# Patient Record
Sex: FEMALE | Race: WHITE | Employment: PART TIME | URBAN - NONMETROPOLITAN AREA
[De-identification: names, ages, dates, MRNs, and addresses within clinical notes are randomized per-mention and may not be internally consistent; named-entity substitution may affect disease eponyms.]

---

## 2018-07-31 ENCOUNTER — OFFICE VISIT (OUTPATIENT)
Dept: FAMILY MEDICINE CLINIC | Age: 38
End: 2018-07-31
Payer: MEDICAID

## 2018-07-31 VITALS
RESPIRATION RATE: 18 BRPM | SYSTOLIC BLOOD PRESSURE: 120 MMHG | WEIGHT: 238 LBS | BODY MASS INDEX: 38.25 KG/M2 | HEIGHT: 66 IN | DIASTOLIC BLOOD PRESSURE: 70 MMHG | HEART RATE: 72 BPM

## 2018-07-31 DIAGNOSIS — B35.1 ONYCHOMYCOSIS: ICD-10-CM

## 2018-07-31 DIAGNOSIS — M54.50 CHRONIC LOW BACK PAIN WITHOUT SCIATICA, UNSPECIFIED BACK PAIN LATERALITY: Primary | ICD-10-CM

## 2018-07-31 DIAGNOSIS — G89.29 CHRONIC NECK PAIN: ICD-10-CM

## 2018-07-31 DIAGNOSIS — M54.2 CHRONIC NECK PAIN: ICD-10-CM

## 2018-07-31 DIAGNOSIS — G89.29 CHRONIC LOW BACK PAIN WITHOUT SCIATICA, UNSPECIFIED BACK PAIN LATERALITY: Primary | ICD-10-CM

## 2018-07-31 PROCEDURE — 99203 OFFICE O/P NEW LOW 30 MIN: CPT | Performed by: INTERNAL MEDICINE

## 2018-07-31 PROCEDURE — G8427 DOCREV CUR MEDS BY ELIG CLIN: HCPCS | Performed by: INTERNAL MEDICINE

## 2018-07-31 PROCEDURE — G8417 CALC BMI ABV UP PARAM F/U: HCPCS | Performed by: INTERNAL MEDICINE

## 2018-07-31 PROCEDURE — 1036F TOBACCO NON-USER: CPT | Performed by: INTERNAL MEDICINE

## 2018-07-31 RX ORDER — MELOXICAM 15 MG/1
15 TABLET ORAL DAILY
Qty: 30 TABLET | Refills: 0 | Status: SHIPPED | OUTPATIENT
Start: 2018-07-31 | End: 2018-08-28 | Stop reason: SDUPTHER

## 2018-07-31 RX ORDER — OMEPRAZOLE 20 MG/1
20 CAPSULE, DELAYED RELEASE ORAL DAILY
COMMUNITY
End: 2018-09-19 | Stop reason: SDUPTHER

## 2018-07-31 RX ORDER — IBUPROFEN 800 MG/1
800 TABLET ORAL EVERY 6 HOURS PRN
COMMUNITY
End: 2018-09-19 | Stop reason: SDUPTHER

## 2018-07-31 ASSESSMENT — ENCOUNTER SYMPTOMS
ABDOMINAL PAIN: 0
BOWEL INCONTINENCE: 0
COUGH: 0
SORE THROAT: 0
BLURRED VISION: 0
SHORTNESS OF BREATH: 0
HEARTBURN: 0
BACK PAIN: 1
NAUSEA: 0

## 2018-07-31 ASSESSMENT — PATIENT HEALTH QUESTIONNAIRE - PHQ9
1. LITTLE INTEREST OR PLEASURE IN DOING THINGS: 1
2. FEELING DOWN, DEPRESSED OR HOPELESS: 1
SUM OF ALL RESPONSES TO PHQ9 QUESTIONS 1 & 2: 2
SUM OF ALL RESPONSES TO PHQ QUESTIONS 1-9: 2

## 2018-07-31 NOTE — PROGRESS NOTES
Musculoskeletal: Positive for back pain and neck pain. Skin: Negative for rash. Neurological: Negative for dizziness and headaches. Psychiatric/Behavioral: Negative for depression. The patient is not nervous/anxious and does not have insomnia. Physical Exam:     Vitals:  /70 (Site: Left Arm, Position: Sitting, Cuff Size: Medium Adult)   Pulse 72   Resp 18   Ht 5' 6\" (1.676 m)   Wt 238 lb (108 kg)   LMP 07/14/2018   BMI 38.41 kg/m²       Physical Exam   Constitutional: She is oriented to person, place, and time. She appears well-developed and well-nourished. No distress. HENT:   Head: Normocephalic and atraumatic. Right Ear: External ear normal.   Left Ear: External ear normal.   Mouth/Throat: Oropharynx is clear and moist. No oropharyngeal exudate. Neck: No thyromegaly present. Cardiovascular: Normal rate, regular rhythm and normal heart sounds. No murmur heard. Pulmonary/Chest: Effort normal and breath sounds normal. She has no wheezes. She has no rales. Abdominal: Soft. Bowel sounds are normal. She exhibits no distension and no mass. There is no tenderness. Musculoskeletal: Normal range of motion. She exhibits tenderness (over the neck area and lumbar spine, range of motion normal). She exhibits no edema or deformity. Lymphadenopathy:     She has no cervical adenopathy. Neurological: She is alert and oriented to person, place, and time. No cranial nerve deficit. Skin: Skin is warm and dry. No rash noted. Her toenails are painted with red nail polish and I am not able to see the extent of fungal infection. The nails look very thick   Psychiatric: She has a normal mood and affect. Her behavior is normal. Judgment normal.   Vitals reviewed.             Data:     No results found for: NA, K, CL, CO2, BUN, CREATININE, GLUCOSE, PROT, LABALBU, BILITOT, ALKPHOS, AST, ALT  No results found for: WBC, RBC, HGB, HCT, MCV, MCH, MCHC, RDW, PLT, MPV  No results found for:

## 2018-08-05 ENCOUNTER — HOSPITAL ENCOUNTER (EMERGENCY)
Age: 38
Discharge: HOME | End: 2018-08-05
Attending: FAMILY MEDICINE
Payer: MEDICAID

## 2018-08-05 VITALS
OXYGEN SATURATION: 96 % | TEMPERATURE: 98.8 F | DIASTOLIC BLOOD PRESSURE: 78 MMHG | SYSTOLIC BLOOD PRESSURE: 126 MMHG | RESPIRATION RATE: 16 BRPM | HEIGHT: 67 IN | WEIGHT: 237 LBS | BODY MASS INDEX: 37.2 KG/M2 | HEART RATE: 68 BPM

## 2018-08-05 DIAGNOSIS — K59.00 CONSTIPATION, UNSPECIFIED CONSTIPATION TYPE: ICD-10-CM

## 2018-08-05 DIAGNOSIS — M62.830 SPASM OF BACK MUSCLES: Primary | ICD-10-CM

## 2018-08-05 PROCEDURE — 6360000002 HC RX W HCPCS: Performed by: FAMILY MEDICINE

## 2018-08-05 PROCEDURE — 96372 THER/PROPH/DIAG INJ SC/IM: CPT

## 2018-08-05 PROCEDURE — 99283 EMERGENCY DEPT VISIT LOW MDM: CPT

## 2018-08-05 RX ORDER — ACETAMINOPHEN 500 MG
1000 TABLET ORAL EVERY 6 HOURS PRN
COMMUNITY
End: 2021-02-15 | Stop reason: ALTCHOICE

## 2018-08-05 RX ORDER — IBUPROFEN 800 MG/1
800 TABLET ORAL EVERY 8 HOURS PRN
Qty: 30 TABLET | Refills: 0 | Status: SHIPPED | OUTPATIENT
Start: 2018-08-05 | End: 2018-09-19 | Stop reason: SDUPTHER

## 2018-08-05 RX ORDER — KETOROLAC TROMETHAMINE 30 MG/ML
60 INJECTION, SOLUTION INTRAMUSCULAR; INTRAVENOUS ONCE
Status: COMPLETED | OUTPATIENT
Start: 2018-08-05 | End: 2018-08-05

## 2018-08-05 RX ORDER — METHOCARBAMOL 500 MG/1
500 TABLET, FILM COATED ORAL 4 TIMES DAILY
Qty: 40 TABLET | Refills: 0 | Status: SHIPPED | OUTPATIENT
Start: 2018-08-05 | End: 2019-05-22 | Stop reason: SDUPTHER

## 2018-08-05 RX ORDER — ORPHENADRINE CITRATE 30 MG/ML
60 INJECTION INTRAMUSCULAR; INTRAVENOUS ONCE
Status: COMPLETED | OUTPATIENT
Start: 2018-08-05 | End: 2018-08-05

## 2018-08-05 RX ADMIN — ORPHENADRINE CITRATE 60 MG: 30 INJECTION INTRAMUSCULAR; INTRAVENOUS at 18:35

## 2018-08-05 RX ADMIN — KETOROLAC TROMETHAMINE 60 MG: 60 INJECTION, SOLUTION INTRAMUSCULAR at 18:34

## 2018-08-05 ASSESSMENT — PAIN SCALES - GENERAL
PAINLEVEL_OUTOF10: 9
PAINLEVEL_OUTOF10: 9

## 2018-08-05 ASSESSMENT — PAIN DESCRIPTION - LOCATION: LOCATION: BACK;SHOULDER

## 2018-08-05 ASSESSMENT — PAIN DESCRIPTION - ORIENTATION: ORIENTATION: LOWER;RIGHT;LEFT

## 2018-08-05 ASSESSMENT — PAIN DESCRIPTION - PAIN TYPE: TYPE: ACUTE PAIN;CHRONIC PAIN

## 2018-08-05 NOTE — ED NOTES
Pt having increased pain in upper back and shoulder from fall yesterday. Pt states, \"I am in a drug rehab center and can't have any narcotics but I have had Toradol before for pain and it helps. Also while I'm here I have been having trouble going to the bathroom and haven't had a bowel movement for 3 days. \"      Sanjiv Panchal RN  08/05/18 9273

## 2018-08-06 NOTE — ED PROVIDER NOTES
[facility]  eMERGENCY dEPARTMENT eNCOUnter          [address] [facility]       Chief Complaint   Patient presents with    Back Pain     Pt has pain in between shoulder blades and was playing wiffle ball yesterday and fell landing on both hands. Pt having increased pain in upper back and shoulders. Nurses Notes reviewed and I agree except as noted in the HPI. HISTORY OF PRESENT ILLNESS    Corrie Ramires is a 41 y.o. female who presents To emergency room complaint of back pain indicating pain between her shoulder blades, as well as a lower portion of her neck. Patient states  6 weeks prior she thinks she may have pulled a back muscle, had seen her PCP and was placed on low back at that time. Patient states day prior she was playing wiffleball when she fell and both hands, exacerbating pain indicating an area between her shoulder blades and lower portion of her neck posteriorly. Patient rates pain 9 out of 10, aching and cramping like. Patient denies any head strike denies any loss of consciousness from the fall. Patient does acknowledges she is in a drug rehab center he cannot have any narcotic medications, but does states she's had Toradol in the past that helps. Patient incidentally was saying that she was having some difficulty with bowel movements, had not had a bowel movement in a few days. REVIEW OF SYSTEMS     Review of Systems   All other systems reviewed and are negative. PAST MEDICAL HISTORY    has a past medical history of Anxiety; Bulging of lumbar intervertebral disc without myelopathy; Chronic back pain; Depression; GERD (gastroesophageal reflux disease); and Substance abuse. SURGICAL HISTORY      has a past surgical history that includes Pilonidal cyst excision (03/2018).     CURRENT MEDICATIONS       Discharge Medication List as of 8/5/2018  6:29 PM      CONTINUE these medications which have NOT CHANGED    Details   acetaminophen (TYLENOL) 500 MG otherwise noted,Negative acute trauma or deformity,  apparent full range of motion and normal strength all extremities appropriate to age. Neurological: Patient is alert and oriented to person, place, and time. patient displays no tremor. Patient displays no seizure activity. .     Skin: Skin is warm and dry. Patient is not diaphoretic. Psychiatric: Patient has a normal mood and affect. Patient speech is normal and behavior is normal. Cognition and memory are normal.      DIFFERENTIAL DIAGNOSIS:    muscle spasm trapezius versus rhomboid versus capit suture, constipation    DIAGNOSTIC RESULTS         RADIOLOGY: non-plain film images(s) such as CT, Ultrasound and MRI are read by the radiologist.  No orders to display       LABS:   Labs Reviewed - No data to display    EMERGENCY DEPARTMENT COURSE:   Vitals:    Vitals:    08/05/18 1803   BP: 126/78   Pulse: 68   Resp: 16   Temp: 98.8 °F (37.1 °C)   TempSrc: Oral   SpO2: 96%   Weight: 237 lb (107.5 kg)   Height: 5' 7\" (1.702 m)      patient history and physical exam taken at bedside, discussed patient's symptoms and exam finds, discussed the clinical diagnosis of spasms of the upper back, confirmed allergies will give IM ketorolac and orphenadrine, in light of patient being in a drug rehab center, she can have Motrin 800 and she is use Robaxin the past which is allowed, will prescribe a same. Discussed patient's constipation, discussed importance of hydration, slowly increasing her fiber over time, in the interim can use MiraLAX and/or Colace for cyst, as well as other methods such as milk of magnesia, magnesium citrate, etc.  Patient to follow with PCP as needed, acknowledges    FINAL IMPRESSION      1. Spasm of back muscles    2.  Constipation, unspecified constipation type          DISPOSITION/PLAN   discharge    [facility]:  [facility] [id number] [facility]  [phone]    Call   As needed      DISCHARGE MEDICATIONS:  Discharge Medication List as of 8/5/2018  6:29 PM      START taking these medications    Details   !! ibuprofen (ADVIL;MOTRIN) 800 MG tablet Take 1 tablet by mouth every 8 hours as needed for Pain, Disp-30 tablet, R-0Print      methocarbamol (ROBAXIN) 500 MG tablet Take 1 tablet by mouth 4 times daily for 10 days, Disp-40 tablet, R-0Print       !! - Potential duplicate medications found. Please discuss with provider. Summation      Patient Course:  discharge    ED Medications administered this visit:    Medications   orphenadrine (NORFLEX) injection 60 mg (60 mg Intramuscular Given 8/5/18 1835)   ketorolac (TORADOL) injection 60 mg (60 mg Intramuscular Given 8/5/18 1834)       New Prescriptions from this visit:    Discharge Medication List as of 8/5/2018  6:29 PM      START taking these medications    Details   !! ibuprofen (ADVIL;MOTRIN) 800 MG tablet Take 1 tablet by mouth every 8 hours as needed for Pain, Disp-30 tablet, R-0Print      methocarbamol (ROBAXIN) 500 MG tablet Take 1 tablet by mouth 4 times daily for 10 days, Disp-40 tablet, R-0Print       !! - Potential duplicate medications found. Please discuss with provider. Follow-up:  Doris Wooten MD  [address]  [phone]    Call   As needed        Final Impression:   1. Spasm of back muscles    2.  Constipation, unspecified constipation type               (Please note that portions of this note were completed with a voice recognition program.  Efforts were made to edit the dictations but occasionally words are mis-transcribed.)    MD Brian Sanchez MD  08/06/18 4510

## 2018-08-28 DIAGNOSIS — G89.29 CHRONIC LOW BACK PAIN WITHOUT SCIATICA, UNSPECIFIED BACK PAIN LATERALITY: ICD-10-CM

## 2018-08-28 DIAGNOSIS — M54.50 CHRONIC LOW BACK PAIN WITHOUT SCIATICA, UNSPECIFIED BACK PAIN LATERALITY: ICD-10-CM

## 2018-08-28 RX ORDER — MELOXICAM 15 MG/1
15 TABLET ORAL DAILY
Qty: 30 TABLET | Refills: 0 | Status: SHIPPED | OUTPATIENT
Start: 2018-08-28 | End: 2018-10-02 | Stop reason: SDUPTHER

## 2018-09-19 ENCOUNTER — HOSPITAL ENCOUNTER (OUTPATIENT)
Age: 38
Discharge: HOME | End: 2018-09-19
Payer: MEDICAID

## 2018-09-19 ENCOUNTER — OFFICE VISIT (OUTPATIENT)
Dept: FAMILY MEDICINE CLINIC | Age: 38
End: 2018-09-19
Payer: MEDICAID

## 2018-09-19 VITALS
BODY MASS INDEX: 37.51 KG/M2 | DIASTOLIC BLOOD PRESSURE: 70 MMHG | WEIGHT: 239 LBS | HEIGHT: 67 IN | SYSTOLIC BLOOD PRESSURE: 128 MMHG

## 2018-09-19 DIAGNOSIS — R20.2 NUMBNESS AND TINGLING IN LEFT HAND: Primary | ICD-10-CM

## 2018-09-19 DIAGNOSIS — B35.1 ONYCHOMYCOSIS: ICD-10-CM

## 2018-09-19 DIAGNOSIS — G89.29 CHRONIC BILATERAL LOW BACK PAIN WITHOUT SCIATICA: ICD-10-CM

## 2018-09-19 DIAGNOSIS — M62.838 MUSCLE SPASM: ICD-10-CM

## 2018-09-19 DIAGNOSIS — R20.2 NUMBNESS AND TINGLING IN LEFT HAND: ICD-10-CM

## 2018-09-19 DIAGNOSIS — R20.0 NUMBNESS AND TINGLING IN LEFT HAND: ICD-10-CM

## 2018-09-19 DIAGNOSIS — Z87.898 HISTORY OF HEAVY ALCOHOL CONSUMPTION: ICD-10-CM

## 2018-09-19 DIAGNOSIS — M54.50 CHRONIC BILATERAL LOW BACK PAIN WITHOUT SCIATICA: ICD-10-CM

## 2018-09-19 DIAGNOSIS — L40.9 PSORIASIS: ICD-10-CM

## 2018-09-19 DIAGNOSIS — R20.0 NUMBNESS AND TINGLING IN LEFT HAND: Primary | ICD-10-CM

## 2018-09-19 LAB
ABSOLUTE EOS #: 0.1 K/UL (ref 0–0.4)
ABSOLUTE IMMATURE GRANULOCYTE: NORMAL K/UL (ref 0–0.3)
ABSOLUTE LYMPH #: 2.5 K/UL (ref 1–4.8)
ABSOLUTE MONO #: 0.8 K/UL (ref 0–1)
ALBUMIN SERPL-MCNC: 4.5 G/DL (ref 3.5–5.2)
ALBUMIN/GLOBULIN RATIO: ABNORMAL (ref 1–2.5)
ALP BLD-CCNC: 69 U/L (ref 35–104)
ALT SERPL-CCNC: 21 U/L (ref 5–33)
ANION GAP SERPL CALCULATED.3IONS-SCNC: 11 MMOL/L (ref 9–17)
AST SERPL-CCNC: 14 U/L
BASOPHILS # BLD: 0 % (ref 0–2)
BASOPHILS ABSOLUTE: 0 K/UL (ref 0–0.2)
BILIRUB SERPL-MCNC: 0.21 MG/DL (ref 0.3–1.2)
BUN BLDV-MCNC: 10 MG/DL (ref 6–20)
BUN/CREAT BLD: 12 (ref 9–20)
CALCIUM SERPL-MCNC: 9.5 MG/DL (ref 8.6–10.4)
CHLORIDE BLD-SCNC: 102 MMOL/L (ref 98–107)
CO2: 27 MMOL/L (ref 20–31)
CREAT SERPL-MCNC: 0.81 MG/DL (ref 0.5–0.9)
DIFFERENTIAL TYPE: YES
EOSINOPHILS RELATIVE PERCENT: 1 % (ref 0–5)
ESTIMATED AVERAGE GLUCOSE: 105 MG/DL
FOLATE: 10.9 NG/ML
GFR AFRICAN AMERICAN: >60 ML/MIN
GFR NON-AFRICAN AMERICAN: >60 ML/MIN
GFR SERPL CREATININE-BSD FRML MDRD: ABNORMAL ML/MIN/{1.73_M2}
GFR SERPL CREATININE-BSD FRML MDRD: ABNORMAL ML/MIN/{1.73_M2}
GLUCOSE BLD-MCNC: 83 MG/DL (ref 70–99)
HBA1C MFR BLD: 5.3 % (ref 4.8–5.9)
HCT VFR BLD CALC: 38.2 % (ref 36–46)
HEMOGLOBIN: 13.1 G/DL (ref 12–16)
IMMATURE GRANULOCYTES: NORMAL %
LYMPHOCYTES # BLD: 26 % (ref 15–40)
MAGNESIUM: 2.5 MG/DL (ref 1.6–2.6)
MCH RBC QN AUTO: 29 PG (ref 26–34)
MCHC RBC AUTO-ENTMCNC: 34.4 G/DL (ref 31–37)
MCV RBC AUTO: 84.2 FL (ref 80–100)
MONOCYTES # BLD: 8 % (ref 4–8)
NRBC AUTOMATED: NORMAL PER 100 WBC
PDW BLD-RTO: 14.1 % (ref 12.1–15.2)
PLATELET # BLD: 347 K/UL (ref 140–450)
PLATELET ESTIMATE: NORMAL
PMV BLD AUTO: NORMAL FL (ref 6–12)
POTASSIUM SERPL-SCNC: 3.7 MMOL/L (ref 3.7–5.3)
RBC # BLD: 4.53 M/UL (ref 4–5.2)
RBC # BLD: NORMAL 10*6/UL
SEG NEUTROPHILS: 65 % (ref 47–75)
SEGMENTED NEUTROPHILS ABSOLUTE COUNT: 6 K/UL (ref 2.5–7)
SODIUM BLD-SCNC: 140 MMOL/L (ref 135–144)
TOTAL PROTEIN: 7.3 G/DL (ref 6.4–8.3)
TSH SERPL DL<=0.05 MIU/L-ACNC: 1.81 MIU/L (ref 0.3–5)
VITAMIN B-12: 355 PG/ML (ref 232–1245)
WBC # BLD: 9.5 K/UL (ref 3.5–11)
WBC # BLD: NORMAL 10*3/UL

## 2018-09-19 PROCEDURE — 36415 COLL VENOUS BLD VENIPUNCTURE: CPT

## 2018-09-19 PROCEDURE — G8417 CALC BMI ABV UP PARAM F/U: HCPCS | Performed by: FAMILY MEDICINE

## 2018-09-19 PROCEDURE — 1036F TOBACCO NON-USER: CPT | Performed by: FAMILY MEDICINE

## 2018-09-19 PROCEDURE — 82746 ASSAY OF FOLIC ACID SERUM: CPT

## 2018-09-19 PROCEDURE — 83735 ASSAY OF MAGNESIUM: CPT

## 2018-09-19 PROCEDURE — 83036 HEMOGLOBIN GLYCOSYLATED A1C: CPT

## 2018-09-19 PROCEDURE — 84443 ASSAY THYROID STIM HORMONE: CPT

## 2018-09-19 PROCEDURE — 82607 VITAMIN B-12: CPT

## 2018-09-19 PROCEDURE — G8427 DOCREV CUR MEDS BY ELIG CLIN: HCPCS | Performed by: FAMILY MEDICINE

## 2018-09-19 PROCEDURE — 85025 COMPLETE CBC W/AUTO DIFF WBC: CPT

## 2018-09-19 PROCEDURE — 80053 COMPREHEN METABOLIC PANEL: CPT

## 2018-09-19 PROCEDURE — 99204 OFFICE O/P NEW MOD 45 MIN: CPT | Performed by: FAMILY MEDICINE

## 2018-09-19 RX ORDER — IBUPROFEN 800 MG/1
800 TABLET ORAL EVERY 8 HOURS PRN
Qty: 90 TABLET | Refills: 1 | Status: SHIPPED | OUTPATIENT
Start: 2018-09-19 | End: 2019-07-15 | Stop reason: SDUPTHER

## 2018-09-19 RX ORDER — OMEPRAZOLE 20 MG/1
20 CAPSULE, DELAYED RELEASE ORAL DAILY
Qty: 30 CAPSULE | Refills: 5 | Status: SHIPPED | OUTPATIENT
Start: 2018-09-19 | End: 2019-03-25 | Stop reason: SDUPTHER

## 2018-09-19 NOTE — PROGRESS NOTES
toenail thickened a few mm, crumbly under the surface. 4th and 5th toenails thickened to a lesser degree. Mild erythema and peeling of R sole also. L foot skin WNL. Psychiatric: She has a normal mood and affect. Her behavior is normal.   Nursing note and vitals reviewed. Assessment & Plan:        Diagnosis Orders   1. Numbness and tingling in left hand  Comprehensive Metabolic Panel    CBC Auto Differential    Hemoglobin A1C    TSH with Reflex    Magnesium    Vitamin B12 & Folate    EMG   2. Muscle spasm  Comprehensive Metabolic Panel    CBC Auto Differential    Hemoglobin A1C    TSH with Reflex    Magnesium    Vitamin B12 & Folate   3. Psoriasis     4. History of heavy alcohol consumption     5. Onychomycosis     6. Chronic bilateral low back pain without sciatica     N/T in L hand ulnar nerve distribution. On exam has signs of compression at the neck, elbow, and wrist. EMG to differentiate further; labs. Interesting symptoms of widespread twitching and pain which started few wks after pt had ceased use of alcohol, street drugs, and psych meds and has persisted, so they're not r/t withdrawal from any substance. Not convincing for seizures either. May be variant of RLS. Again labs and EMG may reveal cause. Consider neuro referral  Mild psoriasis of scalp, elbows. Cont good moisturizing and use kenalog ointment prn.  H/o heavy alcohol, sober x 75 days and in intensive residential treatment program. No known liver disease. penlac for onychomycosis and advised otc athlete's foot tx for R tinea pedis  Low back pain ok on nsaid's, exercise. Cont same. Ok to use occasional ibuprofen along with the mobic.          Requested Prescriptions     Signed Prescriptions Disp Refills    omeprazole (PRILOSEC) 20 MG delayed release capsule 30 capsule 5     Sig: Take 1 capsule by mouth daily    ibuprofen (ADVIL;MOTRIN) 800 MG tablet 90 tablet 1     Sig: Take 1 tablet by mouth every 8 hours as needed for Pain    ciclopirox

## 2018-09-20 ASSESSMENT — ENCOUNTER SYMPTOMS
EYES NEGATIVE: 1
ALLERGIC/IMMUNOLOGIC NEGATIVE: 1
GASTROINTESTINAL NEGATIVE: 1
RESPIRATORY NEGATIVE: 1

## 2018-10-02 DIAGNOSIS — M54.50 CHRONIC LOW BACK PAIN WITHOUT SCIATICA, UNSPECIFIED BACK PAIN LATERALITY: ICD-10-CM

## 2018-10-02 DIAGNOSIS — G89.29 CHRONIC LOW BACK PAIN WITHOUT SCIATICA, UNSPECIFIED BACK PAIN LATERALITY: ICD-10-CM

## 2018-10-02 RX ORDER — MELOXICAM 15 MG/1
15 TABLET ORAL DAILY
Qty: 30 TABLET | Refills: 5 | Status: SHIPPED | OUTPATIENT
Start: 2018-10-02 | End: 2019-03-25 | Stop reason: SDUPTHER

## 2018-10-02 NOTE — TELEPHONE ENCOUNTER
Requesting a refill on Meloxicam 15mg    Drug [address] Maintenance   Topic Date Due    HIV screen  03/26/1997    DTaP/Tdap/Td vaccine (1 - Tdap) 03/26/2001    Cervical cancer screen  03/26/2003    Flu vaccine (1) 09/01/2018             (applicable per patient's age: Cancer Screenings, Depression Screening, Fall Risk Screening, Immunizations)    Hemoglobin A1C (%)   Date Value   09/19/2018 5.3     AST (U/L)   Date Value   09/19/2018 14     ALT (U/L)   Date Value   09/19/2018 21     BUN (mg/dL)   Date Value   09/19/2018 10      (goal A1C is < 7)   (goal LDL is <100) need 30-50% reduction from baseline     BP Readings from Last 3 Encounters:   09/19/18 128/70   08/05/18 126/78   07/31/18 120/70    (goal /80)      All Future Testing planned in [facility]:  Lab Frequency Next Occurrence   EMG Once 10/03/2018       Next Visit Date:  No future appointments.          Patient Active Problem List:     Chronic neck pain     Onychomycosis     Chronic low back pain without sciatica

## 2018-10-11 ENCOUNTER — TELEPHONE (OUTPATIENT)
Dept: FAMILY MEDICINE CLINIC | Age: 38
End: 2018-10-11

## 2019-03-25 DIAGNOSIS — M54.50 CHRONIC LOW BACK PAIN WITHOUT SCIATICA, UNSPECIFIED BACK PAIN LATERALITY: ICD-10-CM

## 2019-03-25 DIAGNOSIS — G89.29 CHRONIC LOW BACK PAIN WITHOUT SCIATICA, UNSPECIFIED BACK PAIN LATERALITY: ICD-10-CM

## 2019-03-25 RX ORDER — MELOXICAM 15 MG/1
15 TABLET ORAL DAILY
Qty: 30 TABLET | Refills: 1 | Status: SHIPPED | OUTPATIENT
Start: 2019-03-25 | End: 2019-05-22

## 2019-03-25 RX ORDER — OMEPRAZOLE 20 MG/1
20 CAPSULE, DELAYED RELEASE ORAL DAILY
Qty: 30 CAPSULE | Refills: 1 | Status: SHIPPED | OUTPATIENT
Start: 2019-03-25 | End: 2019-05-22 | Stop reason: SDUPTHER

## 2019-03-26 ENCOUNTER — HOSPITAL ENCOUNTER (OUTPATIENT)
Age: 39
Setting detail: SPECIMEN
Discharge: HOME | End: 2019-03-26
Payer: MEDICAID

## 2019-03-26 ENCOUNTER — OFFICE VISIT (OUTPATIENT)
Dept: OBGYN CLINIC | Age: 39
End: 2019-03-26
Payer: MEDICAID

## 2019-03-26 VITALS
SYSTOLIC BLOOD PRESSURE: 118 MMHG | WEIGHT: 224 LBS | BODY MASS INDEX: 33.95 KG/M2 | DIASTOLIC BLOOD PRESSURE: 70 MMHG | RESPIRATION RATE: 16 BRPM | HEIGHT: 68 IN

## 2019-03-26 DIAGNOSIS — N92.6 IRREGULAR MENSTRUAL CYCLE: ICD-10-CM

## 2019-03-26 DIAGNOSIS — B35.6 TINEA CRURIS: ICD-10-CM

## 2019-03-26 DIAGNOSIS — Z23 NEED FOR INFLUENZA VACCINATION: Primary | ICD-10-CM

## 2019-03-26 DIAGNOSIS — Z12.4 ENCOUNTER FOR SCREENING FOR CERVICAL CANCER: ICD-10-CM

## 2019-03-26 PROCEDURE — 90471 IMMUNIZATION ADMIN: CPT | Performed by: OBSTETRICS & GYNECOLOGY

## 2019-03-26 PROCEDURE — G0145 SCR C/V CYTO,THINLAYER,RESCR: HCPCS

## 2019-03-26 PROCEDURE — 90686 IIV4 VACC NO PRSV 0.5 ML IM: CPT | Performed by: OBSTETRICS & GYNECOLOGY

## 2019-03-26 PROCEDURE — G8484 FLU IMMUNIZE NO ADMIN: HCPCS | Performed by: OBSTETRICS & GYNECOLOGY

## 2019-03-26 PROCEDURE — 99385 PREV VISIT NEW AGE 18-39: CPT | Performed by: OBSTETRICS & GYNECOLOGY

## 2019-03-26 RX ORDER — DROSPIRENONE AND ETHINYL ESTRADIOL 0.03MG-3MG
1 KIT ORAL DAILY
Qty: 1 PACKET | Refills: 12 | Status: SHIPPED | OUTPATIENT
Start: 2019-03-26 | End: 2019-05-22 | Stop reason: ALTCHOICE

## 2019-03-26 RX ORDER — NYSTATIN 100000 [USP'U]/G
POWDER TOPICAL
Qty: 1 BOTTLE | Refills: 1 | Status: SHIPPED | OUTPATIENT
Start: 2019-03-26 | End: 2019-07-15 | Stop reason: ALTCHOICE

## 2019-03-27 LAB — COMMENT: NORMAL

## 2019-04-09 LAB — CYTOLOGY REPORT: NORMAL

## 2019-05-22 ENCOUNTER — OFFICE VISIT (OUTPATIENT)
Dept: FAMILY MEDICINE CLINIC | Age: 39
End: 2019-05-22
Payer: MEDICAID

## 2019-05-22 ENCOUNTER — HOSPITAL ENCOUNTER (OUTPATIENT)
Age: 39
Discharge: HOME | End: 2019-05-22
Payer: MEDICAID

## 2019-05-22 ENCOUNTER — TELEPHONE (OUTPATIENT)
Dept: FAMILY MEDICINE CLINIC | Age: 39
End: 2019-05-22

## 2019-05-22 VITALS
WEIGHT: 218 LBS | HEIGHT: 68 IN | SYSTOLIC BLOOD PRESSURE: 120 MMHG | DIASTOLIC BLOOD PRESSURE: 82 MMHG | BODY MASS INDEX: 33.04 KG/M2

## 2019-05-22 DIAGNOSIS — Z79.899 HIGH RISK MEDICATION USE: ICD-10-CM

## 2019-05-22 DIAGNOSIS — G89.29 CHRONIC NECK PAIN: ICD-10-CM

## 2019-05-22 DIAGNOSIS — F41.9 ANXIETY: ICD-10-CM

## 2019-05-22 DIAGNOSIS — Z79.899 HIGH RISK MEDICATION USE: Primary | ICD-10-CM

## 2019-05-22 DIAGNOSIS — M54.2 CHRONIC NECK PAIN: ICD-10-CM

## 2019-05-22 DIAGNOSIS — K21.9 GASTROESOPHAGEAL REFLUX DISEASE WITHOUT ESOPHAGITIS: ICD-10-CM

## 2019-05-22 DIAGNOSIS — G89.29 CHRONIC BILATERAL LOW BACK PAIN WITHOUT SCIATICA: Primary | ICD-10-CM

## 2019-05-22 DIAGNOSIS — F19.11 HISTORY OF SUBSTANCE ABUSE (HCC): ICD-10-CM

## 2019-05-22 DIAGNOSIS — B35.1 ONYCHOMYCOSIS OF TOENAIL: ICD-10-CM

## 2019-05-22 DIAGNOSIS — B35.1 ONYCHOMYCOSIS OF RIGHT GREAT TOE: ICD-10-CM

## 2019-05-22 DIAGNOSIS — M54.50 CHRONIC BILATERAL LOW BACK PAIN WITHOUT SCIATICA: Primary | ICD-10-CM

## 2019-05-22 LAB
ALBUMIN SERPL-MCNC: 4.8 G/DL (ref 3.5–5.2)
ALBUMIN/GLOBULIN RATIO: ABNORMAL (ref 1–2.5)
ALP BLD-CCNC: 62 U/L (ref 35–104)
ALT SERPL-CCNC: 20 U/L (ref 5–33)
AST SERPL-CCNC: 16 U/L
BILIRUB SERPL-MCNC: 0.2 MG/DL (ref 0.3–1.2)
BILIRUBIN DIRECT: <0.08 MG/DL
BILIRUBIN, INDIRECT: ABNORMAL MG/DL (ref 0–1)
GLOBULIN: ABNORMAL G/DL (ref 1.5–3.8)
TOTAL PROTEIN: 7.4 G/DL (ref 6.4–8.3)

## 2019-05-22 PROCEDURE — 36415 COLL VENOUS BLD VENIPUNCTURE: CPT

## 2019-05-22 PROCEDURE — 1036F TOBACCO NON-USER: CPT | Performed by: FAMILY MEDICINE

## 2019-05-22 PROCEDURE — G8417 CALC BMI ABV UP PARAM F/U: HCPCS | Performed by: FAMILY MEDICINE

## 2019-05-22 PROCEDURE — 99214 OFFICE O/P EST MOD 30 MIN: CPT | Performed by: FAMILY MEDICINE

## 2019-05-22 PROCEDURE — 80076 HEPATIC FUNCTION PANEL: CPT

## 2019-05-22 PROCEDURE — G8427 DOCREV CUR MEDS BY ELIG CLIN: HCPCS | Performed by: FAMILY MEDICINE

## 2019-05-22 RX ORDER — TERBINAFINE HYDROCHLORIDE 250 MG/1
250 TABLET ORAL DAILY
Qty: 84 TABLET | Refills: 0 | Status: SHIPPED | OUTPATIENT
Start: 2019-05-22 | End: 2019-06-20 | Stop reason: SDUPTHER

## 2019-05-22 RX ORDER — OMEPRAZOLE 40 MG/1
40 CAPSULE, DELAYED RELEASE ORAL DAILY
Qty: 30 CAPSULE | Refills: 2 | Status: SHIPPED | OUTPATIENT
Start: 2019-05-22 | End: 2019-07-15 | Stop reason: SDUPTHER

## 2019-05-22 RX ORDER — METHOCARBAMOL 500 MG/1
500 TABLET, FILM COATED ORAL 3 TIMES DAILY PRN
Qty: 60 TABLET | Refills: 0 | Status: SHIPPED | OUTPATIENT
Start: 2019-05-22 | End: 2019-06-20 | Stop reason: SDUPTHER

## 2019-05-22 RX ORDER — IBUPROFEN 800 MG/1
800 TABLET ORAL EVERY 8 HOURS PRN
Qty: 60 TABLET | Refills: 1 | Status: SHIPPED | OUTPATIENT
Start: 2019-05-22 | End: 2019-07-15 | Stop reason: SDUPTHER

## 2019-05-22 ASSESSMENT — PATIENT HEALTH QUESTIONNAIRE - PHQ9
1. LITTLE INTEREST OR PLEASURE IN DOING THINGS: 0
SUM OF ALL RESPONSES TO PHQ QUESTIONS 1-9: 0
SUM OF ALL RESPONSES TO PHQ9 QUESTIONS 1 & 2: 0
2. FEELING DOWN, DEPRESSED OR HOPELESS: 0
SUM OF ALL RESPONSES TO PHQ QUESTIONS 1-9: 0

## 2019-05-22 NOTE — TELEPHONE ENCOUNTER
----- Message from Waqas Castillo DO sent at 5/22/2019 10:45 AM EDT -----  Liver function ok, prescribing terbinafine for toenail fungus. Needs to repeat hepatic function panel in 3 mos.

## 2019-05-22 NOTE — PROGRESS NOTES
Name: Gustavo Burciaga  : 1980         Chief Complaint:     Chief Complaint   Patient presents with    Gastroesophageal Reflux    Insomnia     Out on her own after completing [employer]. out for 3 weeks , living with her sister    Back Pain    Rash     groin       History of Present Illness:      Gustavo Burciaga is a 42 y.o.  female who presents with Gastroesophageal Reflux; Insomnia (Out on her own after completing [employer]. out for 3 weeks , living with her sister); Back Pain; and Rash (groin)      HPI     Patient presents for follow-up of anxiety. She has a history of substance abuse including alcohol, pain meds, benzos. Remote history of cocaine use. She participated in and completed the teen challenge substance abuse discipleship program, finished 3 weeks ago and is now living with her sister who also did the program a couple of years ago. Pt has been able to abstain from substances. Some cravings for alcohol at times but prays through it. Sometimes eats sweets at night to help with that. Anxiety bothering her. Feels at [facility] some perfectionism was instilled in her and she started having constipation, L eye twitching, tension in upper shoulders and neck, \"deep sense of calamity\" in chest. Anxiety started after she started working. For 2 wks between [facility] and working she had good BM's and the eye stopped twitching. Trouble sleeping, goes to bed around midnight but then wakes up 3:30-4 and is up for a while. Slept a lot better while in [employer].    Chronic neck pain, sees chiro q2 wks. Knows she holds stress in her neck and shoulders. A lot of back pain also, working as . Was using robaxin prn while at [facility] and it did help. Does some stretching and it helps some days more than others. Toenail fungus, gavin on R great toenail, didn't resolve despite use of penlac for 7 straight mos. Bothersome GERD, thinks r/t drinking more coffee and also drinking some pop every day. Trying to cut back. Heartburn starts first thing in the morning. Using pepto, nauzene chews. 3 days ago started taking 2 omeprazole 20 mg caps daily and it hasn't helped yet. Past Medical History:     Past Medical History:   Diagnosis Date    Anxiety     Bulging of lumbar intervertebral disc without myelopathy     L1    Chronic back pain     Depression     GERD (gastroesophageal reflux disease)     Substance abuse ([address]         Past Surgical History:     Past Surgical History:   Procedure Laterality Date    PILONIDAL CYST EXCISION  03/2018        Medications:       Prior to Admission medications    Medication Sig Start Date End Date Taking? Authorizing Provider   omeprazole (PRILOSEC) 40 MG delayed release capsule Take 1 capsule by mouth daily 5/22/19  Yes Luciano Oats, DO   methocarbamol (ROBAXIN) 500 MG tablet Take 1 tablet by mouth 3 times daily as needed (neck pain) 5/22/19  Yes Luciano Oats, DO   ibuprofen (ADVIL;MOTRIN) 800 MG tablet Take 1 tablet by mouth every 8 hours as needed for Pain 5/22/19  Yes Luciano Oats, DO   terbinafine (LAMISIL) 250 MG tablet Take 1 tablet by mouth daily 5/22/19 8/14/19 Yes Luciano Oats, DO   nystatin (MYCOSTATIN) 200051 UNIT/GM powder Apply 2 times daily as needed until rash clears 3/26/19  Yes Valerio Ron MD   ibuprofen (ADVIL;MOTRIN) 800 MG tablet Take 1 tablet by mouth every 8 hours as needed for Pain  Patient taking differently: Take 1 tablet by mouth every 8 hours as needed for Pain 9/19/18   Luciano Jordans, DO   ciclopirox (PENLAC) 8 % solution Apply topically nightly. 9/19/18   Luciano Oats, DO   acetaminophen (TYLENOL) 500 MG tablet Take 1,000 mg by mouth every 6 hours as needed for Pain     Historical Provider, MD        Allergies:       Patient has no known allergies. Social History:     Tobacco:    reports that she quit smoking about 10 months ago. Her smoking use included cigarettes. She has a 4.00 pack-year smoking history.  She has never used smokeless tobacco.  Alcohol:      reports that she drinks alcohol. Drug Use:  reports that she has current or past drug history. Drugs: Opiates  and Methamphetamines. Family History:     Family History   Problem Relation Age of Onset    Diabetes Mother        Review of Systems:     Positive and Negative as described in HPI    Review of Systems   Constitutional: Negative. Respiratory: Negative. Genitourinary: Negative. Physical Exam:     Vitals:  /82   Ht 5' 8\" (1.727 m)   Wt 218 lb (98.9 kg)   BMI 33.15 kg/m²   Physical Exam   Constitutional: She is oriented to person, place, and time. She appears well-developed and well-nourished. HENT:   Head: Normocephalic and atraumatic. Right Ear: Hearing and tympanic membrane normal.   Left Ear: Hearing and tympanic membrane normal.   Nose: No mucosal edema or rhinorrhea. Mouth/Throat: Oropharynx is clear and moist.   Eyes: Pupils are equal, round, and reactive to light. Conjunctivae and EOM are normal.   Neck: Neck supple. No thyroid mass and no thyromegaly present. Cardiovascular: Normal rate, regular rhythm, S1 normal and S2 normal.   No murmur heard. No peripheral edema. Pulmonary/Chest: Effort normal and breath sounds normal.   Abdominal: Soft. Bowel sounds are normal. There is no hepatosplenomegaly. There is no tenderness. Musculoskeletal:   Hypertonicity cuca traps. Mild ttp and hypertonicity cuca lumbar paraspinals. No obvious somatic dysfn. Neg SLR bilaterally. Lymphadenopathy:     She has no cervical adenopathy. Neurological: She is alert and oriented to person, place, and time. She has normal strength. Skin: Skin is warm and dry. No rash noted. R great toenail thick, dark, opaque, crumbly undersurface   Psychiatric: She has a normal mood and affect. Her behavior is normal.   Nursing note and vitals reviewed.       Data:     Lab Results   Component Value Date     09/19/2018    K 3.7 09/19/2018    CL 102 09/19/2018    CO2 27 09/19/2018    BUN 10 09/19/2018    CREATININE 0.81 09/19/2018    GLUCOSE 83 09/19/2018    PROT 7.4 05/22/2019    LABALBU 4.8 05/22/2019    BILITOT 0.20 05/22/2019    ALKPHOS 62 05/22/2019    AST 16 05/22/2019    ALT 20 05/22/2019     Lab Results   Component Value Date    WBC 9.5 09/19/2018    RBC 4.53 09/19/2018    HGB 13.1 09/19/2018    HCT 38.2 09/19/2018    MCV 84.2 09/19/2018    MCH 29.0 09/19/2018    MCHC 34.4 09/19/2018    RDW 14.1 09/19/2018     09/19/2018    MPV NOT REPORTED 09/19/2018     Lab Results   Component Value Date    TSH 1.81 09/19/2018     Lab Results   Component Value Date    LABA1C 5.3 09/19/2018         Assessment & Plan:        Diagnosis Orders   1. Chronic bilateral low back pain without sciatica  [facility] [place]   2. Chronic neck pain  [facility]   3. Onychomycosis of right great toe     4. High risk medication use  Hepatic Function Panel   5. Anxiety     6. History of substance abuse     7. Gastroesophageal reflux disease without esophagitis     low back and neck pain chronically with apparent DDD. Starting PT. Robaxin prn. Discussed that occasionally this is a med people do abuse. She has never felt any kind of high from the med and is confident it won't trigger cravings for her but will monitor closely. May also use ibuprofen prn - sparingly with GERD. R toenail onychomycosis which has failed topical tx. Advised of risks of liver damage and of need for monitoring but pt was desiring systemic tx. If liver enzymes wnl will rx terbinafine. Bothersome anxiety but not desiring med tx. Advised deep breathing periodically to help tension in neck and shoulders. Discussed h/o multi substance abuse, congratulated on sobriety. Continue working on same. GERD - cont omeprazole 40 mg daily and try to reduce/eliminate trigger food and drink. Return in about 3 months (around 8/22/2019) for gerd.           Requested Prescriptions     Signed Prescriptions Disp Refills    omeprazole (PRILOSEC) 40 MG delayed release capsule 30 capsule 2     Sig: Take 1 capsule by mouth daily    methocarbamol (ROBAXIN) 500 MG tablet 60 tablet 0     Sig: Take 1 tablet by mouth 3 times daily as needed (neck pain)    ibuprofen (ADVIL;MOTRIN) 800 MG tablet 60 tablet 1     Sig: Take 1 tablet by mouth every 8 hours as needed for Pain    terbinafine (LAMISIL) 250 MG tablet 84 tablet 0     Sig: Take 1 tablet by mouth daily       There are no Patient Instructions on file for this visit. Agustinaene Clarkine received counseling on the following healthy behaviors: medication adherence  Reviewed prior labs and health maintenance. Continue current medications, diet and exercise. Discussed use, benefit, and side effects of prescribed medications. Barriers to medication compliance addressed. Patient given educational materials - see patient instructions. All patient questions answered. Patient voiced understanding.      Electronically signed by Rima Allen DO on 5/26/2019 at 4:37 PM   [facility] [id number]  Dept: [phone]

## 2019-05-26 ASSESSMENT — ENCOUNTER SYMPTOMS: RESPIRATORY NEGATIVE: 1

## 2019-05-29 ENCOUNTER — HOSPITAL ENCOUNTER (OUTPATIENT)
Dept: PHYSICAL THERAPY | Age: 39
Setting detail: THERAPIES SERIES
Discharge: HOME | End: 2019-05-29
Payer: MEDICAID

## 2019-05-29 PROCEDURE — 97162 PT EVAL MOD COMPLEX 30 MIN: CPT

## 2019-05-29 ASSESSMENT — PAIN SCALES - GENERAL: PAINLEVEL_OUTOF10: 4

## 2019-05-29 ASSESSMENT — PAIN DESCRIPTION - ORIENTATION: ORIENTATION: RIGHT;LEFT

## 2019-05-29 ASSESSMENT — PAIN DESCRIPTION - LOCATION: LOCATION: BACK;NECK

## 2019-05-29 NOTE — PLAN OF CARE
[facility] TYSON       Phone: [phone]   Date: 2019                      Outpatient Physical Therapy  Fax: [phone]    ACCT #: [de-identified]                     Plan of Care  [facility]#: [id number]  Patient: Sara Chavez  : 1980    Referring Practitioner: Dr Garret Kasper    Referral Date : 19    Diagnosis: Chronic low back pain without sciatica, chronic neck pain  Onset Date: 19(Referral)  Treatment Diagnosis: back pain, neck pain    Assessment  Body structures, Functions, Activity limitations: Decreased functional mobility , Decreased strength  Assessment: Chronic back pain and new onset neck pain over past year. Decrease posture. Educated patient on posture and issued educational handout.  educated on and issued HEP handout. Plan for manual therapy, back education, therex with focus on core exercises. Prognosis: Good    Treatment Plan   Days: 2 times per week Weeks: 5 weeks Total # of Visits Approved: 10    Therapeutic Exercise, Patient Education/HEP, Back Education, Manual Therapy: Myofacial Release and Manual Therapy: Mobilization/Manipulation     Goals  Short term goals  Time Frame for Short term goals: 6  Short term goal 1: Patient to be independent with HEP for core strengthening  Short term goal 2: Patient to demonstrate proper posture and lifting following education  Long term goals  Time Frame for Long term goals : 10  Long term goal 1: Decrease neck pain 2/10 x 3days  Long term goal 2: Decrease low back pain 4/10 at worst x 3 days  Long term goal 3: Improve functional activities with score >8/50 on Neck Disability Index     Brea Robles, PT   Date: 2019    ______________________________________ Date: 2019  Physician Signature  By signing above or cosigning electronically, I have reviewed this Plan of Care and certify a need for medically necessary rehabilitation services.
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
## 2019-05-31 ENCOUNTER — HOSPITAL ENCOUNTER (OUTPATIENT)
Dept: PHYSICAL THERAPY | Age: 39
Setting detail: THERAPIES SERIES
Discharge: HOME | End: 2019-05-31
Payer: MEDICAID

## 2019-05-31 PROCEDURE — 97110 THERAPEUTIC EXERCISES: CPT

## 2019-05-31 PROCEDURE — 97140 MANUAL THERAPY 1/> REGIONS: CPT

## 2019-05-31 ASSESSMENT — PAIN SCALES - GENERAL: PAINLEVEL_OUTOF10: 4

## 2019-05-31 NOTE — PROGRESS NOTES
Phone: [address]      Fax: [phone]                            Outpatient Physical Therapy                                                                            Daily Note    Date: 2019  Patient Name: Hallie Hardy        MRN: [id number]   ACCT#:  [de-identified]  : 1980  (42 y.o.)    Referring Practitioner: Dr Dayanara Angela    Referral Date : 19    Diagnosis: Chronic low back pain without sciatica, chronic neck pain  Treatment Diagnosis: back pain, neck pain    Onset Date: 19(Referral)  PT Insurance Information: Parth Bowens  Total # of Visits Approved: 10 Per Physician Order  Total # of Visits to Date: 2  No Show: 0  Canceled Appointment: 0    Pre-Treatment Pain:  410     Assessment  Assessment: Initiated ex and manual as outlined. She reports good compliance with HEP. Cues to not shift pelvis during \"bird dog ex\",  Pt noted feeling much better after session. Will cont.    Chart Reviewed: Yes    Plan  Plan: Continue with current plan    Exercises/Modalities/Manual:  See DocFlow Sheet    Education:   Patient Education: Heat, hydration       Goals  (Total # of Visits to Date: 2)   Short Term Goals - Time Frame for Short term goals: 6  Short term goal 1: Patient to be independent with HEP for core strengthening  Short term goal 2: Patient to demonstrate proper posture and lifting following education       Long Term Goals - Time Frame for Long term goals : 10  Long term goal 1: Decrease neck pain 2/10 x 3days  Long term goal 2: Decrease low back pain 4/10 at worst x 3 days  Long term goal 3: Improve functional activities with score >8/50 on Neck Disability Index       Post Treatment Pain:  2/10    Time In: 0858    Time Out : 0935     Timed Code Treatment Minutes: 37 Minutes  Total Treatment Time: 37 Minutes    Leopold Doll Chaffins PTA  Date: 2019

## 2019-06-04 ENCOUNTER — HOSPITAL ENCOUNTER (OUTPATIENT)
Dept: PHYSICAL THERAPY | Age: 39
Setting detail: THERAPIES SERIES
Discharge: HOME | End: 2019-06-04
Payer: MEDICAID

## 2019-06-04 PROCEDURE — 97110 THERAPEUTIC EXERCISES: CPT

## 2019-06-04 PROCEDURE — 97140 MANUAL THERAPY 1/> REGIONS: CPT

## 2019-06-04 ASSESSMENT — PAIN DESCRIPTION - LOCATION: LOCATION: BACK;NECK

## 2019-06-04 ASSESSMENT — PAIN SCALES - GENERAL: PAINLEVEL_OUTOF10: 4

## 2019-06-04 ASSESSMENT — PAIN DESCRIPTION - ORIENTATION: ORIENTATION: RIGHT;LEFT

## 2019-06-04 NOTE — PROGRESS NOTES
Phone: [address]      Fax: [phone]                            Outpatient Physical Therapy                                                                            Daily Note    Date: 2019  Patient Name: Tom Abarca        MRN: [id number]   ACCT#:  [de-identified]  : 1980  (42 y.o.)    Referring Practitioner: Dr Shon Rivera    Referral Date : 19    Diagnosis: Chronic low back pain without sciatica, chronic neck pain  Treatment Diagnosis: back pain, neck pain    Onset Date: 19(Referral)  PT Insurance Information: Kamini Batch  Total # of Visits Approved: 10 Per Physician Order  Total # of Visits to Date: 3    Pre-Treatment Pain:  4/10     Assessment  Assessment: Pain low back and neck 4/10. Reviewed HEP and progressed with strrengthening exercises with good tolerance. Educated patient on posture and proper lifting. Chart Reviewed: Yes    Plan  Plan: Continue with current plan    Exercises/Modalities/Manual:  See DocFlow Sheet    Education:    On proper lifting        Goals  (Total # of Visits to Date: 3)   Short Term Goals - Time Frame for Short term goals: 6  Short term goal 1: Patient to be independent with HEP for core strengthening  Short term goal 2: Patient to demonstrate proper posture and lifting following education             Long Term Goals - Time Frame for Long term goals : 10  Long term goal 1: Decrease neck pain 2/10 x 3days  Long term goal 2: Decrease low back pain 4/10 at worst x 3 days  Long term goal 3: Improve functional activities with score >8/50 on Neck Disability Index          Post Treatment Pain:  2/10    Time In: 10:35    Time Out : 11:20        Timed Code Treatment Minutes: 45 Minutes  Total Treatment Time: 39 Minutes    Hadley Barraza PT     Date: 2019

## 2019-06-07 ENCOUNTER — HOSPITAL ENCOUNTER (OUTPATIENT)
Dept: PHYSICAL THERAPY | Age: 39
Setting detail: THERAPIES SERIES
Discharge: HOME | End: 2019-06-07
Payer: MEDICAID

## 2019-06-07 PROCEDURE — 97140 MANUAL THERAPY 1/> REGIONS: CPT

## 2019-06-07 PROCEDURE — 97110 THERAPEUTIC EXERCISES: CPT

## 2019-06-11 ENCOUNTER — HOSPITAL ENCOUNTER (OUTPATIENT)
Dept: PHYSICAL THERAPY | Age: 39
Setting detail: THERAPIES SERIES
Discharge: HOME | End: 2019-06-11
Payer: MEDICAID

## 2019-06-11 PROCEDURE — 97140 MANUAL THERAPY 1/> REGIONS: CPT

## 2019-06-11 PROCEDURE — 97110 THERAPEUTIC EXERCISES: CPT

## 2019-06-11 ASSESSMENT — PAIN SCALES - GENERAL: PAINLEVEL_OUTOF10: 4

## 2019-06-11 NOTE — PROGRESS NOTES
Phone: [address]      Fax: [phone]                            Outpatient Physical Therapy                                                                            Daily Note    Date: 2019  Patient Name: Robert Ballesteros        MRN: [id number]   ACCT#:  [de-identified]  : 1980  (42 y.o.)    Referring Practitioner: Dr Ana Phillips    Referral Date : 19    Diagnosis: Chronic low back pain without sciatica, chronic neck pain  Treatment Diagnosis: back pain, neck pain    Onset Date: 19(Referral)  PT Insurance Information: [place]  Total # of Visits Approved: 10 Per Physician Order  Total # of Visits to Date: 5  No Show: 0  Canceled Appointment: 0    Pre-Treatment Pain:  4/10     Assessment  Assessment: Patient rates pain today as 4/10. Following work last night pain was 8/10. Reviewed proper lifting technique and patient is able to demonstate without difficulty. Also reports she now stops to think about how she is lifing things. Following exercises and manual today patient rates pain in neck as 3/10 and back remains at 4/10.   Chart Reviewed: Yes    Plan  Plan: Continue with current plan    Exercises/Modalities/Manual:  See DocFlow Sheet    Education:        Goals  (Total # of Visits to Date: 5)   Short Term Goals - Time Frame for Short term goals: 6  Short term goal 1: Patient to be independent with HEP for core strengthening  Short term goal 2: Patient to demonstrate proper posture and lifting following education-MET             Long Term Goals - Time Frame for Long term goals : 10  Long term goal 1: Decrease neck pain 2/10 x 3days  Long term goal 2: Decrease low back pain 4/10 at worst x 3 days  Long term goal 3: Improve functional activities with score >8/50 on Neck Disability Index          Post Treatment Pain:  3/10 in neck ; 4/10 in back    Time In:   0944  Time Out :1030         Timed Code Treatment Minutes: 46 Minutes  Total Treatment Time: 46

## 2019-06-13 ENCOUNTER — HOSPITAL ENCOUNTER (OUTPATIENT)
Dept: PHYSICAL THERAPY | Age: 39
Setting detail: THERAPIES SERIES
Discharge: HOME | End: 2019-06-13
Payer: MEDICAID

## 2019-06-13 PROCEDURE — 97140 MANUAL THERAPY 1/> REGIONS: CPT

## 2019-06-13 PROCEDURE — 97110 THERAPEUTIC EXERCISES: CPT

## 2019-06-13 ASSESSMENT — PAIN DESCRIPTION - LOCATION: LOCATION: BACK;NECK

## 2019-06-13 ASSESSMENT — PAIN SCALES - GENERAL: PAINLEVEL_OUTOF10: 4

## 2019-06-13 NOTE — PROGRESS NOTES
Phone: [address] [facility]      Fax: [phone]                            Outpatient Physical Therapy                                                                            Daily Note    Date: 2019  Patient Name: Julianne Yeung        MRN: [id number]   ACCT#:  [de-identified]  : 1980  (42 y.o.)    Referring Practitioner: Dr Ady Olsen    Referral Date : 19    Diagnosis: Chronic low back pain without sciatica, chronic neck pain  Treatment Diagnosis: back pain, neck pain    Onset Date: 19(Referral)  PT Insurance Information: Rolly Robles  Total # of Visits Approved: 10 Per Physician Order  Total # of Visits to Date: 6    Pre-Treatment Pain:  4/10     Assessment  Assessment: Reviewed HEP, patient demonstrates independence. Per patient less neck pain 2-3/10, but low back pain persist 4-810. C/o headach today. Cervical ROM WFL all planes. Added temoralis mob for headaches. Patient requesting to decrease PT to 1xwk and she will continue HEP daily.   Chart Reviewed: Yes    Plan  Plan: Continue with current plan    Exercises/Modalities/Manual:  See DocFlow Sheet    Education:         Goals  (Total # of Visits to Date: 6)   Short Term Goals - Time Frame for Short term goals: 6  Short term goal 1: Patient to be independent with HEP for core strengthening-met  Short term goal 2: Patient to demonstrate proper posture and lifting following education-MET             Long Term Goals - Time Frame for Long term goals : 10  Long term goal 1: Decrease neck pain 2/10 x 3days  Long term goal 2: Decrease low back pain 4/10 at worst x 3 days  Long term goal 3: Improve functional activities with score >8/50 on Neck Disability Index          Post Treatment Pain:  3/10    Time In: 11:15    Time Out : 12:00        Timed Code Treatment Minutes: 45 Minutes  Total Treatment Time: 1125 Carol Espinal PT     Date: 2019

## 2019-06-18 ENCOUNTER — APPOINTMENT (OUTPATIENT)
Dept: PHYSICAL THERAPY | Age: 39
End: 2019-06-18
Payer: MEDICAID

## 2019-06-20 ENCOUNTER — APPOINTMENT (OUTPATIENT)
Dept: PHYSICAL THERAPY | Age: 39
End: 2019-06-20
Payer: MEDICAID

## 2019-06-25 ENCOUNTER — HOSPITAL ENCOUNTER (OUTPATIENT)
Dept: PHYSICAL THERAPY | Age: 39
Setting detail: THERAPIES SERIES
Discharge: HOME | End: 2019-06-25
Payer: MEDICAID

## 2019-06-25 PROCEDURE — 97110 THERAPEUTIC EXERCISES: CPT

## 2019-06-25 PROCEDURE — 97140 MANUAL THERAPY 1/> REGIONS: CPT

## 2019-06-25 ASSESSMENT — PAIN DESCRIPTION - LOCATION: LOCATION: BACK;NECK

## 2019-06-25 ASSESSMENT — PAIN SCALES - GENERAL: PAINLEVEL_OUTOF10: 5

## 2019-06-25 NOTE — PROGRESS NOTES
Phone: [address]      Fax: [phone]                            Outpatient Physical Therapy                                                                            Daily Note    Date: 2019  Patient Name: Danisha Gerardo        MRN: [id number]   ACCT#:  [de-identified]  : 1980  (42 y.o.)    Referring Practitioner: Dr Nick Grewal    Referral Date : 19    Diagnosis: Chronic low back pain without sciatica, chronic neck pain  Treatment Diagnosis: back pain, neck pain    Onset Date: 19(Referral)  PT Insurance Information: Theo Bernal  Total # of Visits Approved: 10 Per Physician Order  Total # of Visits to Date: 7    Pre-Treatment Pain:  10     Assessment  Assessment: Pain neck and back 5/10. Patient reports she was moving to appartment over weekend, some increase stress and not sleeping as well. Educated patient on and issued blue t-band to upgraded HEP.   Chart Reviewed: Yes    Plan  Plan: Continue with current plan    Exercises/Modalities/Manual:  See DocFlow Sheet    Education:           Goals  (Total # of Visits to Date: 7)   Short Term Goals - Time Frame for Short term goals: 6  Short term goal 1: Patient to be independent with HEP for core strengthening-met  Short term goal 2: Patient to demonstrate proper posture and lifting following education-MET             Long Term Goals - Time Frame for Long term goals : 10  Long term goal 1: Decrease neck pain 2/10 x 3days  Long term goal 2: Decrease low back pain 4/10 at worst x 3 days  Long term goal 3: Improve functional activities with score >8/50 on Neck Disability Index          Post Treatment Pain:  4/10    Time In: 11:20    Time Out : 12:05        Timed Code Treatment Minutes: 45 Minutes  Total Treatment Time: 2 Amelie Cerda, PT     Date: 2019

## 2019-07-02 ENCOUNTER — APPOINTMENT (OUTPATIENT)
Dept: PHYSICAL THERAPY | Age: 39
End: 2019-07-02
Payer: MEDICAID

## 2019-07-09 ENCOUNTER — OFFICE VISIT (OUTPATIENT)
Dept: OBGYN CLINIC | Age: 39
End: 2019-07-09
Payer: MEDICAID

## 2019-07-09 ENCOUNTER — HOSPITAL ENCOUNTER (OUTPATIENT)
Age: 39
Setting detail: SPECIMEN
Discharge: HOME | End: 2019-07-09
Payer: MEDICAID

## 2019-07-09 ENCOUNTER — HOSPITAL ENCOUNTER (OUTPATIENT)
Dept: PHYSICAL THERAPY | Age: 39
Setting detail: THERAPIES SERIES
Discharge: HOME | End: 2019-07-09
Payer: MEDICAID

## 2019-07-09 VITALS
HEART RATE: 66 BPM | WEIGHT: 214 LBS | DIASTOLIC BLOOD PRESSURE: 79 MMHG | SYSTOLIC BLOOD PRESSURE: 119 MMHG | RESPIRATION RATE: 18 BRPM | HEIGHT: 68 IN | BODY MASS INDEX: 32.43 KG/M2

## 2019-07-09 DIAGNOSIS — Z11.3 SCREENING FOR STD (SEXUALLY TRANSMITTED DISEASE): Primary | ICD-10-CM

## 2019-07-09 DIAGNOSIS — Z11.3 SCREENING FOR STD (SEXUALLY TRANSMITTED DISEASE): ICD-10-CM

## 2019-07-09 PROCEDURE — 97110 THERAPEUTIC EXERCISES: CPT

## 2019-07-09 PROCEDURE — 80074 ACUTE HEPATITIS PANEL: CPT

## 2019-07-09 PROCEDURE — 87491 CHLMYD TRACH DNA AMP PROBE: CPT

## 2019-07-09 PROCEDURE — 97140 MANUAL THERAPY 1/> REGIONS: CPT

## 2019-07-09 PROCEDURE — 87529 HSV DNA AMP PROBE: CPT

## 2019-07-09 PROCEDURE — 1036F TOBACCO NON-USER: CPT | Performed by: OBSTETRICS & GYNECOLOGY

## 2019-07-09 PROCEDURE — 86696 HERPES SIMPLEX TYPE 2 TEST: CPT

## 2019-07-09 PROCEDURE — 87070 CULTURE OTHR SPECIMN AEROBIC: CPT

## 2019-07-09 PROCEDURE — G8427 DOCREV CUR MEDS BY ELIG CLIN: HCPCS | Performed by: OBSTETRICS & GYNECOLOGY

## 2019-07-09 PROCEDURE — 87389 HIV-1 AG W/HIV-1&-2 AB AG IA: CPT

## 2019-07-09 PROCEDURE — 86694 HERPES SIMPLEX NES ANTBDY: CPT

## 2019-07-09 PROCEDURE — G8417 CALC BMI ABV UP PARAM F/U: HCPCS | Performed by: OBSTETRICS & GYNECOLOGY

## 2019-07-09 PROCEDURE — 36415 COLL VENOUS BLD VENIPUNCTURE: CPT | Performed by: OBSTETRICS & GYNECOLOGY

## 2019-07-09 PROCEDURE — 99213 OFFICE O/P EST LOW 20 MIN: CPT | Performed by: OBSTETRICS & GYNECOLOGY

## 2019-07-09 PROCEDURE — 87591 N.GONORRHOEAE DNA AMP PROB: CPT

## 2019-07-09 PROCEDURE — 86695 HERPES SIMPLEX TYPE 1 TEST: CPT

## 2019-07-09 ASSESSMENT — PAIN SCALES - GENERAL: PAINLEVEL_OUTOF10: 2

## 2019-07-11 LAB
C TRACH DNA GENITAL QL NAA+PROBE: NEGATIVE
CULTURE: NORMAL
HAV IGM SER IA-ACNC: NONREACTIVE
HEPATITIS B CORE IGM ANTIBODY: NONREACTIVE
HEPATITIS B SURFACE ANTIGEN: NONREACTIVE
HEPATITIS C ANTIBODY: NONREACTIVE
HERPES SIMPLEX VIRUS 1 IGG: 6.77
HERPES SIMPLEX VIRUS 2 IGG: 0.72
HERPES TYPE 1/2 IGM COMBINED: 1.89
HIV AG/AB: NONREACTIVE
Lab: NORMAL
N. GONORRHOEAE DNA: NEGATIVE
SPECIMEN DESCRIPTION: NORMAL
SPECIMEN DESCRIPTION: NORMAL

## 2019-07-12 DIAGNOSIS — B37.9 YEAST INFECTION: Primary | ICD-10-CM

## 2019-07-12 RX ORDER — FLUCONAZOLE 150 MG/1
150 TABLET ORAL ONCE
Qty: 1 TABLET | Refills: 0 | Status: SHIPPED | OUTPATIENT
Start: 2019-07-12 | End: 2019-07-12

## 2019-07-13 LAB
CULTURE: ABNORMAL
Lab: ABNORMAL
SPECIMEN DESCRIPTION: ABNORMAL

## 2019-07-15 ENCOUNTER — OFFICE VISIT (OUTPATIENT)
Dept: FAMILY MEDICINE CLINIC | Age: 39
End: 2019-07-15
Payer: MEDICAID

## 2019-07-15 VITALS
HEIGHT: 68 IN | DIASTOLIC BLOOD PRESSURE: 70 MMHG | SYSTOLIC BLOOD PRESSURE: 110 MMHG | BODY MASS INDEX: 32.28 KG/M2 | WEIGHT: 213 LBS

## 2019-07-15 DIAGNOSIS — Z79.899 HIGH RISK MEDICATION USE: ICD-10-CM

## 2019-07-15 DIAGNOSIS — J30.9 CHRONIC ALLERGIC RHINITIS: ICD-10-CM

## 2019-07-15 DIAGNOSIS — G47.09 SECONDARY INSOMNIA: Primary | ICD-10-CM

## 2019-07-15 DIAGNOSIS — B35.1 ONYCHOMYCOSIS OF TOENAIL: ICD-10-CM

## 2019-07-15 DIAGNOSIS — K21.9 GASTROESOPHAGEAL REFLUX DISEASE WITHOUT ESOPHAGITIS: ICD-10-CM

## 2019-07-15 PROCEDURE — 99214 OFFICE O/P EST MOD 30 MIN: CPT | Performed by: FAMILY MEDICINE

## 2019-07-15 PROCEDURE — 1036F TOBACCO NON-USER: CPT | Performed by: FAMILY MEDICINE

## 2019-07-15 PROCEDURE — G8427 DOCREV CUR MEDS BY ELIG CLIN: HCPCS | Performed by: FAMILY MEDICINE

## 2019-07-15 PROCEDURE — G8417 CALC BMI ABV UP PARAM F/U: HCPCS | Performed by: FAMILY MEDICINE

## 2019-07-15 RX ORDER — CETIRIZINE HYDROCHLORIDE 10 MG/1
10 TABLET ORAL DAILY
Qty: 30 TABLET | Refills: 2 | Status: SHIPPED | OUTPATIENT
Start: 2019-07-15 | End: 2019-10-14 | Stop reason: SDUPTHER

## 2019-07-15 RX ORDER — METHOCARBAMOL 500 MG/1
500 TABLET, FILM COATED ORAL 3 TIMES DAILY PRN
Qty: 60 TABLET | Refills: 0 | Status: SHIPPED | OUTPATIENT
Start: 2019-07-15 | End: 2019-08-12 | Stop reason: SDUPTHER

## 2019-07-15 RX ORDER — FLUTICASONE PROPIONATE 50 MCG
2 SPRAY, SUSPENSION (ML) NASAL DAILY
Qty: 1 BOTTLE | Refills: 2 | Status: SHIPPED | OUTPATIENT
Start: 2019-07-15 | End: 2019-10-22 | Stop reason: SDUPTHER

## 2019-07-15 RX ORDER — GABAPENTIN 400 MG/1
1 CAPSULE ORAL 3 TIMES DAILY
Refills: 0 | COMMUNITY
Start: 2019-06-25 | End: 2021-10-29 | Stop reason: DRUGHIGH

## 2019-07-15 RX ORDER — TRAZODONE HYDROCHLORIDE 50 MG/1
50-100 TABLET ORAL NIGHTLY PRN
Qty: 60 TABLET | Refills: 2 | Status: SHIPPED | OUTPATIENT
Start: 2019-07-15 | End: 2019-10-21 | Stop reason: SDUPTHER

## 2019-07-15 RX ORDER — OMEPRAZOLE 40 MG/1
40 CAPSULE, DELAYED RELEASE ORAL DAILY
Qty: 30 CAPSULE | Refills: 5 | Status: SHIPPED | OUTPATIENT
Start: 2019-07-15 | End: 2020-01-08

## 2019-07-15 RX ORDER — IBUPROFEN 800 MG/1
800 TABLET ORAL EVERY 8 HOURS PRN
Qty: 60 TABLET | Refills: 2 | Status: SHIPPED | OUTPATIENT
Start: 2019-07-15 | End: 2019-10-23 | Stop reason: SDUPTHER

## 2019-07-15 RX ORDER — DROSPIRENONE AND ETHINYL ESTRADIOL 0.03MG-3MG
1 KIT ORAL DAILY
Qty: 1 PACKET | Refills: 12
Start: 2019-07-15 | End: 2019-10-07 | Stop reason: ALTCHOICE

## 2019-07-15 RX ORDER — TERBINAFINE HYDROCHLORIDE 250 MG/1
250 TABLET ORAL DAILY
Qty: 30 TABLET | Refills: 0 | Status: SHIPPED | OUTPATIENT
Start: 2019-07-15 | End: 2019-08-12 | Stop reason: SDUPTHER

## 2019-07-15 ASSESSMENT — ENCOUNTER SYMPTOMS
GASTROINTESTINAL NEGATIVE: 1
RESPIRATORY NEGATIVE: 1

## 2019-07-15 NOTE — PROGRESS NOTES
and side effects of prescribed medications. Barriers to medication compliance addressed. Patient given educational materials - see patient instructions. All patient questions answered. Patient voiced understanding.      Electronically signed by Maranda Reyna DO on 7/15/2019 at 10:46 PM   [address]  Dept: [phone]

## 2019-07-15 NOTE — PATIENT INSTRUCTIONS
SURVEY:    You may be receiving a survey from [employer] regarding your visit today. Please complete the survey to enable us to provide the highest quality of care to you and your family. If you cannot score us as very good on any question, please call the office to discuss how we could have made your experience exceptional.     Thank you.

## 2019-08-06 ENCOUNTER — OFFICE VISIT (OUTPATIENT)
Dept: PRIMARY CARE CLINIC | Age: 39
End: 2019-08-06
Payer: MEDICAID

## 2019-08-06 VITALS
SYSTOLIC BLOOD PRESSURE: 112 MMHG | DIASTOLIC BLOOD PRESSURE: 75 MMHG | WEIGHT: 217 LBS | OXYGEN SATURATION: 95 % | BODY MASS INDEX: 32.99 KG/M2 | HEART RATE: 96 BPM | TEMPERATURE: 98 F

## 2019-08-06 DIAGNOSIS — H10.9 BACTERIAL CONJUNCTIVITIS OF LEFT EYE: Primary | ICD-10-CM

## 2019-08-06 PROCEDURE — 1036F TOBACCO NON-USER: CPT | Performed by: NURSE PRACTITIONER

## 2019-08-06 PROCEDURE — G8417 CALC BMI ABV UP PARAM F/U: HCPCS | Performed by: NURSE PRACTITIONER

## 2019-08-06 PROCEDURE — 99213 OFFICE O/P EST LOW 20 MIN: CPT | Performed by: NURSE PRACTITIONER

## 2019-08-06 PROCEDURE — G8427 DOCREV CUR MEDS BY ELIG CLIN: HCPCS | Performed by: NURSE PRACTITIONER

## 2019-08-06 RX ORDER — TOBRAMYCIN 3 MG/ML
1 SOLUTION/ DROPS OPHTHALMIC EVERY 4 HOURS
Qty: 5 ML | Refills: 0 | Status: SHIPPED | OUTPATIENT
Start: 2019-08-06 | End: 2019-08-13

## 2019-08-06 ASSESSMENT — ENCOUNTER SYMPTOMS
EYE REDNESS: 1
PHOTOPHOBIA: 1
EYE ITCHING: 0
GASTROINTESTINAL NEGATIVE: 1
RESPIRATORY NEGATIVE: 1
EYE DISCHARGE: 1
EYE PAIN: 1

## 2019-08-06 NOTE — PATIENT INSTRUCTIONS
SURVEY:    You may be receiving a survey from [employer] regarding your visit today. Please complete the survey to enable us to provide the highest quality of care to you and your family. If you cannot score us a very good on any question, please call the office to discuss how we could of made your experience a very good one. Thank you. Patient Education        Pinkeye: Care Instructions  Your Care Instructions    Pinkeye is redness and swelling of the eye surface and the conjunctiva (the lining of the eyelid and the covering of the white part of the eye). Pinkeye is also called conjunctivitis. Pinkeye is often caused by infection with bacteria or a virus. Dry air, allergies, smoke, and chemicals are other common causes. Pinkeye often clears on its own in 7 to 10 days. Antibiotics only help if the pinkeye is caused by bacteria. Pinkeye caused by infection spreads easily. If an allergy or chemical is causing pinkeye, it will not go away unless you can avoid whatever is causing it. Follow-up care is a key part of your treatment and safety. Be sure to make and go to all appointments, and call your doctor if you are having problems. It's also a good idea to know your test results and keep a list of the medicines you take. How can you care for yourself at home? · Wash your hands often. Always wash them before and after you treat pinkeye or touch your eyes or face. · Use moist cotton or a clean, wet cloth to remove crust. Wipe from the inside corner of the eye to the outside. Use a clean part of the cloth for each wipe. · Put cold or warm wet cloths on your eye a few times a day if the eye hurts. · Do not wear contact lenses or eye makeup until the pinkeye is gone. Throw away any eye makeup you were using when you got pinkeye. Clean your contacts and storage case. If you wear disposable contacts, use a new pair when your eye has cleared and it is safe to wear contacts again.   · If the doctor gave you

## 2019-08-06 NOTE — PROGRESS NOTES
30 tablet 0    traZODone (DESYREL) 50 MG tablet Take 1-2 tablets by mouth nightly as needed for Sleep 60 tablet 2    cetirizine (ZYRTEC) 10 MG tablet Take 1 tablet by mouth daily 30 tablet 2    fluticasone (FLONASE) 50 MCG/ACT nasal spray 2 sprays by Nasal route daily 1 Bottle 2    acetaminophen (TYLENOL) 500 MG tablet Take 1,000 mg by mouth every 6 hours as needed for Pain        No current facility-administered medications for this visit. No Known Allergies    Subjective:      Review of Systems   Constitutional: Negative. HENT: Negative. Eyes: Positive for photophobia, pain, discharge and redness. Negative for itching. Respiratory: Negative. Cardiovascular: Negative. Gastrointestinal: Negative. Endocrine: Negative. Genitourinary: Negative. Musculoskeletal: Negative. Skin: Negative. Allergic/Immunologic: Positive for environmental allergies. Neurological: Positive for headaches. Hematological: Negative. Psychiatric/Behavioral: Negative. Objective:     Physical Exam   Constitutional: She is oriented to person, place, and time. She appears well-developed and well-nourished. HENT:   Head: Normocephalic. Right Ear: External ear normal.   Left Ear: External ear normal.   Nose: Nose normal.   Mouth/Throat: Oropharynx is clear and moist.   Eyes: Pupils are equal, round, and reactive to light. EOM are normal. Left eye exhibits discharge. Right conjunctiva is not injected. Left conjunctiva is injected. Neck: Normal range of motion. Neck supple. Cardiovascular: Normal rate, regular rhythm and normal heart sounds. Pulmonary/Chest: Effort normal and breath sounds normal.   Musculoskeletal: Normal range of motion. Neurological: She is alert and oriented to person, place, and time. Skin: Skin is warm. Capillary refill takes less than 2 seconds. Psychiatric: She has a normal mood and affect. Nursing note and vitals reviewed.     /75 (Site: Right Upper Arm,

## 2019-08-20 ENCOUNTER — TELEPHONE (OUTPATIENT)
Dept: FAMILY MEDICINE CLINIC | Age: 39
End: 2019-08-20

## 2019-08-20 ENCOUNTER — HOSPITAL ENCOUNTER (OUTPATIENT)
Age: 39
Discharge: HOME | End: 2019-08-20
Payer: MEDICAID

## 2019-08-20 DIAGNOSIS — Z79.899 HIGH RISK MEDICATION USE: ICD-10-CM

## 2019-08-20 DIAGNOSIS — B35.1 ONYCHOMYCOSIS OF TOENAIL: ICD-10-CM

## 2019-08-20 LAB
ALBUMIN SERPL-MCNC: 4.1 G/DL (ref 3.5–5.2)
ALBUMIN/GLOBULIN RATIO: ABNORMAL (ref 1–2.5)
ALP BLD-CCNC: 62 U/L (ref 35–104)
ALT SERPL-CCNC: 16 U/L (ref 5–33)
AST SERPL-CCNC: 13 U/L
BILIRUB SERPL-MCNC: 0.13 MG/DL (ref 0.3–1.2)
BILIRUBIN DIRECT: <0.08 MG/DL
BILIRUBIN, INDIRECT: ABNORMAL MG/DL (ref 0–1)
GLOBULIN: ABNORMAL G/DL (ref 1.5–3.8)
TOTAL PROTEIN: 7.5 G/DL (ref 6.4–8.3)

## 2019-08-20 PROCEDURE — 80076 HEPATIC FUNCTION PANEL: CPT

## 2019-08-20 PROCEDURE — 36415 COLL VENOUS BLD VENIPUNCTURE: CPT

## 2019-08-20 RX ORDER — TERBINAFINE HYDROCHLORIDE 250 MG/1
TABLET ORAL
Qty: 30 TABLET | Refills: 2 | Status: SHIPPED | OUTPATIENT
Start: 2019-08-20 | End: 2020-01-20 | Stop reason: ALTCHOICE

## 2019-08-28 ENCOUNTER — OFFICE VISIT (OUTPATIENT)
Dept: FAMILY MEDICINE CLINIC | Age: 39
End: 2019-08-28
Payer: MEDICAID

## 2019-08-28 VITALS
HEIGHT: 68 IN | OXYGEN SATURATION: 99 % | SYSTOLIC BLOOD PRESSURE: 110 MMHG | WEIGHT: 212 LBS | BODY MASS INDEX: 32.13 KG/M2 | DIASTOLIC BLOOD PRESSURE: 70 MMHG | HEART RATE: 69 BPM

## 2019-08-28 DIAGNOSIS — B35.1 ONYCHOMYCOSIS: Primary | ICD-10-CM

## 2019-08-28 DIAGNOSIS — M54.50 CHRONIC BILATERAL LOW BACK PAIN WITHOUT SCIATICA: ICD-10-CM

## 2019-08-28 DIAGNOSIS — G89.29 CHRONIC BILATERAL LOW BACK PAIN WITHOUT SCIATICA: ICD-10-CM

## 2019-08-28 DIAGNOSIS — M72.2 PLANTAR FASCIITIS: ICD-10-CM

## 2019-08-28 PROCEDURE — 99214 OFFICE O/P EST MOD 30 MIN: CPT | Performed by: FAMILY MEDICINE

## 2019-08-28 PROCEDURE — 1036F TOBACCO NON-USER: CPT | Performed by: FAMILY MEDICINE

## 2019-08-28 PROCEDURE — G8427 DOCREV CUR MEDS BY ELIG CLIN: HCPCS | Performed by: FAMILY MEDICINE

## 2019-08-28 PROCEDURE — G8417 CALC BMI ABV UP PARAM F/U: HCPCS | Performed by: FAMILY MEDICINE

## 2019-08-28 RX ORDER — METHOCARBAMOL 500 MG/1
TABLET, FILM COATED ORAL
Qty: 60 TABLET | Refills: 0 | Status: SHIPPED | OUTPATIENT
Start: 2019-08-28 | End: 2019-10-02 | Stop reason: SDUPTHER

## 2019-08-28 ASSESSMENT — PATIENT HEALTH QUESTIONNAIRE - PHQ9
SUM OF ALL RESPONSES TO PHQ9 QUESTIONS 1 & 2: 0
SUM OF ALL RESPONSES TO PHQ QUESTIONS 1-9: 0
SUM OF ALL RESPONSES TO PHQ QUESTIONS 1-9: 0
1. LITTLE INTEREST OR PLEASURE IN DOING THINGS: 0
2. FEELING DOWN, DEPRESSED OR HOPELESS: 0

## 2019-08-28 NOTE — PROGRESS NOTES
Name: Leidy Fernandez  : 1980         Chief Complaint:     Chief Complaint   Patient presents with    Neck Pain    Foot Pain       History of Present Illness:      Leidy Fernandez is a 42 y.o.  female who presents with Neck Pain and Foot Pain      HPI    L foot pain actually quite a bit better and podiatry says they're doing all they can. On gabapentin and using cuca shoe inserts which help. 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 No stretching or ice. Sleep has gotten a lot better on trazodone 100 mg nightly, hardly ever waking up during the night and wakes feeling well in the morning. Unsure whether she needs the trazodone. Mood has been good. Has a boyfriend and things are going well with him. Headaches which she r/t allergies despite use of flonase and zyrtec. Neck a lot better so she stopped seeing chiro because it almost seemed to be making it worse. meds seem to be helping. Did have an episode of about 4 days of low back pain after she had done more exercise than usual (fast walked a 5k). Had to miss work. Resolved. Onychomycosis seems to be improving with use of oral lamisil. No adverse effects to med. Medical History:     Patient Active Problem List   Diagnosis    Chronic neck pain    Onychomycosis    Chronic low back pain without sciatica       Medications:       Prior to Admission medications    Medication Sig Start Date End Date Taking? Authorizing Provider   methocarbamol (ROBAXIN) 500 MG tablet TAKE 1 TABLET BY MOUTH THREE TIMES DAILY AS NEEDED for neck pain 19  Yes Jay Jay Jeter,    terbinafine (LAMISIL) 250 MG tablet TAKE 1 TABLET BY MOUTH EVERY DAY 19  Yes Jay Jay Jeter DO   gabapentin (NEURONTIN) 300 MG capsule Take 1 capsule by mouth every 12 hours.  19  Yes Historical Provider, MD   drospirenone-ethinyl estradiol (MILTON 28) 3-0.03 MG TABS Take 1 tablet by mouth daily Start the 1st day of next menses 7/15/19  Yes Jay Jay Jeter, DO   ibuprofen (ADVIL;MOTRIN) 800 MG tablet Take 1 tablet by mouth every 8 hours as needed for Pain 7/15/19  Yes Alyssa King DO   omeprazole (PRILOSEC) 40 MG delayed release capsule Take 1 capsule by mouth daily 7/15/19  Yes Alyssa King DO   traZODone (DESYREL) 50 MG tablet Take 1-2 tablets by mouth nightly as needed for Sleep 7/15/19  Yes Alyssa King DO   fluticasone (FLONASE) 50 MCG/ACT nasal spray 2 sprays by Nasal route daily 7/15/19  Yes Alyssa King DO   acetaminophen (TYLENOL) 500 MG tablet Take 1,000 mg by mouth every 6 hours as needed for Pain    Yes Historical Provider, MD        Allergies:       Patient has no known allergies. Review ofSystems:     Positive and Negative as described in HPI    Review of Systems   Constitutional: Negative. Respiratory: Negative. Gastrointestinal: Negative. Physical Exam:     Vitals:  /70   Pulse 69   Ht 5' 8\" (1.727 m)   Wt 212 lb (96.2 kg)   SpO2 99%   BMI 32.23 kg/m²   Physical Exam   Constitutional: She is oriented to person, place, and time. She appears well-developed and well-nourished. No distress. HENT:   Head: Normocephalic and atraumatic. Eyes: Conjunctivae and EOM are normal.   Cardiovascular: Normal rate, regular rhythm and normal heart sounds. No peripheral edema. Pulmonary/Chest: Effort normal and breath sounds normal.   Neurological: She is alert and oriented to person, place, and time. Skin: Skin is warm and dry. R great toenail crumbly under surface, . Toenails painted so unable to assess color. Psychiatric: She has a normal mood and affect. Judgment normal.   Nursing note and vitals reviewed.       Data:     Lab Results   Component Value Date     09/19/2018    K 3.7 09/19/2018     09/19/2018    CO2 27 09/19/2018    BUN 10 09/19/2018    CREATININE 0.81 09/19/2018    GLUCOSE 83 09/19/2018    PROT 7.5 08/20/2019    LABALBU 4.1 08/20/2019    BILITOT 0.13 08/20/2019    ALKPHOS 62 08/20/2019 exercises  Towel stretch    1. Sit with your legs extended and knees straight. 2. Place a towel around your foot just under the toes. 3. Hold each end of the towel in each hand, with your hands above your knees. 4. Pull back with the towel so that your foot stretches toward you. 5. Hold the position for at least 15 to 30 seconds. 6. Repeat 2 to 4 times a session, up to 5 sessions a day. Calf stretch    1. Stand facing a wall with your hands on the wall at about eye level. Put the leg you want to stretch about a step behind your other leg. 2. Keeping your back heel on the floor, bend your front knee until you feel a stretch in the back leg. 3. Hold the stretch for 15 to 30 seconds. Repeat 2 to 4 times. Plantar fascia and calf stretch    1. Stand on a step as shown above. Be sure to hold on to the banister. 2. Slowly let your heels down over the edge of the step as you relax your calf muscles. You should feel a gentle stretch across the bottom of your foot and up the back of your leg to your knee. 3. Hold the stretch about 15 to 30 seconds, and then tighten your calf muscle a little to bring your heel back up to the level of the step. Repeat 2 to 4 times. Towel curls    1. While sitting, place your foot on a towel on the floor and scrunch the towel toward you with your toes. 2. Then, also using your toes, push the towel away from you. Grant pickups    1. Put marbles on the floor next to a cup.  2. Using your toes, try to lift the marbles up from the floor and put them in the cup. Follow-up care is a key part of your treatment and safety. Be sure to make and go to all appointments, and call your doctor if you are having problems. It's also a good idea to know your test results and keep a list of the medicines you take. Where can you learn more? Go to [email or web address] org and sign in to your [employer] account.  Enter L617 in the [employer] box to learn more

## 2019-08-28 NOTE — PATIENT INSTRUCTIONS
place your foot on a towel on the floor and scrunch the towel toward you with your toes. 2. Then, also using your toes, push the towel away from you. [place] pickups    1. Put marbles on the floor next to a cup.  2. Using your toes, try to lift the marbles up from the floor and put them in the cup. Follow-up care is a key part of your treatment and safety. Be sure to make and go to all appointments, and call your doctor if you are having problems. It's also a good idea to know your test results and keep a list of the medicines you take. Where can you learn more? Go to [email or web address] org and sign in to your [employer] account. Enter L538 in the [employer]com box to learn more about \"Plantar Fasciitis: Exercises. \"     If you do not have an account, please click on the \"Sign Up Now\" link. Current as of: September 20, 2018  Content Version: 12.1  © 5806-5256 Healthwise, Incorporated. Care instructions adapted under license by [facility] ([facility]). If you have questions about a medical condition or this instruction, always ask your healthcare professional. [place] [address] any warranty or liability for your use of this information.

## 2019-08-29 ASSESSMENT — ENCOUNTER SYMPTOMS
RESPIRATORY NEGATIVE: 1
GASTROINTESTINAL NEGATIVE: 1

## 2019-09-16 ENCOUNTER — TELEPHONE (OUTPATIENT)
Dept: FAMILY MEDICINE CLINIC | Age: 39
End: 2019-09-16

## 2019-09-16 RX ORDER — NORGESTIMATE AND ETHINYL ESTRADIOL 0.25-0.035
1 KIT ORAL DAILY
Qty: 1 PACKET | Refills: 3 | Status: SHIPPED | OUTPATIENT
Start: 2019-09-16 | End: 2020-01-08

## 2019-09-16 NOTE — TELEPHONE ENCOUNTER
Patient is currently taking Gaby for her birth control but lost her insurance. Patient states Dr. Loni James suggested a cheaper medication. Can patient switch to cheaper med? Please let patient know. Drug [address] Maintenance   Topic Date Due    Varicella Vaccine (1 of 2 - 13+ 2-dose series) 03/26/1995    Flu vaccine (1) 09/01/2019    DTaP/Tdap/Td vaccine (1 - Tdap) 03/26/2020 (Originally 3/26/2001)    Cervical cancer screen  03/26/2022    HIV screen  Completed    Pneumococcal 0-64 years Vaccine  Aged Out             (applicable per patient's age: Cancer Screenings, Depression Screening, Fall Risk Screening, Immunizations)    Hemoglobin A1C (%)   Date Value   09/19/2018 5.3     AST (U/L)   Date Value   08/20/2019 13     ALT (U/L)   Date Value   08/20/2019 16     BUN (mg/dL)   Date Value   09/19/2018 10      (goal A1C is < 7)   (goal LDL is <100) need 30-50% reduction from baseline     BP Readings from Last 3 Encounters:   08/28/19 110/70   08/06/19 112/75   07/15/19 110/70    (goal /80)      All Future Testing planned in [facility]:  Lab Frequency Next Occurrence   Hepatic Function Panel Once 08/22/2019   Comprehensive Metabolic Panel Once 13/51/9965       Next Visit Date:  No future appointments.          Patient Active Problem List:     Chronic neck pain     Onychomycosis     Chronic low back pain without sciatica

## 2019-10-02 RX ORDER — METHOCARBAMOL 500 MG/1
TABLET, FILM COATED ORAL
Qty: 60 TABLET | Refills: 0 | Status: SHIPPED | OUTPATIENT
Start: 2019-10-02 | End: 2019-10-22 | Stop reason: SDUPTHER

## 2019-10-07 ENCOUNTER — OFFICE VISIT (OUTPATIENT)
Dept: PRIMARY CARE CLINIC | Age: 39
End: 2019-10-07
Payer: MEDICAID

## 2019-10-07 VITALS
SYSTOLIC BLOOD PRESSURE: 119 MMHG | TEMPERATURE: 97.9 F | WEIGHT: 213.6 LBS | DIASTOLIC BLOOD PRESSURE: 79 MMHG | HEIGHT: 66 IN | HEART RATE: 63 BPM | BODY MASS INDEX: 34.33 KG/M2 | OXYGEN SATURATION: 96 %

## 2019-10-07 DIAGNOSIS — J02.9 SORE THROAT: ICD-10-CM

## 2019-10-07 DIAGNOSIS — R05.9 COUGH: ICD-10-CM

## 2019-10-07 DIAGNOSIS — J06.9 VIRAL URI WITH COUGH: Primary | ICD-10-CM

## 2019-10-07 LAB
INFLUENZA A ANTIBODY: NORMAL
INFLUENZA B ANTIBODY: NORMAL
S PYO AG THROAT QL: NORMAL

## 2019-10-07 PROCEDURE — G8484 FLU IMMUNIZE NO ADMIN: HCPCS | Performed by: NURSE PRACTITIONER

## 2019-10-07 PROCEDURE — G8427 DOCREV CUR MEDS BY ELIG CLIN: HCPCS | Performed by: NURSE PRACTITIONER

## 2019-10-07 PROCEDURE — 99213 OFFICE O/P EST LOW 20 MIN: CPT | Performed by: NURSE PRACTITIONER

## 2019-10-07 PROCEDURE — 87880 STREP A ASSAY W/OPTIC: CPT | Performed by: NURSE PRACTITIONER

## 2019-10-07 PROCEDURE — 1036F TOBACCO NON-USER: CPT | Performed by: NURSE PRACTITIONER

## 2019-10-07 PROCEDURE — 87804 INFLUENZA ASSAY W/OPTIC: CPT | Performed by: NURSE PRACTITIONER

## 2019-10-07 PROCEDURE — G8417 CALC BMI ABV UP PARAM F/U: HCPCS | Performed by: NURSE PRACTITIONER

## 2019-10-07 RX ORDER — BENZONATATE 100 MG/1
100 CAPSULE ORAL 3 TIMES DAILY PRN
Qty: 21 CAPSULE | Refills: 0 | Status: SHIPPED | OUTPATIENT
Start: 2019-10-07 | End: 2019-10-14

## 2019-10-07 ASSESSMENT — ENCOUNTER SYMPTOMS
SORE THROAT: 1
SINUS PAIN: 0
EYES NEGATIVE: 1
COUGH: 1
SINUS PRESSURE: 1

## 2019-10-12 ENCOUNTER — HOSPITAL ENCOUNTER (EMERGENCY)
Age: 39
Discharge: HOME | End: 2019-10-12
Attending: EMERGENCY MEDICINE
Payer: MEDICAID

## 2019-10-12 ENCOUNTER — APPOINTMENT (OUTPATIENT)
Dept: GENERAL RADIOLOGY | Age: 39
End: 2019-10-12
Payer: MEDICAID

## 2019-10-12 VITALS
SYSTOLIC BLOOD PRESSURE: 119 MMHG | WEIGHT: 211.5 LBS | DIASTOLIC BLOOD PRESSURE: 68 MMHG | RESPIRATION RATE: 16 BRPM | HEIGHT: 66 IN | HEART RATE: 72 BPM | TEMPERATURE: 98.7 F | OXYGEN SATURATION: 99 % | BODY MASS INDEX: 33.99 KG/M2

## 2019-10-12 DIAGNOSIS — J18.9 PNEUMONIA DUE TO ORGANISM: Primary | ICD-10-CM

## 2019-10-12 PROCEDURE — 94664 DEMO&/EVAL PT USE INHALER: CPT

## 2019-10-12 PROCEDURE — 6370000000 HC RX 637 (ALT 250 FOR IP): Performed by: EMERGENCY MEDICINE

## 2019-10-12 PROCEDURE — 71046 X-RAY EXAM CHEST 2 VIEWS: CPT

## 2019-10-12 PROCEDURE — 99283 EMERGENCY DEPT VISIT LOW MDM: CPT

## 2019-10-12 RX ORDER — ALBUTEROL SULFATE 90 UG/1
2 AEROSOL, METERED RESPIRATORY (INHALATION) EVERY 6 HOURS PRN
Qty: 1 INHALER | Refills: 3 | Status: ON HOLD | OUTPATIENT
Start: 2019-10-12 | End: 2020-10-18

## 2019-10-12 RX ORDER — AZITHROMYCIN 250 MG/1
250 TABLET, FILM COATED ORAL DAILY
Qty: 4 TABLET | Refills: 0 | Status: SHIPPED | OUTPATIENT
Start: 2019-10-13 | End: 2019-10-14

## 2019-10-12 RX ORDER — IPRATROPIUM BROMIDE AND ALBUTEROL SULFATE 2.5; .5 MG/3ML; MG/3ML
SOLUTION RESPIRATORY (INHALATION)
Status: DISCONTINUED
Start: 2019-10-12 | End: 2019-10-12 | Stop reason: HOSPADM

## 2019-10-12 RX ORDER — IPRATROPIUM BROMIDE AND ALBUTEROL SULFATE 2.5; .5 MG/3ML; MG/3ML
1 SOLUTION RESPIRATORY (INHALATION)
Status: DISCONTINUED | OUTPATIENT
Start: 2019-10-12 | End: 2019-10-12 | Stop reason: HOSPADM

## 2019-10-12 RX ORDER — AZITHROMYCIN 250 MG/1
500 TABLET, FILM COATED ORAL ONCE
Status: COMPLETED | OUTPATIENT
Start: 2019-10-12 | End: 2019-10-12

## 2019-10-12 RX ADMIN — AZITHROMYCIN 500 MG: 250 TABLET, FILM COATED ORAL at 02:38

## 2019-10-12 RX ADMIN — IPRATROPIUM BROMIDE AND ALBUTEROL SULFATE 1 AMPULE: .5; 3 SOLUTION RESPIRATORY (INHALATION) at 02:17

## 2019-10-12 ASSESSMENT — ENCOUNTER SYMPTOMS
VOMITING: 0
RHINORRHEA: 1
TROUBLE SWALLOWING: 0
SHORTNESS OF BREATH: 0
SINUS PRESSURE: 1
COUGH: 1
WHEEZING: 0
BACK PAIN: 0
SORE THROAT: 0
VOICE CHANGE: 0
SINUS PAIN: 1
NAUSEA: 0

## 2019-10-12 ASSESSMENT — PAIN DESCRIPTION - PAIN TYPE: TYPE: ACUTE PAIN

## 2019-10-12 ASSESSMENT — PAIN SCALES - GENERAL: PAINLEVEL_OUTOF10: 7

## 2019-10-12 ASSESSMENT — PAIN DESCRIPTION - DESCRIPTORS: DESCRIPTORS: ACHING

## 2019-10-12 ASSESSMENT — PAIN DESCRIPTION - LOCATION: LOCATION: HEAD;CHEST

## 2019-10-14 ENCOUNTER — HOSPITAL ENCOUNTER (EMERGENCY)
Age: 39
Discharge: HOME | End: 2019-10-14
Attending: EMERGENCY MEDICINE
Payer: MEDICAID

## 2019-10-14 VITALS
HEIGHT: 66 IN | DIASTOLIC BLOOD PRESSURE: 116 MMHG | OXYGEN SATURATION: 97 % | RESPIRATION RATE: 18 BRPM | TEMPERATURE: 98.2 F | BODY MASS INDEX: 33.73 KG/M2 | WEIGHT: 209.9 LBS | SYSTOLIC BLOOD PRESSURE: 134 MMHG | HEART RATE: 73 BPM

## 2019-10-14 DIAGNOSIS — J18.0 BRONCHOPNEUMONIA: Primary | ICD-10-CM

## 2019-10-14 PROCEDURE — 99283 EMERGENCY DEPT VISIT LOW MDM: CPT

## 2019-10-14 PROCEDURE — 6370000000 HC RX 637 (ALT 250 FOR IP): Performed by: EMERGENCY MEDICINE

## 2019-10-14 PROCEDURE — 94664 DEMO&/EVAL PT USE INHALER: CPT

## 2019-10-14 RX ORDER — HYDROCODONE BITARTRATE AND ACETAMINOPHEN 5; 325 MG/1; MG/1
1 TABLET ORAL ONCE
Status: COMPLETED | OUTPATIENT
Start: 2019-10-14 | End: 2019-10-14

## 2019-10-14 RX ORDER — IPRATROPIUM BROMIDE AND ALBUTEROL SULFATE 2.5; .5 MG/3ML; MG/3ML
1 SOLUTION RESPIRATORY (INHALATION) ONCE
Status: COMPLETED | OUTPATIENT
Start: 2019-10-14 | End: 2019-10-14

## 2019-10-14 RX ORDER — DOXYCYCLINE 100 MG/1
100 CAPSULE ORAL 2 TIMES DAILY
Qty: 20 CAPSULE | Refills: 0 | Status: SHIPPED | OUTPATIENT
Start: 2019-10-14 | End: 2019-11-25

## 2019-10-14 RX ORDER — CETIRIZINE HYDROCHLORIDE 10 MG/1
10 TABLET ORAL DAILY
COMMUNITY
End: 2019-10-22 | Stop reason: SDUPTHER

## 2019-10-14 RX ORDER — PREDNISONE 20 MG/1
40 TABLET ORAL DAILY
Qty: 10 TABLET | Refills: 0 | Status: SHIPPED | OUTPATIENT
Start: 2019-10-14 | End: 2019-10-19

## 2019-10-14 RX ORDER — CETIRIZINE HYDROCHLORIDE 10 MG/1
TABLET ORAL
Qty: 30 TABLET | Refills: 5 | Status: SHIPPED | OUTPATIENT
Start: 2019-10-14 | End: 2020-04-16

## 2019-10-14 RX ADMIN — HYDROCODONE BITARTRATE AND ACETAMINOPHEN 1 TABLET: 5; 325 TABLET ORAL at 12:02

## 2019-10-14 RX ADMIN — IPRATROPIUM BROMIDE AND ALBUTEROL SULFATE 1 AMPULE: .5; 3 SOLUTION RESPIRATORY (INHALATION) at 12:18

## 2019-10-14 ASSESSMENT — PAIN DESCRIPTION - LOCATION: LOCATION: GENERALIZED;HEAD

## 2019-10-14 ASSESSMENT — PAIN DESCRIPTION - PAIN TYPE: TYPE: ACUTE PAIN

## 2019-10-14 ASSESSMENT — PAIN DESCRIPTION - DESCRIPTORS: DESCRIPTORS: ACHING;HEADACHE

## 2019-10-14 ASSESSMENT — PAIN SCALES - GENERAL
PAINLEVEL_OUTOF10: 7
PAINLEVEL_OUTOF10: 7

## 2019-10-16 ENCOUNTER — TELEPHONE (OUTPATIENT)
Dept: FAMILY MEDICINE CLINIC | Age: 39
End: 2019-10-16

## 2019-10-16 RX ORDER — BENZONATATE 100 MG/1
100 CAPSULE ORAL 3 TIMES DAILY PRN
Qty: 20 CAPSULE | Refills: 0 | Status: SHIPPED | OUTPATIENT
Start: 2019-10-16 | End: 2019-11-25

## 2019-10-21 RX ORDER — TRAZODONE HYDROCHLORIDE 50 MG/1
TABLET ORAL
Qty: 60 TABLET | Refills: 2 | Status: SHIPPED | OUTPATIENT
Start: 2019-10-21 | End: 2020-02-03

## 2019-10-22 ENCOUNTER — OFFICE VISIT (OUTPATIENT)
Dept: FAMILY MEDICINE CLINIC | Age: 39
End: 2019-10-22
Payer: COMMERCIAL

## 2019-10-22 ENCOUNTER — HOSPITAL ENCOUNTER (OUTPATIENT)
Age: 39
Discharge: HOME | End: 2019-10-22
Payer: COMMERCIAL

## 2019-10-22 VITALS
SYSTOLIC BLOOD PRESSURE: 110 MMHG | WEIGHT: 211 LBS | HEART RATE: 77 BPM | BODY MASS INDEX: 33.91 KG/M2 | DIASTOLIC BLOOD PRESSURE: 70 MMHG | OXYGEN SATURATION: 99 % | HEIGHT: 66 IN

## 2019-10-22 DIAGNOSIS — J18.9 PNEUMONIA OF LEFT LOWER LOBE DUE TO INFECTIOUS ORGANISM: Primary | ICD-10-CM

## 2019-10-22 DIAGNOSIS — R50.9 FEBRILE ILLNESS: ICD-10-CM

## 2019-10-22 DIAGNOSIS — R53.83 FATIGUE, UNSPECIFIED TYPE: ICD-10-CM

## 2019-10-22 DIAGNOSIS — R51.9 PERSISTENT HEADACHES: ICD-10-CM

## 2019-10-22 DIAGNOSIS — B35.1 ONYCHOMYCOSIS OF TOENAIL: ICD-10-CM

## 2019-10-22 LAB
ABSOLUTE EOS #: 0.2 K/UL (ref 0–0.4)
ABSOLUTE IMMATURE GRANULOCYTE: ABNORMAL K/UL (ref 0–0.3)
ABSOLUTE LYMPH #: 2.3 K/UL (ref 1–4.8)
ABSOLUTE MONO #: 0.6 K/UL (ref 0–1)
ALBUMIN SERPL-MCNC: 4.3 G/DL (ref 3.5–5.2)
ALBUMIN/GLOBULIN RATIO: ABNORMAL (ref 1–2.5)
ALP BLD-CCNC: 56 U/L (ref 35–104)
ALT SERPL-CCNC: 23 U/L (ref 5–33)
ANION GAP SERPL CALCULATED.3IONS-SCNC: 9 MMOL/L (ref 9–17)
AST SERPL-CCNC: 17 U/L
BASOPHILS # BLD: 0 % (ref 0–2)
BASOPHILS ABSOLUTE: 0 K/UL (ref 0–0.2)
BILIRUB SERPL-MCNC: 0.2 MG/DL (ref 0.3–1.2)
BUN BLDV-MCNC: 15 MG/DL (ref 6–20)
BUN/CREAT BLD: 22 (ref 9–20)
CALCIUM SERPL-MCNC: 10.2 MG/DL (ref 8.6–10.4)
CHLORIDE BLD-SCNC: 102 MMOL/L (ref 98–107)
CO2: 26 MMOL/L (ref 20–31)
CREAT SERPL-MCNC: 0.69 MG/DL (ref 0.5–0.9)
DIFFERENTIAL TYPE: YES
EOSINOPHILS RELATIVE PERCENT: 2 % (ref 0–5)
GFR AFRICAN AMERICAN: >60 ML/MIN
GFR NON-AFRICAN AMERICAN: >60 ML/MIN
GFR SERPL CREATININE-BSD FRML MDRD: ABNORMAL ML/MIN/{1.73_M2}
GFR SERPL CREATININE-BSD FRML MDRD: ABNORMAL ML/MIN/{1.73_M2}
GLUCOSE BLD-MCNC: 104 MG/DL (ref 70–99)
HCT VFR BLD CALC: 39.1 % (ref 36–46)
HEMOGLOBIN: 13.5 G/DL (ref 12–16)
IMMATURE GRANULOCYTES: ABNORMAL %
LYMPHOCYTES # BLD: 21 % (ref 15–40)
MCH RBC QN AUTO: 30.2 PG (ref 26–34)
MCHC RBC AUTO-ENTMCNC: 34.6 G/DL (ref 31–37)
MCV RBC AUTO: 87.3 FL (ref 80–100)
MONOCYTES # BLD: 6 % (ref 4–8)
NRBC AUTOMATED: ABNORMAL PER 100 WBC
PDW BLD-RTO: 12.9 % (ref 12.1–15.2)
PLATELET # BLD: 349 K/UL (ref 140–450)
PLATELET ESTIMATE: ABNORMAL
PMV BLD AUTO: ABNORMAL FL (ref 6–12)
POTASSIUM SERPL-SCNC: 4.4 MMOL/L (ref 3.7–5.3)
RBC # BLD: 4.48 M/UL (ref 4–5.2)
RBC # BLD: ABNORMAL 10*6/UL
SEG NEUTROPHILS: 71 % (ref 47–75)
SEGMENTED NEUTROPHILS ABSOLUTE COUNT: 8 K/UL (ref 2.5–7)
SODIUM BLD-SCNC: 137 MMOL/L (ref 135–144)
TOTAL PROTEIN: 7.4 G/DL (ref 6.4–8.3)
TSH SERPL DL<=0.05 MIU/L-ACNC: 0.69 MIU/L (ref 0.3–5)
WBC # BLD: 11.2 K/UL (ref 3.5–11)
WBC # BLD: ABNORMAL 10*3/UL

## 2019-10-22 PROCEDURE — G8417 CALC BMI ABV UP PARAM F/U: HCPCS | Performed by: FAMILY MEDICINE

## 2019-10-22 PROCEDURE — G8427 DOCREV CUR MEDS BY ELIG CLIN: HCPCS | Performed by: FAMILY MEDICINE

## 2019-10-22 PROCEDURE — 36415 COLL VENOUS BLD VENIPUNCTURE: CPT

## 2019-10-22 PROCEDURE — 85025 COMPLETE CBC W/AUTO DIFF WBC: CPT

## 2019-10-22 PROCEDURE — 84443 ASSAY THYROID STIM HORMONE: CPT

## 2019-10-22 PROCEDURE — 99214 OFFICE O/P EST MOD 30 MIN: CPT | Performed by: FAMILY MEDICINE

## 2019-10-22 PROCEDURE — 80053 COMPREHEN METABOLIC PANEL: CPT

## 2019-10-22 PROCEDURE — 1036F TOBACCO NON-USER: CPT | Performed by: FAMILY MEDICINE

## 2019-10-22 PROCEDURE — G8484 FLU IMMUNIZE NO ADMIN: HCPCS | Performed by: FAMILY MEDICINE

## 2019-10-22 RX ORDER — FLUTICASONE PROPIONATE 50 MCG
2 SPRAY, SUSPENSION (ML) NASAL DAILY
Qty: 1 BOTTLE | Refills: 2 | Status: SHIPPED | OUTPATIENT
Start: 2019-10-22 | End: 2020-07-27

## 2019-10-22 RX ORDER — METHOCARBAMOL 500 MG/1
TABLET, FILM COATED ORAL
Qty: 60 TABLET | Refills: 2 | Status: SHIPPED | OUTPATIENT
Start: 2019-10-22 | End: 2019-12-30

## 2019-10-22 RX ORDER — PREDNISONE 20 MG/1
40 TABLET ORAL DAILY
Qty: 10 TABLET | Refills: 0 | Status: SHIPPED | OUTPATIENT
Start: 2019-10-22 | End: 2019-10-27

## 2019-10-23 RX ORDER — IBUPROFEN 800 MG/1
800 TABLET ORAL EVERY 8 HOURS PRN
Qty: 60 TABLET | Refills: 2 | Status: SHIPPED | OUTPATIENT
Start: 2019-10-23 | End: 2019-12-30 | Stop reason: SDUPTHER

## 2019-10-24 ASSESSMENT — ENCOUNTER SYMPTOMS
GASTROINTESTINAL NEGATIVE: 1
RESPIRATORY NEGATIVE: 1

## 2019-11-01 ENCOUNTER — TELEPHONE (OUTPATIENT)
Dept: FAMILY MEDICINE CLINIC | Age: 39
End: 2019-11-01

## 2019-11-01 ENCOUNTER — HOSPITAL ENCOUNTER (OUTPATIENT)
Age: 39
Discharge: HOME | End: 2019-11-01
Payer: COMMERCIAL

## 2019-11-01 DIAGNOSIS — R19.7 DIARRHEA, UNSPECIFIED TYPE: Primary | ICD-10-CM

## 2019-11-02 ENCOUNTER — HOSPITAL ENCOUNTER (OUTPATIENT)
Age: 39
Setting detail: SPECIMEN
Discharge: HOME | End: 2019-11-02
Payer: COMMERCIAL

## 2019-11-02 DIAGNOSIS — R19.7 DIARRHEA, UNSPECIFIED TYPE: ICD-10-CM

## 2019-11-02 LAB
-: NORMAL
REASON FOR REJECTION: NORMAL
ZZ NTE CLEAN UP: ORDERED TEST: NORMAL
ZZ NTE WITH NAME CLEAN UP: SPECIMEN SOURCE: NORMAL

## 2019-11-04 ENCOUNTER — TELEPHONE (OUTPATIENT)
Dept: FAMILY MEDICINE CLINIC | Age: 39
End: 2019-11-04

## 2019-11-25 ENCOUNTER — HOSPITAL ENCOUNTER (OUTPATIENT)
Age: 39
Discharge: HOME | End: 2019-11-25
Payer: COMMERCIAL

## 2019-11-25 ENCOUNTER — OFFICE VISIT (OUTPATIENT)
Dept: FAMILY MEDICINE CLINIC | Age: 39
End: 2019-11-25
Payer: COMMERCIAL

## 2019-11-25 VITALS
DIASTOLIC BLOOD PRESSURE: 62 MMHG | SYSTOLIC BLOOD PRESSURE: 110 MMHG | HEIGHT: 66 IN | OXYGEN SATURATION: 97 % | HEART RATE: 74 BPM | WEIGHT: 213 LBS | BODY MASS INDEX: 34.23 KG/M2

## 2019-11-25 DIAGNOSIS — Z20.2 STD EXPOSURE: ICD-10-CM

## 2019-11-25 DIAGNOSIS — Z23 NEED FOR INFLUENZA VACCINATION: ICD-10-CM

## 2019-11-25 DIAGNOSIS — J30.9 CHRONIC ALLERGIC RHINITIS: Primary | ICD-10-CM

## 2019-11-25 LAB
DIRECT EXAM: NORMAL
HAV IGM SER IA-ACNC: NONREACTIVE
HEPATITIS B CORE IGM ANTIBODY: NONREACTIVE
HEPATITIS B SURFACE ANTIGEN: NONREACTIVE
HEPATITIS C ANTIBODY: NONREACTIVE
HIV AG/AB: NONREACTIVE
Lab: NORMAL
SPECIMEN DESCRIPTION: NORMAL

## 2019-11-25 PROCEDURE — 87389 HIV-1 AG W/HIV-1&-2 AB AG IA: CPT

## 2019-11-25 PROCEDURE — 87480 CANDIDA DNA DIR PROBE: CPT

## 2019-11-25 PROCEDURE — 36415 COLL VENOUS BLD VENIPUNCTURE: CPT

## 2019-11-25 PROCEDURE — 80074 ACUTE HEPATITIS PANEL: CPT

## 2019-11-25 PROCEDURE — 86695 HERPES SIMPLEX TYPE 1 TEST: CPT

## 2019-11-25 PROCEDURE — 86694 HERPES SIMPLEX NES ANTBDY: CPT

## 2019-11-25 PROCEDURE — 1036F TOBACCO NON-USER: CPT | Performed by: FAMILY MEDICINE

## 2019-11-25 PROCEDURE — G8417 CALC BMI ABV UP PARAM F/U: HCPCS | Performed by: FAMILY MEDICINE

## 2019-11-25 PROCEDURE — 90686 IIV4 VACC NO PRSV 0.5 ML IM: CPT | Performed by: FAMILY MEDICINE

## 2019-11-25 PROCEDURE — G8482 FLU IMMUNIZE ORDER/ADMIN: HCPCS | Performed by: FAMILY MEDICINE

## 2019-11-25 PROCEDURE — G8427 DOCREV CUR MEDS BY ELIG CLIN: HCPCS | Performed by: FAMILY MEDICINE

## 2019-11-25 PROCEDURE — 87491 CHLMYD TRACH DNA AMP PROBE: CPT

## 2019-11-25 PROCEDURE — 87510 GARDNER VAG DNA DIR PROBE: CPT

## 2019-11-25 PROCEDURE — 86696 HERPES SIMPLEX TYPE 2 TEST: CPT

## 2019-11-25 PROCEDURE — 90471 IMMUNIZATION ADMIN: CPT | Performed by: FAMILY MEDICINE

## 2019-11-25 PROCEDURE — 87660 TRICHOMONAS VAGIN DIR PROBE: CPT

## 2019-11-25 PROCEDURE — 99213 OFFICE O/P EST LOW 20 MIN: CPT | Performed by: FAMILY MEDICINE

## 2019-11-25 PROCEDURE — 87591 N.GONORRHOEAE DNA AMP PROB: CPT

## 2019-11-25 ASSESSMENT — ENCOUNTER SYMPTOMS
GASTROINTESTINAL NEGATIVE: 1
RESPIRATORY NEGATIVE: 1

## 2019-11-26 LAB
HERPES SIMPLEX VIRUS 1 IGG: 6.16
HERPES SIMPLEX VIRUS 2 IGG: 0.8
HERPES TYPE 1/2 IGM COMBINED: 1.61

## 2019-11-27 LAB
C. TRACHOMATIS DNA ,URINE: NEGATIVE
N. GONORRHOEAE DNA, URINE: NEGATIVE
SPECIMEN DESCRIPTION: NORMAL

## 2019-12-16 ENCOUNTER — OFFICE VISIT (OUTPATIENT)
Dept: FAMILY MEDICINE CLINIC | Age: 39
End: 2019-12-16
Payer: COMMERCIAL

## 2019-12-16 ENCOUNTER — HOSPITAL ENCOUNTER (OUTPATIENT)
Dept: GENERAL RADIOLOGY | Age: 39
Discharge: HOME | End: 2019-12-18
Payer: COMMERCIAL

## 2019-12-16 ENCOUNTER — HOSPITAL ENCOUNTER (OUTPATIENT)
Age: 39
Discharge: HOME | End: 2019-12-18
Payer: COMMERCIAL

## 2019-12-16 VITALS
OXYGEN SATURATION: 98 % | HEART RATE: 80 BPM | SYSTOLIC BLOOD PRESSURE: 132 MMHG | WEIGHT: 211 LBS | BODY MASS INDEX: 34.06 KG/M2 | TEMPERATURE: 98.2 F | DIASTOLIC BLOOD PRESSURE: 86 MMHG

## 2019-12-16 DIAGNOSIS — R06.02 SOB (SHORTNESS OF BREATH): ICD-10-CM

## 2019-12-16 DIAGNOSIS — Z71.6 ENCOUNTER FOR SMOKING CESSATION COUNSELING: ICD-10-CM

## 2019-12-16 DIAGNOSIS — R09.82 PND (POST-NASAL DRIP): ICD-10-CM

## 2019-12-16 DIAGNOSIS — J06.9 VIRAL URI: Primary | ICD-10-CM

## 2019-12-16 PROCEDURE — G8482 FLU IMMUNIZE ORDER/ADMIN: HCPCS | Performed by: NURSE PRACTITIONER

## 2019-12-16 PROCEDURE — 71046 X-RAY EXAM CHEST 2 VIEWS: CPT

## 2019-12-16 PROCEDURE — G8417 CALC BMI ABV UP PARAM F/U: HCPCS | Performed by: NURSE PRACTITIONER

## 2019-12-16 PROCEDURE — 99213 OFFICE O/P EST LOW 20 MIN: CPT | Performed by: NURSE PRACTITIONER

## 2019-12-16 PROCEDURE — G8427 DOCREV CUR MEDS BY ELIG CLIN: HCPCS | Performed by: NURSE PRACTITIONER

## 2019-12-16 PROCEDURE — 4004F PT TOBACCO SCREEN RCVD TLK: CPT | Performed by: NURSE PRACTITIONER

## 2019-12-16 RX ORDER — PSEUDOEPHEDRINE HCL 120 MG/1
120 TABLET, FILM COATED, EXTENDED RELEASE ORAL EVERY 12 HOURS
Qty: 20 TABLET | Refills: 0 | Status: SHIPPED | OUTPATIENT
Start: 2019-12-16 | End: 2019-12-26

## 2019-12-16 RX ORDER — VARENICLINE TARTRATE 25 MG
KIT ORAL
Qty: 53 TABLET | Refills: 0 | Status: SHIPPED
Start: 2019-12-16 | End: 2020-04-07

## 2019-12-16 ASSESSMENT — ENCOUNTER SYMPTOMS
SHORTNESS OF BREATH: 0
SINUS PAIN: 1
VOMITING: 0
NAUSEA: 0
SORE THROAT: 1
RHINORRHEA: 0
DIARRHEA: 1
COUGH: 1

## 2019-12-19 ENCOUNTER — TELEPHONE (OUTPATIENT)
Dept: FAMILY MEDICINE CLINIC | Age: 39
End: 2019-12-19

## 2019-12-19 RX ORDER — AZITHROMYCIN 250 MG/1
250 TABLET, FILM COATED ORAL SEE ADMIN INSTRUCTIONS
Qty: 6 TABLET | Refills: 0 | Status: SHIPPED | OUTPATIENT
Start: 2019-12-19 | End: 2019-12-24

## 2019-12-30 RX ORDER — IBUPROFEN 800 MG/1
800 TABLET ORAL EVERY 8 HOURS PRN
Qty: 60 TABLET | Refills: 2 | Status: SHIPPED | OUTPATIENT
Start: 2019-12-30 | End: 2020-03-02

## 2019-12-30 RX ORDER — METHOCARBAMOL 500 MG/1
TABLET, FILM COATED ORAL
Qty: 60 TABLET | Refills: 2 | Status: SHIPPED | OUTPATIENT
Start: 2019-12-30 | End: 2020-02-28

## 2020-01-08 RX ORDER — OMEPRAZOLE 40 MG/1
CAPSULE, DELAYED RELEASE ORAL
Qty: 30 CAPSULE | Refills: 5 | Status: SHIPPED | OUTPATIENT
Start: 2020-01-08 | End: 2020-06-15

## 2020-01-08 RX ORDER — NORGESTIMATE AND ETHINYL ESTRADIOL 0.25-0.035
1 KIT ORAL DAILY
Qty: 28 TABLET | Refills: 5 | Status: SHIPPED | OUTPATIENT
Start: 2020-01-08 | End: 2020-06-23

## 2020-01-20 ENCOUNTER — OFFICE VISIT (OUTPATIENT)
Dept: FAMILY MEDICINE CLINIC | Age: 40
End: 2020-01-20
Payer: COMMERCIAL

## 2020-01-20 VITALS
HEART RATE: 77 BPM | DIASTOLIC BLOOD PRESSURE: 68 MMHG | OXYGEN SATURATION: 99 % | TEMPERATURE: 98.1 F | WEIGHT: 204.7 LBS | BODY MASS INDEX: 33.04 KG/M2 | SYSTOLIC BLOOD PRESSURE: 116 MMHG

## 2020-01-20 PROCEDURE — 99213 OFFICE O/P EST LOW 20 MIN: CPT | Performed by: NURSE PRACTITIONER

## 2020-01-20 PROCEDURE — G8427 DOCREV CUR MEDS BY ELIG CLIN: HCPCS | Performed by: NURSE PRACTITIONER

## 2020-01-20 PROCEDURE — G8417 CALC BMI ABV UP PARAM F/U: HCPCS | Performed by: NURSE PRACTITIONER

## 2020-01-20 PROCEDURE — G8482 FLU IMMUNIZE ORDER/ADMIN: HCPCS | Performed by: NURSE PRACTITIONER

## 2020-01-20 PROCEDURE — 1036F TOBACCO NON-USER: CPT | Performed by: NURSE PRACTITIONER

## 2020-01-20 ASSESSMENT — ENCOUNTER SYMPTOMS
DIARRHEA: 0
NAUSEA: 0
SHORTNESS OF BREATH: 0
SINUS PAIN: 0
SINUS PRESSURE: 0
SORE THROAT: 1
TROUBLE SWALLOWING: 0
COUGH: 0
VOMITING: 0

## 2020-01-20 NOTE — PROGRESS NOTES
HPI Notes    Name: Tana Vargas  : 1980         Chief Complaint:     Chief Complaint   Patient presents with    1 Month Follow-Up     Chantix- having side effects of runny nose, gas, nausea, constipation    Other     Left inner ear pain, sore throat/gland       History of Present Illness:        HPI  Patient is a 51-year-old female who reports for continued evaluation of smoking cessation. Patient's quit date is 2020. Patient took Chantix but started to experience some side effects including runny nose, flatulence, nausea, and constipation. Patient did continue the entire month worth, however would like to stop taking Chantix. Patient states that her cravings for smoking has decreased dramatically. Patient does take about 15 nicotine lozenges per day (30 mg of nicotine). This is much less than her to pack per day smoking habit. Patient also complained of left ear pain and a sore throat. Symptoms started about 1 week ago. Denies any fever, chills, and difficulty swallowing. Does not really \"feel sick\". Past Medical History:     Past Medical History:   Diagnosis Date    Anxiety     Bulging of lumbar intervertebral disc without myelopathy     L1    Chronic back pain     Depression     GERD (gastroesophageal reflux disease)     Substance abuse ([facility] [address]       Reviewed all health maintenance requirements and ordered appropriate tests  Health Maintenance Due   Topic Date Due    Varicella Vaccine (1 of 2 - 2-dose childhood series) 1983    Pneumococcal 0-64 years Vaccine (1 of 1 - PPSV23) 1988       Past Surgical History:     Past Surgical History:   Procedure Laterality Date    PILONIDAL CYST EXCISION  2018        Medications:       Prior to Admission medications    Medication Sig Start Date End Date Taking?  Authorizing Provider   omeprazole (PRILOSEC) 40 MG delayed release capsule TAKE 1 CAPSULE BY MOUTH EVERY DAY 20  Yes Leonard Iniguez DO   norgestimate-ethinyl estradiol (ORTHO-CYCLEN) 0.25-35 MG-MCG per tablet TAKE 1 TABLET BY MOUTH DAILY 1/8/20  Yes Tosha Casper, DO   methocarbamol (ROBAXIN) 500 MG tablet TAKE 1 TABLET BY MOUTH THREE TIMES DAILY AS NEEDED 12/30/19  Yes Tosha Casper, DO   ibuprofen (ADVIL;MOTRIN) 800 MG tablet Take 1 tablet by mouth every 8 hours as needed for Pain 12/30/19  Yes Tosha Casper, DO   varenicline (CHANTIX STARTING MONTH PAK) 0.5 MG X 11 & 1 MG X 42 tablet Take 0.5mg by mouth daily first 3 days. Then take 0.5mg by mouth twice daily for 4 days. After first week, take 1mg by mouth twice daily. 12/16/19  Yes MICHAEL Cruz CNP   fluticasone (FLONASE) 50 MCG/ACT nasal spray 2 sprays by Nasal route daily 10/22/19  Yes Tosha Casper DO   traZODone (DESYREL) 50 MG tablet TAKE 1 TO 2 TABLETS BY MOUTH NIGHTLY AS NEEDED for sleep 10/21/19  Yes Tosha Casper, DO   cetirizine (ZYRTEC) 10 MG tablet TAKE 1 TABLET BY MOUTH EVERY DAY 10/14/19  Yes Tosha Casper DO   albuterol sulfate HFA (PROVENTIL HFA) 108 (90 Base) MCG/ACT inhaler Inhale 2 puffs into the lungs every 6 hours as needed for Wheezing 10/12/19  Yes Leti Jung,    gabapentin (NEURONTIN) 300 MG capsule Take 1 capsule by mouth every 12 hours. 6/25/19  Yes Historical Provider, MD   acetaminophen (TYLENOL) 500 MG tablet Take 1,000 mg by mouth every 6 hours as needed for Pain    Yes Historical Provider, MD        Allergies:       Patient has no known allergies. Social History:     Tobacco:    reports that she quit smoking about 2 weeks ago. Her smoking use included cigarettes. She has a 4.00 pack-year smoking history. She has never used smokeless tobacco.  Alcohol:      reports current alcohol use. Drug Use:  reports previous drug use. Drugs: Opiates  and Methamphetamines.     Family History:        Family History   Problem Relation Age of Onset    Diabetes Mother        Review of Systems:         Review of Systems   Constitutional: Negative for chills and fever. HENT: Positive for sore throat. Negative for postnasal drip, sinus pressure, sinus pain and trouble swallowing. Respiratory: Negative for cough and shortness of breath. Cardiovascular: Negative for chest pain and palpitations. Gastrointestinal: Negative for diarrhea, nausea and vomiting. Neurological: Negative for dizziness, seizures and headaches. Physical Exam:     Vitals:  /68   Pulse 77   Temp 98.1 °F (36.7 °C) (Oral)   Wt 204 lb 11.2 oz (92.9 kg)   LMP 01/06/2020 (Approximate)   SpO2 99%   BMI 33.04 kg/m²       Physical Exam  Vitals signs and nursing note reviewed. Constitutional:       Appearance: Normal appearance. She is well-developed. HENT:      Right Ear: Tympanic membrane normal. There is no impacted cerumen. Left Ear: A middle ear effusion (small amount of clear fluid behind TM) is present. There is no impacted cerumen. Nose: Nose normal.      Mouth/Throat:      Mouth: Mucous membranes are moist.      Pharynx: Posterior oropharyngeal erythema present. No oropharyngeal exudate or uvula swelling. Cardiovascular:      Rate and Rhythm: Normal rate and regular rhythm. Heart sounds: Normal heart sounds, S1 normal and S2 normal.   Pulmonary:      Effort: Pulmonary effort is normal. No respiratory distress. Breath sounds: Normal breath sounds. Abdominal:      General: Bowel sounds are normal.      Palpations: Abdomen is soft. Tenderness: There is no tenderness. Skin:     General: Skin is warm and dry. Neurological:      Mental Status: She is alert and oriented to person, place, and time.                Data:     Lab Results   Component Value Date     10/22/2019    K 4.4 10/22/2019     10/22/2019    CO2 26 10/22/2019    BUN 15 10/22/2019    CREATININE 0.69 10/22/2019    GLUCOSE 104 10/22/2019    PROT 7.4 10/22/2019    LABALBU 4.3 10/22/2019    BILITOT 0.20 10/22/2019    ALKPHOS 56 10/22/2019    AST 17 10/22/2019    ALT 23 10/22/2019     Lab Results   Component Value Date    WBC 11.2 10/22/2019    RBC 4.48 10/22/2019    HGB 13.5 10/22/2019    HCT 39.1 10/22/2019    MCV 87.3 10/22/2019    MCH 30.2 10/22/2019    MCHC 34.6 10/22/2019    RDW 12.9 10/22/2019     10/22/2019    MPV NOT REPORTED 10/22/2019     Lab Results   Component Value Date    TSH 0.69 10/22/2019     Lab Results   Component Value Date    LABA1C 5.3 09/19/2018          Assessment & Plan        Diagnosis Orders   1. Acute effusion of left ear   --very small acute effusion of left ear. May try sudafed and flonase. 2. Acute viral pharyngitis  --warm tea with honey, lozenges with honey     3. Encounter for smoking cessation counseling  --pt wants to D/C chantix due to side effects. Pt educated to avoid triggers. Pt educated to not buy cigarettes. May continue nicotine lozenges for now, but educated that she should wean off of those eventually. Patient verbalizes understanding and agreement with plan. All questions answered. If symptoms do not resolve or worsen, return to office. Completed Refills   Requested Prescriptions      No prescriptions requested or ordered in this encounter     No follow-ups on file. No orders of the defined types were placed in this encounter. No orders of the defined types were placed in this encounter. There are no Patient Instructions on file for this visit. Electronically signed by MICHAEL Tesfaye CNP on 1/20/2020 at 10:33 AM           Completed Refills      Requested Prescriptions      No prescriptions requested or ordered in this encounter         Sandy Doyleer received counseling on the following healthy behaviors: nutrition, exercise, medication adherence and tobacco cessation  Reviewed prior labs and health maintenance. Continue current medications, diet and exercise. Discussed use, benefit, and side effects of prescribed medications. Barriers to medication compliance addressed.    Patient

## 2020-02-03 RX ORDER — TRAZODONE HYDROCHLORIDE 50 MG/1
TABLET ORAL
Qty: 60 TABLET | Refills: 2 | Status: SHIPPED | OUTPATIENT
Start: 2020-02-03 | End: 2020-05-07

## 2020-02-03 NOTE — TELEPHONE ENCOUNTER
Last OV: 7/15/19 for insomnia with instruction to follow up in 3 months, no appointment made for insomnia  Last RX: Trazadone    Next scheduled apt: Visit date not found

## 2020-02-05 ENCOUNTER — OFFICE VISIT (OUTPATIENT)
Dept: FAMILY MEDICINE CLINIC | Age: 40
End: 2020-02-05
Payer: COMMERCIAL

## 2020-02-05 VITALS
BODY MASS INDEX: 32.77 KG/M2 | TEMPERATURE: 98 F | OXYGEN SATURATION: 98 % | WEIGHT: 203 LBS | DIASTOLIC BLOOD PRESSURE: 80 MMHG | HEART RATE: 84 BPM | SYSTOLIC BLOOD PRESSURE: 112 MMHG

## 2020-02-05 PROCEDURE — 99213 OFFICE O/P EST LOW 20 MIN: CPT | Performed by: NURSE PRACTITIONER

## 2020-02-05 PROCEDURE — G8427 DOCREV CUR MEDS BY ELIG CLIN: HCPCS | Performed by: NURSE PRACTITIONER

## 2020-02-05 PROCEDURE — 1036F TOBACCO NON-USER: CPT | Performed by: NURSE PRACTITIONER

## 2020-02-05 PROCEDURE — G8482 FLU IMMUNIZE ORDER/ADMIN: HCPCS | Performed by: NURSE PRACTITIONER

## 2020-02-05 PROCEDURE — G8417 CALC BMI ABV UP PARAM F/U: HCPCS | Performed by: NURSE PRACTITIONER

## 2020-02-05 ASSESSMENT — ENCOUNTER SYMPTOMS
NAUSEA: 0
COUGH: 0
CONSTIPATION: 1
FLATUS: 1
DIARRHEA: 0
SHORTNESS OF BREATH: 0
VOMITING: 0
ABDOMINAL PAIN: 0
BLOATING: 1
BLOOD IN STOOL: 0

## 2020-02-05 ASSESSMENT — PATIENT HEALTH QUESTIONNAIRE - PHQ9
1. LITTLE INTEREST OR PLEASURE IN DOING THINGS: 0
SUM OF ALL RESPONSES TO PHQ QUESTIONS 1-9: 0
2. FEELING DOWN, DEPRESSED OR HOPELESS: 0
SUM OF ALL RESPONSES TO PHQ9 QUESTIONS 1 & 2: 0
SUM OF ALL RESPONSES TO PHQ QUESTIONS 1-9: 0

## 2020-02-05 NOTE — PROGRESS NOTES
MICHAEL Leslie CNP   omeprazole (PRILOSEC) 40 MG delayed release capsule TAKE 1 CAPSULE BY MOUTH EVERY DAY 1/8/20  Yes Pam Duggan DO   norgestimate-ethinyl estradiol (ORTHO-CYCLEN) 0.25-35 MG-MCG per tablet TAKE 1 TABLET BY MOUTH DAILY 1/8/20  Yes Pam Duggan DO   methocarbamol (ROBAXIN) 500 MG tablet TAKE 1 TABLET BY MOUTH THREE TIMES DAILY AS NEEDED 12/30/19  Yes Pam Duggan DO   ibuprofen (ADVIL;MOTRIN) 800 MG tablet Take 1 tablet by mouth every 8 hours as needed for Pain 12/30/19  Yes Pam Duggan DO   fluticasone (FLONASE) 50 MCG/ACT nasal spray 2 sprays by Nasal route daily 10/22/19  Yes Pam Duggan DO   cetirizine (ZYRTEC) 10 MG tablet TAKE 1 TABLET BY MOUTH EVERY DAY 10/14/19  Yes Pam Duggan DO   albuterol sulfate HFA (PROVENTIL HFA) 108 (90 Base) MCG/ACT inhaler Inhale 2 puffs into the lungs every 6 hours as needed for Wheezing 10/12/19  Yes Samy Conrad,    gabapentin (NEURONTIN) 300 MG capsule Take 1 capsule by mouth every 12 hours. 6/25/19  Yes Historical Provider, MD   acetaminophen (TYLENOL) 500 MG tablet Take 1,000 mg by mouth every 6 hours as needed for Pain    Yes Historical Provider, MD   varenicline (CHANTIX STARTING MONTH PAK) 0.5 MG X 11 & 1 MG X 42 tablet Take 0.5mg by mouth daily first 3 days. Then take 0.5mg by mouth twice daily for 4 days. After first week, take 1mg by mouth twice daily. Patient not taking: Reported on 2/5/2020 12/16/19   MICHAEL Leslie CNP        Allergies:       Patient has no known allergies. Social History:     Tobacco:    reports that she quit smoking about 5 weeks ago. Her smoking use included cigarettes. She has a 4.00 pack-year smoking history. She has never used smokeless tobacco.  Alcohol:      reports current alcohol use. Drug Use:  reports previous drug use. Drugs: Opiates  and Methamphetamines.     Family History:        Family History   Problem Relation Age of Onset    Diabetes Mother any warranty or liability for your use of this information. Patient Education        Learning About Foods That Are Good Sources of Fiber  What foods are high in fiber? The foods you eat contain nutrients, such as vitamins and minerals. Fiber is a nutrient. Your body needs the right amount to stay healthy and work as it should. You can use the list below to help you make choices about which foods to eat. Here are some examples of foods that are good sources of fiber. Fruits  · Apple  · Apricot  · Avocado  · Banana  · Blackberries  · Cherries  · Melon  · Pear  · Raspberries  Grains  · Amaranth  · Barley  · Bran cereal  · Farro  · Oat bran  · Oatmeal  · Quinoa  · Rice (brown or wild)  · Whole-grain bread  · Whole-grain English muffin  Protein foods  · Almonds  · Beans (black, kidney, navy, lo)  · Yogesh seeds  · Garbanzo beans  · Lentils  · Pumpkin seeds  · Split peas  · Sunflower seeds  Vegetables  · Artichoke  · Broccoli  · Tate sprouts  · Cabbage  · Carrots  · Cauliflower  · Eggplant  · Green peas  · Kale  · Pumpkin  · Sweet potato  · White potato  Work with your doctor to find out how much of this nutrient you need. Depending on your health, you may need more or less of it in your diet. Where can you learn more? Go to [email or web address] org and sign in to your [employer] account. Enter F355 in the [facility] box to learn more about \"Learning About Foods That Are Good Sources of Fiber. \"     If you do not have an account, please click on the \"Sign Up Now\" link. Current as of: August 21, 2019  Content Version: 12.3  © 8490-5164 Healthwise, Incorporated. Care instructions adapted under license by [facility] ([facility]). If you have questions about a medical condition or this instruction, always ask your healthcare professional. Iván Rodrigues any warranty or liability for your use of this information.              Electronically signed by Shira Galicia MICHAEL - CNP on 2/5/2020 at 12:26 PM           Completed Refills      Requested Prescriptions      No prescriptions requested or ordered in this encounter         Ilana Vale received counseling on the following healthy behaviors: nutrition, exercise and medication adherence  Reviewed prior labs and health maintenance. Continue current medications, diet and exercise. Discussed use, benefit, and side effects of prescribed medications. Barriers to medication compliance addressed. Patient given educational materials - see patient instructions. All patient questions answered. Patient voiced understanding.

## 2020-02-28 RX ORDER — METHOCARBAMOL 500 MG/1
TABLET, FILM COATED ORAL
Qty: 60 TABLET | Refills: 2 | Status: SHIPPED | OUTPATIENT
Start: 2020-02-28 | End: 2020-04-29

## 2020-03-02 RX ORDER — IBUPROFEN 800 MG/1
800 TABLET ORAL EVERY 8 HOURS PRN
Qty: 60 TABLET | Refills: 2 | Status: SHIPPED | OUTPATIENT
Start: 2020-03-02 | End: 2020-05-07

## 2020-03-04 ENCOUNTER — TELEPHONE (OUTPATIENT)
Dept: FAMILY MEDICINE CLINIC | Age: 40
End: 2020-03-04

## 2020-03-16 ENCOUNTER — OFFICE VISIT (OUTPATIENT)
Dept: PRIMARY CARE CLINIC | Age: 40
End: 2020-03-16
Payer: COMMERCIAL

## 2020-03-16 VITALS
DIASTOLIC BLOOD PRESSURE: 78 MMHG | OXYGEN SATURATION: 95 % | TEMPERATURE: 97.8 F | WEIGHT: 201 LBS | SYSTOLIC BLOOD PRESSURE: 117 MMHG | BODY MASS INDEX: 32.44 KG/M2 | HEART RATE: 92 BPM

## 2020-03-16 PROCEDURE — G8417 CALC BMI ABV UP PARAM F/U: HCPCS | Performed by: NURSE PRACTITIONER

## 2020-03-16 PROCEDURE — 99213 OFFICE O/P EST LOW 20 MIN: CPT | Performed by: NURSE PRACTITIONER

## 2020-03-16 PROCEDURE — G8427 DOCREV CUR MEDS BY ELIG CLIN: HCPCS | Performed by: NURSE PRACTITIONER

## 2020-03-16 PROCEDURE — G8482 FLU IMMUNIZE ORDER/ADMIN: HCPCS | Performed by: NURSE PRACTITIONER

## 2020-03-16 PROCEDURE — 1036F TOBACCO NON-USER: CPT | Performed by: NURSE PRACTITIONER

## 2020-03-16 RX ORDER — AZITHROMYCIN 250 MG/1
TABLET, FILM COATED ORAL
Qty: 6 TABLET | Refills: 0 | Status: SHIPPED | OUTPATIENT
Start: 2020-03-16 | End: 2020-04-07 | Stop reason: ALTCHOICE

## 2020-03-16 RX ORDER — BENZONATATE 100 MG/1
100 CAPSULE ORAL 3 TIMES DAILY PRN
Qty: 21 CAPSULE | Refills: 0 | Status: SHIPPED | OUTPATIENT
Start: 2020-03-16 | End: 2020-03-23

## 2020-03-16 ASSESSMENT — ENCOUNTER SYMPTOMS
SORE THROAT: 0
VOMITING: 0
COUGH: 1
RHINORRHEA: 1
SHORTNESS OF BREATH: 0
NAUSEA: 0
WHEEZING: 0
DIARRHEA: 0

## 2020-03-16 NOTE — PROGRESS NOTES
[id number]  [id number] [place], [place] [id number]  Dept: [phone]  Dept Fax: [phone]     Leonor Feng is a 42 y.o. female who presents to the [facility] in Care today for hermedical conditions/complaints as noted below. Leonor Feng is c/o of Cough (Patient presents today with a cough that began 1 week ago. Patient has been using her albuterol inhahler with some improvement. )      HPI:     Cough   This is a new problem. The current episode started 1 to 4 weeks ago (Started 1 week ago with fairly dry cough, productive with albuterol. runny nose all the time, no fever but headache x 3 days. ). The problem has been gradually improving. The problem occurs every few minutes. The cough is productive of sputum (Last weekend was green but now is clear. ). Associated symptoms include headaches and rhinorrhea. Pertinent negatives include no chills, ear pain, fever, myalgias, nasal congestion, rash, sore throat, shortness of breath or wheezing. Associated symptoms comments: Sneezing. The symptoms are aggravated by lying down. Risk factors: Quit smoking first of year, She has tried a beta-agonist inhaler and OTC cough suppressant (Mucinex  couple of times per day) for the symptoms. The treatment provided moderate relief. Her past medical history is significant for environmental allergies (since moving from [place] 1 1/2 years ago) and pneumonia (Diangosed in November). There is no history of asthma or bronchitis.           Past Medical History:   Diagnosis Date    Anxiety     Bulging of lumbar intervertebral disc without myelopathy     L1    Chronic back pain     Depression     GERD (gastroesophageal reflux disease)     Substance abuse ([facility])         Current Outpatient Medications   Medication Sig Dispense Refill    azithromycin (ZITHROMAX) 250 MG tablet Take 2 tabs by mouth on Day 1, then 1 tab by mouth on Days 2-5. 6 tablet 0    benzonatate (TESSALON PERLES) 100 MG capsule Take 1 capsule by mouth 3 times daily as needed for Cough (Swallow whole. No more than 3 doses in 24 hrs.) 21 capsule 0    ibuprofen (ADVIL;MOTRIN) 800 MG tablet Take 1 tablet by mouth every 8 hours as needed for Pain 60 tablet 2    methocarbamol (ROBAXIN) 500 MG tablet TAKE 1 TABLET BY MOUTH THREE TIMES DAILY AS NEEDED 60 tablet 2    traZODone (DESYREL) 50 MG tablet TAKE 1 TO 2 TABLETS BY MOUTH NIGHTLY AS NEEDED for sleep 60 tablet 2    omeprazole (PRILOSEC) 40 MG delayed release capsule TAKE 1 CAPSULE BY MOUTH EVERY DAY 30 capsule 5    norgestimate-ethinyl estradiol (ORTHO-CYCLEN) 0.25-35 MG-MCG per tablet TAKE 1 TABLET BY MOUTH DAILY 28 tablet 5    fluticasone (FLONASE) 50 MCG/ACT nasal spray 2 sprays by Nasal route daily 1 Bottle 2    cetirizine (ZYRTEC) 10 MG tablet TAKE 1 TABLET BY MOUTH EVERY DAY 30 tablet 5    albuterol sulfate HFA (PROVENTIL HFA) 108 (90 Base) MCG/ACT inhaler Inhale 2 puffs into the lungs every 6 hours as needed for Wheezing 1 Inhaler 3    gabapentin (NEURONTIN) 300 MG capsule Take 1 capsule by mouth every 12 hours. 0    acetaminophen (TYLENOL) 500 MG tablet Take 1,000 mg by mouth every 6 hours as needed for Pain       varenicline (CHANTIX STARTING MONTH VALERIA) 0.5 MG X 11 & 1 MG X 42 tablet Take 0.5mg by mouth daily first 3 days. Then take 0.5mg by mouth twice daily for 4 days. After first week, take 1mg by mouth twice daily. (Patient not taking: Reported on 3/16/2020) 53 tablet 0     No current facility-administered medications for this visit. No Known Allergies    Subjective:     Review of Systems   Constitutional: Negative for appetite change, chills, diaphoresis, fatigue and fever. HENT: Positive for rhinorrhea and sneezing. Negative for congestion, ear pain and sore throat. Respiratory: Positive for cough. Negative for shortness of breath and wheezing. Gastrointestinal: Negative for diarrhea, nausea and vomiting. Heartburn has been worse. Musculoskeletal: Negative for myalgias. Skin: Negative for rash and wound. Allergic/Immunologic: Positive for environmental allergies (since moving from [place] 1 1/2 years ago). Neurological: Positive for headaches. Negative for dizziness and light-headedness. Objective:      Physical Exam  Vitals signs and nursing note reviewed. Constitutional:       General: She is not in acute distress. Appearance: Normal appearance. She is well-developed. She is not ill-appearing or diaphoretic. Comments: Abulatory to clinic, well-hydrated nontoxic appearance. HENT:      Head: Normocephalic and atraumatic. Right Ear: Hearing, tympanic membrane, ear canal and external ear normal. No middle ear effusion. No mastoid tenderness. Tympanic membrane is not injected, erythematous or bulging. Left Ear: Hearing, tympanic membrane, ear canal and external ear normal.  No middle ear effusion. No mastoid tenderness. Tympanic membrane is not injected, erythematous or bulging. Nose: Mucosal edema and rhinorrhea present. No congestion. Right Sinus: No maxillary sinus tenderness or frontal sinus tenderness. Left Sinus: No maxillary sinus tenderness or frontal sinus tenderness. Mouth/Throat:      Lips: Pink. Mouth: Mucous membranes are moist.      Pharynx: Oropharynx is clear. Uvula midline. No oropharyngeal exudate or posterior oropharyngeal erythema. Tonsils: No tonsillar abscesses. Eyes:      General: No scleral icterus. Right eye: No discharge. Left eye: No discharge. Conjunctiva/sclera: Conjunctivae normal.      Pupils: Pupils are equal, round, and reactive to light. Cardiovascular:      Rate and Rhythm: Normal rate and regular rhythm. Heart sounds: Normal heart sounds, S1 normal and S2 normal. No murmur. No friction rub. No gallop.     Pulmonary:      Effort: Pulmonary effort is normal. No tachypnea, bradypnea, accessory muscle usage or community-acquired pneumonia since treated in November 2019. · Probiotic or greek yogurt daily while on antibiotic. · Continue Albuterol Inhaler 1-2 puffs every 4 to 6 hours as needed for wheezing or shortness of breath. Rinse mouth after use. · Practice meticulous handwashing and cover cough to prevent spread of infection  · Encouraged to increase fluids and rest  · Aleve/Ibuprofen/Tylenol OTC PRN for pain, discomfort or fever as directed on package. Take with food. · Cool mist humidifier  · Hot tea with honey and lemon for cough PRN  · Patient instructions given for acute bronchitis, zithromax and tessalon perles. · To ER or call 911 if any difficulty breathing, shortness of breath, inability to swallow, hives, rash, facial/tongue swelling or temp greater than 103 degrees. · Follow up with PCP or Walk in Care as needed if symptoms worsen or do not improve. Jia Sharma received counseling on the following healthy behaviors: medication adherence. Patient given educational materials - see patient instructions. Discussed use,benefit, and side effects of prescribed medications. Treatment plan discussed atvisit. Continue routine health care follow up. All patient questions answered. Pt voiced understanding.       Electronically signed by MICHAEL Edwards CNP on 3/16/2020 at 1:32 PM

## 2020-03-16 NOTE — PATIENT INSTRUCTIONS
SURVEY:    You may be receiving a survey from [employer] regarding your visit today. Please complete the survey to enable us to provide the highest quality of care to you and your family. If you cannot score us a very good on any question, please call the office to discuss how we could of made your experience a very good one. Thank you. Patient Education        Cough: Care Instructions  Your Care Instructions    A cough is your body's response to something that bothers your throat or airways. Many things can cause a cough. You might cough because of a cold or the flu, bronchitis, or asthma. Smoking, postnasal drip, allergies, and stomach acid that backs up into your throat also can cause coughs. A cough is a symptom, not a disease. Most coughs stop when the cause, such as a cold, goes away. You can take a few steps at home to cough less and feel better. Follow-up care is a key part of your treatment and safety. Be sure to make and go to all appointments, and call your doctor if you are having problems. It's also a good idea to know your test results and keep a list of the medicines you take. How can you care for yourself at home? · Drink lots of water and other fluids. This helps thin the mucus and soothes a dry or sore throat. Honey or lemon juice in hot water or tea may ease a dry cough. · Take cough medicine as directed by your doctor. · Prop up your head on pillows to help you breathe and ease a dry cough. · Try cough drops to soothe a dry or sore throat. Cough drops don't stop a cough. Medicine-flavored cough drops are no better than candy-flavored drops or hard candy. · Do not smoke. Avoid secondhand smoke. If you need help quitting, talk to your doctor about stop-smoking programs and medicines. These can increase your chances of quitting for good. When should you call for help? Call 911 anytime you think you may need emergency care.  For example, call if:    · You have severe trouble device (not a kitchen spoon). Use this medicine for the full prescribed length of time, even if your symptoms quickly improve. Skipping doses can increase your risk of infection that is resistant to medication. Azithromycin will not treat a viral infection such as the flu or a common cold. Store at room temperature away from moisture and heat. Throw away any unused liquid medicine after 10 days. What happens if I miss a dose? Take the medicine as soon as you can, but skip the missed dose if it is almost time for your next dose. Do not take two doses at one time. What happens if I overdose? Seek emergency medical attention or call the Poison Help line at [phone]. What should I avoid while taking azithromycin? Antibiotic medicines can cause diarrhea, which may be a sign of a new infection. If you have diarrhea that is watery or bloody, call your doctor before using anti-diarrhea medicine. Azithromycin could make you sunburn more easily. Avoid sunlight or tanning beds. Wear protective clothing and use sunscreen (SPF 30 or higher) when you are outdoors. What are the possible side effects of azithromycin? Get emergency medical help if you have signs of an allergic reaction (hives, difficult breathing, swelling in your face or throat) or a severe skin reaction (fever, sore throat, burning in your eyes, skin pain, red or purple skin rash that spreads and causes blistering and peeling). Seek medical treatment if you have a serious drug reaction that can affect many parts of your body. Symptoms may include: skin rash, fever, swollen glands, muscle aches, severe weakness, unusual bruising, or yellowing of your skin or eyes.   Call your doctor at once if you have:  · severe stomach pain, diarrhea that is watery or bloody;  · fast or pounding heartbeats, fluttering in your chest, shortness of breath, and sudden dizziness (like you might pass out); or  · liver problems --nausea, vomiting, loss of appetite, stomach pain (upper right side), tiredness, itching, dark urine, brad-colored stools, jaundice (yellowing of the skin or eyes); Call your doctor right away if a baby taking azithromycin becomes irritable or vomits while eating or nursing. Older adults may be more likely to have side effects on heart rhythm, including a life-threatening fast heart rate. Common side effects may include:  · nausea, vomiting; or  · stomach pain. This is not a complete list of side effects and others may occur. Call your doctor for medical advice about side effects. You may report side effects to FDA at 8-751-FDA-5271. What other drugs will affect azithromycin? Tell your doctor about all your other medicines, especially:  · colchicine;  · digoxin;  · nelfinavir;  · phenytoin;  · an antacid that contains aluminum or magnesium --Acid Gone, Gaviscon, Gelusil, Maalox, Milk of Magnesia, Mylanta, Pepcid Complete, Rolaids, Rulox, and others; or  · a blood thinner --warfarin, Coumadin, Jantoven. This list is not complete. Other drugs may affect azithromycin, including prescription and over-the-counter medicines, vitamins, and herbal products. Not all possible drug interactions are listed here. Where can I get more information? Your pharmacist can provide more information about azithromycin. Remember, keep this and all other medicines out of the reach of children, never share your medicines with others, and use this medication only for the indication prescribed. Every effort has been made to ensure that the information provided by [address] is accurate, up-to-date, and complete, but no guarantee is made to that effect. Drug information contained herein may be time sensitive. [employer] information has been compiled for use by healthcare practitioners and consumers in the [place] and therefore [employer] does not warrant that uses outside of the [place] are appropriate, unless specifically indicated otherwise. with food. · Cool mist humidifier  · Hot tea with honey and lemon for cough PRN  · Patient instructions given for acute bronchitis, zithromax and tessalon perles. · To ER or call 911 if any difficulty breathing, shortness of breath, inability to swallow, hives, rash, facial/tongue swelling or temp greater than 103 degrees. · Follow up with PCP or [facility] as needed if symptoms worsen or do not improve.

## 2020-03-19 ENCOUNTER — OFFICE VISIT (OUTPATIENT)
Dept: FAMILY MEDICINE CLINIC | Age: 40
End: 2020-03-19
Payer: COMMERCIAL

## 2020-03-19 PROCEDURE — 11402 EXC TR-EXT B9+MARG 1.1-2 CM: CPT | Performed by: FAMILY MEDICINE

## 2020-03-19 NOTE — PROGRESS NOTES
Name: Mavis Zhou  : 1980         Chief Complaint:     Chief Complaint   Patient presents with    Skin Problem       History of Present Illness:      Mavis Zhou is a 42 y.o.  female who presents with Skin Problem      HPI     Patient complains of tender cyst in the center of her chest.  She thinks the cyst has been there for a number of years, but more recently it had enlarged in size and it has been very sore for the past couple of weeks. No drainage. No redness. Feeling well. History of pilonidal cyst but no other skin cysts like this. Past Medical History:     Past Medical History:   Diagnosis Date    Anxiety     Bulging of lumbar intervertebral disc without myelopathy     L1    Chronic back pain     Depression     GERD (gastroesophageal reflux disease)     Substance abuse ([facility] [address]         Past Surgical History:     Past Surgical History:   Procedure Laterality Date    PILONIDAL CYST EXCISION  2018        Medications:       Prior to Admission medications    Medication Sig Start Date End Date Taking? Authorizing Provider   azithromycin (ZITHROMAX) 250 MG tablet Take 2 tabs by mouth on Day 1, then 1 tab by mouth on Days 2-5. 3/16/20   MICHAEL León CNP   benzonatate (TESSALON PERLES) 100 MG capsule Take 1 capsule by mouth 3 times daily as needed for Cough (Swallow whole.   No more than 3 doses in 24 hrs.) 3/16/20 3/23/20  MICHAEL León CNP   ibuprofen (ADVIL;MOTRIN) 800 MG tablet Take 1 tablet by mouth every 8 hours as needed for Pain 3/2/20   Joshua Michaud DO   methocarbamol (ROBAXIN) 500 MG tablet TAKE 1 TABLET BY MOUTH THREE TIMES DAILY AS NEEDED 20   Joshua Michaud DO   traZODone (DESYREL) 50 MG tablet TAKE 1 TO 2 TABLETS BY MOUTH NIGHTLY AS NEEDED for sleep 2/3/20   MICHAEL Ochoa CNP   omeprazole (PRILOSEC) 40 MG delayed release capsule TAKE 1 CAPSULE BY MOUTH EVERY DAY 20   Joshua Michaud DO   norgestimate-ethinyl estradiol (ORTHO-CYCLEN) 0.25-35 MG-MCG per tablet TAKE 1 TABLET BY MOUTH DAILY 1/8/20   Lizeth Valderrama, DO   varenicline (CHANTIX STARTING MONTH PAK) 0.5 MG X 11 & 1 MG X 42 tablet Take 0.5mg by mouth daily first 3 days. Then take 0.5mg by mouth twice daily for 4 days. After first week, take 1mg by mouth twice daily. Patient not taking: Reported on 3/16/2020 12/16/19   MICHAEL Yang - CNP   fluticasone [facility]) 50 MCG/ACT nasal spray 2 sprays by Nasal route daily 10/22/19   Lizeth Valderrama, DO   cetirizine (ZYRTEC) 10 MG tablet TAKE 1 TABLET BY MOUTH EVERY DAY 10/14/19   Lizeth Valderrama, DO   albuterol sulfate HFA (PROVENTIL HFA) 108 (90 Base) MCG/ACT inhaler Inhale 2 puffs into the lungs every 6 hours as needed for Wheezing 10/12/19   Chucho Radha, DO   gabapentin (NEURONTIN) 300 MG capsule Take 1 capsule by mouth every 12 hours. 6/25/19   Historical Provider, MD   acetaminophen (TYLENOL) 500 MG tablet Take 1,000 mg by mouth every 6 hours as needed for Pain     Historical Provider, MD        Allergies:       Patient has no known allergies. Social History:     Tobacco:    reports that she quit smoking about 2 months ago. Her smoking use included cigarettes. She has a 4.00 pack-year smoking history. She has never used smokeless tobacco.  Alcohol:      reports current alcohol use. Drug Use:  reports previous drug use. Drugs: Opiates  and Methamphetamines. Family History:     Family History   Problem Relation Age of Onset    Diabetes Mother        Review of Systems:     Positive and Negative as described in HPI    Review of Systems    Physical Exam:     Vitals: There were no vitals taken for this visit. Physical Exam  Constitutional:       Appearance: Normal appearance. Skin:     General: Skin is warm and dry. Comments: Central chest just to the left of the sternum at approximately fourth rib: Fluctuant subcutaneous cyst approximately 2.5 cm diameter, no overlying cellulitis.   Pinpoint open comedone near the center. Neurological:      Mental Status: She is alert and oriented to person, place, and time. Mental status is at baseline. Psychiatric:         Mood and Affect: Mood normal.         Behavior: Behavior normal.         Judgment: Judgment normal.       Excision of cyst: Written consent obtained. Patient in supine position in a gown. Area identified and marked. Injected 2 mL 1% lidocaine with epinephrine. Prepped with Betadine and alcohol. Sterile drape and sterile gloves applied. Used a #15 scalpel to incise 1.5 cm vertically on a slight diagonal parallel to the skin lines. Used hemostat to separate the skin and subcutaneous tissue, revealing a pearly cyst.  Expressed this through the opening and was able to remove it in its entirety. Some sebaceous material had leaked out into the underlying tissue, and this was also removed manually. Cavity blotted with gauze. Closed using 3-0 Prolene, 1 vertical mattress suture in the center, 3 simple interrupted sutures inferiorly and 2 simple interrupted sutures superiorly. Patient tolerated well. No specimen sent due to pathognomonic appearance. Bacitracin and bandage applied. Instructed in wound care. Assessment & Plan:        Diagnosis Orders   1. Sebaceous cyst  01938 - [place] EXC SKIN BENIG 1.1-2CM TRUNK,ARM,LEG   2. Pain of skin  67855 - [place] EXC SKIN BENIG 1.1-2CM TRUNK,ARM,LEG   Cyst present for many years, had become inflamed but was not infected. Removed successfully as above. Sutured closed. Return in 8 days for suture removal.        Requested Prescriptions      No prescriptions requested or ordered in this encounter       There are no Patient Instructions on file for this visit. Erna Lluvia received counseling on the following healthy behaviors: wound care  Reviewed prior labs and health maintenance. Continue current medications, diet and exercise. Discussed use, benefit, and side effects of prescribed medications.  Barriers

## 2020-04-07 ENCOUNTER — OFFICE VISIT (OUTPATIENT)
Dept: OBGYN CLINIC | Age: 40
End: 2020-04-07
Payer: COMMERCIAL

## 2020-04-07 ENCOUNTER — HOSPITAL ENCOUNTER (OUTPATIENT)
Age: 40
Discharge: HOME | End: 2020-04-07
Payer: COMMERCIAL

## 2020-04-07 VITALS
SYSTOLIC BLOOD PRESSURE: 116 MMHG | HEART RATE: 86 BPM | DIASTOLIC BLOOD PRESSURE: 68 MMHG | RESPIRATION RATE: 18 BRPM | TEMPERATURE: 98.2 F | WEIGHT: 205 LBS | BODY MASS INDEX: 32.95 KG/M2 | HEIGHT: 66 IN

## 2020-04-07 LAB
HAV IGM SER IA-ACNC: NONREACTIVE
HEPATITIS B CORE IGM ANTIBODY: NONREACTIVE
HEPATITIS B SURFACE ANTIGEN: NONREACTIVE
HEPATITIS C ANTIBODY: NONREACTIVE
HIV AG/AB: NONREACTIVE
T. PALLIDUM, IGG: NONREACTIVE

## 2020-04-07 PROCEDURE — 87491 CHLMYD TRACH DNA AMP PROBE: CPT

## 2020-04-07 PROCEDURE — 87070 CULTURE OTHR SPECIMN AEROBIC: CPT

## 2020-04-07 PROCEDURE — 86780 TREPONEMA PALLIDUM: CPT

## 2020-04-07 PROCEDURE — 36415 COLL VENOUS BLD VENIPUNCTURE: CPT

## 2020-04-07 PROCEDURE — 87389 HIV-1 AG W/HIV-1&-2 AB AG IA: CPT

## 2020-04-07 PROCEDURE — 87624 HPV HI-RISK TYP POOLED RSLT: CPT

## 2020-04-07 PROCEDURE — G8427 DOCREV CUR MEDS BY ELIG CLIN: HCPCS | Performed by: OBSTETRICS & GYNECOLOGY

## 2020-04-07 PROCEDURE — 99213 OFFICE O/P EST LOW 20 MIN: CPT | Performed by: OBSTETRICS & GYNECOLOGY

## 2020-04-07 PROCEDURE — 80074 ACUTE HEPATITIS PANEL: CPT

## 2020-04-07 PROCEDURE — 1036F TOBACCO NON-USER: CPT | Performed by: OBSTETRICS & GYNECOLOGY

## 2020-04-07 PROCEDURE — G8417 CALC BMI ABV UP PARAM F/U: HCPCS | Performed by: OBSTETRICS & GYNECOLOGY

## 2020-04-07 PROCEDURE — G0145 SCR C/V CYTO,THINLAYER,RESCR: HCPCS

## 2020-04-07 PROCEDURE — 87591 N.GONORRHOEAE DNA AMP PROB: CPT

## 2020-04-07 NOTE — PROGRESS NOTES
PROBLEM VISIT     Date of service: 2020    Gracie Kitchen  Is a 47 y.o. single female    PT's PCP is: Ru Jha DO     : 1980                                             Subjective:       Patient's last menstrual period was 2020. OB History    Para Term  AB Living   0 0 0 0 0 0   SAB TAB Ectopic Molar Multiple Live Births   0 0 0 0 0 0        Social History     Tobacco Use   Smoking Status Former Smoker    Packs/day: 2.00    Years: 2.00    Pack years: 4.00    Types: Cigarettes    Last attempt to quit: 2020    Years since quittin.2   Smokeless Tobacco Never Used        Social History     Substance and Sexual Activity   Alcohol Use Yes    Comment: in the past       Allergies: Patient has no known allergies. Current Outpatient Medications:     ibuprofen (ADVIL;MOTRIN) 800 MG tablet, Take 1 tablet by mouth every 8 hours as needed for Pain, Disp: 60 tablet, Rfl: 2    methocarbamol (ROBAXIN) 500 MG tablet, TAKE 1 TABLET BY MOUTH THREE TIMES DAILY AS NEEDED, Disp: 60 tablet, Rfl: 2    traZODone (DESYREL) 50 MG tablet, TAKE 1 TO 2 TABLETS BY MOUTH NIGHTLY AS NEEDED for sleep, Disp: 60 tablet, Rfl: 2    omeprazole (PRILOSEC) 40 MG delayed release capsule, TAKE 1 CAPSULE BY MOUTH EVERY DAY, Disp: 30 capsule, Rfl: 5    norgestimate-ethinyl estradiol (ORTHO-CYCLEN) 0.25-35 MG-MCG per tablet, TAKE 1 TABLET BY MOUTH DAILY, Disp: 28 tablet, Rfl: 5    fluticasone (FLONASE) 50 MCG/ACT nasal spray, 2 sprays by Nasal route daily, Disp: 1 Bottle, Rfl: 2    cetirizine (ZYRTEC) 10 MG tablet, TAKE 1 TABLET BY MOUTH EVERY DAY, Disp: 30 tablet, Rfl: 5    albuterol sulfate HFA (PROVENTIL HFA) 108 (90 Base) MCG/ACT inhaler, Inhale 2 puffs into the lungs every 6 hours as needed for Wheezing, Disp: 1 Inhaler, Rfl: 3    gabapentin (NEURONTIN) 300 MG capsule, Take 1 capsule by mouth every 12 hours. , Disp: , Rfl: 0    acetaminophen (TYLENOL) 500 MG tablet, Take 1,000 mg by

## 2020-04-09 LAB — CYTOLOGY REPORT: NORMAL

## 2020-04-10 LAB
CHLAMYDIA BY THIN PREP: NEGATIVE
N. GONORRHOEAE DNA, THIN PREP: NEGATIVE
SPECIMEN DESCRIPTION: NORMAL

## 2020-04-11 LAB
CULTURE: NORMAL
Lab: NORMAL
SPECIMEN DESCRIPTION: NORMAL

## 2020-04-13 LAB
HPV SAMPLE: ABNORMAL
HPV, GENOTYPE 16: NOT DETECTED
HPV, GENOTYPE 18: NOT DETECTED
HPV, HIGH RISK OTHER: DETECTED
HPV, INTERPRETATION: ABNORMAL
SPECIMEN DESCRIPTION: ABNORMAL

## 2020-04-16 RX ORDER — CETIRIZINE HYDROCHLORIDE 10 MG/1
TABLET ORAL
Qty: 30 TABLET | Refills: 5 | Status: SHIPPED | OUTPATIENT
Start: 2020-04-16 | End: 2020-08-31 | Stop reason: SDUPTHER

## 2020-04-16 NOTE — TELEPHONE ENCOUNTER
Last OV: 3/19/2020  Last RX: 10/14/19   Next scheduled apt: Visit date not found    Medication pending. Health Maintenance   Topic Date Due    Varicella vaccine (1 of 2 - 2-dose childhood series) 03/26/1983    Pneumococcal 0-64 years Vaccine (1 of 1 - PPSV23) 03/26/1988    DTaP/Tdap/Td vaccine (1 - Tdap) 03/26/2001    Cervical cancer screen  04/07/2025    Flu vaccine  Completed    HIV screen  Completed    Hepatitis A vaccine  Aged Out    Hepatitis B vaccine  Aged Out    Hib vaccine  Aged Out    Meningococcal (ACWY) vaccine  Aged Out             (applicable per patient's age: Cancer Screenings, Depression Screening, Fall Risk Screening, Immunizations)    Hemoglobin A1C (%)   Date Value   09/19/2018 5.3     AST (U/L)   Date Value   10/22/2019 17     ALT (U/L)   Date Value   10/22/2019 23     BUN (mg/dL)   Date Value   10/22/2019 15      (goal A1C is < 7)   (goal LDL is <100) need 30-50% reduction from baseline     BP Readings from Last 3 Encounters:   04/07/20 116/68   03/16/20 117/78   02/05/20 112/80    (goal /80)      All Future Testing planned in [facility]:  Lab Frequency Next Occurrence   Hepatic Function Panel Once 08/22/2019   Comprehensive Metabolic Panel Once 76/21/8586   XR CHEST STANDARD (2 VW) Once 11/12/2019       Next Visit Date:  No future appointments.          Patient Active Problem List:     Chronic neck pain     Onychomycosis     Chronic low back pain without sciatica

## 2020-04-22 ENCOUNTER — OFFICE VISIT (OUTPATIENT)
Dept: FAMILY MEDICINE CLINIC | Age: 40
End: 2020-04-22
Payer: COMMERCIAL

## 2020-04-22 VITALS
SYSTOLIC BLOOD PRESSURE: 110 MMHG | BODY MASS INDEX: 33.75 KG/M2 | WEIGHT: 210 LBS | DIASTOLIC BLOOD PRESSURE: 60 MMHG | OXYGEN SATURATION: 100 % | HEIGHT: 66 IN | HEART RATE: 78 BPM

## 2020-04-22 PROCEDURE — 99213 OFFICE O/P EST LOW 20 MIN: CPT | Performed by: FAMILY MEDICINE

## 2020-04-22 PROCEDURE — G8417 CALC BMI ABV UP PARAM F/U: HCPCS | Performed by: FAMILY MEDICINE

## 2020-04-22 PROCEDURE — G8427 DOCREV CUR MEDS BY ELIG CLIN: HCPCS | Performed by: FAMILY MEDICINE

## 2020-04-22 PROCEDURE — 1036F TOBACCO NON-USER: CPT | Performed by: FAMILY MEDICINE

## 2020-04-22 NOTE — PATIENT INSTRUCTIONS
SURVEY:    You may be receiving a survey from [employer] regarding your visit today. You may get this in the mail, through your MyChart or in your email. Please complete the survey to enable us to provide the highest quality of care to you and your family. If you cannot score us as very good ( 5 Stars) on any question, please feel free to call the office to discuss how we could have made your experience exceptional.     Thank you. Clinical Care Team:  Dr. Adrianna Pelaez, DO [facility], [address] [place] [address] Team:              Valeria Banegas    Patient Education        Hamstring Strain: Rehab Exercises  Introduction  Here are some examples of exercises for you to try. The exercises may be suggested for a condition or for rehabilitation. Start each exercise slowly. Ease off the exercises if you start to have pain. You will be told when to start these exercises and which ones will work best for you. How to do the exercises  Hamstring set (heel dig)   1. Sit with your affected leg bent. Your good leg should be straight and supported on the floor. 2. Tighten the muscles on the back of your bent leg (hamstring) by pressing your heel into the floor. 3. Hold for about 6 seconds, and then rest for up to 10 seconds. 4. Repeat 8 to 12 times. Hamstring curl   1. Lie on your stomach with your knees straight. Place a pillow under your stomach. If your kneecap is uncomfortable, roll up a washcloth and put it under your leg just above your kneecap. 2. Lift the foot of your affected leg by bending your knee so that you bring your foot up toward your buttock. If this motion hurts, try it without bending your knee quite as far. This may help you avoid any painful motion. 3. Slowly move your leg up and down. 4. Repeat 8 to 12 times. 5. When you can do this exercise with ease and no pain, add some resistance. To do this:  6.  Tie the ends of an exercise band together to form Straight-leg raises to the outside   1. Lie on your side, with your injured leg on top. 2. Tighten the front thigh muscles of your injured leg to keep your knee straight. 3. Keep your hip and your leg straight in line with the rest of your body, and keep your knee pointing forward. Do not drop your hip back. 4. Lift your injured leg straight up toward the ceiling, about 12 inches off the floor. Hold for about 6 seconds, then slowly lower your leg. 5. Do 8 to 12 repetitions. Straight-leg raises to the back   1. Lie on your stomach, and lift your leg straight up behind you (toward the ceiling). 2. Lift your toes about 6 inches off the floor, hold for about 6 seconds, then lower slowly. 3. Do 8 to 12 repetitions. Straight-leg raises to the inside   1. Lie on the side of your body with the injured leg. 2. You can either prop your other (good) leg up on a chair, or you can bend your good knee and put that foot in front of your injured knee. Do not drop your hip back. 3. Tighten the muscles on the front of your thigh to straighten your injured knee. 4. Keep your kneecap pointing forward, and lift your whole leg up toward the ceiling about 6 inches. Hold for about 6 seconds, then lower slowly. 5. Do 8 to 12 repetitions. Heel dig bridging   1. Lie on your back with both knees bent and your ankles bent so that only your heels are digging into the floor. Your knees should be bent about 90 degrees. 2. Then push your heels into the floor, squeeze your buttocks, and lift your hips off the floor until your shoulders, hips, and knees are all in a straight line. 3. Hold for about 6 seconds as you continue to breathe normally, and then slowly lower your hips back down to the floor and rest for up to 10 seconds. 4. Do 8 to 12 repetitions. Hamstring curls   1. Lie on your stomach with your knees straight.  If your kneecap is uncomfortable, roll up a washcloth and put it under your leg just above your

## 2020-04-22 NOTE — PROGRESS NOTES
Name: Kemal Lobo  : 1980         Chief Complaint:     Chief Complaint   Patient presents with    Knee Pain     pain in back of right knee, worse in the mornings       History of Present Illness:      Kemal Lobo is a 47 y.o.  female who presents with Knee Pain (pain in back of right knee, worse in the mornings)      HPI     Pain R posterior knee for past few wks without inciting injury. Has seen chiro 2x. First time helped for a couple days but tx yesterday didn't help. Better when she's been more mobile, good while active but then really sore and stiff at night. Doesn't seem to swell. Still has numbness and feeling of something brushing against skin, cuca upper ext, L dorsal foot, which has been present for a long time. Had LUE EMG couple yrs ago showing ulnar neuropathy. She only has mild symptoms in that distribution currently. Past Medical History:     Past Medical History:   Diagnosis Date    Anxiety     Bulging of lumbar intervertebral disc without myelopathy     L1    Chronic back pain     Depression     GERD (gastroesophageal reflux disease)     Substance abuse ([facility] [address]         Past Surgical History:     Past Surgical History:   Procedure Laterality Date    PILONIDAL CYST EXCISION  2018        Medications:       Prior to Admission medications    Medication Sig Start Date End Date Taking?  Authorizing Provider   cetirizine (ZYRTEC) 10 MG tablet TAKE 1 TABLET BY MOUTH EVERY DAY 20  Yes Miladis Carcamo DO   ibuprofen (ADVIL;MOTRIN) 800 MG tablet Take 1 tablet by mouth every 8 hours as needed for Pain 3/2/20  Yes Miladis Carcamo DO   methocarbamol (ROBAXIN) 500 MG tablet TAKE 1 TABLET BY MOUTH THREE TIMES DAILY AS NEEDED 20  Yes Miladis Carcamo DO   traZODone (DESYREL) 50 MG tablet TAKE 1 TO 2 TABLETS BY MOUTH NIGHTLY AS NEEDED for sleep 2/3/20  Yes MICHAEL Mendez CNP   omeprazole (PRILOSEC) 40 MG delayed release capsule TAKE 1 CAPSULE BY MOUTH EVERY DAY 1/8/20  Yes Saida Stapleton, DO   norgestimate-ethinyl estradiol (ORTHO-CYCLEN) 0.25-35 MG-MCG per tablet TAKE 1 TABLET BY MOUTH DAILY 1/8/20  Yes Saida Stapleton, DO   fluticasone (FLONASE) 50 MCG/ACT nasal spray 2 sprays by Nasal route daily 10/22/19  Yes Saida Stapleton, DO   albuterol sulfate HFA (PROVENTIL HFA) 108 (90 Base) MCG/ACT inhaler Inhale 2 puffs into the lungs every 6 hours as needed for Wheezing 10/12/19  Yes Dano Alex, DO   gabapentin (NEURONTIN) 300 MG capsule Take 1 capsule by mouth every 12 hours. 6/25/19  Yes Historical Provider, MD   acetaminophen (TYLENOL) 500 MG tablet Take 1,000 mg by mouth every 6 hours as needed for Pain    Yes Historical Provider, MD        Allergies:       Patient has no known allergies. Social History:     Tobacco:    reports that she quit smoking about 3 months ago. Her smoking use included cigarettes. She has a 4.00 pack-year smoking history. She has never used smokeless tobacco.  Alcohol:      reports current alcohol use. Drug Use:  reports previous drug use. Drugs: Opiates  and Methamphetamines. Family History:     Family History   Problem Relation Age of Onset    Diabetes Mother        Review of Systems:     Positive and Negative as described in HPI    Review of Systems   Constitutional: Negative. Skin: Negative. Neurological: Negative. Physical Exam:     Vitals:  /60   Pulse 78   Ht 5' 6\" (1.676 m)   Wt 210 lb (95.3 kg)   LMP 03/31/2020   SpO2 100%   BMI 33.89 kg/m²   Physical Exam  Vitals signs and nursing note reviewed. Constitutional:       General: She is not in acute distress. Appearance: She is well-developed. Pulmonary:      Effort: Pulmonary effort is normal.   Musculoskeletal:      Comments: R knee small effusion. Normal alignment. Mild ttp medial distal hamstring attachment. No joint line tenderness. Neg anterior and posterior drawer, meniscal testing, varus and valgus testing.  No baker cyst or other posterior swelling. Skin:     General: Skin is warm and dry. Neurological:      Mental Status: She is alert and oriented to person, place, and time. Psychiatric:         Mood and Affect: Mood normal.         Behavior: Behavior normal.         Judgment: Judgment normal.         Data:     Lab Results   Component Value Date     10/22/2019    K 4.4 10/22/2019     10/22/2019    CO2 26 10/22/2019    BUN 15 10/22/2019    CREATININE 0.69 10/22/2019    GLUCOSE 104 10/22/2019    PROT 7.4 10/22/2019    LABALBU 4.3 10/22/2019    BILITOT 0.20 10/22/2019    ALKPHOS 56 10/22/2019    AST 17 10/22/2019    ALT 23 10/22/2019     Lab Results   Component Value Date    WBC 11.2 10/22/2019    RBC 4.48 10/22/2019    HGB 13.5 10/22/2019    HCT 39.1 10/22/2019    MCV 87.3 10/22/2019    MCH 30.2 10/22/2019    MCHC 34.6 10/22/2019    RDW 12.9 10/22/2019     10/22/2019    MPV NOT REPORTED 10/22/2019     Lab Results   Component Value Date    TSH 0.69 10/22/2019     Lab Results   Component Value Date    LABA1C 5.3 09/19/2018     EMG 10/3/18  Evidence of L ulnar neuropathy seen at L elbow segment c/w cubital tunnel syndrome, mild in degree electrically. No evidence of cervical radic, brachial plexopathy, entrapment neuropathy of medial or radial nerves or diffuse neuropathic process. Assessment & Plan:        Diagnosis Orders   1. Acute pain of right knee     2. Paresthesia     R posterior knee pain of uncertain etiology. Suspect hamstring strain. Has small effusion so potentially has internal derangement but otherwise has normal joint exam. Exercises as below, heat to keep hamstring loose, cont ibuprofen. F/u if not improving. Formal PT not available currently d/t COVID 19. Widespread paresthesia. EMG previously as above showing no diffuse process. May have some effects of previous heavy alcohol use.  Consider neuro referral.        Requested Prescriptions      No prescriptions requested or ordered in this encounter exercise band together to form a loop. Attach one end of the loop to a secure object or shut a door on it to hold it in place. (Or you can have someone hold one end of the loop to provide resistance.)  7. Loop the other end of the exercise band around the lower part of your affected leg. 8. Repeat steps 1 through 4, slowly pulling back on the exercise band with your leg. Hip extension   1. Stand facing a wall with your hands on the wall at about chest level. 2. Keeping the knee of your affected leg straight, kick that leg straight back behind you. 3. Relax, and lower your leg back to the starting position. 4. Repeat 8 to 12 times. 5. When you can do this exercise with ease and no pain, add some resistance. To do this:  6. Tie the ends of an exercise band together to form a loop. Attach one end of the loop to a secure object or shut a door on it to hold it in place. (Or you can have someone hold one end of the loop to provide resistance.)  7. Loop the other end of the exercise band around the lower part of your affected leg. 8. Repeat steps 1 through 4, slowly pulling back on the exercise band with your leg. Hamstring wall stretch   1. Lie on your back in a doorway, with your good leg through the open door. 2. Slide your affected leg up the wall to straighten your knee. You should feel a gentle stretch down the back of your leg. 1. Do not arch your back. 2. Do not bend either knee. 3. Keep one heel touching the floor and the other heel touching the wall. Do not point your toes. 3. Hold the stretch for at least 1 minute to begin. Then try to lengthen the time you hold the stretch to as long as 6 minutes. 4. Repeat 2 to 4 times. 5. If you do not have a place to do this exercise in a doorway, there is another way to do it:  6. Lie on your back, and bend the knee of your affected leg. 7. Loop a towel under the ball and toes of that foot, and hold the ends of the towel in your hands.   8. Straighten your knee, and slowly pull back on the towel. You should feel a gentle stretch down the back of your leg. 9. Hold the stretch for 15 to 30 seconds. Or even better, hold the stretch for 1 minute if you can. 10. Repeat 2 to 4 times. Calf stretch   1. Stand facing a wall with your hands on the wall at about eye level. Put your affected leg about a step behind your other leg. 2. Keeping your back leg straight and your back heel on the floor, bend your front knee and gently bring your hip and chest toward the wall until you feel a stretch in the calf of your back leg. 3. Hold the stretch for 15 to 30 seconds. 4. Repeat 2 to 4 times. 5. Repeat steps 1 through 4, but this time keep your back knee bent. Single-leg balance   1. Stand on a flat surface with your arms stretched out to your sides like you are making the letter \"T. \" Then lift your good leg off the floor, bending it at the knee. If you are not steady on your feet, use one hand to hold on to a chair, counter, or wall. 2. Standing on your affected leg, keep that knee straight. Try to balance on that leg for up to 30 seconds. Then rest for up to 10 seconds. 3. Repeat 6 to 8 times. 4. When you can balance on your affected leg for 30 seconds with your eyes open, try to balance on it with your eyes closed. 5. When you can do this exercise with your eyes closed for 30 seconds and with ease and no pain, try standing on a pillow or piece of foam, and repeat steps 1 through 4. Follow-up care is a key part of your treatment and safety. Be sure to make and go to all appointments, and call your doctor if you are having problems. It's also a good idea to know your test results and keep a list of the medicines you take. Where can you learn more? Go to https://nathaneb.Marblar. org and sign in to your Trip4real account. Enter [id number] [id number] in the [employer] box to learn more about \"Hamstring Strain: Rehab Exercises. \"     If you do not have an account, please click on the \"Sign Up Now\" link. Current as of: June 26, 2019Content Version: 12.4  © 4143-8286 Healthwise, Incorporated. Care instructions adapted under license by [facility] ([facility]). If you have questions about a medical condition or this instruction, always ask your healthcare professional. [address] any warranty or liability for your use of this information. Patient Education        Knee: Exercises  Introduction  Here are some examples of exercises for you to try. The exercises may be suggested for a condition or for rehabilitation. Start each exercise slowly. Ease off the exercises if you start to have pain. You will be told when to start these exercises and which ones will work best for you. How to do the exercises  Quad sets   1. Sit with your leg straight and supported on the floor or a firm bed. (If you feel discomfort in the front or back of your knee, place a small towel roll under your knee.)  2. Tighten the muscles on top of your thigh by pressing the back of your knee flat down to the floor. (If you feel discomfort under your kneecap, place a small towel roll under your knee.)  3. Hold for about 6 seconds, then rest for up to 10 seconds. 4. Do 8 to 12 repetitions several times a day. Straight-leg raises to the front   1. Lie on your back with your good knee bent so that your foot rests flat on the floor. Your injured leg should be straight. Make sure that your low back has a normal curve. You should be able to slip your flat hand in between the floor and the small of your back, with your palm touching the floor and your back touching the back of your hand. 2. Tighten the thigh muscles in the injured leg by pressing the back of your knee flat down to the floor. Hold your knee straight. 3. Keeping the thigh muscles tight, lift your injured leg up so that your heel is about 12 inches off the floor. Hold for about 6 seconds and then lower slowly.   4. Do 8 to 12 repetitions, 3 times a day. Straight-leg raises to the outside   1. Lie on your side, with your injured leg on top. 2. Tighten the front thigh muscles of your injured leg to keep your knee straight. 3. Keep your hip and your leg straight in line with the rest of your body, and keep your knee pointing forward. Do not drop your hip back. 4. Lift your injured leg straight up toward the ceiling, about 12 inches off the floor. Hold for about 6 seconds, then slowly lower your leg. 5. Do 8 to 12 repetitions. Straight-leg raises to the back   1. Lie on your stomach, and lift your leg straight up behind you (toward the ceiling). 2. Lift your toes about 6 inches off the floor, hold for about 6 seconds, then lower slowly. 3. Do 8 to 12 repetitions. Straight-leg raises to the inside   1. Lie on the side of your body with the injured leg. 2. You can either prop your other (good) leg up on a chair, or you can bend your good knee and put that foot in front of your injured knee. Do not drop your hip back. 3. Tighten the muscles on the front of your thigh to straighten your injured knee. 4. Keep your kneecap pointing forward, and lift your whole leg up toward the ceiling about 6 inches. Hold for about 6 seconds, then lower slowly. 5. Do 8 to 12 repetitions. Heel dig bridging   1. Lie on your back with both knees bent and your ankles bent so that only your heels are digging into the floor. Your knees should be bent about 90 degrees. 2. Then push your heels into the floor, squeeze your buttocks, and lift your hips off the floor until your shoulders, hips, and knees are all in a straight line. 3. Hold for about 6 seconds as you continue to breathe normally, and then slowly lower your hips back down to the floor and rest for up to 10 seconds. 4. Do 8 to 12 repetitions. Hamstring curls   1. Lie on your stomach with your knees straight.  If your kneecap is uncomfortable, roll up a washcloth and put it under your leg just above your kneecap. 2. Lift the foot of your injured leg by bending the knee so that you bring the foot up toward your buttock. If this motion hurts, try it without bending your knee quite as far. This may help you avoid any painful motion. 3. Slowly lower your leg back to the floor. 4. Do 8 to 12 repetitions. 5. With permission from your doctor or physical therapist, you may also want to add a cuff weight to your ankle (not more than 5 pounds). With weight, you do not have to lift your leg more than 12 inches to get a hamstring workout. Shallow standing knee bends   1. Stand with your hands lightly resting on a counter or chair in front of you. Put your feet shoulder-width apart. 2. Slowly bend your knees so that you squat down like you are going to sit in a chair. Make sure your knees do not go in front of your toes. 3. Lower yourself about 6 inches. Your heels should remain on the floor at all times. 4. Rise slowly to a standing position. Heel raises   1. Stand with your feet a few inches apart, with your hands lightly resting on a counter or chair in front of you. 2. Slowly raise your heels off the floor while keeping your knees straight. 3. Hold for about 6 seconds, then slowly lower your heels to the floor. 4. Do 8 to 12 repetitions several times during the day. Follow-up care is a key part of your treatment and safety. Be sure to make and go to all appointments, and call your doctor if you are having problems. It's also a good idea to know your test results and keep a list of the medicines you take. Where can you learn more? Go to https://[email or web address] org and sign in to your Friends[employer] account. Enter J839 in the [employer] box to learn more about \"Knee: Exercises. \"     If you do not have an account, please click on the \"Sign Up Now\" link. Current as of: June 26, 2019Content Version: 12.4  © 5809-2843 Healthwise, [facility].   Care

## 2020-04-29 RX ORDER — METHOCARBAMOL 500 MG/1
TABLET, FILM COATED ORAL
Qty: 60 TABLET | Refills: 2 | Status: SHIPPED | OUTPATIENT
Start: 2020-04-29 | End: 2020-06-15

## 2020-05-07 RX ORDER — TRAZODONE HYDROCHLORIDE 50 MG/1
TABLET ORAL
Qty: 60 TABLET | Refills: 2 | Status: SHIPPED | OUTPATIENT
Start: 2020-05-07 | End: 2020-07-13

## 2020-05-29 ENCOUNTER — HOSPITAL ENCOUNTER (EMERGENCY)
Age: 40
Discharge: HOME | End: 2020-05-29
Attending: EMERGENCY MEDICINE
Payer: COMMERCIAL

## 2020-05-29 ENCOUNTER — APPOINTMENT (OUTPATIENT)
Dept: GENERAL RADIOLOGY | Age: 40
End: 2020-05-29
Payer: COMMERCIAL

## 2020-05-29 VITALS
BODY MASS INDEX: 37.56 KG/M2 | DIASTOLIC BLOOD PRESSURE: 98 MMHG | TEMPERATURE: 97.8 F | WEIGHT: 212 LBS | OXYGEN SATURATION: 100 % | HEART RATE: 67 BPM | RESPIRATION RATE: 20 BRPM | SYSTOLIC BLOOD PRESSURE: 164 MMHG | HEIGHT: 63 IN

## 2020-05-29 LAB
BILIRUBIN URINE: NEGATIVE
COLOR: YELLOW
COMMENT UA: NORMAL
GLUCOSE URINE: NEGATIVE
HCG(URINE) PREGNANCY TEST: NEGATIVE
KETONES, URINE: NEGATIVE
LEUKOCYTE ESTERASE, URINE: NEGATIVE
NITRITE, URINE: NEGATIVE
PH UA: 7 (ref 5–8)
PROTEIN UA: NEGATIVE
SPECIFIC GRAVITY UA: 1.01 (ref 1–1.03)
TURBIDITY: CLEAR
URINE HGB: NEGATIVE
UROBILINOGEN, URINE: NORMAL

## 2020-05-29 PROCEDURE — 99284 EMERGENCY DEPT VISIT MOD MDM: CPT

## 2020-05-29 PROCEDURE — 93005 ELECTROCARDIOGRAM TRACING: CPT | Performed by: EMERGENCY MEDICINE

## 2020-05-29 PROCEDURE — 96372 THER/PROPH/DIAG INJ SC/IM: CPT

## 2020-05-29 PROCEDURE — 81003 URINALYSIS AUTO W/O SCOPE: CPT

## 2020-05-29 PROCEDURE — 71045 X-RAY EXAM CHEST 1 VIEW: CPT

## 2020-05-29 PROCEDURE — 6360000002 HC RX W HCPCS: Performed by: EMERGENCY MEDICINE

## 2020-05-29 PROCEDURE — 81025 URINE PREGNANCY TEST: CPT

## 2020-05-29 RX ORDER — LORAZEPAM 2 MG/ML
1 INJECTION INTRAMUSCULAR ONCE
Status: COMPLETED | OUTPATIENT
Start: 2020-05-29 | End: 2020-05-29

## 2020-05-29 RX ADMIN — LORAZEPAM 1 MG: 2 INJECTION, SOLUTION INTRAMUSCULAR; INTRAVENOUS at 21:38

## 2020-05-30 LAB
EKG ATRIAL RATE: 58 BPM
EKG P AXIS: 49 DEGREES
EKG P-R INTERVAL: 178 MS
EKG Q-T INTERVAL: 416 MS
EKG QRS DURATION: 114 MS
EKG QTC CALCULATION (BAZETT): 408 MS
EKG R AXIS: -42 DEGREES
EKG T AXIS: 27 DEGREES
EKG VENTRICULAR RATE: 58 BPM

## 2020-05-30 PROCEDURE — 93010 ELECTROCARDIOGRAM REPORT: CPT | Performed by: INTERNAL MEDICINE

## 2020-06-02 ENCOUNTER — HOSPITAL ENCOUNTER (OUTPATIENT)
Age: 40
Setting detail: SPECIMEN
Discharge: HOME | End: 2020-06-02
Payer: COMMERCIAL

## 2020-06-02 ENCOUNTER — PROCEDURE VISIT (OUTPATIENT)
Dept: OBGYN CLINIC | Age: 40
End: 2020-06-02
Payer: COMMERCIAL

## 2020-06-02 VITALS
WEIGHT: 207.6 LBS | HEIGHT: 66 IN | DIASTOLIC BLOOD PRESSURE: 64 MMHG | TEMPERATURE: 97.9 F | SYSTOLIC BLOOD PRESSURE: 116 MMHG | BODY MASS INDEX: 33.37 KG/M2

## 2020-06-02 PROCEDURE — 87591 N.GONORRHOEAE DNA AMP PROB: CPT

## 2020-06-02 PROCEDURE — 87491 CHLMYD TRACH DNA AMP PROBE: CPT

## 2020-06-02 PROCEDURE — 88305 TISSUE EXAM BY PATHOLOGIST: CPT

## 2020-06-02 PROCEDURE — 57454 BX/CURETT OF CERVIX W/SCOPE: CPT | Performed by: OBSTETRICS & GYNECOLOGY

## 2020-06-04 LAB
C TRACH DNA GENITAL QL NAA+PROBE: NEGATIVE
N. GONORRHOEAE DNA: NEGATIVE
SPECIMEN DESCRIPTION: NORMAL
SURGICAL PATHOLOGY REPORT: NORMAL

## 2020-06-15 RX ORDER — OMEPRAZOLE 40 MG/1
CAPSULE, DELAYED RELEASE ORAL
Qty: 30 CAPSULE | Refills: 5 | Status: SHIPPED | OUTPATIENT
Start: 2020-06-15 | End: 2020-12-16

## 2020-06-15 RX ORDER — METHOCARBAMOL 500 MG/1
TABLET, FILM COATED ORAL
Qty: 60 TABLET | Refills: 2 | Status: SHIPPED | OUTPATIENT
Start: 2020-06-15 | End: 2020-06-21 | Stop reason: SDUPTHER

## 2020-06-15 NOTE — TELEPHONE ENCOUNTER
Last visit:  4/22/2020  Next Visit Date:  No future appointments. Last Med refill:    Medication List:  Prior to Admission medications    Medication Sig Start Date End Date Taking? Authorizing Provider   linaCLOtide (LINZESS PO) Take by mouth    Historical Provider, MD   ibuprofen (ADVIL;MOTRIN) 800 MG tablet TAKE 1 TABLET BY MOUTH EVERY 8 HOURS AS NEEDED FOR PAIN 5/7/20   Radha Ruffing, DO   traZODone (DESYREL) 50 MG tablet TAKE 1 TO 2 TABLETS BY MOUTH NIGHTLY AS NEEDED for sleep 5/7/20   Radha Ruffing, DO   methocarbamol (ROBAXIN) 500 MG tablet TAKE 1 TABLET BY MOUTH THREE TIMES DAILY AS NEEDED 4/29/20   Radha Ruffing, DO   cetirizine (ZYRTEC) 10 MG tablet TAKE 1 TABLET BY MOUTH EVERY DAY 4/16/20   Ardha Ruffing, DO   omeprazole (PRILOSEC) 40 MG delayed release capsule TAKE 1 CAPSULE BY MOUTH EVERY DAY 1/8/20   Radha Ruffing, DO   norgestimate-ethinyl estradiol (ORTHO-CYCLEN) 0.25-35 MG-MCG per tablet TAKE 1 TABLET BY MOUTH DAILY 1/8/20   Radha Ruffing, DO   fluticasone (FLONASE) 50 MCG/ACT nasal spray 2 sprays by Nasal route daily 10/22/19   Radha Ruffing, DO   albuterol sulfate HFA (PROVENTIL HFA) 108 (90 Base) MCG/ACT inhaler Inhale 2 puffs into the lungs every 6 hours as needed for Wheezing 10/12/19   Felipe Reis, DO   gabapentin (NEURONTIN) 300 MG capsule Take 1 capsule by mouth every 12 hours. 6/25/19   Historical Provider, MD   acetaminophen (TYLENOL) 500 MG tablet Take 1,000 mg by mouth every 6 hours as needed for Pain     Historical Provider, MD       Allergies:  Patient has no known allergies.     Hemoglobin A1C (%)   Date Value   09/19/2018 5.3             ( goal A1C is < 7)   No results found for: LABMICR  No results found for: LDLCHOLESTEROL, LDLCALC    (goal LDL is <100)   AST (U/L)   Date Value   10/22/2019 17     ALT (U/L)   Date Value   10/22/2019 23     BUN (mg/dL)   Date Value   10/22/2019 15     BP Readings from Last 3 Encounters:   06/02/20 116/64   05/29/20

## 2020-06-17 ENCOUNTER — TELEPHONE (OUTPATIENT)
Dept: FAMILY MEDICINE CLINIC | Age: 40
End: 2020-06-17

## 2020-06-18 ENCOUNTER — OFFICE VISIT (OUTPATIENT)
Dept: FAMILY MEDICINE CLINIC | Age: 40
End: 2020-06-18
Payer: COMMERCIAL

## 2020-06-18 VITALS
DIASTOLIC BLOOD PRESSURE: 72 MMHG | BODY MASS INDEX: 33.89 KG/M2 | SYSTOLIC BLOOD PRESSURE: 120 MMHG | HEART RATE: 84 BPM | OXYGEN SATURATION: 97 % | WEIGHT: 210 LBS | TEMPERATURE: 98.3 F

## 2020-06-18 PROCEDURE — G8417 CALC BMI ABV UP PARAM F/U: HCPCS | Performed by: NURSE PRACTITIONER

## 2020-06-18 PROCEDURE — G8427 DOCREV CUR MEDS BY ELIG CLIN: HCPCS | Performed by: NURSE PRACTITIONER

## 2020-06-18 PROCEDURE — 1036F TOBACCO NON-USER: CPT | Performed by: NURSE PRACTITIONER

## 2020-06-18 PROCEDURE — 99214 OFFICE O/P EST MOD 30 MIN: CPT | Performed by: NURSE PRACTITIONER

## 2020-06-18 RX ORDER — LORAZEPAM 0.5 MG/1
0.5 TABLET ORAL EVERY 8 HOURS PRN
Qty: 42 TABLET | Refills: 0 | Status: SHIPPED | OUTPATIENT
Start: 2020-06-18 | End: 2020-07-02 | Stop reason: SDUPTHER

## 2020-06-18 ASSESSMENT — ENCOUNTER SYMPTOMS
VOMITING: 0
COUGH: 0
DIARRHEA: 0
NAUSEA: 0
SHORTNESS OF BREATH: 0

## 2020-06-18 NOTE — PROGRESS NOTES
back pain     Depression     GERD (gastroesophageal reflux disease)     Substance abuse ([facility] [address]     alcohol and opiates      Reviewed all health maintenance requirements and ordered appropriate tests  Health Maintenance Due   Topic Date Due    Varicella vaccine (1 of 2 - 2-dose childhood series) 03/26/1983    Pneumococcal 0-64 years Vaccine (1 of 1 - PPSV23) 03/26/1988    DTaP/Tdap/Td vaccine (1 - Tdap) 03/26/2001       Past Surgical History:     Past Surgical History:   Procedure Laterality Date    COLONOSCOPY  05/26/2020    PILONIDAL CYST EXCISION  03/2018        Medications:       Prior to Admission medications    Medication Sig Start Date End Date Taking? Authorizing Provider   Probiotic Product (PROBIOTIC-10 PO) Take 1 capsule by mouth daily   Yes Historical Provider, MD   LORazepam (ATIVAN) 0.5 MG tablet Take 1 tablet by mouth every 8 hours as needed for Anxiety for up to 30 days.  6/18/20 7/18/20 Yes MICHAEL Griffin CNP   omeprazole (PRILOSEC) 40 MG delayed release capsule TAKE 1 CAPSULE BY MOUTH EVERY DAY 6/15/20  Yes Radha Ruffing, DO   methocarbamol (ROBAXIN) 500 MG tablet TAKE 1 TABLET BY MOUTH THREE TIMES DAILY AS NEEDED 6/15/20  Yes Radha Ruffing, DO   ibuprofen (ADVIL;MOTRIN) 800 MG tablet TAKE 1 TABLET BY MOUTH EVERY 8 HOURS AS NEEDED FOR PAIN 5/7/20  Yes Radha Ruffing, DO   traZODone (DESYREL) 50 MG tablet TAKE 1 TO 2 TABLETS BY MOUTH NIGHTLY AS NEEDED for sleep 5/7/20  Yes Radha Ruffing, DO   cetirizine (ZYRTEC) 10 MG tablet TAKE 1 TABLET BY MOUTH EVERY DAY 4/16/20  Yes Radha Ruffing, DO   norgestimate-ethinyl estradiol (ORTHO-CYCLEN) 0.25-35 MG-MCG per tablet TAKE 1 TABLET BY MOUTH DAILY 1/8/20  Yes Radha Ruffing, DO   fluticasone (FLONASE) 50 MCG/ACT nasal spray 2 sprays by Nasal route daily 10/22/19  Yes Radha Ruffing, DO   albuterol sulfate HFA (PROVENTIL HFA) 108 (90 Base) MCG/ACT inhaler Inhale 2 puffs into the lungs every 6 hours as needed for Wheezing 10/12/19  Yes Skip Gavel, DO   gabapentin (NEURONTIN) 300 MG capsule Take 1 capsule by mouth every 12 hours. 6/25/19  Yes Historical Provider, MD   acetaminophen (TYLENOL) 500 MG tablet Take 1,000 mg by mouth every 6 hours as needed for Pain    Yes Historical Provider, MD        Allergies:       Patient has no known allergies. Social History:     Tobacco:    reports that she quit smoking about 5 months ago. Her smoking use included cigarettes. She has a 4.00 pack-year smoking history. She has never used smokeless tobacco.  Alcohol:      reports current alcohol use. Drug Use:  reports previous drug use. Drugs: Opiates  and Methamphetamines. Family History:        Family History   Problem Relation Age of Onset    Diabetes Mother        Review of Systems:         Review of Systems   Constitutional: Negative for chills and fever. Respiratory: Negative for cough and shortness of breath. Cardiovascular: Negative for chest pain and palpitations. Gastrointestinal: Negative for diarrhea, nausea and vomiting. Neurological: Negative for dizziness, seizures and headaches. Psychiatric/Behavioral: Positive for dysphoric mood. The patient does not have insomnia. Physical Exam:     Vitals:  /72   Pulse 84   Temp 98.3 °F (36.8 °C) (Oral)   Wt 210 lb (95.3 kg)   LMP 05/29/2020   SpO2 97%   BMI 33.89 kg/m²       Physical Exam  Vitals signs and nursing note reviewed. Constitutional:       Appearance: Normal appearance. She is well-developed. Cardiovascular:      Rate and Rhythm: Normal rate and regular rhythm. Heart sounds: Normal heart sounds, S1 normal and S2 normal.   Pulmonary:      Effort: Pulmonary effort is normal. No respiratory distress. Breath sounds: Normal breath sounds. Abdominal:      General: Bowel sounds are normal.      Palpations: Abdomen is soft. Tenderness: There is no abdominal tenderness. Skin:     General: Skin is warm and dry.    Neurological:

## 2020-06-21 ENCOUNTER — HOSPITAL ENCOUNTER (EMERGENCY)
Age: 40
Discharge: HOME | End: 2020-06-21
Attending: FAMILY MEDICINE
Payer: COMMERCIAL

## 2020-06-21 VITALS
SYSTOLIC BLOOD PRESSURE: 131 MMHG | RESPIRATION RATE: 20 BRPM | DIASTOLIC BLOOD PRESSURE: 76 MMHG | HEART RATE: 78 BPM | TEMPERATURE: 99.2 F | OXYGEN SATURATION: 98 %

## 2020-06-21 PROCEDURE — 99282 EMERGENCY DEPT VISIT SF MDM: CPT

## 2020-06-21 PROCEDURE — 96372 THER/PROPH/DIAG INJ SC/IM: CPT

## 2020-06-21 PROCEDURE — 6360000002 HC RX W HCPCS: Performed by: FAMILY MEDICINE

## 2020-06-21 RX ORDER — KETOROLAC TROMETHAMINE 30 MG/ML
60 INJECTION, SOLUTION INTRAMUSCULAR; INTRAVENOUS ONCE
Status: COMPLETED | OUTPATIENT
Start: 2020-06-21 | End: 2020-06-21

## 2020-06-21 RX ORDER — PREDNISONE 20 MG/1
60 TABLET ORAL DAILY
Qty: 15 TABLET | Refills: 0 | Status: SHIPPED | OUTPATIENT
Start: 2020-06-21 | End: 2020-06-26

## 2020-06-21 RX ORDER — IBUPROFEN 800 MG/1
800 TABLET ORAL EVERY 8 HOURS PRN
Qty: 30 TABLET | Refills: 0 | Status: SHIPPED | OUTPATIENT
Start: 2020-06-21 | End: 2020-07-01 | Stop reason: SDUPTHER

## 2020-06-21 RX ORDER — ORPHENADRINE CITRATE 30 MG/ML
60 INJECTION INTRAMUSCULAR; INTRAVENOUS ONCE
Status: COMPLETED | OUTPATIENT
Start: 2020-06-21 | End: 2020-06-21

## 2020-06-21 RX ORDER — METHOCARBAMOL 500 MG/1
TABLET, FILM COATED ORAL
Qty: 60 TABLET | Refills: 0 | Status: SHIPPED | OUTPATIENT
Start: 2020-06-21 | End: 2020-08-13

## 2020-06-21 RX ORDER — DEXAMETHASONE SODIUM PHOSPHATE 10 MG/ML
10 INJECTION, SOLUTION INTRAMUSCULAR; INTRAVENOUS ONCE
Status: COMPLETED | OUTPATIENT
Start: 2020-06-21 | End: 2020-06-21

## 2020-06-21 RX ADMIN — ORPHENADRINE CITRATE 60 MG: 30 INJECTION INTRAMUSCULAR; INTRAVENOUS at 18:44

## 2020-06-21 RX ADMIN — DEXAMETHASONE SODIUM PHOSPHATE 10 MG: 10 INJECTION, SOLUTION INTRAMUSCULAR; INTRAVENOUS at 18:42

## 2020-06-21 RX ADMIN — KETOROLAC TROMETHAMINE 60 MG: 30 INJECTION, SOLUTION INTRAMUSCULAR at 18:43

## 2020-06-21 ASSESSMENT — PAIN DESCRIPTION - PROGRESSION: CLINICAL_PROGRESSION: GRADUALLY WORSENING

## 2020-06-21 ASSESSMENT — PAIN DESCRIPTION - DESCRIPTORS: DESCRIPTORS: ACHING;CONSTANT

## 2020-06-21 ASSESSMENT — PAIN SCALES - GENERAL
PAINLEVEL_OUTOF10: 6
PAINLEVEL_OUTOF10: 8

## 2020-06-21 ASSESSMENT — PAIN DESCRIPTION - ORIENTATION: ORIENTATION: LOWER

## 2020-06-21 ASSESSMENT — PAIN DESCRIPTION - ONSET: ONSET: ON-GOING

## 2020-06-21 ASSESSMENT — PAIN DESCRIPTION - PAIN TYPE: TYPE: ACUTE PAIN;CHRONIC PAIN

## 2020-06-21 ASSESSMENT — ENCOUNTER SYMPTOMS: BACK PAIN: 1

## 2020-06-21 ASSESSMENT — PAIN DESCRIPTION - LOCATION: LOCATION: BACK

## 2020-06-21 ASSESSMENT — PAIN DESCRIPTION - FREQUENCY: FREQUENCY: CONTINUOUS

## 2020-06-22 NOTE — ED PROVIDER NOTES
IM orphenadrine, oral dexamethasone, will continue patient on her Motrin 800 3 times daily as needed, patient can increase her Robaxin 1 to 2 tablets 3 times a day as she is only currently taking it 1 tablet 3 times daily, will place patient on steroid burst, outpatient follow-up with PCP, acknowledges    FINAL IMPRESSION      1. Acute exacerbation of chronic low back pain          DISPOSITION/PLAN   D/c    PATIENT REFERRED TO:  Miladis Carcamo DO  [address] [phone]    Call       [facility] ED  [address] [address] [phone]    As needed, If symptoms worsen      DISCHARGE MEDICATIONS:  Discharge Medication List as of 6/21/2020  6:46 PM      START taking these medications    Details   predniSONE (DELTASONE) 20 MG tablet Take 3 tablets by mouth daily for 5 days, Disp-15 tablet, R-0Print      !! ibuprofen (ADVIL;MOTRIN) 800 MG tablet Take 1 tablet by mouth every 8 hours as needed for Pain, Disp-30 tablet, R-0Print       !! - Potential duplicate medications found. Please discuss with provider. Summation      Patient Course:  D/c    ED Medications administered this visit:    Medications   dexamethasone (PF) (DECADRON) injection 10 mg (10 mg Oral Given 6/21/20 1842)   ketorolac (TORADOL) injection 60 mg (60 mg Intramuscular Given 6/21/20 1843)   orphenadrine (NORFLEX) injection 60 mg (60 mg Intramuscular Given 6/21/20 1844)       New Prescriptions from this visit:    Discharge Medication List as of 6/21/2020  6:46 PM      START taking these medications    Details   predniSONE (DELTASONE) 20 MG tablet Take 3 tablets by mouth daily for 5 days, Disp-15 tablet, R-0Print      !! ibuprofen (ADVIL;MOTRIN) 800 MG tablet Take 1 tablet by mouth every 8 hours as needed for Pain, Disp-30 tablet, R-0Print       !! - Potential duplicate medications found. Please discuss with provider.           Follow-up:  Miladis Carcamo DO  [address] [place] [address] [facility] [address]  [phone]    Call       [facility]  [address] [facility] [address] [phone]    As needed, If symptoms worsen        Final Impression:   1.  Acute exacerbation of chronic low back pain               (Please note that portions of this note were completed with a voice recognition program.  Efforts were made to edit the dictations but occasionally words are mis-transcribed.)    MD Oscar Portillo MD  06/21/20 [id number]

## 2020-06-23 RX ORDER — NORGESTIMATE AND ETHINYL ESTRADIOL 0.25-0.035
KIT ORAL
Qty: 28 TABLET | Refills: 5 | Status: SHIPPED | OUTPATIENT
Start: 2020-06-23 | End: 2020-12-11

## 2020-07-01 ENCOUNTER — OFFICE VISIT (OUTPATIENT)
Dept: FAMILY MEDICINE CLINIC | Age: 40
End: 2020-07-01
Payer: COMMERCIAL

## 2020-07-01 ENCOUNTER — HOSPITAL ENCOUNTER (OUTPATIENT)
Age: 40
Setting detail: SPECIMEN
Discharge: HOME | End: 2020-07-01
Payer: COMMERCIAL

## 2020-07-01 VITALS
WEIGHT: 210 LBS | OXYGEN SATURATION: 98 % | DIASTOLIC BLOOD PRESSURE: 70 MMHG | BODY MASS INDEX: 33.89 KG/M2 | HEART RATE: 86 BPM | SYSTOLIC BLOOD PRESSURE: 114 MMHG

## 2020-07-01 LAB
AMPHETAMINE SCREEN URINE: NEGATIVE
BARBITURATE SCREEN URINE: NEGATIVE
BENZODIAZEPINE SCREEN, URINE: POSITIVE
BUPRENORPHINE URINE: ABNORMAL
CANNABINOID SCREEN URINE: NEGATIVE
COCAINE METABOLITE, URINE: NEGATIVE
MDMA URINE: ABNORMAL
METHADONE SCREEN, URINE: NEGATIVE
METHAMPHETAMINE, URINE: NEGATIVE
OPIATES, URINE: NEGATIVE
OXYCODONE SCREEN URINE: NEGATIVE
PHENCYCLIDINE, URINE: NEGATIVE
PROPOXYPHENE, URINE: NEGATIVE
TEST INFORMATION: ABNORMAL
TRICYCLIC ANTIDEPRESSANTS, UR: NEGATIVE

## 2020-07-01 PROCEDURE — 1036F TOBACCO NON-USER: CPT | Performed by: NURSE PRACTITIONER

## 2020-07-01 PROCEDURE — 80306 DRUG TEST PRSMV INSTRMNT: CPT

## 2020-07-01 PROCEDURE — G8417 CALC BMI ABV UP PARAM F/U: HCPCS | Performed by: NURSE PRACTITIONER

## 2020-07-01 PROCEDURE — G8427 DOCREV CUR MEDS BY ELIG CLIN: HCPCS | Performed by: NURSE PRACTITIONER

## 2020-07-01 PROCEDURE — 99214 OFFICE O/P EST MOD 30 MIN: CPT | Performed by: NURSE PRACTITIONER

## 2020-07-01 RX ORDER — METRONIDAZOLE 500 MG/1
TABLET ORAL
COMMUNITY
Start: 2020-06-17 | End: 2020-07-01 | Stop reason: ALTCHOICE

## 2020-07-01 RX ORDER — FAMOTIDINE 20 MG/1
20 TABLET, FILM COATED ORAL 2 TIMES DAILY
Qty: 60 TABLET | Refills: 2 | Status: ON HOLD | OUTPATIENT
Start: 2020-07-01 | End: 2020-10-18

## 2020-07-01 ASSESSMENT — ENCOUNTER SYMPTOMS
NAUSEA: 0
SHORTNESS OF BREATH: 0
DIARRHEA: 0
VOMITING: 0
COUGH: 0

## 2020-07-01 NOTE — PROGRESS NOTES
HPI Notes    Name: Rudy Hallman  : 1980         Chief Complaint:     Chief Complaint   Patient presents with   [address] Problem     Patient here today for 2 week follow up on anxiety. Patient said she is doing better, alot more controlled. History of Present Illness:        HPI  Pt is a 48 yo female who presents for follow up on anxiety. Pt states that she is feeling better, however she is feeling emotional right now. Pt is a recovering alcoholic. 2018 is her sober date. Overall, pt states that she is doing better on ativan. She is still attending AA. Still doing counseling via computer and awaiting an appointment with [facility]. Past Medical History:     Past Medical History:   Diagnosis Date    Anxiety     Bulging of lumbar intervertebral disc without myelopathy     L1    Chronic back pain     Depression     GERD (gastroesophageal reflux disease)     Substance abuse ([facility] [address]     alcohol and opiates      Reviewed all health maintenance requirements and ordered appropriate tests  Health Maintenance Due   Topic Date Due    Varicella vaccine (1 of 2 - 2-dose childhood series) 1983    Pneumococcal 0-64 years Vaccine (1 of 1 - PPSV23) 1988    DTaP/Tdap/Td vaccine (1 - Tdap) 2001       Past Surgical History:     Past Surgical History:   Procedure Laterality Date    COLONOSCOPY  2020    PILONIDAL CYST EXCISION  2018    PLANTAR FASCIA SURGERY Left 2020        Medications:       Prior to Admission medications    Medication Sig Start Date End Date Taking?  Authorizing Provider   famotidine (PEPCID) 20 MG tablet Take 1 tablet by mouth 2 times daily 20  Yes MICHAEL Dent - CNP   norgestimate-ethinyl estradiol (ORTHO-CYCLEN) 0.25-35 MG-MCG per tablet TAKE 1 TABLET BY MOUTH EVERY DAY 20  Yes Fidel Qiu,    methocarbamol (ROBAXIN) 500 MG tablet 1-2 tabs PO TID prn back pain/muscle spasms 20  Yes Jose Flores MD   LORazepam (ATIVAN) 0.5 MG tablet Take 1 tablet by mouth every 8 hours as needed for Anxiety for up to 30 days. 6/18/20 7/18/20 Yes MICHAEL Martinez CNP   omeprazole (PRILOSEC) 40 MG delayed release capsule TAKE 1 CAPSULE BY MOUTH EVERY DAY 6/15/20  Yes Elvis Anguiano, DO   ibuprofen (ADVIL;MOTRIN) 800 MG tablet TAKE 1 TABLET BY MOUTH EVERY 8 HOURS AS NEEDED FOR PAIN 5/7/20  Yes Daryel Slate, DO   traZODone (DESYREL) 50 MG tablet TAKE 1 TO 2 TABLETS BY MOUTH NIGHTLY AS NEEDED for sleep 5/7/20  Yes Daryel Nickiete, DO   cetirizine (ZYRTEC) 10 MG tablet TAKE 1 TABLET BY MOUTH EVERY DAY 4/16/20  Yes Elvis Anguiano, DO   fluticasone (FLONASE) 50 MCG/ACT nasal spray 2 sprays by Nasal route daily 10/22/19  Yes Elvis Anguiano, DO   gabapentin (NEURONTIN) 300 MG capsule Take 1 capsule by mouth every 12 hours. 6/25/19  Yes Historical Provider, MD   acetaminophen (TYLENOL) 500 MG tablet Take 1,000 mg by mouth every 6 hours as needed for Pain    Yes Historical Provider, MD   albuterol sulfate HFA (PROVENTIL HFA) 108 (90 Base) MCG/ACT inhaler Inhale 2 puffs into the lungs every 6 hours as needed for Wheezing  Patient not taking: Reported on 7/1/2020 10/12/19   Luis Lackey DO        Allergies:       Patient has no known allergies. Social History:     Tobacco:    reports that she quit smoking about 5 months ago. Her smoking use included cigarettes. She has a 4.00 pack-year smoking history. She has never used smokeless tobacco.  Alcohol:      reports current alcohol use. Drug Use:  reports previous drug use. Drugs: Opiates  and Methamphetamines. Family History:        Family History   Problem Relation Age of Onset    Diabetes Mother        Review of Systems:         Review of Systems   Constitutional: Negative for chills and fever. Respiratory: Negative for cough and shortness of breath. Cardiovascular: Negative for chest pain and palpitations.    Gastrointestinal: Negative for diarrhea, nausea and vomiting. Neurological: Negative for dizziness, seizures and headaches. Psychiatric/Behavioral: The patient is nervous/anxious. Physical Exam:     Vitals:  /70   Pulse 86   Wt 210 lb (95.3 kg)   SpO2 98%   BMI 33.89 kg/m²       Physical Exam  Vitals signs and nursing note reviewed. Constitutional:       Appearance: Normal appearance. She is well-developed. Cardiovascular:      Rate and Rhythm: Normal rate and regular rhythm. Heart sounds: Normal heart sounds, S1 normal and S2 normal.   Pulmonary:      Effort: Pulmonary effort is normal. No respiratory distress. Breath sounds: Normal breath sounds. Abdominal:      General: Bowel sounds are normal.      Palpations: Abdomen is soft. Tenderness: There is no abdominal tenderness. Skin:     General: Skin is warm and dry. Neurological:      Mental Status: She is alert and oriented to person, place, and time. Psychiatric:         Mood and Affect: Mood is anxious. Speech: Speech normal.         Behavior: Behavior normal. Behavior is cooperative. Thought Content: Thought content does not include homicidal or suicidal ideation. Thought content does not include homicidal or suicidal plan.          Cognition and Memory: Cognition normal.         Judgment: Judgment normal.               Data:     Lab Results   Component Value Date     10/22/2019    K 4.4 10/22/2019     10/22/2019    CO2 26 10/22/2019    BUN 15 10/22/2019    CREATININE 0.69 10/22/2019    GLUCOSE 104 10/22/2019    PROT 7.4 10/22/2019    LABALBU 4.3 10/22/2019    BILITOT 0.20 10/22/2019    ALKPHOS 56 10/22/2019    AST 17 10/22/2019    ALT 23 10/22/2019     Lab Results   Component Value Date    WBC 11.2 10/22/2019    RBC 4.48 10/22/2019    HGB 13.5 10/22/2019    HCT 39.1 10/22/2019    MCV 87.3 10/22/2019    MCH 30.2 10/22/2019    MCHC 34.6 10/22/2019    RDW 12.9 10/22/2019     10/22/2019    MPV NOT REPORTED 10/22/2019     Lab Results   Component Value Date    TSH 0.69 10/22/2019     Lab Results   Component Value Date    LABA1C 5.3 09/19/2018          Assessment & Plan        Diagnosis Orders   1. Anxiety  Urine Drug Screen     Patient doing better. Will send urine for drug screen and verify that patient is taking the benzodiazepine. OARRS report reviewed. No red flags. Patient verbalizes understanding and agreement with plan. All questions answered. If symptoms do not resolve or worsen, return to office. Controlled Substance Monitoring:    Acute and Chronic Pain Monitoring:   RX Monitoring 7/2/2020   Periodic Controlled Substance Monitoring No signs of potential drug abuse or diversion identified. ;Random urine drug screen sent today. Completed Refills   Requested Prescriptions     Signed Prescriptions Disp Refills    famotidine (PEPCID) 20 MG tablet 60 tablet 2     Sig: Take 1 tablet by mouth 2 times daily     No follow-ups on file. Orders Placed This Encounter   Medications    famotidine (PEPCID) 20 MG tablet     Sig: Take 1 tablet by mouth 2 times daily     Dispense:  60 tablet     Refill:  2     Orders Placed This Encounter   Procedures    Urine Drug Screen         Patient Instructions   SURVEY:    You may be receiving a survey from [employer] regarding your visit today. Please complete the survey to enable us to provide the highest quality of care to you and your family. If you cannot score us a very good (5 Stars) on any question, please call the office to discuss how we could have made your experience a very good one. Thank you.     Clinical Care Team: ELZA Bob LPN    Clerical Team: Ai Castro        Electronically signed by MICHAEL Bob CNP on 7/1/2020 at 12:24 PM           Completed Refills Requested Prescriptions     Signed Prescriptions Disp Refills    famotidine (PEPCID) 20 MG tablet 60 tablet 2     Sig: Take 1 tablet by mouth 2 times daily         Jennifer received counseling on the following healthy behaviors: medication adherence  Reviewed prior labs and health maintenance. Continue current medications, diet and exercise. Discussed use, benefit, and side effects of prescribed medications. Barriers to medication compliance addressed. Patient given educational materials - see patient instructions. All patient questions answered. Patient voiced understanding.

## 2020-07-02 RX ORDER — LORAZEPAM 0.5 MG/1
0.5 TABLET ORAL EVERY 8 HOURS PRN
Qty: 90 TABLET | Refills: 0 | Status: SHIPPED | OUTPATIENT
Start: 2020-07-02 | End: 2020-08-04 | Stop reason: SDUPTHER

## 2020-07-13 RX ORDER — TRAZODONE HYDROCHLORIDE 50 MG/1
TABLET ORAL
Qty: 60 TABLET | Refills: 2 | Status: SHIPPED | OUTPATIENT
Start: 2020-07-13 | End: 2020-12-14

## 2020-07-13 RX ORDER — IBUPROFEN 800 MG/1
800 TABLET ORAL EVERY 8 HOURS PRN
Qty: 90 TABLET | Refills: 2 | Status: SHIPPED | OUTPATIENT
Start: 2020-07-13 | End: 2020-10-12

## 2020-07-27 RX ORDER — FLUTICASONE PROPIONATE 50 MCG
SPRAY, SUSPENSION (ML) NASAL
Qty: 16 G | Refills: 2 | Status: SHIPPED | OUTPATIENT
Start: 2020-07-27 | End: 2020-10-12

## 2020-08-04 ENCOUNTER — OFFICE VISIT (OUTPATIENT)
Dept: FAMILY MEDICINE CLINIC | Age: 40
End: 2020-08-04
Payer: COMMERCIAL

## 2020-08-04 VITALS
OXYGEN SATURATION: 98 % | SYSTOLIC BLOOD PRESSURE: 118 MMHG | WEIGHT: 226.6 LBS | TEMPERATURE: 98.5 F | BODY MASS INDEX: 35.56 KG/M2 | HEIGHT: 67 IN | DIASTOLIC BLOOD PRESSURE: 72 MMHG | HEART RATE: 76 BPM

## 2020-08-04 PROCEDURE — 1036F TOBACCO NON-USER: CPT | Performed by: NURSE PRACTITIONER

## 2020-08-04 PROCEDURE — G8417 CALC BMI ABV UP PARAM F/U: HCPCS | Performed by: NURSE PRACTITIONER

## 2020-08-04 PROCEDURE — 99214 OFFICE O/P EST MOD 30 MIN: CPT | Performed by: NURSE PRACTITIONER

## 2020-08-04 PROCEDURE — G8427 DOCREV CUR MEDS BY ELIG CLIN: HCPCS | Performed by: NURSE PRACTITIONER

## 2020-08-04 RX ORDER — VORTIOXETINE 10 MG/1
10 TABLET, FILM COATED ORAL DAILY
Status: ON HOLD | COMMUNITY
Start: 2020-07-16 | End: 2020-10-21 | Stop reason: HOSPADM

## 2020-08-04 RX ORDER — LORAZEPAM 0.5 MG/1
0.5 TABLET ORAL EVERY 8 HOURS PRN
Qty: 90 TABLET | Refills: 0 | Status: SHIPPED | OUTPATIENT
Start: 2020-08-04 | End: 2020-10-01

## 2020-08-04 ASSESSMENT — ENCOUNTER SYMPTOMS
SHORTNESS OF BREATH: 0
NAUSEA: 0
COUGH: 0
DIARRHEA: 0
VOMITING: 0

## 2020-08-04 NOTE — PROGRESS NOTES
HPI Notes    Name: Kate Hoffmann  : 1980         Chief Complaint:     Chief Complaint   Patient presents with    Anxiety     1 month follow up     Allergies       History of Present Illness:        HPI  Pt has hx of anxiety. Pt is a former drug user, but has been clean for several years. Pt has been doing counseling at [facility] and has seen psychiatry. Pt was started on trintellix 5mg daily. Pt states that she has been feeling better, but still having uncontrolled anxiety at times. Pt was given ativan by me and has been using it. Sometimes uses it twice daily, sometimes three times. Pt is going to be starting trauma counseling and is anticipating some hard feelings and emotions soon. Pt has seasonal allergies. Has been getting frontal headache in AM. Pt has been doing flonase 1 spray each nostril daily and zyrtec 10mg daily. Past Medical History:     Past Medical History:   Diagnosis Date    Anxiety     Bulging of lumbar intervertebral disc without myelopathy     L1    Chronic back pain     Depression     GERD (gastroesophageal reflux disease)     Substance abuse ([facility] [address]     alcohol and opiates      Reviewed all health maintenance requirements and ordered appropriate tests  Health Maintenance Due   Topic Date Due    Varicella vaccine (1 of 2 - 2-dose childhood series) 1983    Pneumococcal 0-64 years Vaccine (1 of 1 - PPSV23) 1988    DTaP/Tdap/Td vaccine (1 - Tdap) 2001       Past Surgical History:     Past Surgical History:   Procedure Laterality Date    COLONOSCOPY  2020    PILONIDAL CYST EXCISION  2018    PLANTAR FASCIA SURGERY Left 2020        Medications:       Prior to Admission medications    Medication Sig Start Date End Date Taking?  Authorizing Provider   TRINTELLIX 5 MG tablet TAKE 1 TABLET BY MOUTH EVERY DAY 20  Yes Historical Provider, MD   LORazepam (ATIVAN) 0.5 MG tablet Take 1 tablet by mouth every 8 hours as needed for Anxiety for up  Diabetes Mother        Review of Systems:         Review of Systems   Constitutional: Negative for chills and fever. Respiratory: Negative for cough and shortness of breath. Cardiovascular: Negative for chest pain and palpitations. Gastrointestinal: Negative for diarrhea, nausea and vomiting. Neurological: Negative for dizziness, seizures and headaches. Psychiatric/Behavioral: Negative for sleep disturbance and suicidal ideas. The patient is nervous/anxious. The patient is not hyperactive. Physical Exam:     Vitals:  /72 (Site: Left Upper Arm, Position: Sitting, Cuff Size: Medium Adult)   Pulse 76   Temp 98.5 °F (36.9 °C) (Oral)   Ht 5' 7\" (1.702 m)   Wt 226 lb 9.6 oz (102.8 kg)   LMP 07/28/2020   SpO2 98%   BMI 35.49 kg/m²       Physical Exam  Vitals signs and nursing note reviewed. Constitutional:       Appearance: Normal appearance. She is well-developed. Cardiovascular:      Rate and Rhythm: Normal rate and regular rhythm. Heart sounds: Normal heart sounds, S1 normal and S2 normal.   Pulmonary:      Effort: Pulmonary effort is normal. No respiratory distress. Breath sounds: Normal breath sounds. Abdominal:      General: Bowel sounds are normal.      Palpations: Abdomen is soft. Tenderness: There is no abdominal tenderness. Skin:     General: Skin is warm and dry. Neurological:      Mental Status: She is alert and oriented to person, place, and time. Psychiatric:         Attention and Perception: Attention normal.         Mood and Affect: Mood is anxious. Speech: Speech normal.         Behavior: Behavior is cooperative. Thought Content: Thought content does not include homicidal or suicidal ideation. Thought content does not include homicidal or suicidal plan.          Cognition and Memory: Cognition normal.         Judgment: Judgment normal.      Comments: Slightly tearful in interview when talking about past               Data: Lab Results   Component Value Date     10/22/2019    K 4.4 10/22/2019     10/22/2019    CO2 26 10/22/2019    BUN 15 10/22/2019    CREATININE 0.69 10/22/2019    GLUCOSE 104 10/22/2019    PROT 7.4 10/22/2019    LABALBU 4.3 10/22/2019    BILITOT 0.20 10/22/2019    ALKPHOS 56 10/22/2019    AST 17 10/22/2019    ALT 23 10/22/2019     Lab Results   Component Value Date    WBC 11.2 10/22/2019    RBC 4.48 10/22/2019    HGB 13.5 10/22/2019    HCT 39.1 10/22/2019    MCV 87.3 10/22/2019    MCH 30.2 10/22/2019    MCHC 34.6 10/22/2019    RDW 12.9 10/22/2019     10/22/2019    MPV NOT REPORTED 10/22/2019     Lab Results   Component Value Date    TSH 0.69 10/22/2019     Lab Results   Component Value Date    LABA1C 5.3 09/19/2018          Assessment & Plan        Diagnosis Orders   1. Anxiety  --Continue with psychiatry and counseling. Continue Trintellix. Will continue Ativan at 3 times daily for 30 more days. After that we will drop to twice daily. LORazepam (ATIVAN) 0.5 MG tablet   2. Seasonal allergies   --Increase Flonase to twice daily and increase Zyrtec to twice daily. Patient verbalizes understanding and agreement with plan. All questions answered. If symptoms do not resolve or worsen, return to office. Completed Refills   Requested Prescriptions     Signed Prescriptions Disp Refills    LORazepam (ATIVAN) 0.5 MG tablet 90 tablet 0     Sig: Take 1 tablet by mouth every 8 hours as needed for Anxiety for up to 30 days. No follow-ups on file. Orders Placed This Encounter   Medications    LORazepam (ATIVAN) 0.5 MG tablet     Sig: Take 1 tablet by mouth every 8 hours as needed for Anxiety for up to 30 days. Dispense:  90 tablet     Refill:  0     No orders of the defined types were placed in this encounter. Patient Instructions     SURVEY:    You may be receiving a survey from [employer] regarding your visit today.     Please complete the survey to enable us to

## 2020-08-04 NOTE — PATIENT INSTRUCTIONS
SURVEY:    You may be receiving a survey from [employer] regarding your visit today. Please complete the survey to enable us to provide the highest quality of care to you and your family. If you cannot score us a very good on any question, please call the office to discuss how we could of made your experience a very good one. Thank you.

## 2020-08-13 RX ORDER — METHOCARBAMOL 500 MG/1
TABLET, FILM COATED ORAL
Qty: 60 TABLET | Refills: 2 | Status: SHIPPED | OUTPATIENT
Start: 2020-08-13 | End: 2020-10-12

## 2020-08-31 RX ORDER — CETIRIZINE HYDROCHLORIDE 10 MG/1
TABLET ORAL
Qty: 60 TABLET | Refills: 5 | Status: SHIPPED | OUTPATIENT
Start: 2020-08-31 | End: 2021-04-21 | Stop reason: SDUPTHER

## 2020-08-31 NOTE — TELEPHONE ENCOUNTER
Patient is asking for a refill on Zyrtec 10 mg. Patient states she takes it twice daily.     Drug [address] Maintenance   Topic Date Due    Varicella vaccine (1 of 2 - 2-dose childhood series) 03/26/1983    Pneumococcal 0-64 years Vaccine (1 of 1 - PPSV23) 03/26/1988    DTaP/Tdap/Td vaccine (1 - Tdap) 03/26/2001    Flu vaccine (1) 09/01/2020    Cervical cancer screen  04/07/2025    HIV screen  Completed    Hepatitis A vaccine  Aged Out    Hepatitis B vaccine  Aged Out    Hib vaccine  Aged Out    Meningococcal (ACWY) vaccine  Aged Out             (applicable per patient's age: Cancer Screenings, Depression Screening, Fall Risk Screening, Immunizations)    Hemoglobin A1C (%)   Date Value   09/19/2018 5.3     AST (U/L)   Date Value   10/22/2019 17     ALT (U/L)   Date Value   10/22/2019 23     BUN (mg/dL)   Date Value   10/22/2019 15      (goal A1C is < 7)   (goal LDL is <100) need 30-50% reduction from baseline     BP Readings from Last 3 Encounters:   08/04/20 118/72   07/01/20 114/70   06/21/20 131/76    (goal /80)      All Future Testing planned in [facility]:  Lab Frequency Next Occurrence   XR CHEST STANDARD (2 VW) Once 11/12/2019       Next Visit Date:  Future Appointments   Date Time Provider Felipe Mancini   9/4/2020  9:40 AM MICHAEL León - [facility]            Patient Active Problem List:     Chronic neck pain     Onychomycosis     Chronic low back pain without sciatica

## 2020-08-31 NOTE — TELEPHONE ENCOUNTER
Rx sent. This is a safe dose but I do not believe I have ever seen insurance cover this dosing. They will probably only give her 30 pills/month, and then if she wants to keep taking 2 a day she would have to buy the rest over-the-counter.

## 2020-10-11 ENCOUNTER — HOSPITAL ENCOUNTER (EMERGENCY)
Age: 40
Discharge: HOME | End: 2020-10-11
Attending: EMERGENCY MEDICINE
Payer: COMMERCIAL

## 2020-10-11 VITALS
BODY MASS INDEX: 36 KG/M2 | TEMPERATURE: 97.8 F | DIASTOLIC BLOOD PRESSURE: 90 MMHG | HEIGHT: 66 IN | WEIGHT: 224 LBS | OXYGEN SATURATION: 100 % | SYSTOLIC BLOOD PRESSURE: 109 MMHG | HEART RATE: 72 BPM | RESPIRATION RATE: 20 BRPM

## 2020-10-11 LAB
SARS-COV-2, RAPID: DETECTED
SARS-COV-2: ABNORMAL
SARS-COV-2: ABNORMAL
SOURCE: ABNORMAL

## 2020-10-11 PROCEDURE — U0002 COVID-19 LAB TEST NON-CDC: HCPCS

## 2020-10-11 PROCEDURE — 99284 EMERGENCY DEPT VISIT MOD MDM: CPT

## 2020-10-11 PROCEDURE — 99283 EMERGENCY DEPT VISIT LOW MDM: CPT

## 2020-10-11 PROCEDURE — 6370000000 HC RX 637 (ALT 250 FOR IP): Performed by: EMERGENCY MEDICINE

## 2020-10-11 RX ORDER — IBUPROFEN 200 MG
600 TABLET ORAL ONCE
Status: COMPLETED | OUTPATIENT
Start: 2020-10-11 | End: 2020-10-11

## 2020-10-11 RX ORDER — DEXTROMETHORPHAN HYDROBROMIDE, GUAIFENESIN 5; 100 MG/5ML; MG/5ML
20 LIQUID ORAL EVERY 4 HOURS PRN
Qty: 1 BOTTLE | Refills: 0 | Status: ON HOLD | OUTPATIENT
Start: 2020-10-11 | End: 2020-10-20 | Stop reason: HOSPADM

## 2020-10-11 RX ADMIN — IBUPROFEN 600 MG: 200 TABLET, FILM COATED ORAL at 08:35

## 2020-10-11 ASSESSMENT — ENCOUNTER SYMPTOMS
BACK PAIN: 0
COUGH: 1
EYE REDNESS: 0
NAUSEA: 0
ABDOMINAL PAIN: 0
SHORTNESS OF BREATH: 0
SORE THROAT: 1
VOMITING: 0
EYE PAIN: 0
TROUBLE SWALLOWING: 0
COLOR CHANGE: 0
DIARRHEA: 0

## 2020-10-11 ASSESSMENT — PAIN DESCRIPTION - FREQUENCY: FREQUENCY: CONTINUOUS

## 2020-10-11 ASSESSMENT — PAIN SCALES - GENERAL
PAINLEVEL_OUTOF10: 9
PAINLEVEL_OUTOF10: 9
PAINLEVEL_OUTOF10: 8

## 2020-10-11 ASSESSMENT — PAIN DESCRIPTION - ONSET: ONSET: ON-GOING

## 2020-10-11 ASSESSMENT — PAIN DESCRIPTION - LOCATION: LOCATION: OTHER (COMMENT)

## 2020-10-11 ASSESSMENT — PAIN DESCRIPTION - PROGRESSION: CLINICAL_PROGRESSION: GRADUALLY WORSENING

## 2020-10-11 ASSESSMENT — PAIN DESCRIPTION - PAIN TYPE: TYPE: ACUTE PAIN

## 2020-10-11 NOTE — ED PROVIDER NOTES
[facility] ED  eMERGENCY dEPARTMENT eNCOUnter      Pt Name: Jerry Jones  MRN: [id number]  [place] 1980  Date of evaluation: 10/11/2020  Provider: Kathy Damian MD    CHIEF COMPLAINT       Chief Complaint   Patient presents with    Cough     exposed to Covid people at [employer] last Sunday and yesterday she started to feel sore throat and coughing up green phlegm    Pharyngitis     Patient is a 82-year-old female who presents to the emergency department complaining of cough and congestion. Patient states she was around people at [employer] on Sunday that had COVID and now is having body aches and sore throat and cough. She denies shortness of breath. She denies nausea or vomiting. She denies chills but has had slight fever. She denies any other associated symptoms. She states nothing is making it better or worse. Nursing Notes were reviewed. REVIEW OF SYSTEMS    (2-9 systems for level 4, 10 or more for level 5)     Review of Systems   Constitutional: Negative for chills and fever. HENT: Positive for sore throat. Negative for ear pain and trouble swallowing. Eyes: Negative for pain and redness. Respiratory: Positive for cough. Negative for shortness of breath. Cardiovascular: Negative for chest pain and palpitations. Gastrointestinal: Negative for abdominal pain, diarrhea, nausea and vomiting. Genitourinary: Negative for dysuria and frequency. Musculoskeletal: Negative for back pain and neck pain. Skin: Negative for color change and rash. Neurological: Negative for dizziness, syncope and headaches. Psychiatric/Behavioral: Negative for hallucinations and suicidal ideas. Except as noted above the remainder of the review of systems was reviewed and negative.        PAST MEDICAL HISTORY     Past Medical History:   Diagnosis Date    Anxiety     Bulging of lumbar intervertebral disc without myelopathy     L1    Chronic back pain     Depression     GERD (gastroesophageal reflux disease)     Substance abuse ([facility] [address]     alcohol and opiates         SURGICAL HISTORY       Past Surgical History:   Procedure Laterality Date    COLONOSCOPY  2020    PILONIDAL CYST EXCISION  2018    PLANTAR FASCIA SURGERY Left 2020         ALLERGIES     Patient has no known allergies.     FAMILY HISTORY       Family History   Problem Relation Age of Onset    Diabetes Mother           SOCIAL HISTORY       Social History     Socioeconomic History    Marital status:      Spouse name: None    Number of children: None    Years of education: None    Highest education level: None   Occupational History    None   Social Needs    Financial resource strain: None    Food insecurity     Worry: None     Inability: None    Transportation needs     Medical: None     Non-medical: None   Tobacco Use    Smoking status: Current Every Day Smoker     Packs/day: 1.00     Years: 2.00     Pack years: 2.00     Types: Cigarettes     Last attempt to quit: 2020     Years since quittin.7    Smokeless tobacco: Never Used   Substance and Sexual Activity    Alcohol use: Yes     Comment: in the past    Drug use: Not Currently     Comment: Pt has been sober for 30 days    Sexual activity: Not Currently   Lifestyle    Physical activity     Days per week: None     Minutes per session: None    Stress: None   Relationships    Social connections     Talks on phone: None     Gets together: None     Attends [employer] service: None     Active member of club or organization: None     Attends meetings of clubs or organizations: None     Relationship status: None    Intimate partner violence     Fear of current or ex partner: None     Emotionally abused: None     Physically abused: None     Forced sexual activity: None   Other Topics Concern    None   Social History Narrative    None           PHYSICAL EXAM    (up to 7 for level 4, 8 ormore for level 5)     ED Triage Vitals [10/11/20 0816]   BP Temp Temp Source Pulse Resp SpO2 Height Weight   (!) 109/90 97.8 °F (36.6 °C) Oral 72 20 100 % 5' 6\" (1.676 m) 224 lb (101.6 kg)       Physical Exam     Physical    Vital signs and nursing notes were reviewed as well as the social, family, and past medical history. Gen. appearance: Patient is alert and oriented and in no acute distress    Head: Atraumatic, normocephalic    Neck: Supple, trachea/thyroid normal    EENT: PERRLA, EOMI, conjunctiva normal.    Skin: Warm and dry with no rash    Cardiovascular: Heart RRR, no gallops or rubs, no aortic enlargement or bruits noted. Respiratory: Lungs clear, no wheezing, no rales, normal breath sounds. Gastrointestinal: Abdomen nontender, bowel sounds normal, no rebound/guarding/distention or mass    Musculoskeletal: No tenderness in the extremities, no back or hip pain. Neurological: Patient is alert and oriented ×3, no focal motor or sensory deficits noted    DIAGNOSTIC RESULTS              LABS:  Labs Reviewed   COVID-19 - Abnormal; Notable for the following components:       Result Value    SARS-CoV-2, Rapid DETECTED (*)     All other components within normal limits       All other labs were within normal range or not returned as of this dictation. EMERGENCY DEPARTMENT COURSE and DIFFERENTIAL DIAGNOSIS/MDM:   Vitals:    Vitals:    10/11/20 0816   BP: (!) 109/90   Pulse: 72   Resp: 20   Temp: 97.8 °F (36.6 °C)   TempSrc: Oral   SpO2: 100%   Weight: 224 lb (101.6 kg)   Height: 5' 6\" (1.676 m)                 REASSESSMENT      Here in the ED a rapid COVID test was done and patient was given Motrin. It was positive and patient will be discharged home.   Patient was given instructions for quarantine    PROCEDURES:  Unless otherwise noted below, none     Procedures    FINAL IMPRESSION      1. COVID-19          DISPOSITION/PLAN   DISPOSITION Decision To Discharge 10/11/2020 09:29:03 AM      PATIENT REFERRED TO:  Lakeshia Arias DO  [address] [place] [address] [address] [facility] [address] [phone]    In 1 week        DISCHARGE MEDICATIONS:  Discharge Medication List as of 10/11/2020  9:29 AM      START taking these medications    Details   Dextromethorphan-guaiFENesin (ROBITUSSIN COUGH+CHEST CONCHA DM)  MG/20ML LIQD Take 20 mLs by mouth every 4 hours as needed (Cough), Disp-1 Bottle,R-0Print                (Please note that portions ofthis note were completed with a voice recognition program.  Efforts were made to edit the dictations but occasionally words are mis-transcribed.)    Gena Myers MD(electronically signed)  Attending Emergency Physician            Gena Myers MD  10/11/20 1451

## 2020-10-12 ENCOUNTER — CARE COORDINATION (OUTPATIENT)
Dept: CARE COORDINATION | Age: 40
End: 2020-10-12

## 2020-10-12 RX ORDER — METHOCARBAMOL 500 MG/1
TABLET, FILM COATED ORAL
Qty: 60 TABLET | Refills: 2 | Status: SHIPPED | OUTPATIENT
Start: 2020-10-12 | End: 2021-01-19 | Stop reason: SDUPTHER

## 2020-10-12 RX ORDER — IBUPROFEN 800 MG/1
800 TABLET ORAL EVERY 8 HOURS PRN
Qty: 90 TABLET | Refills: 2 | Status: ON HOLD | OUTPATIENT
Start: 2020-10-12 | End: 2020-10-21 | Stop reason: HOSPADM

## 2020-10-12 RX ORDER — FLUTICASONE PROPIONATE 50 MCG
SPRAY, SUSPENSION (ML) NASAL
Qty: 16 G | Refills: 2 | Status: SHIPPED | OUTPATIENT
Start: 2020-10-12 | End: 2021-03-12

## 2020-10-12 NOTE — TELEPHONE ENCOUNTER
Last visit:  9/4/2020  Next Visit Date:  No future appointments. Last Med refill:    Medication List:  Prior to Admission medications    Medication Sig Start Date End Date Taking? Authorizing Provider   Dextromethorphan-guaiFENesin (ROBITUSSIN COUGH+CHEST CONCHA DM)  MG/20ML LIQD Take 20 mLs by mouth every 4 hours as needed (Cough) 10/11/20 10/18/20  Edgardo Lowe MD   LORazepam (ATIVAN) 0.5 MG tablet Take 1 tablet by mouth every 8 hours as needed for Anxiety for up to 30 days. 10/1/20 10/31/20  MICHAEL Bailey CNP   cetirizine (ZYRTEC) 10 MG tablet TAKE 2 TABLET BY MOUTH EVERY DAY 8/31/20   Holly Phillips DO   methocarbamol (ROBAXIN) 500 MG tablet TAKE 1 TABLET BY MOUTH THREE TIMES DAILY AS NEEDED 8/13/20   Holly Phillips DO   TRINTELLIX 5 MG tablet TAKE 1 TABLET BY MOUTH EVERY DAY 7/16/20   Historical Provider, MD   fluticasone (FLONASE) 50 MCG/ACT nasal spray use 2 (TWO) sprays by nasal route daily 7/27/20   Holly Phillips DO   traZODone (DESYREL) 50 MG tablet TAKE 1 TO 2 TABLETS BY MOUTH NIGHTLY AS NEEDED for sleep 7/13/20   Holly Phillips DO   ibuprofen (ADVIL;MOTRIN) 800 MG tablet TAKE 1 TABLET BY MOUTH EVERY 8 HOURS AS NEEDED FOR PAIN 7/13/20   Holly Phillips DO   famotidine (PEPCID) 20 MG tablet Take 1 tablet by mouth 2 times daily 7/1/20   MICHAEL Bailey CNP   norgestimate-ethinyl estradiol (ORTHO-CYCLEN) 0.25-35 MG-MCG per tablet TAKE 1 TABLET BY MOUTH EVERY DAY 6/23/20   Holly Phillips DO   omeprazole (PRILOSEC) 40 MG delayed release capsule TAKE 1 CAPSULE BY MOUTH EVERY DAY 6/15/20   Holly Phillips DO   albuterol sulfate HFA (PROVENTIL HFA) 108 (90 Base) MCG/ACT inhaler Inhale 2 puffs into the lungs every 6 hours as needed for Wheezing  Patient not taking: Reported on 7/1/2020 10/12/19   Eris Ryan,    gabapentin (NEURONTIN) 300 MG capsule Take 1 capsule by mouth every 12 hours.  6/25/19   Historical Provider, MD   acetaminophen (TYLENOL) 500

## 2020-10-13 ENCOUNTER — TELEPHONE (OUTPATIENT)
Dept: FAMILY MEDICINE CLINIC | Age: 40
End: 2020-10-13

## 2020-10-13 RX ORDER — BENZONATATE 200 MG/1
200 CAPSULE ORAL 3 TIMES DAILY PRN
Qty: 30 CAPSULE | Refills: 0 | Status: ON HOLD | OUTPATIENT
Start: 2020-10-13 | End: 2020-10-20 | Stop reason: HOSPADM

## 2020-10-13 NOTE — TELEPHONE ENCOUNTER
Severe painful cough, chest congestion - Patient Covid Positive. Requesting medication for relief. Current cough med not helping.        Pharmacy [facility]  Last OV: 9/4/2020

## 2020-10-18 ENCOUNTER — APPOINTMENT (OUTPATIENT)
Dept: GENERAL RADIOLOGY | Age: 40
End: 2020-10-18
Payer: COMMERCIAL

## 2020-10-18 ENCOUNTER — HOSPITAL ENCOUNTER (INPATIENT)
Age: 40
LOS: 3 days | Discharge: HOME | DRG: 751 | End: 2020-10-21
Attending: PSYCHIATRY & NEUROLOGY | Admitting: PSYCHIATRY & NEUROLOGY
Payer: COMMERCIAL

## 2020-10-18 ENCOUNTER — HOSPITAL ENCOUNTER (EMERGENCY)
Age: 40
Discharge: PSYCHIATRIC OR SUBSTANCE USE FACILITY | End: 2020-10-18
Attending: FAMILY MEDICINE
Payer: COMMERCIAL

## 2020-10-18 VITALS
OXYGEN SATURATION: 99 % | TEMPERATURE: 97.7 F | WEIGHT: 221 LBS | BODY MASS INDEX: 35.67 KG/M2 | DIASTOLIC BLOOD PRESSURE: 60 MMHG | HEART RATE: 78 BPM | SYSTOLIC BLOOD PRESSURE: 98 MMHG | RESPIRATION RATE: 16 BRPM

## 2020-10-18 PROBLEM — F32.A DEPRESSION WITH SUICIDAL IDEATION: Status: ACTIVE | Noted: 2020-10-18

## 2020-10-18 PROBLEM — R45.851 DEPRESSION WITH SUICIDAL IDEATION: Status: ACTIVE | Noted: 2020-10-18

## 2020-10-18 LAB
ABSOLUTE EOS #: 0.1 K/UL (ref 0–0.4)
ABSOLUTE IMMATURE GRANULOCYTE: ABNORMAL K/UL (ref 0–0.3)
ABSOLUTE LYMPH #: 3.1 K/UL (ref 1–4.8)
ABSOLUTE MONO #: 1 K/UL (ref 0–1)
ACETAMINOPHEN LEVEL: <5 UG/ML (ref 10–30)
ALBUMIN SERPL-MCNC: 4.7 G/DL (ref 3.5–5.2)
ALBUMIN/GLOBULIN RATIO: ABNORMAL (ref 1–2.5)
ALP BLD-CCNC: 68 U/L (ref 35–104)
ALT SERPL-CCNC: 19 U/L (ref 5–33)
AMPHETAMINE SCREEN URINE: NEGATIVE
ANION GAP SERPL CALCULATED.3IONS-SCNC: 12 MMOL/L (ref 9–17)
AST SERPL-CCNC: 20 U/L
BARBITURATE SCREEN URINE: NEGATIVE
BASOPHILS # BLD: 0 % (ref 0–2)
BASOPHILS ABSOLUTE: 0 K/UL (ref 0–0.2)
BENZODIAZEPINE SCREEN, URINE: POSITIVE
BILIRUB SERPL-MCNC: 0.21 MG/DL (ref 0.3–1.2)
BILIRUBIN URINE: NEGATIVE
BUN BLDV-MCNC: 3 MG/DL (ref 6–20)
BUN/CREAT BLD: 4 (ref 9–20)
BUPRENORPHINE URINE: ABNORMAL
CALCIUM SERPL-MCNC: 9.6 MG/DL (ref 8.6–10.4)
CANNABINOID SCREEN URINE: NEGATIVE
CHLORIDE BLD-SCNC: 104 MMOL/L (ref 98–107)
CO2: 24 MMOL/L (ref 20–31)
COCAINE METABOLITE, URINE: NEGATIVE
COLOR: YELLOW
COMMENT UA: NORMAL
CREAT SERPL-MCNC: 0.68 MG/DL (ref 0.5–0.9)
DIFFERENTIAL TYPE: YES
EKG ATRIAL RATE: 70 BPM
EKG P AXIS: 41 DEGREES
EKG P-R INTERVAL: 182 MS
EKG Q-T INTERVAL: 402 MS
EKG QRS DURATION: 116 MS
EKG QTC CALCULATION (BAZETT): 434 MS
EKG R AXIS: -40 DEGREES
EKG T AXIS: 7 DEGREES
EKG VENTRICULAR RATE: 70 BPM
EOSINOPHILS RELATIVE PERCENT: 1 % (ref 0–5)
ETHANOL PERCENT: <0.01 %
ETHANOL: <10 MG/DL
GFR AFRICAN AMERICAN: >60 ML/MIN
GFR NON-AFRICAN AMERICAN: >60 ML/MIN
GFR SERPL CREATININE-BSD FRML MDRD: ABNORMAL ML/MIN/{1.73_M2}
GFR SERPL CREATININE-BSD FRML MDRD: ABNORMAL ML/MIN/{1.73_M2}
GLUCOSE BLD-MCNC: 113 MG/DL (ref 70–99)
GLUCOSE URINE: NEGATIVE
HCG QUALITATIVE: NEGATIVE
HCT VFR BLD CALC: 39.3 % (ref 36–46)
HEMOGLOBIN: 13.4 G/DL (ref 12–16)
IMMATURE GRANULOCYTES: ABNORMAL %
KETONES, URINE: NEGATIVE
LEUKOCYTE ESTERASE, URINE: NEGATIVE
LYMPHOCYTES # BLD: 37 % (ref 15–40)
MCH RBC QN AUTO: 28.9 PG (ref 26–34)
MCHC RBC AUTO-ENTMCNC: 34.2 G/DL (ref 31–37)
MCV RBC AUTO: 84.5 FL (ref 80–100)
MDMA URINE: ABNORMAL
METHADONE SCREEN, URINE: NEGATIVE
METHAMPHETAMINE, URINE: NEGATIVE
MONOCYTES # BLD: 12 % (ref 4–8)
NITRITE, URINE: NEGATIVE
NRBC AUTOMATED: ABNORMAL PER 100 WBC
OPIATES, URINE: NEGATIVE
OXYCODONE SCREEN URINE: NEGATIVE
PDW BLD-RTO: 12.8 % (ref 12.1–15.2)
PH UA: 6 (ref 5–8)
PHENCYCLIDINE, URINE: NEGATIVE
PLATELET # BLD: 320 K/UL (ref 140–450)
PLATELET ESTIMATE: ABNORMAL
PMV BLD AUTO: ABNORMAL FL (ref 6–12)
POTASSIUM SERPL-SCNC: 3.7 MMOL/L (ref 3.7–5.3)
PROPOXYPHENE, URINE: NEGATIVE
PROTEIN UA: NEGATIVE
RBC # BLD: 4.65 M/UL (ref 4–5.2)
RBC # BLD: ABNORMAL 10*6/UL
SALICYLATE LEVEL: <1 MG/DL (ref 3–10)
SARS-COV-2, RAPID: DETECTED
SARS-COV-2: ABNORMAL
SARS-COV-2: ABNORMAL
SEG NEUTROPHILS: 50 % (ref 47–75)
SEGMENTED NEUTROPHILS ABSOLUTE COUNT: 4.1 K/UL (ref 2.5–7)
SODIUM BLD-SCNC: 140 MMOL/L (ref 135–144)
SOURCE: ABNORMAL
SPECIFIC GRAVITY UA: 1.01 (ref 1–1.03)
TEST INFORMATION: ABNORMAL
TOTAL PROTEIN: 7.7 G/DL (ref 6.4–8.3)
TRICYCLIC ANTIDEPRESSANTS, UR: NEGATIVE
TURBIDITY: CLEAR
URINE HGB: NEGATIVE
UROBILINOGEN, URINE: NORMAL
WBC # BLD: 8.3 K/UL (ref 3.5–11)
WBC # BLD: ABNORMAL 10*3/UL

## 2020-10-18 PROCEDURE — 80307 DRUG TEST PRSMV CHEM ANLYZR: CPT

## 2020-10-18 PROCEDURE — 71045 X-RAY EXAM CHEST 1 VIEW: CPT

## 2020-10-18 PROCEDURE — 2580000003 HC RX 258

## 2020-10-18 PROCEDURE — 6370000000 HC RX 637 (ALT 250 FOR IP): Performed by: PSYCHIATRY & NEUROLOGY

## 2020-10-18 PROCEDURE — G0480 DRUG TEST DEF 1-7 CLASSES: HCPCS

## 2020-10-18 PROCEDURE — 6360000002 HC RX W HCPCS

## 2020-10-18 PROCEDURE — U0002 COVID-19 LAB TEST NON-CDC: HCPCS

## 2020-10-18 PROCEDURE — 96372 THER/PROPH/DIAG INJ SC/IM: CPT

## 2020-10-18 PROCEDURE — 6370000000 HC RX 637 (ALT 250 FOR IP): Performed by: FAMILY MEDICINE

## 2020-10-18 PROCEDURE — 85025 COMPLETE CBC W/AUTO DIFF WBC: CPT

## 2020-10-18 PROCEDURE — 99285 EMERGENCY DEPT VISIT HI MDM: CPT

## 2020-10-18 PROCEDURE — 93010 ELECTROCARDIOGRAM REPORT: CPT | Performed by: INTERNAL MEDICINE

## 2020-10-18 PROCEDURE — 1240000000 HC EMOTIONAL WELLNESS R&B

## 2020-10-18 PROCEDURE — 6360000002 HC RX W HCPCS: Performed by: FAMILY MEDICINE

## 2020-10-18 PROCEDURE — 36415 COLL VENOUS BLD VENIPUNCTURE: CPT

## 2020-10-18 PROCEDURE — 80306 DRUG TEST PRSMV INSTRMNT: CPT

## 2020-10-18 PROCEDURE — 93005 ELECTROCARDIOGRAM TRACING: CPT | Performed by: FAMILY MEDICINE

## 2020-10-18 PROCEDURE — C9803 HOPD COVID-19 SPEC COLLECT: HCPCS

## 2020-10-18 PROCEDURE — 80053 COMPREHEN METABOLIC PANEL: CPT

## 2020-10-18 PROCEDURE — 84703 CHORIONIC GONADOTROPIN ASSAY: CPT

## 2020-10-18 PROCEDURE — 81003 URINALYSIS AUTO W/O SCOPE: CPT

## 2020-10-18 RX ORDER — ACETAMINOPHEN 325 MG/1
650 TABLET ORAL EVERY 4 HOURS PRN
Status: DISCONTINUED | OUTPATIENT
Start: 2020-10-18 | End: 2020-10-21 | Stop reason: HOSPADM

## 2020-10-18 RX ORDER — IBUPROFEN 200 MG
400 TABLET ORAL EVERY 6 HOURS PRN
Status: CANCELLED | OUTPATIENT
Start: 2020-10-18

## 2020-10-18 RX ORDER — BENZONATATE 100 MG/1
200 CAPSULE ORAL ONCE
Status: COMPLETED | OUTPATIENT
Start: 2020-10-18 | End: 2020-10-18

## 2020-10-18 RX ORDER — LORAZEPAM 2 MG/ML
INJECTION INTRAMUSCULAR
Status: COMPLETED
Start: 2020-10-18 | End: 2020-10-18

## 2020-10-18 RX ORDER — NICOTINE 21 MG/24HR
1 PATCH, TRANSDERMAL 24 HOURS TRANSDERMAL DAILY
Status: DISCONTINUED | OUTPATIENT
Start: 2020-10-18 | End: 2020-10-21 | Stop reason: HOSPADM

## 2020-10-18 RX ORDER — CLONAZEPAM 0.5 MG/1
0.5 TABLET ORAL 3 TIMES DAILY PRN
Status: DISCONTINUED | OUTPATIENT
Start: 2020-10-18 | End: 2020-10-21 | Stop reason: HOSPADM

## 2020-10-18 RX ORDER — TRAZODONE HYDROCHLORIDE 50 MG/1
50 TABLET ORAL NIGHTLY PRN
Status: CANCELLED | OUTPATIENT
Start: 2020-10-18

## 2020-10-18 RX ORDER — LORAZEPAM 0.5 MG/1
2 TABLET ORAL ONCE
Status: DISCONTINUED | OUTPATIENT
Start: 2020-10-18 | End: 2020-10-18 | Stop reason: HOSPADM

## 2020-10-18 RX ORDER — MAGNESIUM HYDROXIDE/ALUMINUM HYDROXICE/SIMETHICONE 120; 1200; 1200 MG/30ML; MG/30ML; MG/30ML
30 SUSPENSION ORAL EVERY 6 HOURS PRN
Status: DISCONTINUED | OUTPATIENT
Start: 2020-10-18 | End: 2020-10-21 | Stop reason: HOSPADM

## 2020-10-18 RX ORDER — HYDROXYZINE HYDROCHLORIDE 25 MG/1
25 TABLET, FILM COATED ORAL 3 TIMES DAILY PRN
Status: ON HOLD | COMMUNITY
End: 2020-10-21 | Stop reason: HOSPADM

## 2020-10-18 RX ORDER — ACETAMINOPHEN 325 MG/1
650 TABLET ORAL EVERY 4 HOURS PRN
Status: CANCELLED | OUTPATIENT
Start: 2020-10-18

## 2020-10-18 RX ORDER — HYDROXYZINE 50 MG/1
50 TABLET, FILM COATED ORAL 3 TIMES DAILY PRN
Status: DISCONTINUED | OUTPATIENT
Start: 2020-10-18 | End: 2020-10-21 | Stop reason: HOSPADM

## 2020-10-18 RX ORDER — LORAZEPAM 2 MG/ML
2 INJECTION INTRAMUSCULAR ONCE
Status: COMPLETED | OUTPATIENT
Start: 2020-10-18 | End: 2020-10-18

## 2020-10-18 RX ORDER — HYDROXYZINE HYDROCHLORIDE 10 MG/1
50 TABLET, FILM COATED ORAL 3 TIMES DAILY PRN
Status: CANCELLED | OUTPATIENT
Start: 2020-10-18

## 2020-10-18 RX ORDER — IBUPROFEN 400 MG/1
400 TABLET ORAL EVERY 6 HOURS PRN
Status: DISCONTINUED | OUTPATIENT
Start: 2020-10-18 | End: 2020-10-19

## 2020-10-18 RX ORDER — GABAPENTIN 400 MG/1
400 CAPSULE ORAL 3 TIMES DAILY
Status: DISCONTINUED | OUTPATIENT
Start: 2020-10-18 | End: 2020-10-21 | Stop reason: HOSPADM

## 2020-10-18 RX ORDER — POLYETHYLENE GLYCOL 3350 17 G/17G
17 POWDER, FOR SOLUTION ORAL DAILY PRN
Status: DISCONTINUED | OUTPATIENT
Start: 2020-10-18 | End: 2020-10-21 | Stop reason: HOSPADM

## 2020-10-18 RX ORDER — MAGNESIUM HYDROXIDE/ALUMINUM HYDROXICE/SIMETHICONE 120; 1200; 1200 MG/30ML; MG/30ML; MG/30ML
30 SUSPENSION ORAL EVERY 6 HOURS PRN
Status: CANCELLED | OUTPATIENT
Start: 2020-10-18

## 2020-10-18 RX ORDER — NICOTINE 21 MG/24HR
1 PATCH, TRANSDERMAL 24 HOURS TRANSDERMAL DAILY
Status: CANCELLED | OUTPATIENT
Start: 2020-10-18

## 2020-10-18 RX ORDER — TRAZODONE HYDROCHLORIDE 50 MG/1
50 TABLET ORAL NIGHTLY PRN
Status: DISCONTINUED | OUTPATIENT
Start: 2020-10-18 | End: 2020-10-21 | Stop reason: HOSPADM

## 2020-10-18 RX ORDER — CETIRIZINE HYDROCHLORIDE 10 MG/1
10 TABLET ORAL DAILY
Status: DISCONTINUED | OUTPATIENT
Start: 2020-10-18 | End: 2020-10-21 | Stop reason: HOSPADM

## 2020-10-18 RX ORDER — ZIPRASIDONE MESYLATE 20 MG/ML
20 INJECTION, POWDER, LYOPHILIZED, FOR SOLUTION INTRAMUSCULAR ONCE
Status: COMPLETED | OUTPATIENT
Start: 2020-10-18 | End: 2020-10-18

## 2020-10-18 RX ORDER — POLYETHYLENE GLYCOL 3350 17 G/17G
17 POWDER, FOR SOLUTION ORAL DAILY PRN
Status: CANCELLED | OUTPATIENT
Start: 2020-10-18

## 2020-10-18 RX ADMIN — LORAZEPAM 2 MG: 2 INJECTION, SOLUTION INTRAMUSCULAR; INTRAVENOUS at 08:10

## 2020-10-18 RX ADMIN — BENZONATATE 200 MG: 100 CAPSULE ORAL at 07:17

## 2020-10-18 RX ADMIN — HYDROXYZINE HYDROCHLORIDE 50 MG: 50 TABLET, FILM COATED ORAL at 21:12

## 2020-10-18 RX ADMIN — WATER 20 ML: 1 INJECTION INTRAMUSCULAR; INTRAVENOUS; SUBCUTANEOUS at 08:12

## 2020-10-18 RX ADMIN — CETIRIZINE HYDROCHLORIDE 10 MG: 10 TABLET, FILM COATED ORAL at 14:14

## 2020-10-18 RX ADMIN — ZIPRASIDONE MESYLATE 20 MG: 20 INJECTION, POWDER, LYOPHILIZED, FOR SOLUTION INTRAMUSCULAR at 08:11

## 2020-10-18 RX ADMIN — LORAZEPAM 2 MG: 2 INJECTION INTRAMUSCULAR at 08:10

## 2020-10-18 RX ADMIN — IBUPROFEN 400 MG: 400 TABLET, FILM COATED ORAL at 14:24

## 2020-10-18 RX ADMIN — GABAPENTIN 400 MG: 400 CAPSULE ORAL at 14:14

## 2020-10-18 RX ADMIN — GABAPENTIN 400 MG: 400 CAPSULE ORAL at 21:12

## 2020-10-18 RX ADMIN — VORTIOXETINE 10 MG: 10 TABLET, FILM COATED ORAL at 21:11

## 2020-10-18 ASSESSMENT — PAIN SCALES - GENERAL
PAINLEVEL_OUTOF10: 6
PAINLEVEL_OUTOF10: 5
PAINLEVEL_OUTOF10: 8

## 2020-10-18 ASSESSMENT — PAIN DESCRIPTION - PAIN TYPE: TYPE: CHRONIC PAIN

## 2020-10-18 ASSESSMENT — LIFESTYLE VARIABLES: HISTORY_ALCOHOL_USE: YES

## 2020-10-18 ASSESSMENT — PATIENT HEALTH QUESTIONNAIRE - PHQ9: SUM OF ALL RESPONSES TO PHQ QUESTIONS 1-9: 9

## 2020-10-18 ASSESSMENT — PAIN DESCRIPTION - ONSET: ONSET: ON-GOING

## 2020-10-18 ASSESSMENT — PAIN DESCRIPTION - LOCATION: LOCATION: OTHER (COMMENT)

## 2020-10-18 ASSESSMENT — PAIN DESCRIPTION - PROGRESSION: CLINICAL_PROGRESSION: NOT CHANGED

## 2020-10-18 ASSESSMENT — PAIN DESCRIPTION - DESCRIPTORS: DESCRIPTORS: ACHING;CONSTANT

## 2020-10-18 ASSESSMENT — PAIN DESCRIPTION - FREQUENCY: FREQUENCY: CONTINUOUS

## 2020-10-18 ASSESSMENT — PAIN - FUNCTIONAL ASSESSMENT: PAIN_FUNCTIONAL_ASSESSMENT: ACTIVITIES ARE NOT PREVENTED

## 2020-10-18 NOTE — BH NOTE
Patient given tobacco quitline number 88622041456 at this time, refusing to call at this time, states \" I just dont want to quit now\"- patient given information as to the dangers of long term tobacco use. Continue to reinforce the importance of tobacco cessation.

## 2020-10-18 NOTE — ED NOTES
Patient states she took 4mg xanax and 40mg adderal approx 1 hour ago and states if she would have had enough to overdose she would have because she is tired of fighting this addiction     Rei Hector, DEJON  10/18/20 0144

## 2020-10-18 NOTE — PLAN OF CARE
Problem: Airway Clearance - Ineffective  Goal: Achieve or maintain patent airway  Outcome: Ongoing  Note: Respirations unlabored. Lungs clear but diminished. Pt continues with frequent cough. Problem: Gas Exchange - Impaired  Goal: Absence of hypoxia  Outcome: Met This Shift  Note: O2 sat 94% on room air. Problem: Gas Exchange - Impaired  Goal: Promote optimal lung function  Outcome: Met This Shift     Problem: Breathing Pattern - Ineffective  Goal: Ability to achieve and maintain a regular respiratory rate  Outcome: Met This Shift  Note: Respirations wnl at 14. Problem: Body Temperature -  Risk of, Imbalanced  Goal: Ability to maintain a body temperature within defined limits  Outcome: Met This Shift  Note: Temp wnl. Problem: Isolation Precautions - Risk of Spread of Infection  Goal: Prevent transmission of infection  Outcome: Ongoing  Note: Pt maintained in droplet plus isolation. Instructed to wear mask when out of room. Problem: Pain:  Goal: Control of chronic pain  Description: Control of chronic pain  Outcome: Ongoing  Note: Pain continues. Pt medicated with gabapentin as ordered for nerve pain. Pt also given ibuprofen as needed.

## 2020-10-18 NOTE — BH NOTE
Dr Kam Collazo notified of consult on pt due to admission with +COVID status. MD notified that pt is afebrile and respirations are unlabored. Pulmonology will see pt tomorrow.

## 2020-10-18 NOTE — ED NOTES
Pt resting on right side with resp unlabored and eyes closed. [facility] at side.       Diane Chanlder RN  10/18/20 0126

## 2020-10-18 NOTE — BH NOTE
roadblocks)                    ( )  Coping skills (new ways to manage stress, exercise, relaxation techniques, changing routine, distraction)                                                           ( )  Basic information about quitting (benefits of quitting, techniques in how to quit, available resources  ( ) Referral for counseling faxed to [facility]                                                           (X ) Patient refused counseling  ( ) Patient has not smoked in the last 30 days    Metabolic Screening:    Lab Results   Component Value Date    LABA1C 5.3 09/19/2018       No results found for: CHOL  No results found for: TRIG  No results found for: HDL  No components found for: LDLCAL  No results found for: LABVLDL      Body mass index is 35.51 kg/m². BP Readings from Last 2 Encounters:   10/18/20 118/78   10/18/20 98/60           Pt admitted with followings belongings:  Dentures: Lowers, Uppers  Vision - Corrective Lenses: Glasses  Hearing Aid: None  Jewelry: Earrings, Other (Comment)(body piercing)  Body Piercings Removed: Yes  Clothing: Undergarments (Comment), Footwear, Pants, Shirt, Socks  Were All Patient Medications Collected?: Yes  Other Valuables: Cell phone, Money (Comment), RAJIV Lay, [id number])     Valuables sent home with NA. Valuables placed in safe in security envelope, number:  0230. Patient's home medications were verified. Patient oriented to surroundings and program expectations and copy of patient rights given. Received admission packet:  yes. Consents reviewed, signed no. Refused yes-pt too sleepy from Im medications given prior to transfer. Patient verbalize understanding:  No.    Patient education on precautions: yes; instructed on COVID isolation. Pt admitted to [facility] unit room 202. Pt lethargic upon admission due to IM medications given prior to transfer. Pt denies suicidal or homicidal ideation.  Reports the pills she took prior to going to [facility] ED were just what she got from her sister and cousin. She reports she usually takes these pills as recreation. Pt reports going to [facility] ED due to difficulty breathing due to COVID. Pt denies auditory or visual hallucinations. Lungs clear but diminished. Frequent moist cough noted. O2 sat WNL on room air. Lunch ordered for pt.                    Michel Hart RN

## 2020-10-18 NOTE — ED NOTES
PATIENT STATES \"I NEED HELP\" STATES \"I AM TRIED OF FIGHTING THIS ADDICTION FOR PAST 20 YEARS\" STATES \" I TOOK XANAX AND ALTERAL TONIGHT THAT WAS NOT MINE AND IF I WOULD HAVE HAD ENOUGH TO OD I WOULD HAVE\" DOCTOR AT [facility], RN  10/18/20 1787

## 2020-10-18 NOTE — ED NOTES
Pt tearful, stating \"I don't understand it\" \"I just want the COVID pain to stop\" pt instructed that she is being transported to [place] for help.      Mark Telles RN  10/18/20 5343

## 2020-10-18 NOTE — PROGRESS NOTES
Writer offered 1:1 session as an alternative to group therapy; patient declined due to resting in room.

## 2020-10-18 NOTE — ED NOTES
Pt escorted to bathroom with this RN and two [employer]. Pt void and is escorted back to room 5.       Aure De La Paz RN  10/18/20 2647

## 2020-10-18 NOTE — PROGRESS NOTES
Patient ambulates to bathroom with steady gait with this nurse, voids and back to bed, officer remains at [facility]

## 2020-10-18 NOTE — BH NOTE
RN assessing influenza vaccine for pt. Pt currently is +COVID at this time. No fever noted however pt reports feeling weak and continues to have a frequent cough. This RN asks charge RN re: pt to have influenza vaccine while COVID+. Charge RN referrs RN to call house supervisor. House supervisor states \"that should be a Dr Brigitte Aranda". Will notify MD via perfect-serve.

## 2020-10-18 NOTE — ED PROVIDER NOTES
eMERGENCY dEPARTMENT eNCOUnter        [address] [facility]    Chief Complaint   Patient presents with    Other     PATIENT DX WITH COVID 10-11-20 AND STATES SHE DONE FIGHTING HER ADDICTIONS       HPI    Chapis Beebe is a 47 y.o. female who presents with severe depression with suicidal ideation. Patient took for 1 mg Xanax tablets and 40 mg of Adderall according to the patient. She has a history of recent Covid infection. Depression symptoms are described by the patient as being severe. She is seeking immediate help for her depression. She has had a cough for 3 days. REVIEW OF SYSTEMS    All systems reviewed and positives are in the HPI.     PAST MEDICAL HISTORY    Past Medical History:   Diagnosis Date    Anxiety     Bulging of lumbar intervertebral disc without myelopathy     L1    Chronic back pain     Depression     GERD (gastroesophageal reflux disease)     Substance abuse (HCC)     alcohol and opiates       SURGICAL HISTORY    Past Surgical History:   Procedure Laterality Date    COLONOSCOPY  05/26/2020    PILONIDAL CYST EXCISION  03/2018    PLANTAR FASCIA SURGERY Left 06/11/2020       CURRENT MEDICATIONS    Current Outpatient Rx   Medication Sig Dispense Refill    hydrOXYzine (ATARAX) 25 MG tablet Take 25 mg by mouth 3 times daily as needed for Itching      benzonatate (TESSALON) 200 MG capsule Take 1 capsule by mouth 3 times daily as needed for Cough 30 capsule 0    fluticasone (FLONASE) 50 MCG/ACT nasal spray use 2 (TWO) sprays by nasal route daily 16 g 2    ibuprofen (ADVIL;MOTRIN) 800 MG tablet TAKE 1 TABLET BY MOUTH EVERY 8 HOURS AS NEEDED FOR PAIN 90 tablet 2    methocarbamol (ROBAXIN) 500 MG tablet TAKE 1 TABLET BY MOUTH THREE TIMES DAILY AS NEEDED 60 tablet 2    Dextromethorphan-guaiFENesin (ROBITUSSIN COUGH+CHEST CONCHA DM)  MG/20ML LIQD Take 20 mLs by mouth every 4 hours as needed (Cough) 1 Bottle 0    LORazepam (ATIVAN) 0.5 MG tablet Take 1 tablet by mouth every 8 hours as needed for Anxiety for up to 30 days. 90 tablet 0    cetirizine (ZYRTEC) 10 MG tablet TAKE 2 TABLET BY MOUTH EVERY DAY 60 tablet 5    TRINTELLIX 5 MG tablet TAKE 1 TABLET BY MOUTH EVERY DAY      traZODone (DESYREL) 50 MG tablet TAKE 1 TO 2 TABLETS BY MOUTH NIGHTLY AS NEEDED for sleep 60 tablet 2    famotidine (PEPCID) 20 MG tablet Take 1 tablet by mouth 2 times daily 60 tablet 2    norgestimate-ethinyl estradiol (ORTHO-CYCLEN) 0.25-35 MG-MCG per tablet TAKE 1 TABLET BY MOUTH EVERY DAY 28 tablet 5    omeprazole (PRILOSEC) 40 MG delayed release capsule TAKE 1 CAPSULE BY MOUTH EVERY DAY 30 capsule 5    albuterol sulfate HFA (PROVENTIL HFA) 108 (90 Base) MCG/ACT inhaler Inhale 2 puffs into the lungs every 6 hours as needed for Wheezing 1 Inhaler 3    gabapentin (NEURONTIN) 300 MG capsule Take 1 capsule by mouth every 12 hours.   0    acetaminophen (TYLENOL) 500 MG tablet Take 1,000 mg by mouth every 6 hours as needed for Pain          ALLERGIES    No Known Allergies    FAMILY HISTORY    Family History   Problem Relation Age of Onset    Diabetes Mother        SOCIAL HISTORY    Social History     Socioeconomic History    Marital status:      Spouse name: Not on file    Number of children: Not on file    Years of education: Not on file    Highest education level: Not on file   Occupational History    Not on file   Social Needs    Financial resource strain: Not on file    Food insecurity     Worry: Not on file     Inability: Not on file    Transportation needs     Medical: Not on file     Non-medical: Not on file   Tobacco Use    Smoking status: Current Every Day Smoker     Packs/day: 1.00     Years: 2.00     Pack years: 2.00     Types: Cigarettes     Last attempt to quit: 2020     Years since quittin.7    Smokeless tobacco: Never Used   Substance and Sexual Activity    Alcohol use: Yes     Comment: in the past    Drug use: Not Currently     Comment: Pt has been sober for 30 days    Sexual activity: Not Currently   Lifestyle    Physical activity     Days per week: Not on file     Minutes per session: Not on file    Stress: Not on file   Relationships    Social connections     Talks on phone: Not on file     Gets together: Not on file     Attends [employer] service: Not on file     Active member of club or organization: Not on file     Attends meetings of clubs or organizations: Not on file     Relationship status: Not on file    Intimate partner violence     Fear of current or ex partner: Not on file     Emotionally abused: Not on file     Physically abused: Not on file     Forced sexual activity: Not on file   Other Topics Concern    Not on file   Social History Narrative    Not on file       PHYSICAL EXAM    VITAL SIGNS: /74   Pulse 70   Temp 98 °F (36.7 °C) (Oral)   Resp 20   Wt 221 lb (100.2 kg)   LMP 10/15/2020   SpO2 98%   BMI 35.67 kg/m²   Constitutional:  Well developed, well nourished, severe acute distress, non-toxic appearance   Eyes:  PERRL, conjunctiva normal   HENT:  Atraumatic, external ears normal, nose normal, oropharynx moist. Neck- supple   Respiratory:  No respiratory distress, normal breath sounds. Cardiovascular:  Normal rate, normal rhythm, no murmurs, no gallops, no rubs   GI:  Soft, nondistended, normal bowel sounds, nontender, no organomegaly. Musculoskeletal:  No edema, no tenderness, no  deformities. Back- no tenderness   Integument:  Well hydrated   Neurologic:  Grossly intact  Psychiatric: Patient is severely depressed. EKG      EKG with no acute changes. RADIOLOGY/PROCEDURES      Chest x-ray was normal.    ED COURSE & MEDICAL DECISION MAKING    Pertinent Labs & Imaging studies reviewed.  (See chart for details)  Labs Reviewed   CBC WITH AUTO DIFFERENTIAL - Abnormal; Notable for the following components:       Result Value    Monocytes 12 (*)     All other components within normal limits   COMPREHENSIVE METABOLIC PANEL - Abnormal; Notable for the following components:    Glucose 113 (*)     BUN 3 (*)     Bun/Cre Ratio 4 (*)     Total Bilirubin 0.21 (*)     All other components within normal limits   URINE DRUG SCREEN - Abnormal; Notable for the following components:    Benzodiazepine Screen, Urine POSITIVE (*)     All other components within normal limits   ACETAMINOPHEN LEVEL - Abnormal; Notable for the following components:    Acetaminophen Level <5 (*)     All other components within normal limits   SALICYLATE LEVEL - Abnormal; Notable for the following components:    Salicylate Lvl <1 (*)     All other components within normal limits   COVID-19 - Abnormal; Notable for the following components:    SARS-CoV-2, Rapid DETECTED (*)     All other components within normal limits   URINALYSIS   ETHANOL   HCG, SERUM, QUALITATIVE     Patient will be transferred to [facility] psychiatric facility. I spoke with Dr. Miquel Mcdonald, and he accepted transfer. FINAL IMPRESSION    1. Depression with suicidal ideation  2. Summation      Patient Course: Patient will be transferred to [facility] psychiatric facility. I spoke with Mihir Elena and he accepted transfer. ED Medications administered this visit:    Medications   benzonatate (TESSALON) capsule 200 mg (has no administration in time range)       New Prescriptions from this visit:    New Prescriptions    No medications on file       Follow-up:  No follow-up provider specified. Final Impression:   1.  Depression with suicidal ideation               (Please note that portions of this note were completed with a voice recognition program.  Efforts were made to edit the dictations but occasionally words are mis-transcribed.)     Ashleigh Vaz MD  10/18/20 9948       Ashleigh Vaz MD  10/18/20 0229

## 2020-10-18 NOTE — ED NOTES
[facility] officer in to check patient for [facility] Giovanny [address] [place] [address]  10/18/20 3523

## 2020-10-18 NOTE — PROGRESS NOTES
Writer unable to complete leisure assessment due to patient resting in room. Staff will attempt to assess at a later date.

## 2020-10-18 NOTE — ED NOTES
Pt threatens to leave, pt will not take oral ativan. Pt states \"I did not say I wanted to slit my wrist, I just want the COVID pain to go away\" Pt instructed that once she said she she wanted to OD that is a statement that she was going to harm herself and she cannot leave now. [address] [place] [address] at bedside with this RN.       Sara Littlejohn RN  10/18/20 4485

## 2020-10-18 NOTE — ED NOTES
Writer updates Shima Garcia RN at [address] [place] [address] that pt is now uncooperative and wants to leave AMA, so she will now be pink slipped.      Diomedes Mendosa RN  10/18/20 7261

## 2020-10-19 LAB
C-REACTIVE PROTEIN: 2.7 MG/L (ref 0–5)
D-DIMER QUANTITATIVE: 0.37 MG/L FEU (ref 0–0.59)
FERRITIN: 136 UG/L (ref 13–150)
LACTATE DEHYDROGENASE: 139 U/L (ref 135–214)

## 2020-10-19 PROCEDURE — 36415 COLL VENOUS BLD VENIPUNCTURE: CPT

## 2020-10-19 PROCEDURE — 6370000000 HC RX 637 (ALT 250 FOR IP): Performed by: INTERNAL MEDICINE

## 2020-10-19 PROCEDURE — 99223 1ST HOSP IP/OBS HIGH 75: CPT | Performed by: PSYCHIATRY & NEUROLOGY

## 2020-10-19 PROCEDURE — 85379 FIBRIN DEGRADATION QUANT: CPT

## 2020-10-19 PROCEDURE — 6370000000 HC RX 637 (ALT 250 FOR IP): Performed by: PSYCHIATRY & NEUROLOGY

## 2020-10-19 PROCEDURE — 82728 ASSAY OF FERRITIN: CPT

## 2020-10-19 PROCEDURE — 83615 LACTATE (LD) (LDH) ENZYME: CPT

## 2020-10-19 PROCEDURE — 86140 C-REACTIVE PROTEIN: CPT

## 2020-10-19 PROCEDURE — 1240000000 HC EMOTIONAL WELLNESS R&B

## 2020-10-19 RX ORDER — DEXTROMETHORPHAN POLISTIREX 30 MG/5ML
60 SUSPENSION ORAL EVERY 12 HOURS SCHEDULED
Status: DISCONTINUED | OUTPATIENT
Start: 2020-10-19 | End: 2020-10-21 | Stop reason: HOSPADM

## 2020-10-19 RX ORDER — BENZONATATE 100 MG/1
200 CAPSULE ORAL 3 TIMES DAILY
Status: DISCONTINUED | OUTPATIENT
Start: 2020-10-19 | End: 2020-10-21 | Stop reason: HOSPADM

## 2020-10-19 RX ORDER — ACETAMINOPHEN 650 MG/1
650 SUPPOSITORY RECTAL EVERY 6 HOURS PRN
Status: DISCONTINUED | OUTPATIENT
Start: 2020-10-19 | End: 2020-10-21 | Stop reason: HOSPADM

## 2020-10-19 RX ORDER — SERTRALINE HYDROCHLORIDE 25 MG/1
25 TABLET, FILM COATED ORAL DAILY
Status: DISCONTINUED | OUTPATIENT
Start: 2020-10-20 | End: 2020-10-20

## 2020-10-19 RX ORDER — ACETAMINOPHEN 325 MG/1
650 TABLET ORAL EVERY 6 HOURS PRN
Status: DISCONTINUED | OUTPATIENT
Start: 2020-10-19 | End: 2020-10-21 | Stop reason: HOSPADM

## 2020-10-19 RX ORDER — ALBUTEROL SULFATE 90 UG/1
2 AEROSOL, METERED RESPIRATORY (INHALATION) 4 TIMES DAILY
Status: DISCONTINUED | OUTPATIENT
Start: 2020-10-19 | End: 2020-10-21 | Stop reason: HOSPADM

## 2020-10-19 RX ADMIN — CETIRIZINE HYDROCHLORIDE 10 MG: 10 TABLET, FILM COATED ORAL at 08:24

## 2020-10-19 RX ADMIN — ACETAMINOPHEN 650 MG: 325 TABLET, FILM COATED ORAL at 14:10

## 2020-10-19 RX ADMIN — TRAZODONE HYDROCHLORIDE 50 MG: 50 TABLET ORAL at 20:37

## 2020-10-19 RX ADMIN — GABAPENTIN 400 MG: 400 CAPSULE ORAL at 08:24

## 2020-10-19 RX ADMIN — IBUPROFEN 400 MG: 400 TABLET, FILM COATED ORAL at 08:23

## 2020-10-19 RX ADMIN — Medication 2 PUFF: at 20:44

## 2020-10-19 RX ADMIN — GABAPENTIN 400 MG: 400 CAPSULE ORAL at 14:08

## 2020-10-19 RX ADMIN — BENZONATATE 200 MG: 100 CAPSULE ORAL at 20:37

## 2020-10-19 RX ADMIN — BENZONATATE 200 MG: 100 CAPSULE ORAL at 14:08

## 2020-10-19 RX ADMIN — Medication 60 MG: at 20:37

## 2020-10-19 RX ADMIN — Medication 2 PUFF: at 16:49

## 2020-10-19 RX ADMIN — GABAPENTIN 400 MG: 400 CAPSULE ORAL at 20:37

## 2020-10-19 RX ADMIN — CLONAZEPAM 0.5 MG: 0.5 TABLET ORAL at 08:24

## 2020-10-19 ASSESSMENT — PAIN SCALES - GENERAL
PAINLEVEL_OUTOF10: 2
PAINLEVEL_OUTOF10: 3
PAINLEVEL_OUTOF10: 0
PAINLEVEL_OUTOF10: 4
PAINLEVEL_OUTOF10: 0

## 2020-10-19 ASSESSMENT — LIFESTYLE VARIABLES: HISTORY_ALCOHOL_USE: YES

## 2020-10-19 ASSESSMENT — PATIENT HEALTH QUESTIONNAIRE - PHQ9: SUM OF ALL RESPONSES TO PHQ QUESTIONS 1-9: 6

## 2020-10-19 NOTE — CONSULTS
[facility] PULMONARY & CRITICAL CARE SPECIALISTS   CONSULT NOTE:      DATE OF CONSULT 10/19/2020    REASON FOR CONSULTATION:  COVID-19 infection      PCP Evelyn Solomon DO     CHIEF COMPLAINT: Cough, myalgias    HISTORY OF PRESENT ILLNESS:   Gabriele Rashid is a 72-year-old white female with a history of polysubstance abuse abuse including opiate and alcohol use who 1 week ago last Saturday, started having myalgias as well as a cough. She then was tested positive for Covid on this . She while in the ER admitted to taking too much Atarax and Ativan up with her panic attacks. There was also questionable suicidal ideation while in the ER at [facility]. Denies any shortness of breath that is worse than her baseline. She denies any chest pain. She did have loss of taste and smell last week. However she feels like her taste is coming back. She smokes about a pack a day and she says that she smoked for about 20 to 25 years. She has no documented history of asthma or \"COPD\". Does feel more congested today. She is not on any inhalers at home. ALLERGIES:  No Known Allergies    HOME MEDICATIONS:  Medications Prior to Admission: hydrOXYzine (ATARAX) 25 MG tablet, Take 25 mg by mouth 3 times daily as needed for Itching  benzonatate (TESSALON) 200 MG capsule, Take 1 capsule by mouth 3 times daily as needed for Cough  fluticasone (FLONASE) 50 MCG/ACT nasal spray, use 2 (TWO) sprays by nasal route daily  ibuprofen (ADVIL;MOTRIN) 800 MG tablet, TAKE 1 TABLET BY MOUTH EVERY 8 HOURS AS NEEDED FOR PAIN  methocarbamol (ROBAXIN) 500 MG tablet, TAKE 1 TABLET BY MOUTH THREE TIMES DAILY AS NEEDED  [] Dextromethorphan-guaiFENesin (ROBITUSSIN COUGH+CHEST CONCHA DM)  MG/20ML LIQD, Take 20 mLs by mouth every 4 hours as needed (Cough)  LORazepam (ATIVAN) 0.5 MG tablet, Take 1 tablet by mouth every 8 hours as needed for Anxiety for up to 30 days.   cetirizine (ZYRTEC) 10 MG tablet, TAKE 2 TABLET BY MOUTH EVERY DAY  TRINTELLIX 10 MG TABS tablet, Take 10 mg by mouth daily   traZODone (DESYREL) 50 MG tablet, TAKE 1 TO 2 TABLETS BY MOUTH NIGHTLY AS NEEDED for sleep  norgestimate-ethinyl estradiol (ORTHO-CYCLEN) 0.25-35 MG-MCG per tablet, TAKE 1 TABLET BY MOUTH EVERY DAY  omeprazole (PRILOSEC) 40 MG delayed release capsule, TAKE 1 CAPSULE BY MOUTH EVERY DAY  gabapentin (NEURONTIN) 400 MG capsule, Take 1 capsule by mouth 3 times daily.    acetaminophen (TYLENOL) 500 MG tablet, Take 1,000 mg by mouth every 6 hours as needed for Pain   [DISCONTINUED] famotidine (PEPCID) 20 MG tablet, Take 1 tablet by mouth 2 times daily  [DISCONTINUED] albuterol sulfate HFA (PROVENTIL HFA) 108 (90 Base) MCG/ACT inhaler, Inhale 2 puffs into the lungs every 6 hours as needed for Wheezing      PAST MEDICAL HISTORY:  Past Medical History:   Diagnosis Date    Anxiety     Bulging of lumbar intervertebral disc without myelopathy     L1    Chronic back pain     Depression     GERD (gastroesophageal reflux disease)     Substance abuse ([facility])     alcohol and opiates       PAST SURGICAL HISTORY:  Past Surgical History:   Procedure Laterality Date    COLONOSCOPY  2020    PILONIDAL CYST EXCISION  2018    PLANTAR FASCIA SURGERY Left 2020          SOCIAL HISTORY:  Social History     Socioeconomic History    Marital status:      Spouse name: Not on file    Number of children: Not on file    Years of education: Not on file    Highest education level: Not on file   Occupational History    Not on file   Social Needs    Financial resource strain: Not on file    Food insecurity     Worry: Not on file     Inability: Not on file    Transportation needs     Medical: Not on file     Non-medical: Not on file   Tobacco Use    Smoking status: Current Every Day Smoker     Packs/day: 1.00     Years: 2.00     Pack years: 2.00     Types: Cigarettes     Last attempt to quit: 2020     Years since quittin.8    Smokeless tobacco: Never Used   Substance and Sexual Activity    Alcohol use: Yes     Comment: in the past    Drug use: Not Currently     Comment: Pt has been sober for 30 days    Sexual activity: Not Currently   Lifestyle    Physical activity     Days per week: Not on file     Minutes per session: Not on file    Stress: Not on file   Relationships    Social connections     Talks on phone: Not on file     Gets together: Not on file     Attends Synagogue service: Not on file     Active member of club or organization: Not on file     Attends meetings of clubs or organizations: Not on file     Relationship status: Not on file    Intimate partner violence     Fear of current or ex partner: Not on file     Emotionally abused: Not on file     Physically abused: Not on file     Forced sexual activity: Not on file   Other Topics Concern    Not on file   Social History Narrative    Not on file       FAMILY HISTORY:  Family History   Problem Relation Age of Onset    Diabetes Mother        REVIEW OF SYSTEMS:  All other systems reviewed and are negative. PHYSICAL EXAM:  Vital Signs Blood pressure 110/70, pulse 72, temperature 98.1 °F (36.7 °C), temperature source Oral, resp. rate 14, height 5' 6\" (1.676 m), weight 220 lb (99.8 kg), last menstrual period 10/15/2020, SpO2 97 %, not currently breastfeeding. Oxygen Amount and Delivery:      Admission Weight Weight: 220 lb (99.8 kg)    General Appearance   Pleasant middle-aged female in no acute respiratory distress  Head  Normocephalic, without obvious abnormality, atraumatic    Eyes  conjunctivae/corneas clear. PERRL, EOM's intact. Fundi benign.     ENT clear without thrush  Neck  no adenopathy, no carotid bruit, no JVD, supple, symmetrical, trachea midline and thyroid not enlarged, symmetric, no tenderness/mass/nodules  Lungs clear without any wheezes or rhonchi she did cough several times when I examined her  Heart: regular rate and rhythm, S1, S2 normal, no murmur, click, rub or gallop  Abdomen  soft, non-tender; bowel sounds normal; no masses,  no organomegaly  Extremities  No cyanosis clubbing or edema  Skin  Skin color, texture, turgor normal. No rashes or lesions  Neurologic: Alert and oriented X 3, normal strength and tone. Imaging        Clear chest x-ray    Lab Review  CBC     Lab Results   Component Value Date    WBC 8.3 10/18/2020    RBC 4.65 10/18/2020    HGB 13.4 10/18/2020    HCT 39.3 10/18/2020     10/18/2020    MCV 84.5 10/18/2020    MCH 28.9 10/18/2020    MCHC 34.2 10/18/2020    RDW 12.8 10/18/2020    LYMPHOPCT 37 10/18/2020    MONOPCT 12 10/18/2020    BASOPCT 0 10/18/2020    MONOSABS 1.00 10/18/2020    LYMPHSABS 3.10 10/18/2020    EOSABS 0.10 10/18/2020    BASOSABS 0.00 10/18/2020    DIFFTYPE YES 10/18/2020       BMP   Lab Results   Component Value Date     10/18/2020    K 3.7 10/18/2020     10/18/2020    CO2 24 10/18/2020    BUN 3 10/18/2020    CREATININE 0.68 10/18/2020    GLUCOSE 113 10/18/2020    CALCIUM 9.6 10/18/2020       LFTS  Lab Results   Component Value Date    ALKPHOS 68 10/18/2020    ALT 19 10/18/2020    AST 20 10/18/2020    PROT 7.7 10/18/2020    BILITOT 0.21 10/18/2020    BILIDIR <0.08 08/20/2019    IBILI CANNOT BE CALCULATED 08/20/2019    LABALBU 4.7 10/18/2020       INR  No results for input(s): PROTIME, INR in the last 72 hours. APTT  No results for input(s): APTT in the last 72 hours. Lactic Acid  No results found for: LACTA     PRO-BNP   No results for input(s): PROBNP in the last 72 hours.         ABGs: No results found for: PHART, PO2ART, ECM9ZFK    No results found for: IFIO2, MODE, SETTIDVOL, SETPEEP      Impression    COVID-19 infection  Tobacco use  Polysubstance use/depression    Plan:      Smoking cessation discussed, she is on a nicotine patch  We will place her on albuterol inhaler scheduled  Place her on antitussives scheduled  Check laboratory markers including D-dimer, CRP, LDH, and ferritin  At this point, no indication for Decadron or Remdesivir  Continue isolation, droplet plus precautions  Discontinue ibuprofen, not recommended in patients with COVID-19 infection  Further  recommendations to follow
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
## 2020-10-19 NOTE — PLAN OF CARE
5 [facility]  Initial Interdisciplinary Treatment Plan NO      Original treatment plan Date & Time: 10/19/20    Admission Type:       Reason for admission:   Reason for Admission: Pt made statements at [facility] ED about having SI to OD on meds. Upon admission, pt reports her words were twisted.     Estimated Length of Stay:  5-7days  Estimated Discharge Date: to be determined by physician    PATIENT STRENGTHS:  Patient Strengths:Strengths: No significant Physical Illness, Employment  Patient Strengths and Limitations:Limitations: Inappropriate/potentially harmful leisure interests  Addictive Behavior: Addictive Behavior  In the past 3 months, have you felt or has someone told you that you have a problem with:  : (alcohol and pill use)  Do you have a history of Chemical Use?: No  Do you have a history of Alcohol Use?: Yes  Do you have a history of Street Drug Abuse?: No  Histroy of Prescripton Drug Abuse?: Yes  Medical Problems:  Past Medical History:   Diagnosis Date    Anxiety     Bulging of lumbar intervertebral disc without myelopathy     L1    Chronic back pain     Depression     GERD (gastroesophageal reflux disease)     Substance abuse (HCC)     alcohol and opiates     Status EXAM:Status and Exam  Normal: No  Facial Expression: Flat  Affect: Appropriate  Level of Consciousness: Lethargic  Mood:Normal: No  Mood: Depressed, Anxious  Motor Activity:Normal: No  Motor Activity: Decreased  Interview Behavior: Cooperative, Evasive  Preception: [place] to Person, [employer] to Time, [place] to Place, [place] to Situation  Attention:Normal: No  Attention: Unable to Concentrate  Thought Processes: Circumstantial  Thought Content:Normal: No  Thought Content: Poverty of Content  Hallucinations: None  Delusions: No  Memory:Normal: Yes  Insight and Judgment: No  Insight and Judgment: Poor Judgment, Poor Insight  Present Suicidal Ideation: No  Present Homicidal Ideation: No    EDUCATION:   Learner Progress Toward Treatment Goals: reviewed group plans and strategies for care    Method:group therapy, medication compliance, individualized assessments and care planning    Outcome: needs reinforcement    PATIENT GOALS: to be discussed with patient within 72 hours    PLAN/TREATMENT RECOMMENDATIONS:     continue group therapy , medications compliance, goal setting, individualized assessments and care, continue to monitor pt on unit      SHORT-TERM GOALS:   Time frame for Short-Term Goals: 5-7 days    LONG-TERM GOALS:  Time frame for Long-Term Goals: 6 months  Members Present in Team Meeting: See Signature Sheet    Jayjay George, [address]
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
## 2020-10-19 NOTE — H&P
HISTORY and Kael Dorsey [id number]       NAME:  Brandi Gaston  MRN: [id number]   YOB: 1980   Date: 10/19/2020   Age: 47 y.o. Gender: female     COMPLAINT AND PRESENT HISTORY:    Brandi Gaston is a 47 y.o.  female, admitted because of increasing depression with suicidal ideation. According to ED/ Admission notes, pt admitted from [facility] ED, due to possible SI with plan to OD. I saw the patient today at the bedside, she was very cooperative during the H&P examination . Patient has been on psychiatric medications  Xanax and Adderall on time as prescribe it . Pt denies any suicidal /homicidal ideation , no visual or auditory hallucination. Patient denies any current alcohol or substance abuse , no smoking. Patient admits to good sleep/appetite and good mood today. she has a history of recent Covid infection, she denies any fever/chills, chest pain , or SOB, no palpitation or headache. she reports  still coughing but not bad as before no sputum. Patient denies any other somatic complaints. Please see patient's psychiatric hx for more information. DIAGNOSTIC RESULTS   Labs:  CBC:   Recent Labs     10/18/20  0123   WBC 8.3   HGB 13.4        BMP:    Recent Labs     10/18/20  0123      K 3.7      CO2 24   BUN 3*   CREATININE 0.68   GLUCOSE 113*     Hepatic:   Recent Labs     10/18/20  0123   AST 20   ALT 19   BILITOT 0.21*   ALKPHOS 68     Lipids: No results for input(s): CHOL, HDL in the last 72 hours.     Invalid input(s): LDLCALCU  U/A:  Lab Results   Component Value Date    COLORU YELLOW 10/18/2020    SPECGRAV 1.010 10/18/2020    LEUKOCYTESUR NEGATIVE 10/18/2020    GLUCOSEU NEGATIVE 10/18/2020       PAST MEDICAL HISTORY     Past Medical History:   Diagnosis Date    Anxiety     Bulging of lumbar intervertebral disc without myelopathy     L1    Chronic back pain     Depression     GERD (gastroesophageal reflux disease)     Substance abuse ([facility] [address]     alcohol and opiates       Pt denies any history of Diabetes mellitus type 2, hypertension, stroke, heart disease, COPD, Asthma, GERD, HLD, Cancer, Seizures,Thyroid disease, Kidney Disease, Hepatitis, TB.    SURGICAL HISTORY       Past Surgical History:   Procedure Laterality Date    COLONOSCOPY  2020    PILONIDAL CYST EXCISION  2018    PLANTAR FASCIA SURGERY Left 2020       FAMILY HISTORY       Family History   Problem Relation Age of Onset    Diabetes Mother        SOCIAL HISTORY       Social History     Socioeconomic History    Marital status:      Spouse name: None    Number of children: None    Years of education: None    Highest education level: None   Occupational History    None   Social Needs    Financial resource strain: None    Food insecurity     Worry: None     Inability: None    Transportation needs     Medical: None     Non-medical: None   Tobacco Use    Smoking status: Current Every Day Smoker     Packs/day: 1.00     Years: 2.00     Pack years: 2.00     Types: Cigarettes     Last attempt to quit: 2020     Years since quittin.8    Smokeless tobacco: Never Used   Substance and Sexual Activity    Alcohol use: Yes     Comment: in the past    Drug use: Not Currently     Comment: Pt has been sober for 30 days    Sexual activity: Not Currently   Lifestyle    Physical activity     Days per week: None     Minutes per session: None    Stress: None   Relationships    Social connections     Talks on phone: None     Gets together: None     Attends Bahai service: None     Active member of club or organization: None     Attends meetings of clubs or organizations: None     Relationship status: None    Intimate partner violence     Fear of current or ex partner: None     Emotionally abused: None     Physically abused: None     Forced sexual activity: None   Other Topics Concern    None   Social History Narrative    None abdominal pain, nausea, vomiting, dysphagia, diarrhea or constipation. Genitourinary:  No burning on micturition. No hesitancy, urgency, frequency or discoloration of urine. Locomotor:  No bone or joint pains. No swelling or deformities. Neuropsychiatric:  See HPI. GENERAL PHYSICAL EXAM:     Vitals: /70   Pulse 72   Temp 98.1 °F (36.7 °C) (Oral)   Resp 14   Ht 5' 6\" (1.676 m)   Wt 220 lb (99.8 kg)   LMP 10/15/2020   SpO2 97%   Breastfeeding No   BMI 35.51 kg/m²  Body mass index is 35.51 kg/m². Pt was examined with a nurse present in the room. GENERAL APPEARANCE:  Cami Sommer is 47 y.o.,  female, mildly obese, nourished, conscious, alert. Does not appear to be distress or pain at this time. SKIN:  Warm, dry, no cyanosis or jaundice. HEAD:  Normocephalic, atraumatic, no swelling or tenderness. EYES:  Pupils equal, reactive to light, Conjunctiva is clear, EOMs intact daniel. eyelids WNL. EARS:  No discharge, no marked hearing loss. NOSE:  No rhinorrhea, epistaxis or septal deformity. THROAT:  Not congested. No ulceration bleeding or discharge. NECK:  No stiffness, trachea central.  No palpable masses or L.N.      CHEST:  Symmetrical and equal on expansion. HEART:  Regular rate and rhythm. S1 > S2, No audible murmurs or gallops. LUNGS:  Equal on expansion, normal breath sounds. No adventitious sounds. ABDOMEN:  Obese. Soft on palpation. No localized tenderness. No guarding or rigidity. No palpable organomegaly. LYMPHATICS:  No palpable cervical Lymphadenopathy. LOCOMOTOR, BACK AND SPINE:  No tenderness or deformities. EXTREMITIES:  Symmetrical, no pretibial edema. No discoloration or ulcerations. NEUROLOGIC:  The patient is conscious, alert, oriented,Cranial nerve II-XII intact, taste and smell were not examined. No apparent focal sensory or motor deficits. Muscle strength equal Daniel.  No facial droop, tongue protrudes centrally, no slurring of the speech. PROVISIONAL DIAGNOSES:      Active Problems:    Depression with suicidal ideation  Resolved Problems:    * No resolved hospital problems.  *      MICHAEL Gaona CNP on 10/19/2020 at 10:27 AM

## 2020-10-19 NOTE — SUICIDE SAFETY PLAN
SAFETY PLAN    A suicide Safety Plan is a document that supports someone when they are having thoughts of suicide. Warning Signs that indicate a suicidal crisis may be developing: What (situations, thoughts, feelings, body sensations, behaviors, etc.) do you experience that lets you know you are beginning to think about suicide? 1. Shortness of breath  2. Increased anxiety  3. Increased heart rate    Internal Coping Strategies:  What things can I do (relaxation techniques, hobbies, physical activities, etc.) to take my mind off my problems without contacting another person? 1. Going to [employer]  2. Bible Study  3. AA meeting    People and social settings that provide distraction: Who can I call or where can I go to distract me? 1. Name: Katerynacherelle Dandre  Phone: [phone]  2. Name: Edmar Brown  Phone: [phone]   3. Place: [facility]            4. Place:     People whom I can ask for help: Who can I call when I need help - for example, friends, family, clergy, someone else? 1. Name: Stephanie Correa                Phone: [phone]  2. Name: Ansley Roach  Phone: [phone]. Professionals or [address] [place] [address] Blvd I can contact during a crisis: Who can I call for help - for example, my doctor, my psychiatrist, my psychologist, a mental health provider, a suicide hotline? 1. [facility]    [address] [address] [facility]      Phone: [phone]      2. [address] [facility]   [phone]  Crisis line: [phone]    3. Suicide Prevention Lifeline: 7-457-583-TALK (0042)      Making the environment safe: How can I make my environment (house/apartment/living space) safer? For example, can I remove guns, medications, and other items? 1.Keep medications in a safe secure location  2.  Keep support phone numbers in reach

## 2020-10-19 NOTE — PLAN OF CARE
Problem: Airway Clearance - Ineffective  Goal: Achieve or maintain patent airway  10/19/2020 0931 by Virgie Lopez RN  Outcome: Ongoing  Note: Patient continues to maintain a patent airway. Problem: Gas Exchange - Impaired  Goal: Absence of hypoxia  10/19/2020 0931 by Virgie Lopez RN  Outcome: Ongoing  Note: Patient's oxygen saturation is at 97% today. Problem: Gas Exchange - Impaired  Goal: Promote optimal lung function  10/19/2020 0931 by Virgie Lopez RN  Outcome: Ongoing  Note: Patient's respirations are even and unlabored, continues to work toward optimal lung function. Problem: Breathing Pattern - Ineffective  Goal: Ability to achieve and maintain a regular respiratory rate  10/19/2020 0931 by Virgie Lopez RN  Outcome: Ongoing  Note: Patient's respiratory rate is 14. Problem: Body Temperature -  Risk of, Imbalanced  Goal: Ability to maintain a body temperature within defined limits  10/19/2020 0931 by Virgie Lopez RN  Outcome: Ongoing  Note: Patient's body temp is 98.1      Problem: Isolation Precautions - Risk of Spread of Infection  Goal: Prevent transmission of infection  10/19/2020 0931 by Virgie Lopez RN  Outcome: Ongoing  Note: Patient wears mask, performs hand hygiene, isolates herself to prevent transmission of infection. Staff takes necessary precautions to help prevent transmission as well. Problem: Pain:  Goal: Control of chronic pain  Description: Control of chronic pain  Outcome: Ongoing  Note: Patient reports pain is well controlled with ibuprofen.       Problem: Altered Mood, Depressive Behavior:  Goal: Able to verbalize acceptance of life and situations over which he or she has no control  Description: Able to verbalize acceptance of life and situations over which he or she has no control  10/19/2020 0931 by Virgie Lopez RN  Outcome: Ongoing  Note: Patient admits to having anxiety and depression, denies suicidal and homicidal thoughts, denies hallucinations. Cooperative and medication compliant. Reports feeling frustrated with her admission here as she reports she was never suicidal. She states she feels groggy from the IM medications they gave her prior to transfer to [facility]. Patient reports feeling exhausted and \"run down\", reports cough and hard time breathing. Cough noted, non productive, vitals WNL. Patient safety maintained through Q15 min and random safety checks. Problem: Altered Mood, Depressive Behavior:  Goal: Able to verbalize support systems  Description: Able to verbalize support systems  10/19/2020 0931 by Darryl Mooney RN  Outcome: Ongoing  Note: Patient able to verbalize friends as support system. Problem: Altered Mood, Depressive Behavior:  Goal: Absence of self-harm  Description: Absence of self-harm  No evidence of self-harm noted. Pt denies suicidal or homicidal ideation. Denies auditory or visual hallucinations. 10/19/2020 0931 by Darryl Mooney RN  Outcome: Ongoing  Note: Patient remains free of self harm. Problem: Tobacco Use:  Goal: Inpatient tobacco use cessation counseling participation  Description: Inpatient tobacco use cessation counseling participation  Pt refuses tobacco cessation counseling. Uses Nicoderm while inpatient. Outcome: Ongoing  Note: Patient declines tobacco use cessation counseling.

## 2020-10-19 NOTE — PLAN OF CARE
Problem: Airway Clearance - Ineffective  Goal: Achieve or maintain patent airway  10/18/2020 2126 by Mary Gruber RN  Outcome: Met This Shift   Respirations are even and unlabored. Patient has frequent non productive cough. Patient's lungs are clear but diminished. Problem: Gas Exchange - Impaired  Goal: Absence of hypoxia  10/18/2020 2126 by Mary Gruber RN  Outcome: Met This Shift   Patient's SpO2 is 97%. Problem: Gas Exchange - Impaired  Goal: Promote optimal lung function  10/18/2020 2124 by Mary Gruber RN  Outcome: Ongoing     Problem: Breathing Pattern - Ineffective  Goal: Ability to achieve and maintain a regular respiratory rate  10/18/2020 2126 by Mary Gruber RN  Outcome: Met This Shift   Patient respiratory rate maintained at 14. Problem: Body Temperature -  Risk of, Imbalanced  Goal: Ability to maintain a body temperature within defined limits  10/18/2020 2126 by Mary Gruber RN  Outcome: Met This Shift   Patient's temperature is WNL at 97.7  Problem: Isolation Precautions - Risk of Spread of Infection  Goal: Prevent transmission of infection  10/18/2020 2126 by Mary Gruber RN  Outcome: Ongoing   Droplet plus precautions maintained for patient. Problem: Altered Mood, Depressive Behavior:  Goal: Able to verbalize acceptance of life and situations over which he or she has no control  Description: Able to verbalize acceptance of life and situations over which he or she has no control  10/18/2020 2126 by Mary Gruber RN  Outcome: Ongoing   Patient reports anxiety. Patient is isolative to room and complaints of being tired. Patient is compliant with her medications. Problem: Altered Mood, Depressive Behavior:  Goal: Able to verbalize support systems  Description: Able to verbalize support systems  10/18/2020 2126 by Mary Gruber RN  Outcome: Ongoing   Patient reports a support system of family and friends.    Problem: Altered Mood, Depressive Behavior:  Goal: Absence of self-harm  Description: Absence of self-harm  No evidence of self-harm noted. Pt denies suicidal or homicidal ideation. Denies auditory or visual hallucinations. 10/18/2020 2126 by Elvis Smith RN  Outcome: Ongoing   Patient denies thoughts of self harm. Patient agrees to seek out staff if symptoms arise. Patient remains safe on unit. Patient safety maintained q15 minute checks.

## 2020-10-19 NOTE — SUICIDE SAFETY PLAN
SAFETY PLAN    A suicide Safety Plan is a document that supports someone when they are having thoughts of suicide. Warning Signs that indicate a suicidal crisis may be developing: What (situations, thoughts, feelings, body sensations, behaviors, etc.) do you experience that lets you know you are beginning to think about suicide? 1. ***  2. ***  3. ***    Internal Coping Strategies:  What things can I do (relaxation techniques, hobbies, physical activities, etc.) to take my mind off my problems without contacting another person? 1. ***  2. ***  3. ***    People and social settings that provide distraction: Who can I call or where can I go to distract me? 1. Name: ***  Phone: ***  2. Name: ***  Phone: ***   3. Place: ***            4. Place: ***    People whom I can ask for help: Who can I call when I need help - for example, friends, family, clergy, someone else? 1. Name: ***                Phone: ***  2. Name: ***  Phone: ***  3. Name: ***  Phone: ***    Professionals or [facility] agencies I can contact during a crisis: Who can I call for help - for example, my doctor, my psychiatrist, my psychologist, a mental health provider, a suicide hotline? 1. Clinician Name: ***   Phone: ***      Clinician Pager or Emergency Contact #: ***    2. Clinician Name: ***   Phone: ***      Clinician Pager or Emergency Contact #: ***    3. Suicide Prevention Lifeline: 2-244-269-TALK (7107)    4. [address] [place] [address] Emergency Services -  for example, [facility] suicide hotline, [facility] Hotline: ***      Emergency Services Address: ***      Emergency Services Phone: ***    Making the environment safe: How can I make my environment (house/apartment/living space) safer? For example, can I remove guns, medications, and other items?   1. ***  2. ***

## 2020-10-20 LAB — D-DIMER QUANTITATIVE: 0.41 MG/L FEU (ref 0–0.59)

## 2020-10-20 PROCEDURE — 6370000000 HC RX 637 (ALT 250 FOR IP): Performed by: PSYCHIATRY & NEUROLOGY

## 2020-10-20 PROCEDURE — 1240000000 HC EMOTIONAL WELLNESS R&B

## 2020-10-20 PROCEDURE — 36415 COLL VENOUS BLD VENIPUNCTURE: CPT

## 2020-10-20 PROCEDURE — 99232 SBSQ HOSP IP/OBS MODERATE 35: CPT | Performed by: PSYCHIATRY & NEUROLOGY

## 2020-10-20 PROCEDURE — 6370000000 HC RX 637 (ALT 250 FOR IP): Performed by: INTERNAL MEDICINE

## 2020-10-20 PROCEDURE — 85379 FIBRIN DEGRADATION QUANT: CPT

## 2020-10-20 RX ORDER — DEXTROMETHORPHAN POLISTIREX 30 MG/5ML
60 SUSPENSION ORAL EVERY 12 HOURS SCHEDULED
Qty: 200 ML | Refills: 0
Start: 2020-10-20 | End: 2020-10-30

## 2020-10-20 RX ORDER — ESCITALOPRAM OXALATE 10 MG/1
5 TABLET ORAL DAILY
Status: DISCONTINUED | OUTPATIENT
Start: 2020-10-21 | End: 2020-10-21 | Stop reason: HOSPADM

## 2020-10-20 RX ORDER — BENZONATATE 200 MG/1
200 CAPSULE ORAL 3 TIMES DAILY
Qty: 21 CAPSULE | Refills: 1
Start: 2020-10-20 | End: 2020-10-27

## 2020-10-20 RX ADMIN — Medication 2 PUFF: at 08:09

## 2020-10-20 RX ADMIN — HYDROXYZINE HYDROCHLORIDE 50 MG: 50 TABLET, FILM COATED ORAL at 20:34

## 2020-10-20 RX ADMIN — ACETAMINOPHEN 650 MG: 325 TABLET, FILM COATED ORAL at 08:11

## 2020-10-20 RX ADMIN — Medication 60 MG: at 20:33

## 2020-10-20 RX ADMIN — SERTRALINE HYDROCHLORIDE 25 MG: 25 TABLET ORAL at 08:11

## 2020-10-20 RX ADMIN — TRAZODONE HYDROCHLORIDE 50 MG: 50 TABLET ORAL at 20:34

## 2020-10-20 RX ADMIN — GABAPENTIN 400 MG: 400 CAPSULE ORAL at 08:11

## 2020-10-20 RX ADMIN — Medication 2 PUFF: at 12:21

## 2020-10-20 RX ADMIN — BENZONATATE 200 MG: 100 CAPSULE ORAL at 08:10

## 2020-10-20 RX ADMIN — BENZONATATE 200 MG: 100 CAPSULE ORAL at 20:34

## 2020-10-20 RX ADMIN — BENZONATATE 200 MG: 100 CAPSULE ORAL at 14:20

## 2020-10-20 RX ADMIN — GABAPENTIN 400 MG: 400 CAPSULE ORAL at 20:34

## 2020-10-20 RX ADMIN — GABAPENTIN 400 MG: 400 CAPSULE ORAL at 14:20

## 2020-10-20 RX ADMIN — CLONAZEPAM 0.5 MG: 0.5 TABLET ORAL at 14:23

## 2020-10-20 RX ADMIN — CLONAZEPAM 0.5 MG: 0.5 TABLET ORAL at 20:49

## 2020-10-20 RX ADMIN — Medication 60 MG: at 08:11

## 2020-10-20 RX ADMIN — Medication 2 PUFF: at 20:32

## 2020-10-20 RX ADMIN — CETIRIZINE HYDROCHLORIDE 10 MG: 10 TABLET, FILM COATED ORAL at 08:10

## 2020-10-20 RX ADMIN — Medication 2 PUFF: at 17:47

## 2020-10-20 ASSESSMENT — PAIN SCALES - GENERAL
PAINLEVEL_OUTOF10: 1
PAINLEVEL_OUTOF10: 1

## 2020-10-20 ASSESSMENT — PAIN DESCRIPTION - PROGRESSION: CLINICAL_PROGRESSION: GRADUALLY IMPROVING

## 2020-10-20 ASSESSMENT — PAIN DESCRIPTION - PAIN TYPE: TYPE: ACUTE PAIN

## 2020-10-20 ASSESSMENT — PAIN DESCRIPTION - DESCRIPTORS: DESCRIPTORS: ACHING

## 2020-10-20 ASSESSMENT — PAIN DESCRIPTION - ORIENTATION: ORIENTATION: LOWER;MID

## 2020-10-20 ASSESSMENT — PAIN DESCRIPTION - LOCATION: LOCATION: BACK

## 2020-10-20 NOTE — H&P
Patient was seen and examined by Dr. Jeane Dockery on 10/19/2020, regarding Covid 19 diagnosis. Notes containing appropriate components for history and physical. Please see attached note below. Jaime Herrera MD   Physician   Pulmonology   Consults   Addendum   Date of Service:  10/19/2020 12:41 PM         Consult Orders   Inpatient consult to Pulmonology [[id number]] ordered by Jaime Tijerina MD at 10/18/20 1720         Expand All Collapse All  Expand All by Default      Show:Clear all  ManualTemplateCopied    Added by:  Jaime Herrera MD    [facility] for details    [address]   CONSULT NOTE:        DATE OF CONSULT 10/19/2020     REASON FOR CONSULTATION:  COVID-19 infection        PCP Darletta Meigs, DO      CHIEF COMPLAINT: Cough, myalgias     HISTORY OF PRESENT ILLNESS:   Sergio Thomas is a 21-year-old white female with a history of polysubstance abuse abuse including opiate and alcohol use who 1 week ago last Saturday, started having myalgias as well as a cough. She then was tested positive for Covid on this Sunday. She while in the ER admitted to taking too much Atarax and Ativan up with her panic attacks. There was also questionable suicidal ideation while in the ER at [facility].     Denies any shortness of breath that is worse than her baseline. She denies any chest pain. She did have loss of taste and smell last week. However she feels like her taste is coming back.     She smokes about a pack a day and she says that she smoked for about 20 to 25 years. She has no documented history of asthma or \"COPD\". Does feel more congested today.   She is not on any inhalers at home.           ALLERGIES:  No Known Allergies     HOME MEDICATIONS:      Prescriptions Prior to Admission  Medications Prior to Admission: hydrOXYzine (ATARAX) 25 MG tablet, Take 25 mg by mouth 3 times daily as needed for Itching  benzonatate (TESSALON) 200 MG capsule, Take 1 capsule by mouth 3 times daily as needed for Cough  fluticasone (FLONASE) 50 MCG/ACT nasal spray, use 2 (TWO) sprays by nasal route daily  ibuprofen (ADVIL;MOTRIN) 800 MG tablet, TAKE 1 TABLET BY MOUTH EVERY 8 HOURS AS NEEDED FOR PAIN  methocarbamol (ROBAXIN) 500 MG tablet, TAKE 1 TABLET BY MOUTH THREE TIMES DAILY AS NEEDED  [] Dextromethorphan-guaiFENesin (ROBITUSSIN COUGH+CHEST CONCHA DM)  MG/20ML LIQD, Take 20 mLs by mouth every 4 hours as needed (Cough)  LORazepam (ATIVAN) 0.5 MG tablet, Take 1 tablet by mouth every 8 hours as needed for Anxiety for up to 30 days. cetirizine (ZYRTEC) 10 MG tablet, TAKE 2 TABLET BY MOUTH EVERY DAY  TRINTELLIX 10 MG TABS tablet, Take 10 mg by mouth daily   traZODone (DESYREL) 50 MG tablet, TAKE 1 TO 2 TABLETS BY MOUTH NIGHTLY AS NEEDED for sleep  norgestimate-ethinyl estradiol (ORTHO-CYCLEN) 0.25-35 MG-MCG per tablet, TAKE 1 TABLET BY MOUTH EVERY DAY  omeprazole (PRILOSEC) 40 MG delayed release capsule, TAKE 1 CAPSULE BY MOUTH EVERY DAY  gabapentin (NEURONTIN) 400 MG capsule, Take 1 capsule by mouth 3 times daily.    acetaminophen (TYLENOL) 500 MG tablet, Take 1,000 mg by mouth every 6 hours as needed for Pain   [DISCONTINUED] famotidine (PEPCID) 20 MG tablet, Take 1 tablet by mouth 2 times daily  [DISCONTINUED] albuterol sulfate HFA (PROVENTIL HFA) 108 (90 Base) MCG/ACT inhaler, Inhale 2 puffs into the lungs every 6 hours as needed for Wheezing          PAST MEDICAL HISTORY:    Past Medical History  Past Medical History:  Diagnosis Date   Anxiety     Bulging of lumbar intervertebral disc without myelopathy      L1   Chronic back pain     Depression     GERD (gastroesophageal reflux disease)     Substance abuse (HCC)      alcohol and opiates         PAST SURGICAL HISTORY:    Past Surgical History  Past Surgical History:  Procedure Laterality Date   COLONOSCOPY   2020   PILONIDAL CYST EXCISION   2018   PLANTAR FASCIA SURGERY Left 2020             SOCIAL HISTORY:    Social History         Socioeconomic History   Marital status:       Spouse name: Not on file   Number of children: Not on file   Years of education: Not on file   Highest education level: Not on file  Occupational History   Not on file  Social Needs   Financial resource strain: Not on file   Food insecurity      Worry: Not on file      Inability: Not on file   Transportation needs      Medical: Not on file      Non-medical: Not on file  Tobacco Use   Smoking status: Current Every Day Smoker      Packs/day: 1.00      Years: 2.00      Pack years: 2.00      Types: Cigarettes      Last attempt to quit: 2020      Years since quittin.8   Smokeless tobacco: Never Used  Substance and Sexual Activity   Alcohol use: Yes      Comment: in the past   Drug use: Not Currently      Comment: Pt has been sober for 30 days   Sexual activity: Not Currently  Lifestyle   Physical activity      Days per week: Not on file      Minutes per session: Not on file   Stress: Not on file  Relationships   Social connections      Talks on phone: Not on file      Gets together: Not on file      Attends Yazidism service: Not on file      Active member of club or organization: Not on file      Attends meetings of clubs or organizations: Not on file      Relationship status: Not on file   Intimate partner violence      Fear of current or ex partner: Not on file      Emotionally abused: Not on file      Physically abused: Not on file      Forced sexual activity: Not on file  Other Topics Concern   Not on file  Social History Narrative   Not on file         FAMILY HISTORY:    Family History  Family History  Problem Relation Age of Onset   Diabetes Mother           REVIEW OF SYSTEMS:  All other systems reviewed and are negative.       PHYSICAL EXAM:  Vital Signs Blood pressure 110/70, pulse 72, temperature 98.1 °F (36.7 °C), temperature source Oral, resp.  rate 14, height 5' 6\" (1.676 m), weight 220 lb (99.8 kg), last menstrual period 10/15/2020, SpO2 97 %, not currently breastfeeding. Oxygen Amount and Delivery:       Admission Weight Weight: 220 lb (99.8 kg)     General Appearance   Pleasant middle-aged female in no acute respiratory distress  Head  Normocephalic, without obvious abnormality, atraumatic     Eyes  conjunctivae/corneas clear. PERRL, EOM's intact.  Fundi benign.     ENT clear without thrush  Neck  no adenopathy, no carotid bruit, no JVD, supple, symmetrical, trachea midline and thyroid not enlarged, symmetric, no tenderness/mass/nodules  Lungs clear without any wheezes or rhonchi she did cough several times when I examined her  Heart: regular rate and rhythm, S1, S2 normal, no murmur, click, rub or gallop  Abdomen  soft, non-tender; bowel sounds normal; no masses,  no organomegaly  Extremities  No cyanosis clubbing or edema  Skin  Skin color, texture, turgor normal. No rashes or lesions  Neurologic: Alert and oriented X 3, normal strength and tone.            Imaging          Clear chest x-ray     Lab Review  CBC       Lab Results  Component Value Date    WBC 8.3 10/18/2020    RBC 4.65 10/18/2020    HGB 13.4 10/18/2020    HCT 39.3 10/18/2020     10/18/2020    MCV 84.5 10/18/2020    MCH 28.9 10/18/2020    MCHC 34.2 10/18/2020    RDW 12.8 10/18/2020    LYMPHOPCT 37 10/18/2020    MONOPCT 12 10/18/2020    BASOPCT 0 10/18/2020    MONOSABS 1.00 10/18/2020    LYMPHSABS 3.10 10/18/2020    EOSABS 0.10 10/18/2020    BASOSABS 0.00 10/18/2020    DIFFTYPE YES 10/18/2020        BMP     Lab Results  Component Value Date     10/18/2020    K 3.7 10/18/2020     10/18/2020    CO2 24 10/18/2020    BUN 3 10/18/2020    CREATININE 0.68 10/18/2020    GLUCOSE 113 10/18/2020    CALCIUM 9.6 10/18/2020        LFTS    Lab Results  Component Value Date    ALKPHOS 68 10/18/2020    ALT 19 10/18/2020    AST 20 10/18/2020    PROT 7.7 10/18/2020    BILITOT 0.21 10/18/2020    BILIDIR <0.08 08/20/2019    IBILI CANNOT BE CALCULATED 08/20/2019    LABALBU 4.7 10/18/2020        INR  No results for input(s): PROTIME, INR in the last 72 hours.     APTT  No results for input(s):  APTT in the last 72 hours.     Lactic Acid  No results found for: LACTA      PRO-BNP   No results for input(s): PROBNP in the last 72 hours.         ABGs: No results found for: PHART, PO2ART, QYP3PJW     No results found for: IFIO2, MODE, SETTIDVOL, SETPEEP        Impression     COVID-19 infection  Tobacco use  Polysubstance use/depression     Plan:       Smoking cessation discussed, she is on a nicotine patch  We will place her on albuterol inhaler scheduled  Place her on antitussives scheduled  Check laboratory markers including D-dimer, CRP, LDH, and ferritin  At this point, no indication for Decadron or Remdesivir  Continue isolation, droplet plus precautions  Discontinue ibuprofen, not recommended in patients with COVID-19 infection  Further  recommendations to follow    Revision History

## 2020-10-20 NOTE — FLOWSHEET NOTE
Patient attended spirituality group via ipad.       10/20/20 1618   Encounter Summary   Services provided to: Patient   Referral/Consult From:   (spirituality group via tablet)   Continue Visiting   (10-20-20)   Complexity of Encounter Moderate   Length of Encounter 30 minutes   Spiritual/Episcopalian   Type Spiritual support   Assessment Calm   Intervention Active listening;Prayer;Sustaining presence/ Ministry of presence   Outcome Expressed gratitude;Receptive

## 2020-10-20 NOTE — PROGRESS NOTES
Daily Progress Note  10/20/2020  CHIEF COMPLAINT:  Suicidal thoughts with plan to overdose    Reviewed patient's current plan of care and vital signs with nursing staff. Sleep: 5 hours last night  Attending groups: No    SUBJECTIVE:    Patient is in the day area at start of the interview. She states that she feels guilty that she could possibly be exposing staff and other patients to Covid so she prefers to isolate in her room where she will not be endangering others. She continues to express suicidal thoughts and feeling low. She states that she discovered that over 40 members of her [employer] have been diagnosed with COVID-19. She is worried about her best friend who she states is also pregnant who is a member of the [employer] and was confirmed to have Covid. We discussed her plans for discharge and how she intends to remain sober. She hopes that her [employer] is back up and running shortly as that is her preferred protective factor. She has been thinking about going to [facility] or another program where she will have good support. She states \"I did not sleep well last night because I just do not feel good \". She feels that it is harder for her mood to improve when her body feels bad. She does feel marian that she does not feel worse right now. A pulmonary consult was placed and she has been seen. Patient has a clear chest x-ray and supportive care is recommended. Post discharge she needs to isolate for 12 days. She has been compliant with her medications and denies any side effects. Patient was tapered off ofTrintellix and started on Zoloft today. Patient voiced that she has been unsuccessful with Zoloft in the past and would prefer a different antidepressant medication. We discussed the option of Lexapro and patient verbalized acceptance.     Mental Status Exam  Level of consciousness:  within normal limits   Appearance: Clothing from home, seated in her bed, fair grooming   Behavior/Motor: psychomotor retardation  Attitude toward examiner:  Cooperative, pleasant, good eye contact  Speech: delayed responses, low volume, slow rate  Mood:  \"Better, still sad\"  Affect: flat  Thought processes:  Goal directed, linear  Thought content:  Continued suicidal ideation with thoughts to overdose-patient contracts for safety on the unit              denies homicidal ideations               Denies hallucinations              denies delusions  Cognition:  Oriented to self, location, time, situation  Concentration clinically adequate  Memory: intact  Insight &Judgment: improving  Medication side effects:  denies       Data   height is 5' 6\" (1.676 m) and weight is 220 lb (99.8 kg). Her oral temperature is 97.6 °F (36.4 °C). Her blood pressure is 114/81 and her pulse is 82. Her respiration is 14 and oxygen saturation is 96%. Labs:   Admission on 10/18/2020   Component Date Value Ref Range Status    CRP 10/19/2020 2.7  0.0 - 5.0 mg/L Final    LD 10/19/2020 139  135 - 214 U/L Final    Ferritin 10/19/2020 136  13 - 150 ug/L Final    D-Dimer, Quant 10/19/2020 0.37  0.00 - 0.59 mg/L FEU Final    Comment:        When combined with a low clinical probability, a D dimer value of <0.50 mg/L FEU is   considered negative for DVT and PE (negative predictive value of 98%, sensitivity of 97%). If this test is not being used to help rule out DVT and PE, then the following reference   range should be utilized: 0.00 - 0.59 mg/L FEU. The D-Dimer assay is intended for use as an aid in the diagnosis of venous thromboembolism   (DVT and PE) and the results should be interpreted in conjunction with the patient's medical   history, clinical presentation, and other findings.         Elevated levels of D-dimer activity can be seen in any state of coagulation activation and   is not recommended in patients with therapeutic dose anticoagulant therapy for >24 hours,   fibrinolytic therapy within the previous 7 days, trauma or surgery within the previous 4   weeks,   disseminated malignancies, aortic aneurysm, sepsis, severe infections, pneumonia, severe   skin infections, liver cirrhosis, advanced age, omar                           nary disease, diabetes, and pregnancy. A very low percentage of patients with DVT may yield D-dimer results below the cutoff of   0.5 mg/L FEU. This is known to be more prevalent in patients with distal DVT.  D-Dimer, Quant 10/20/2020 0.41  0.00 - 0.59 mg/L FEU Final    Comment:        When combined with a low clinical probability, a D dimer value of <0.50 mg/L FEU is   considered negative for DVT and PE (negative predictive value of 98%, sensitivity of 97%). If this test is not being used to help rule out DVT and PE, then the following reference   range should be utilized: 0.00 - 0.59 mg/L FEU. The D-Dimer assay is intended for use as an aid in the diagnosis of venous thromboembolism   (DVT and PE) and the results should be interpreted in conjunction with the patient's medical   history, clinical presentation, and other findings. Elevated levels of D-dimer activity can be seen in any state of coagulation activation and   is not recommended in patients with therapeutic dose anticoagulant therapy for >24 hours,   fibrinolytic therapy within the previous 7 days, trauma or surgery within the previous 4   weeks,   disseminated malignancies, aortic aneurysm, sepsis, severe infections, pneumonia, severe   skin infections, liver cirrhosis, advanced age, omar                           nary disease, diabetes, and pregnancy. A very low percentage of patients with DVT may yield D-dimer results below the cutoff of   0.5 mg/L FEU. This is known to be more prevalent in patients with distal DVT.                     Medications  Current Facility-Administered Medications: [START ON 10/21/2020] escitalopram (LEXAPRO) tablet 5 mg, 5 mg, Oral, Daily  dextromethorphan (DELSYM) 30 MG/5ML recognition technology. **    Discussed treatment plan with Dr. Mata Nunez

## 2020-10-20 NOTE — PLAN OF CARE
No  Present Homicidal Ideation: No    Daily Assessment Last Entry:   Daily Sleep (WDL): Within Defined Limits         Patient Currently in Pain: Yes  Daily Nutrition (WDL): Within Defined Limits    Patient Monitoring:  Frequency of Checks: 4 times per hour, close    Psychiatric Symptoms:   Depression Symptoms  Depression Symptoms: Change in energy level  Anxiety Symptoms  Anxiety Symptoms: Generalized  Stacey Symptoms  Stacey Symptoms: No problems reported or observed. Psychosis Symptoms  Delusion Type: No problems reported or observed. Suicide Risk CSSR-S:  1) Within the past month, have you wished you were dead or wished you could go to sleep and not wake up? : Yes  2) Have you actually had any thoughts of killing yourself? : No  3) Have you been thinking about how you might kill yourself? : No  5) Have you started to work out or worked out the details of how to kill yourself?  Do you intend to carry out this plan? : No  6) Have you ever done anything, started to do anything, or prepared to do anything to end your life?: No  Change in Result NO Change in Plan of care NO      EDUCATION:   EDUCATION:   Learner Progress Toward Treatment Goals: Reviewed results and recommendations of this team, Reviewed group plan and strategies, Reviewed signs, symptoms and risk of self harm and violent behavior, Reviewed goals and plan of care    Method:small group, individual verbal education    Outcome:verbalized by patient, but needs reinforcement to obtain goals    PATIENT GOALS:  Short term: Reduce symptoms of COVID-19 and stay healthy  Long term: Continue utilizing supports and being social at; [employer]; [employer] study, and AA if she decides to return.; Continue following up with counselor    PLAN/TREATMENT RECOMMENDATIONS UPDATE: continue with group therapies, increased socialization, continue planning for after discharge goals, continue with medication compliance    SHORT-TERM GOALS UPDATE:   Time frame for Short-Term Goals:

## 2020-10-20 NOTE — PLAN OF CARE
Problem: Airway Clearance - Ineffective  Goal: Achieve or maintain patent airway  Outcome: Ongoing  Note: Airway remains patent. Problem: Gas Exchange - Impaired  Goal: Absence of hypoxia  Outcome: Ongoing  Note: Oxygen saturation is 97% on room air     Problem: Gas Exchange - Impaired  Goal: Promote optimal lung function  Outcome: Ongoing  Note: Ms. Johanny Espinal is able to take deep breaths without difficulty, lung sounds are diminished     Problem: Breathing Pattern - Ineffective  Goal: Ability to achieve and maintain a regular respiratory rate  Outcome: Ongoing  Note: Respirations are 14     Problem: Body Temperature -  Risk of, Imbalanced  Goal: Ability to maintain a body temperature within defined limits  Outcome: Ongoing  Note: Oral temperature is 97.6 f. Problem: Isolation Precautions - Risk of Spread of Infection  Goal: Prevent transmission of infection  10/20/2020 0847 by Darion Rivera RN  Outcome: Ongoing  Note: Ms. Johanny Espinal wears a mask while in the milieu. She is encouraged to maintain good hand and overall hygiene. 10/19/2020 2115 by Ca Silva RN  Outcome: Ongoing  Note: Patient wears mask when she is in dayroom. Covers her mouth while coughing. Problem: Pain:  Goal: Control of chronic pain  Description: Control of chronic pain  Outcome: Ongoing  Note: Ms. Johanny Espinal complained of back pain this morning. Tylenol was administered upon her request.     Problem: Altered Mood, Depressive Behavior:  Goal: Able to verbalize acceptance of life and situations over which he or she has no control  Description: Able to verbalize acceptance of life and situations over which he or she has no control  10/20/2020 0847 by Darion Rivera RN  Outcome: Ongoing  Note: Ms. Johanny Espinal reports \"I feel better than I did yesterday\". She is pleasant, polite and cooperative during our interactions. Ms. Rafael Buchanan denies suicidal ideation and thoughts of harm to self and others.  She admits to feeling depressed and expresses she is always anxious. Safety rounds are maintained. 10/19/2020 2115 by Jv Brooks RN  Outcome: Met This Shift  Note: Patient is able to verbalize and accept life situations that she is unable to control. Problem: Tobacco Use:  Goal: Inpatient tobacco use cessation counseling participation  Description: Inpatient tobacco use cessation counseling participation  Pt refuses tobacco cessation counseling. Uses Nicoderm while inpatient. 10/20/2020 1915 by Paige Leavitt, RN  Note: Declines cessation education and utilizes a nicotine patch that is prescribed daily.    10/20/2020 0847 by Paige Leavitt, RN  Outcome: Ongoing

## 2020-10-20 NOTE — PROGRESS NOTES
Pulmonary Progress Note  [facility]      Patient - Layman Gaucher,  Age - 47 y.o.    - 1980      Room Number - 0202/0202-01   N -  [id number]   [facility]  Date of Admission -  10/18/2020 10:47 AM        Consulting Magno Palma MD  Primary Care Physician - Janki Gamble, DO     SUBJECTIVE   According to nursing staff, patient is still coughing but denies any dyspnea. OBJECTIVE   VITALS    height is 5' 6\" (1.676 m) and weight is 220 lb (99.8 kg). Her oral temperature is 97.6 °F (36.4 °C). Her blood pressure is 114/81 and her pulse is 82. Her respiration is 14 and oxygen saturation is 96%. Body mass index is 35.51 kg/m². Temperature Range: Temp: 97.6 °F (36.4 °C) Temp  Av.8 °F (36.6 °C)  Min: 97.6 °F (36.4 °C)  Max: 98 °F (36.7 °C)  BP Range:  Systolic (57OOR), LZW:672 , Min:112 , VIS:524     Diastolic (83HIR), LVQ:60, Min:68, Max:81    Pulse Range: Pulse  Av  Min: 66  Max: 82  Respiration Range: Resp  Av  Min: 14  Max: 14  Current Pulse Ox[de-identified]  SpO2: 96 %  24HR Pulse Ox Range:  SpO2  Av %  Min: 96 %  Max: 96 %  Oxygen Amount and Delivery: Wt Readings from Last 3 Encounters:   10/18/20 220 lb (99.8 kg)   10/18/20 221 lb (100.2 kg)   10/11/20 224 lb (101.6 kg)       I/O (24 Hours)  No intake or output data in the 24 hours ending 10/20/20 1235    EXAM       Remainder of examination deferred due to excessive risk conferred by physical examination during a global pandemic due to coronavirus 19.   In a setting where local and national supplies of personal protective equipment are depleted    MEDS      sertraline  25 mg Oral Daily    dextromethorphan  60 mg Oral 2 times per day    benzonatate  200 mg Oral TID    albuterol sulfate HFA  2 puff Inhalation 4x daily    nicotine  1 patch Transdermal Daily    cetirizine  10 mg Oral Daily    gabapentin  400 mg Oral TID

## 2020-10-20 NOTE — CARE COORDINATION
Counselor met with patient this date for one on one and discussed follow up and patient not being signed in and rights and responsibilities. Patient explained she is wanting to go home and denies any suicidal/homicidal ideations and denies any auditory / visual hallucinations. Patient understands she is day 3 tomorrow not signed in and understands her rights. Patient is cooperative and has been taking all medications, she reports she had a therapy appointment this morning she misses and her next is on Friday morning. Patient to advocated for self when doctor comes in to go home today or tomorrow.       @[id number] counselor provided coloring pages, [facility], word searches  Along with a box of crayons this date for patient to keep self busy per her request.

## 2020-10-21 VITALS
RESPIRATION RATE: 14 BRPM | DIASTOLIC BLOOD PRESSURE: 80 MMHG | HEIGHT: 66 IN | OXYGEN SATURATION: 98 % | BODY MASS INDEX: 35.36 KG/M2 | SYSTOLIC BLOOD PRESSURE: 109 MMHG | WEIGHT: 220 LBS | TEMPERATURE: 98.1 F | HEART RATE: 74 BPM

## 2020-10-21 LAB — D-DIMER QUANTITATIVE: 0.29 MG/L FEU (ref 0–0.59)

## 2020-10-21 PROCEDURE — 6370000000 HC RX 637 (ALT 250 FOR IP): Performed by: PSYCHIATRY & NEUROLOGY

## 2020-10-21 PROCEDURE — 6370000000 HC RX 637 (ALT 250 FOR IP): Performed by: INTERNAL MEDICINE

## 2020-10-21 PROCEDURE — 99239 HOSP IP/OBS DSCHRG MGMT >30: CPT | Performed by: PSYCHIATRY & NEUROLOGY

## 2020-10-21 PROCEDURE — 85379 FIBRIN DEGRADATION QUANT: CPT

## 2020-10-21 PROCEDURE — 36415 COLL VENOUS BLD VENIPUNCTURE: CPT

## 2020-10-21 RX ORDER — HYDROXYZINE 50 MG/1
50 TABLET, FILM COATED ORAL 3 TIMES DAILY PRN
Qty: 30 TABLET | Refills: 0 | Status: SHIPPED | OUTPATIENT
Start: 2020-10-21 | End: 2020-10-31

## 2020-10-21 RX ORDER — ALBUTEROL SULFATE 90 UG/1
2 AEROSOL, METERED RESPIRATORY (INHALATION) 4 TIMES DAILY
Qty: 1 INHALER | Refills: 3 | Status: SHIPPED | OUTPATIENT
Start: 2020-10-21 | End: 2022-07-25 | Stop reason: SDUPTHER

## 2020-10-21 RX ORDER — ESCITALOPRAM OXALATE 5 MG/1
5 TABLET ORAL DAILY
Qty: 30 TABLET | Refills: 3 | Status: SHIPPED | OUTPATIENT
Start: 2020-10-22 | End: 2021-06-11 | Stop reason: SDUPTHER

## 2020-10-21 RX ADMIN — GABAPENTIN 400 MG: 400 CAPSULE ORAL at 13:18

## 2020-10-21 RX ADMIN — HYDROXYZINE HYDROCHLORIDE 50 MG: 50 TABLET, FILM COATED ORAL at 08:08

## 2020-10-21 RX ADMIN — CETIRIZINE HYDROCHLORIDE 10 MG: 10 TABLET, FILM COATED ORAL at 08:06

## 2020-10-21 RX ADMIN — ACETAMINOPHEN 650 MG: 325 TABLET, FILM COATED ORAL at 08:08

## 2020-10-21 RX ADMIN — BENZONATATE 200 MG: 100 CAPSULE ORAL at 08:06

## 2020-10-21 RX ADMIN — ESCITALOPRAM OXALATE 5 MG: 10 TABLET ORAL at 08:06

## 2020-10-21 RX ADMIN — Medication 2 PUFF: at 08:05

## 2020-10-21 RX ADMIN — Medication 60 MG: at 08:05

## 2020-10-21 RX ADMIN — GABAPENTIN 400 MG: 400 CAPSULE ORAL at 08:05

## 2020-10-21 RX ADMIN — BENZONATATE 200 MG: 100 CAPSULE ORAL at 13:18

## 2020-10-21 RX ADMIN — CLONAZEPAM 0.5 MG: 0.5 TABLET ORAL at 13:18

## 2020-10-21 ASSESSMENT — PAIN SCALES - GENERAL
PAINLEVEL_OUTOF10: 0
PAINLEVEL_OUTOF10: 3

## 2020-10-21 NOTE — BH NOTE
Patient given tobacco quitline number 22030395623 at this time, refusing to call at this time, states \" I just dont want to quit now\"- patient given information as to the dangers of long term tobacco use. Continue to reinforce the importance of tobacco cessation.

## 2020-10-21 NOTE — PLAN OF CARE
Problem: Airway Clearance - Ineffective  Goal: Achieve or maintain patent airway  10/20/2020 2024 by Pablo Tolbert RN  Outcome: Ongoing  Note: Patent airway maintained this shift. Problem: Gas Exchange - Impaired  Goal: Absence of hypoxia  10/20/2020 2024 by Pablo Tolbert RN  Outcome: Ongoing  Note: No hypoxia noted. Oxygen saturation is within normal limits at 97%. Problem: Breathing Pattern - Ineffective  Goal: Ability to achieve and maintain a regular respiratory rate  10/20/2020 2024 by Pablo Tolbert RN  Outcome: Ongoing  Note: Pt maintains regular respiratory rate. Problem: Body Temperature -  Risk of, Imbalanced  Goal: Ability to maintain a body temperature within defined limits  10/20/2020 2024 by Pablo Tolbert RN  Outcome: Ongoing  Note: Pt's temperature is within normal limits at 87.8 F. Problem: Isolation Precautions - Risk of Spread of Infection  Goal: Prevent transmission of infection  10/20/2020 2024 by Pablo Tolbert RN  Outcome: Ongoing  Note: Staff educates pt of ways to prevent transmission of infection. Pt is compliant with wearing face mask. Problem: Pain:  Goal: Control of chronic pain  Description: Control of chronic pain  10/20/2020 2028 by Pablo Tolbert RN  Outcome: Ongoing  Note: Pt denies any pain at this time. Problem: Altered Mood, Depressive Behavior:  Goal: Able to verbalize acceptance of life and situations over which he or she has no control  Description: Able to verbalize acceptance of life and situations over which he or she has no control  10/20/2020 2024 by Pablo Tolbert RN  Outcome: Ongoing  Note: Pt interactive and cooperative during shift assessment. Pt admits to having moderate anxiety about possible discharge tomorrow. Pt states she is ready to go home, but nervous about being alone at home since she has Covid.   Explained to pt that she will be given discharge instructions including phone numbers to call in case she needs help. Encouraged pt to discuss feelings and work with staff to learn how to accept and deal with life situations that are not within ability of control. Provided emotional support, feedback and reassurance as needed. Problem: Altered Mood, Depressive Behavior:  Goal: Absence of self-harm  Description: Absence of self-harm  No evidence of self-harm noted. Pt denies suicidal or homicidal ideation. Denies auditory or visual hallucinations. 10/20/2020 2025 by Lesa Elkins RN  Outcome: Ongoing  Note: Pt remains free of any self-harm. Safe environment maintained. Every 15 minute checks for safety continued per unit policy. Will continue to monitor for safety and provide support and reassurance as needed. Problem: Tobacco Use:  Goal: Inpatient tobacco use cessation counseling participation  Description: Inpatient tobacco use cessation counseling participation  Pt refuses tobacco cessation counseling. Uses Nicoderm while inpatient. 10/20/2020 2025 by Lesa Elkins, DEJON  Outcome: Ongoing  Note: Patient given information on the dangers of long term tobacco use. Will continue to reinforce the dangers of tobacco use.

## 2020-10-21 NOTE — GROUP NOTE
Group Therapy Note    Date: 10/21/2020    Group Start Time: 1330  Group End Time: 1122  Group Topic: Recreational    [facility] Klinefelter    Pt did not attend recreational group d/t resting in room despite staff invitation to attend. 1:1 talk time offered as alternative to group session, pt declined.
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
## 2020-10-21 NOTE — CARE COORDINATION
Met with patient this morning and discussed discharge planning and patient feels ready to be discharged and reported she has therapy appointment set for Friday with her provider.

## 2020-10-22 NOTE — CARE COORDINATION
Name: Micheal Hussein    : 1980    Discharge Date:10/21/20    Primary Auth/Cert #: [id number]    Discharged to home     Discharge Medications:      Medication List      START taking these medications    albuterol sulfate  (90 Base) MCG/ACT inhaler  Inhale 2 puffs into the lungs 4 times daily  Notes to patient:  Cough/wheezing/lungs     dextromethorphan 30 MG/5ML extended release liquid  Commonly known as:  DELSYM  Take 10 mLs by mouth every 12 hours for 10 days  Notes to patient:  Cough/mucus     escitalopram 5 MG tablet  Commonly known as:  LEXAPRO  Take 1 tablet by mouth daily  Notes to patient:  Depression/anxiety        CHANGE how you take these medications    benzonatate 200 MG capsule  Commonly known as:  TESSALON  Take 1 capsule by mouth three times daily for 7 days  What changed:    · when to take this  · reasons to take this  Notes to patient:  cough     hydrOXYzine 50 MG tablet  Commonly known as:  ATARAX  Take 1 tablet by mouth 3 times daily as needed for Anxiety  What changed:    · medication strength  · how much to take  · reasons to take this  Notes to patient:  anxiety        CONTINUE taking these medications    acetaminophen 500 MG tablet  Commonly known as:  TYLENOL  Notes to patient:  pain     cetirizine 10 MG tablet  Commonly known as:  ZYRTEC  TAKE 2 TABLET BY MOUTH EVERY DAY  Notes to patient:  allergies     fluticasone 50 MCG/ACT nasal spray  Commonly known as:  FLONASE  use 2 (TWO) sprays by nasal route daily  Notes to patient:  Steroid/allergies     gabapentin 400 MG capsule  Commonly known as:  NEURONTIN  Notes to patient:  Nerve pain     methocarbamol 500 MG tablet  Commonly known as:  ROBAXIN  TAKE 1 TABLET BY MOUTH THREE TIMES DAILY AS NEEDED  Notes to patient:  Muscle relaxer     norgestimate-ethinyl estradiol 0.25-35 MG-MCG per tablet  Commonly known as:  ORTHO-CYCLEN  TAKE 1 TABLET BY MOUTH EVERY DAY  Notes to patient:  Birth control     omeprazole 40 MG delayed release capsule  Commonly known as:  PRILOSEC  TAKE 1 CAPSULE BY MOUTH EVERY DAY  Notes to patient:  GERD     traZODone 50 MG tablet  Commonly known as:  DESYREL  TAKE 1 TO 2 TABLETS BY MOUTH NIGHTLY AS NEEDED for sleep  Notes to patient:  sleep        STOP taking these medications    ibuprofen 800 MG tablet  Commonly known as:  ADVIL;MOTRIN  Notes to patient:  pain     LORazepam 0.5 MG tablet  Commonly known as:  ATIVAN  Notes to patient:  anxiety     Robitussin Cough+Chest Bean DM  MG/20ML Liqd  Generic drug:  Dextromethorphan-guaiFENesin  Notes to patient:  Cough/congestion     Trintellix 10 MG Tabs tablet  Generic drug:  VORTIoxetine  Notes to patient:  depression           Where to Get Your Medications      These medications were sent to [address] [place] [address] [address] [place] [address]    Phone:  [phone]   · albuterol sulfate  (90 Base) MCG/ACT inhaler  · escitalopram 5 MG tablet  · hydrOXYzine 50 MG tablet     Information about where to get these medications is not yet available    Ask your nurse or doctor about these medications  · benzonatate 200 MG capsule  · dextromethorphan 30 MG/5ML extended release liquid         Follow Up Appointment: [facility]  [address] [facility], [address] [place] [address]   Phone: [phone]    Has standing tele-health appointment on Tuesday at 9 and Friday at [facility] transport  call 48 business hours ahead to schedule rides to / from appointments - holidays and weekends do not count as business hours    [employer]- [phone] / [phone]

## 2020-11-09 ENCOUNTER — TELEPHONE (OUTPATIENT)
Dept: FAMILY MEDICINE CLINIC | Age: 40
End: 2020-11-09

## 2020-11-09 NOTE — TELEPHONE ENCOUNTER
Shouldn't be required since it's been >10d since dx and her symptoms have improved. Please contact psych to see what they require.

## 2020-11-09 NOTE — TELEPHONE ENCOUNTER
Patient is requesting Covid test in order to attend Psych appt. MA to call back if approved at [phone].

## 2020-12-11 ENCOUNTER — OFFICE VISIT (OUTPATIENT)
Dept: FAMILY MEDICINE CLINIC | Age: 40
End: 2020-12-11
Payer: COMMERCIAL

## 2020-12-11 VITALS
BODY MASS INDEX: 35.99 KG/M2 | SYSTOLIC BLOOD PRESSURE: 120 MMHG | DIASTOLIC BLOOD PRESSURE: 68 MMHG | OXYGEN SATURATION: 98 % | HEART RATE: 70 BPM | TEMPERATURE: 98.3 F | WEIGHT: 223 LBS

## 2020-12-11 PROCEDURE — 99214 OFFICE O/P EST MOD 30 MIN: CPT | Performed by: NURSE PRACTITIONER

## 2020-12-11 PROCEDURE — G8484 FLU IMMUNIZE NO ADMIN: HCPCS | Performed by: NURSE PRACTITIONER

## 2020-12-11 PROCEDURE — G8427 DOCREV CUR MEDS BY ELIG CLIN: HCPCS | Performed by: NURSE PRACTITIONER

## 2020-12-11 PROCEDURE — G8417 CALC BMI ABV UP PARAM F/U: HCPCS | Performed by: NURSE PRACTITIONER

## 2020-12-11 PROCEDURE — 4004F PT TOBACCO SCREEN RCVD TLK: CPT | Performed by: NURSE PRACTITIONER

## 2020-12-11 RX ORDER — IBUPROFEN 800 MG/1
800 TABLET ORAL EVERY 6 HOURS PRN
COMMUNITY
End: 2020-12-11 | Stop reason: SDUPTHER

## 2020-12-11 RX ORDER — HYDROXYZINE 50 MG/1
TABLET, FILM COATED ORAL
COMMUNITY
Start: 2020-11-19 | End: 2021-06-11 | Stop reason: SDUPTHER

## 2020-12-11 RX ORDER — BENZONATATE 200 MG/1
200 CAPSULE ORAL 3 TIMES DAILY PRN
Qty: 30 CAPSULE | Refills: 0 | Status: SHIPPED | OUTPATIENT
Start: 2020-12-11 | End: 2020-12-21

## 2020-12-11 RX ORDER — IBUPROFEN 800 MG/1
800 TABLET ORAL EVERY 6 HOURS PRN
Qty: 120 TABLET | Refills: 3 | Status: SHIPPED | OUTPATIENT
Start: 2020-12-11 | End: 2021-02-15 | Stop reason: ALTCHOICE

## 2020-12-11 ASSESSMENT — ENCOUNTER SYMPTOMS
DIARRHEA: 0
COUGH: 1
VOMITING: 0
BACK PAIN: 1
NAUSEA: 0
SHORTNESS OF BREATH: 0
CHEST TIGHTNESS: 0

## 2020-12-11 NOTE — PATIENT INSTRUCTIONS
SURVEY:    You may be receiving a survey from [employer] regarding your visit today. Please complete the survey to enable us to provide the highest quality of care to you and your family. If you cannot score us a very good on any question, please call the office to discuss how we could of made your experience a very good one. Thank you.

## 2020-12-11 NOTE — PROGRESS NOTES
(AYGESTIN) 5 MG tablet Take 1 tablet by mouth daily 12/11/20  Yes MICHAEL Rhoades CNP   ibuprofen (ADVIL;MOTRIN) 800 MG tablet Take 1 tablet by mouth every 6 hours as needed for Pain (for back pain) 12/11/20  Yes MICHAEL Rhoades CNP   benzonatate (TESSALON) 200 MG capsule Take 1 capsule by mouth 3 times daily as needed for Cough 12/11/20 12/21/20 Yes MICHAEL Huang CNP   albuterol sulfate  (90 Base) MCG/ACT inhaler Inhale 2 puffs into the lungs 4 times daily 10/21/20  Yes Billie Bernal MD   escitalopram (LEXAPRO) 5 MG tablet Take 1 tablet by mouth daily 10/22/20  Yes Billie Bernal MD   fluticasone (FLONASE) 50 MCG/ACT nasal spray use 2 (TWO) sprays by nasal route daily 10/12/20  Yes Jesus Felder DO   methocarbamol (ROBAXIN) 500 MG tablet TAKE 1 TABLET BY MOUTH THREE TIMES DAILY AS NEEDED 10/12/20  Yes Jesus Felder DO   cetirizine (ZYRTEC) 10 MG tablet TAKE 2 TABLET BY MOUTH EVERY DAY 8/31/20  Yes Jesus Felder DO   traZODone (DESYREL) 50 MG tablet TAKE 1 TO 2 TABLETS BY MOUTH NIGHTLY AS NEEDED for sleep 7/13/20  Yes Jesus Felder DO   omeprazole (PRILOSEC) 40 MG delayed release capsule TAKE 1 CAPSULE BY MOUTH EVERY DAY 6/15/20  Yes Jesus Felder DO   gabapentin (NEURONTIN) 400 MG capsule Take 1 capsule by mouth 3 times daily. 6/25/19  Yes Historical Provider, MD   acetaminophen (TYLENOL) 500 MG tablet Take 1,000 mg by mouth every 6 hours as needed for Pain    Yes Historical Provider, MD   hydrOXYzine (ATARAX) 50 MG tablet TAKE 1 TABLET BY MOUTH THREE TIMES DAILY AS NEEDED 11/19/20   Historical Provider, MD        Allergies:       Patient has no known allergies. Social History:     Tobacco:    reports that she has been smoking cigarettes. She has a 2.00 pack-year smoking history. She has never used smokeless tobacco.  Alcohol:      reports current alcohol use. Drug Use:  reports previous drug use.     Family History:        Family History Problem Relation Age of Onset    Diabetes Mother        Review of Systems:         Review of Systems   Constitutional: Negative for chills and fever. Respiratory: Positive for cough. Negative for chest tightness and shortness of breath. Cardiovascular: Negative for chest pain and palpitations. Gastrointestinal: Negative for diarrhea, nausea and vomiting. Musculoskeletal: Positive for back pain. Neurological: Negative for dizziness, seizures and headaches. Physical Exam:     Vitals:  /68   Pulse 70   Temp 98.3 °F (36.8 °C) (Oral)   Wt 223 lb (101.2 kg)   SpO2 98%   BMI 35.99 kg/m²       Physical Exam  Vitals signs and nursing note reviewed. Constitutional:       Appearance: Normal appearance. She is well-developed. Cardiovascular:      Rate and Rhythm: Normal rate and regular rhythm. Heart sounds: Normal heart sounds, S1 normal and S2 normal.   Pulmonary:      Effort: Pulmonary effort is normal. No respiratory distress. Breath sounds: Normal breath sounds. Abdominal:      General: Bowel sounds are normal.      Palpations: Abdomen is soft. Tenderness: There is no abdominal tenderness. Musculoskeletal:      Thoracic back: She exhibits tenderness, bony tenderness and pain. Skin:     General: Skin is warm and dry. Neurological:      Mental Status: She is alert and oriented to person, place, and time.                Data:     Lab Results   Component Value Date     10/18/2020    K 3.7 10/18/2020     10/18/2020    CO2 24 10/18/2020    BUN 3 10/18/2020    CREATININE 0.68 10/18/2020    GLUCOSE 113 10/18/2020    PROT 7.7 10/18/2020    LABALBU 4.7 10/18/2020    BILITOT 0.21 10/18/2020    ALKPHOS 68 10/18/2020    AST 20 10/18/2020    ALT 19 10/18/2020     Lab Results   Component Value Date    WBC 8.3 10/18/2020    RBC 4.65 10/18/2020    HGB 13.4 10/18/2020    HCT 39.3 10/18/2020    MCV 84.5 10/18/2020    MCH 28.9 10/18/2020    MCHC 34.2 10/18/2020    RDW 12.8 10/18/2020     10/18/2020    MPV NOT REPORTED 10/18/2020     Lab Results   Component Value Date    TSH 0.69 10/22/2019     Lab Results   Component Value Date    LABA1C 5.3 09/19/2018          Assessment & Plan        Diagnosis Orders   1. Post-viral cough syndrome  --I believe her bloody nose and bloody sputum are due to excessive dryness in her mucous membranes due to using Flonase excessively and being in the dry factory air. Patient will start to use some saline regularly. Will give Tessalon Perles to help with cough at nighttime. 2. Chronic midline thoracic back pain   --Continue with ibuprofen and Robaxin. 3. Dysmenorrhea   --Due to patient being a smoker and the increased risk of blood clot, will switch to progestin only birth control pill for dysmenorrhea. Patient verbalizes understanding and agreement with plan. All questions answered. If symptoms do not resolve or worsen, return to office. Completed Refills   Requested Prescriptions     Signed Prescriptions Disp Refills    norethindrone (AYGESTIN) 5 MG tablet 30 tablet 3     Sig: Take 1 tablet by mouth daily    ibuprofen (ADVIL;MOTRIN) 800 MG tablet 120 tablet 3     Sig: Take 1 tablet by mouth every 6 hours as needed for Pain (for back pain)    benzonatate (TESSALON) 200 MG capsule 30 capsule 0     Sig: Take 1 capsule by mouth 3 times daily as needed for Cough     No follow-ups on file.      Orders Placed This Encounter   Medications    norethindrone (AYGESTIN) 5 MG tablet     Sig: Take 1 tablet by mouth daily     Dispense:  30 tablet     Refill:  3    ibuprofen (ADVIL;MOTRIN) 800 MG tablet     Sig: Take 1 tablet by mouth every 6 hours as needed for Pain (for back pain)     Dispense:  120 tablet     Refill:  3    benzonatate (TESSALON) 200 MG capsule     Sig: Take 1 capsule by mouth 3 times daily as needed for Cough     Dispense:  30 capsule     Refill:  0     No orders of the defined types were placed in this encounter. Patient Instructions     SURVEY:    You may be receiving a survey from [employer] regarding your visit today. Please complete the survey to enable us to provide the highest quality of care to you and your family. If you cannot score us a very good on any question, please call the office to discuss how we could of made your experience a very good one. Thank you. Electronically signed by MICHAEL Barajas CNP on 12/11/2020 at 5:42 PM           Completed Refills      Requested Prescriptions     Signed Prescriptions Disp Refills    norethindrone (AYGESTIN) 5 MG tablet 30 tablet 3     Sig: Take 1 tablet by mouth daily    ibuprofen (ADVIL;MOTRIN) 800 MG tablet 120 tablet 3     Sig: Take 1 tablet by mouth every 6 hours as needed for Pain (for back pain)    benzonatate (TESSALON) 200 MG capsule 30 capsule 0     Sig: Take 1 capsule by mouth 3 times daily as needed for Cough         Jennifer received counseling on the following healthy behaviors: nutrition, exercise, medication adherence and tobacco cessation  Reviewed prior labs and health maintenance. Continue current medications, diet and exercise. Discussed use, benefit, and side effects of prescribed medications. Barriers to medication compliance addressed. Patient given educational materials - see patient instructions. All patient questions answered. Patient voiced understanding.

## 2020-12-14 RX ORDER — TRAZODONE HYDROCHLORIDE 50 MG/1
TABLET ORAL
Qty: 60 TABLET | Refills: 2 | Status: SHIPPED | OUTPATIENT
Start: 2020-12-14 | End: 2021-04-21 | Stop reason: SDUPTHER

## 2020-12-14 NOTE — TELEPHONE ENCOUNTER
Last visit:  12/11/2020  Next Visit Date:    Future Appointments   Date Time Provider Felipe Mancini   1/5/2021 11:00 AM MD jesus alberto [facility]     Last Med refill:    Medication List:  Prior to Admission medications    Medication Sig Start Date End Date Taking? Authorizing Provider   hydrOXYzine (ATARAX) 50 MG tablet TAKE 1 TABLET BY MOUTH THREE TIMES DAILY AS NEEDED 11/19/20   Historical Provider, MD   norethindrone (AYGESTIN) 5 MG tablet Take 1 tablet by mouth daily 12/11/20   MICHAEL Murphy CNP   ibuprofen (ADVIL;MOTRIN) 800 MG tablet Take 1 tablet by mouth every 6 hours as needed for Pain (for back pain) 12/11/20   MICHAEL Murphy CNP   benzonatate (TESSALON) 200 MG capsule Take 1 capsule by mouth 3 times daily as needed for Cough 12/11/20 12/21/20  MICHAEL Murphy CNP   albuterol sulfate  (90 Base) MCG/ACT inhaler Inhale 2 puffs into the lungs 4 times daily 10/21/20   Jane Tamez MD   escitalopram (LEXAPRO) 5 MG tablet Take 1 tablet by mouth daily 10/22/20   Jane Tamez MD   fluticasone (FLONASE) 50 MCG/ACT nasal spray use 2 (TWO) sprays by nasal route daily 10/12/20   Toney Winter DO   methocarbamol (ROBAXIN) 500 MG tablet TAKE 1 TABLET BY MOUTH THREE TIMES DAILY AS NEEDED 10/12/20   Toney Winter DO   cetirizine (ZYRTEC) 10 MG tablet TAKE 2 TABLET BY MOUTH EVERY DAY 8/31/20   Toney Winter DO   traZODone (DESYREL) 50 MG tablet TAKE 1 TO 2 TABLETS BY MOUTH NIGHTLY AS NEEDED for sleep 7/13/20   Toney Winter DO   omeprazole (PRILOSEC) 40 MG delayed release capsule TAKE 1 CAPSULE BY MOUTH EVERY DAY 6/15/20   Toney Winter DO   gabapentin (NEURONTIN) 400 MG capsule Take 1 capsule by mouth 3 times daily.   6/25/19   Historical Provider, MD   acetaminophen (TYLENOL) 500 MG tablet Take 1,000 mg by mouth every 6 hours as needed for Pain     Historical Provider, MD       Allergies:  Patient has no known allergies.     Hemoglobin A1C (%)   Date Value   09/19/2018 5.3             ( goal A1C is < 7)   No results found for: LABMICR  No results found for: LDLCHOLESTEROL, LDLCALC    (goal LDL is <100)   AST (U/L)   Date Value   10/18/2020 20     ALT (U/L)   Date Value   10/18/2020 19     BUN (mg/dL)   Date Value   10/18/2020 3 (L)     BP Readings from Last 3 Encounters:   12/11/20 120/68   10/18/20 98/60   10/11/20 (!) 109/90          (goal 120/80)

## 2020-12-16 RX ORDER — OMEPRAZOLE 40 MG/1
CAPSULE, DELAYED RELEASE ORAL
Qty: 30 CAPSULE | Refills: 5 | Status: SHIPPED | OUTPATIENT
Start: 2020-12-16 | End: 2021-04-21 | Stop reason: SDUPTHER

## 2021-01-05 ENCOUNTER — HOSPITAL ENCOUNTER (OUTPATIENT)
Age: 41
Setting detail: SPECIMEN
Discharge: HOME | End: 2021-01-05
Payer: COMMERCIAL

## 2021-01-05 ENCOUNTER — OFFICE VISIT (OUTPATIENT)
Dept: OBGYN CLINIC | Age: 41
End: 2021-01-05
Payer: COMMERCIAL

## 2021-01-05 VITALS
HEART RATE: 70 BPM | RESPIRATION RATE: 18 BRPM | SYSTOLIC BLOOD PRESSURE: 124 MMHG | DIASTOLIC BLOOD PRESSURE: 73 MMHG | HEIGHT: 66 IN | BODY MASS INDEX: 35.68 KG/M2 | WEIGHT: 222 LBS | TEMPERATURE: 97.4 F

## 2021-01-05 DIAGNOSIS — R87.610 ASCUS WITH POSITIVE HIGH RISK HPV CERVICAL: ICD-10-CM

## 2021-01-05 DIAGNOSIS — R87.610 ASCUS WITH POSITIVE HIGH RISK HPV CERVICAL: Primary | ICD-10-CM

## 2021-01-05 DIAGNOSIS — R87.810 ASCUS WITH POSITIVE HIGH RISK HPV CERVICAL: Primary | ICD-10-CM

## 2021-01-05 DIAGNOSIS — R87.810 ASCUS WITH POSITIVE HIGH RISK HPV CERVICAL: ICD-10-CM

## 2021-01-05 PROCEDURE — G0145 SCR C/V CYTO,THINLAYER,RESCR: HCPCS

## 2021-01-05 PROCEDURE — 87491 CHLMYD TRACH DNA AMP PROBE: CPT

## 2021-01-05 PROCEDURE — G8484 FLU IMMUNIZE NO ADMIN: HCPCS | Performed by: OBSTETRICS & GYNECOLOGY

## 2021-01-05 PROCEDURE — 87591 N.GONORRHOEAE DNA AMP PROB: CPT

## 2021-01-05 PROCEDURE — 88175 CYTOPATH C/V AUTO FLUID REDO: CPT

## 2021-01-05 PROCEDURE — 99213 OFFICE O/P EST LOW 20 MIN: CPT | Performed by: OBSTETRICS & GYNECOLOGY

## 2021-01-05 PROCEDURE — G8427 DOCREV CUR MEDS BY ELIG CLIN: HCPCS | Performed by: OBSTETRICS & GYNECOLOGY

## 2021-01-05 PROCEDURE — G8417 CALC BMI ABV UP PARAM F/U: HCPCS | Performed by: OBSTETRICS & GYNECOLOGY

## 2021-01-05 PROCEDURE — 4004F PT TOBACCO SCREEN RCVD TLK: CPT | Performed by: OBSTETRICS & GYNECOLOGY

## 2021-01-05 NOTE — PROGRESS NOTES
PROBLEM VISIT     Date of service: 2021    Ayden Lord  Is a 47 y.o.  female    PT's PCP is: Alexi Lara DO     : 1980                                             Subjective:       Patient's last menstrual period was 12/15/2020. OB History    Para Term  AB Living   0 0 0 0 0 0   SAB TAB Ectopic Molar Multiple Live Births   0 0 0 0 0 0        Social History     Tobacco Use   Smoking Status Current Every Day Smoker    Packs/day: 1.00    Years: 2.00    Pack years: 2.00    Types: Cigarettes   Smokeless Tobacco Never Used        Social History     Substance and Sexual Activity   Alcohol Use Yes    Comment: in the past       Allergies: Patient has no known allergies. Current Outpatient Medications:     omeprazole (PRILOSEC) 40 MG delayed release capsule, TAKE 1 CAPSULE BY MOUTH EVERY DAY, Disp: 30 capsule, Rfl: 5    traZODone (DESYREL) 50 MG tablet, TAKE 1 TO 2 TABLETS BY MOUTH NIGHTLY AS NEEDED for sleep, Disp: 60 tablet, Rfl: 2    hydrOXYzine (ATARAX) 50 MG tablet, TAKE 1 TABLET BY MOUTH THREE TIMES DAILY AS NEEDED, Disp: , Rfl:     ibuprofen (ADVIL;MOTRIN) 800 MG tablet, Take 1 tablet by mouth every 6 hours as needed for Pain (for back pain), Disp: 120 tablet, Rfl: 3    albuterol sulfate  (90 Base) MCG/ACT inhaler, Inhale 2 puffs into the lungs 4 times daily, Disp: 1 Inhaler, Rfl: 3    escitalopram (LEXAPRO) 5 MG tablet, Take 1 tablet by mouth daily, Disp: 30 tablet, Rfl: 3    fluticasone (FLONASE) 50 MCG/ACT nasal spray, use 2 (TWO) sprays by nasal route daily, Disp: 16 g, Rfl: 2    methocarbamol (ROBAXIN) 500 MG tablet, TAKE 1 TABLET BY MOUTH THREE TIMES DAILY AS NEEDED, Disp: 60 tablet, Rfl: 2    cetirizine (ZYRTEC) 10 MG tablet, TAKE 2 TABLET BY MOUTH EVERY DAY, Disp: 60 tablet, Rfl: 5    gabapentin (NEURONTIN) 400 MG capsule, Take 1 capsule by mouth 3 times daily.  , Disp: , Rfl: 0    acetaminophen (TYLENOL) 500 MG tablet, Take 1,000 mg by

## 2021-01-15 LAB — CYTOLOGY REPORT: NORMAL

## 2021-01-19 RX ORDER — METHOCARBAMOL 500 MG/1
TABLET, FILM COATED ORAL
Qty: 60 TABLET | Refills: 0 | Status: SHIPPED | OUTPATIENT
Start: 2021-01-19 | End: 2021-02-17

## 2021-02-15 ENCOUNTER — OFFICE VISIT (OUTPATIENT)
Dept: FAMILY MEDICINE CLINIC | Age: 41
End: 2021-02-15
Payer: COMMERCIAL

## 2021-02-15 VITALS
HEIGHT: 66 IN | BODY MASS INDEX: 33.91 KG/M2 | SYSTOLIC BLOOD PRESSURE: 130 MMHG | WEIGHT: 211 LBS | DIASTOLIC BLOOD PRESSURE: 72 MMHG | TEMPERATURE: 98 F | HEART RATE: 74 BPM | OXYGEN SATURATION: 98 %

## 2021-02-15 DIAGNOSIS — F17.210 CIGARETTE NICOTINE DEPENDENCE WITHOUT COMPLICATION: ICD-10-CM

## 2021-02-15 DIAGNOSIS — Z00.00 PHYSICAL EXAM, ANNUAL: Primary | ICD-10-CM

## 2021-02-15 PROCEDURE — 99395 PREV VISIT EST AGE 18-39: CPT | Performed by: NURSE PRACTITIONER

## 2021-02-15 PROCEDURE — G8484 FLU IMMUNIZE NO ADMIN: HCPCS | Performed by: NURSE PRACTITIONER

## 2021-02-15 ASSESSMENT — ENCOUNTER SYMPTOMS
SORE THROAT: 0
BLOOD IN STOOL: 0
COUGH: 0
BACK PAIN: 0
DIARRHEA: 0
ABDOMINAL PAIN: 0
NAUSEA: 0
SHORTNESS OF BREATH: 0
CONSTIPATION: 0
VOMITING: 0

## 2021-02-15 NOTE — PROGRESS NOTES
HPI Notes    Name: Mery Bradley  : 1980         Chief Complaint:     Chief Complaint   Patient presents with    Annual Exam     Patient here today for annual wellness physical for insurance. History of Present Illness:        HPI  Pt is a 48 yo female who reports for a wellness physical for her insurance. Pt is a current every day smoker since age 15. Smokes 1ppd. Pt would like to stop, but does not feel ready at this time. Pt has tried chantix, nicotine lozenges, and nicotine patches in the past.     Pt has recently lost 20# intentionally. Denies other concerns. Past Medical History:     Past Medical History:   Diagnosis Date    Anxiety     Bulging of lumbar intervertebral disc without myelopathy     L1    Chronic back pain     Depression     GERD (gastroesophageal reflux disease)     Substance abuse ([facility] [address]     alcohol and opiates      Reviewed all health maintenance requirements and ordered appropriate tests  Health Maintenance Due   Topic Date Due    Varicella vaccine (1 of 2 - 2-dose childhood series) 1983    Pneumococcal 0-64 years Vaccine (1 of 1 - PPSV23) 1988    DTaP/Tdap/Td vaccine (1 - Tdap) 2001    Flu vaccine (1) 2020       Past Surgical History:     Past Surgical History:   Procedure Laterality Date    COLONOSCOPY  2020    PILONIDAL CYST EXCISION  2018    PLANTAR FASCIA SURGERY Left 2020        Medications:       Prior to Admission medications    Medication Sig Start Date End Date Taking? Authorizing Provider   methocarbamol (ROBAXIN) 500 MG tablet One tablet three times a day as needed.  21  Yes Suwanee Snipe, DO   omeprazole (PRILOSEC) 40 MG delayed release capsule TAKE 1 CAPSULE BY MOUTH EVERY DAY 20  Yes Suwanee Snipe, DO   traZODone (DESYREL) 50 MG tablet TAKE 1 TO 2 TABLETS BY MOUTH NIGHTLY AS NEEDED for sleep 20  Yes Suwanee Snipe, DO   hydrOXYzine (ATARAX) 50 MG tablet TAKE 1 TABLET BY MOUTH THREE TIMES DAILY AS NEEDED 11/19/20  Yes Historical Provider, MD   albuterol sulfate  (90 Base) MCG/ACT inhaler Inhale 2 puffs into the lungs 4 times daily 10/21/20  Yes Benton Beebe MD   escitalopram (LEXAPRO) 5 MG tablet Take 1 tablet by mouth daily 10/22/20  Yes Benton Beebe MD   fluticasone (FLONASE) 50 MCG/ACT nasal spray use 2 (TWO) sprays by nasal route daily 10/12/20  Yes Zafar Abad DO   cetirizine (ZYRTEC) 10 MG tablet TAKE 2 TABLET BY MOUTH EVERY DAY 8/31/20  Yes Zafar Abad DO   gabapentin (NEURONTIN) 400 MG capsule Take 1 capsule by mouth 3 times daily. 6/25/19  Yes Historical Provider, MD        Allergies:       Patient has no known allergies. Social History:     Tobacco:    reports that she has been smoking cigarettes. She has a 2.00 pack-year smoking history. She has never used smokeless tobacco.  Alcohol:      reports current alcohol use. Drug Use:  reports previous drug use. Family History:        Family History   Problem Relation Age of Onset    Diabetes Mother        Review of Systems:         Review of Systems   Constitutional: Negative for chills and fever. HENT: Negative for sore throat. Respiratory: Negative for cough and shortness of breath. Cardiovascular: Negative for chest pain, palpitations and leg swelling. Gastrointestinal: Negative for abdominal pain, blood in stool, constipation, diarrhea, nausea and vomiting. Genitourinary: Negative for dysuria, frequency and hematuria. Musculoskeletal: Negative for back pain and neck pain. Skin: Negative for rash. Neurological: Negative for dizziness, tremors, seizures, weakness and headaches. Hematological: Does not bruise/bleed easily. Psychiatric/Behavioral: Negative for suicidal ideas. The patient is not nervous/anxious.           Physical Exam:     Vitals:  /72   Pulse 74   Temp 98 °F (36.7 °C) (Oral)   Ht 5' 6\" (1.676 m)   Wt 211 lb (95.7 kg)   SpO2 98%   BMI 34.06 kg/m² Physical Exam  Vitals signs and nursing note reviewed. Constitutional:       Appearance: Normal appearance. She is well-developed. HENT:      Head: Normocephalic. Right Ear: Hearing, tympanic membrane and external ear normal.      Left Ear: Hearing, tympanic membrane and external ear normal.      Nose: Nose normal.      Mouth/Throat:      Pharynx: Uvula midline. Eyes:      Conjunctiva/sclera: Conjunctivae normal.      Pupils: Pupils are equal, round, and reactive to light. Neck:      Musculoskeletal: Normal range of motion. Thyroid: No thyroid mass. Vascular: No carotid bruit. Trachea: Trachea normal.   Cardiovascular:      Rate and Rhythm: Normal rate and regular rhythm. Pulses:           Dorsalis pedis pulses are 2+ on the right side and 2+ on the left side. Heart sounds: Normal heart sounds, S1 normal and S2 normal.   Pulmonary:      Effort: Pulmonary effort is normal.      Breath sounds: Normal breath sounds. Abdominal:      Palpations: Abdomen is soft. Tenderness: There is no abdominal tenderness. Musculoskeletal: Normal range of motion. Skin:     General: Skin is warm and dry. Findings: No rash. Neurological:      Mental Status: She is alert and oriented to person, place, and time. GCS: GCS eye subscore is 4. GCS verbal subscore is 5. GCS motor subscore is 6. Deep Tendon Reflexes: Reflexes are normal and symmetric. Psychiatric:         Speech: Speech normal.         Behavior: Behavior normal.         Thought Content:  Thought content normal.         Judgment: Judgment normal.               Data:     Lab Results   Component Value Date     10/18/2020    K 3.7 10/18/2020     10/18/2020    CO2 24 10/18/2020    BUN 3 10/18/2020    CREATININE 0.68 10/18/2020    GLUCOSE 113 10/18/2020    PROT 7.7 10/18/2020    LABALBU 4.7 10/18/2020    BILITOT 0.21 10/18/2020    ALKPHOS 68 10/18/2020    AST 20 10/18/2020    ALT 19 10/18/2020     Lab Results   Component Value Date    WBC 8.3 10/18/2020    RBC 4.65 10/18/2020    HGB 13.4 10/18/2020    HCT 39.3 10/18/2020    MCV 84.5 10/18/2020    MCH 28.9 10/18/2020    MCHC 34.2 10/18/2020    RDW 12.8 10/18/2020     10/18/2020    MPV NOT REPORTED 10/18/2020     Lab Results   Component Value Date    TSH 0.69 10/22/2019     Lab Results   Component Value Date    LABA1C 5.3 09/19/2018          Assessment & Plan        Diagnosis Orders   1. Physical exam, annual  --Routine adult health physical.  Patient has no complaints at this time. No abnormalities or deficiencies. Patient educated about adequate water intake  Patient educated about exercise and fitness  Patient educated about nutrition    Health maintenance requirements reviewed with patient       2. Cigarette nicotine dependence without complication  --Patient educated about tapering method. Patient will decrease number of cigarettes smoked in a day by 2 cigarettes every 2-3 days. Patient encouraged to use a calendar to track number of cigarettes for each day. Patient encouraged to sukhdeep on her calendar a hard stop date. Having this visual reminder will make her much more successful than trying to keep track in her mind. Patient verbalizes understanding and agreement with plan. All questions answered. If symptoms do not resolve or worsen, return to office. Completed Refills   Requested Prescriptions      No prescriptions requested or ordered in this encounter     No follow-ups on file. No orders of the defined types were placed in this encounter. No orders of the defined types were placed in this encounter. Patient Instructions   SURVEY:    You may be receiving a survey from [employer] regarding your visit today. Please complete the survey to enable us to provide the highest quality of care to you and your family.     If you cannot score us a very good (5 Stars) on any question, please call the office to discuss how we could have made your experience a very good one. Thank you. Clinical Care Team: ELZA Alves LPN    Clerical Team: [facility]        Electronically signed by MICHAEL Alves CNP on 2/15/2021 at 4:19 PM           Completed Refills      Requested Prescriptions      No prescriptions requested or ordered in this encounter         Sebastián Reading received counseling on the following healthy behaviors: nutrition, exercise, medication adherence and tobacco cessation  Reviewed prior labs and health maintenance. Continue current medications, diet and exercise. Discussed use, benefit, and side effects of prescribed medications. Barriers to medication compliance addressed. Patient given educational materials - see patient instructions. All patient questions answered. Patient voiced understanding.

## 2021-02-15 NOTE — LETTER
[address] [address]  Phone: [phone]  Fax: Miriam Zach [phone], APRN - CNP        February 15, 2021    [address] [place] [address]      Dear Lyudmila Hart:    Patient educated about tapering method. Decrease number of cigarettes smoked in a day by 2 cigarettes every 2-3 days. Patient encouraged to use a calendar to track number of cigarettes for each day. Patient encouraged to sukhdeep on her calendar a hard stop date. Having this visual reminder will make her much more successful than trying to keep track in her mind. Good luck! If you have any questions or concerns, please don't hesitate to call.     Sincerely,          Anahy Parker, MICHAEL - CNP

## 2021-02-17 RX ORDER — METHOCARBAMOL 500 MG/1
TABLET, FILM COATED ORAL
Qty: 60 TABLET | Refills: 0 | Status: SHIPPED | OUTPATIENT
Start: 2021-02-17 | End: 2021-03-12

## 2021-02-17 NOTE — TELEPHONE ENCOUNTER
Last visit:  2/15/2021  Next Visit Date:    Future Appointments   Date Time Provider Felipe Mancini   2/23/2021  3:40 PM DO Rachel Tobar [facility]     Last Med refill:    Medication List:  Prior to Admission medications    Medication Sig Start Date End Date Taking? Authorizing Provider   methocarbamol (ROBAXIN) 500 MG tablet One tablet three times a day as needed. 1/19/21   Monique Hernandez DO   omeprazole (PRILOSEC) 40 MG delayed release capsule TAKE 1 CAPSULE BY MOUTH EVERY DAY 12/16/20   Monique Hernandez DO   traZODone (DESYREL) 50 MG tablet TAKE 1 TO 2 TABLETS BY MOUTH NIGHTLY AS NEEDED for sleep 12/14/20   Monique Hernandez DO   hydrOXYzine (ATARAX) 50 MG tablet TAKE 1 TABLET BY MOUTH THREE TIMES DAILY AS NEEDED 11/19/20   Historical Provider, MD   albuterol sulfate  (90 Base) MCG/ACT inhaler Inhale 2 puffs into the lungs 4 times daily 10/21/20   Analy Speaker, MD   escitalopram (LEXAPRO) 5 MG tablet Take 1 tablet by mouth daily 10/22/20   Analy Saavedra MD   fluticasone (FLONASE) 50 MCG/ACT nasal spray use 2 (TWO) sprays by nasal route daily 10/12/20   Monique Hernandez DO   cetirizine (ZYRTEC) 10 MG tablet TAKE 2 TABLET BY MOUTH EVERY DAY 8/31/20   Monique Hernandez DO   gabapentin (NEURONTIN) 400 MG capsule Take 1 capsule by mouth 3 times daily. 6/25/19   Historical Provider, MD       Allergies:  Patient has no known allergies.     Hemoglobin A1C (%)   Date Value   09/19/2018 5.3             ( goal A1C is < 7)   No results found for: LABMICR  No results found for: LDLCHOLESTEROL, LDLCALC    (goal LDL is <100)   AST (U/L)   Date Value   10/18/2020 20     ALT (U/L)   Date Value   10/18/2020 19     BUN (mg/dL)   Date Value   10/18/2020 3 (L)     BP Readings from Last 3 Encounters:   02/15/21 130/72   01/05/21 124/73   12/11/20 120/68          (goal 120/80)

## 2021-02-18 ENCOUNTER — TELEPHONE (OUTPATIENT)
Dept: OBGYN CLINIC | Age: 41
End: 2021-02-18

## 2021-02-18 NOTE — TELEPHONE ENCOUNTER
Pt would like a Rx for oral contraception please. Last seen 1/5/21.      Health Maintenance   Topic Date Due    Varicella vaccine (1 of 2 - 2-dose childhood series) 03/26/1983    Pneumococcal 0-64 years Vaccine (1 of 1 - PPSV23) 03/26/1988    DTaP/Tdap/Td vaccine (1 - Tdap) 03/26/2001    Flu vaccine (1) 09/01/2020    Cervical cancer screen  01/05/2026    Hepatitis C screen  Completed    HIV screen  Completed    Hepatitis A vaccine  Aged Out    Hepatitis B vaccine  Aged Out    Hib vaccine  Aged Out    Meningococcal (ACWY) vaccine  Aged Out             (applicable per patient's age: Cancer Screenings, Depression Screening, Fall Risk Screening, Immunizations)    Hemoglobin A1C (%)   Date Value   09/19/2018 5.3     AST (U/L)   Date Value   10/18/2020 20     ALT (U/L)   Date Value   10/18/2020 19     BUN (mg/dL)   Date Value   10/18/2020 3 (L)      (goal A1C is < 7)   (goal LDL is <100) need 30-50% reduction from baseline     BP Readings from Last 3 Encounters:   02/15/21 130/72   01/05/21 124/73   12/11/20 120/68    (goal /80)      All Future Testing planned in [facility]:  Lab Frequency Next Occurrence       Next Visit Date:  Future Appointments   Date Time Provider Felipe Mancini   2/23/2021  3:40 PM DO Evelio Bolivar [facility]            Patient Active Problem List:     Chronic neck pain     Onychomycosis     Chronic low back pain without sciatica     Depression with suicidal ideation     Severe recurrent major depression without psychotic features ([facility] [address]

## 2021-02-22 NOTE — TELEPHONE ENCOUNTER
Dr. Debbie Hudson would like to bring the patient in for a discussion about the options for cycle control. Attempted to call pt to set up appt; however, her VM is not set up.

## 2021-02-23 ENCOUNTER — OFFICE VISIT (OUTPATIENT)
Dept: FAMILY MEDICINE CLINIC | Age: 41
End: 2021-02-23
Payer: COMMERCIAL

## 2021-02-23 VITALS
HEIGHT: 66 IN | OXYGEN SATURATION: 99 % | HEART RATE: 77 BPM | DIASTOLIC BLOOD PRESSURE: 70 MMHG | WEIGHT: 210 LBS | BODY MASS INDEX: 33.75 KG/M2 | SYSTOLIC BLOOD PRESSURE: 120 MMHG

## 2021-02-23 DIAGNOSIS — G56.03 BILATERAL CARPAL TUNNEL SYNDROME: Primary | ICD-10-CM

## 2021-02-23 DIAGNOSIS — F10.21 HISTORY OF ALCOHOLISM (HCC): ICD-10-CM

## 2021-02-23 DIAGNOSIS — F33.2 MAJOR DEPRESSIVE DISORDER, RECURRENT SEVERE WITHOUT PSYCHOTIC FEATURES (HCC): ICD-10-CM

## 2021-02-23 DIAGNOSIS — N94.6 DYSMENORRHEA: ICD-10-CM

## 2021-02-23 PROCEDURE — G8484 FLU IMMUNIZE NO ADMIN: HCPCS | Performed by: FAMILY MEDICINE

## 2021-02-23 PROCEDURE — 99214 OFFICE O/P EST MOD 30 MIN: CPT | Performed by: FAMILY MEDICINE

## 2021-02-23 PROCEDURE — 4004F PT TOBACCO SCREEN RCVD TLK: CPT | Performed by: FAMILY MEDICINE

## 2021-02-23 PROCEDURE — G8427 DOCREV CUR MEDS BY ELIG CLIN: HCPCS | Performed by: FAMILY MEDICINE

## 2021-02-23 PROCEDURE — G8417 CALC BMI ABV UP PARAM F/U: HCPCS | Performed by: FAMILY MEDICINE

## 2021-02-23 NOTE — PROGRESS NOTES
Name: Sunil aBiley  : 1980         Chief Complaint:     Chief Complaint   Patient presents with    Hand Pain     carpal tunnel pain, hands hurt and swell. History of Present Illness:      Sunil Bailey is a 47 y.o.  female who presents with Hand Pain (carpal tunnel pain, hands hurt and swell. )      HPI    Back much better, only needing ibuprofen once a day now rather than TID like she had been. Stomach feels better with this change. Sees chiro a couple times a month. Trouble with both hands, numb overnight and \"nervy\" all day. Taking b1 and b6 supplements. Pain in either palm, worst at the end of work. 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 H/o ulnar neuropathy on L side and she has had recurrence of some of those symptoms, only in the hand, not wrist or forearm. Depression under much better control (psych admission in Oct), taking lexapro 5 mg daily which works well. Focusing on spiritual life. Weekly counseling. Hydroxyzine usually once a day in the morning, wakes up with stress held in shoulders and neck. Sober since October. Pt also had COVID in October, SOB, body aches, loss of taste and smell. Many symptoms lingered for quite a while but she's feeling much better now. Dysmenorrhea and irreg menses, had discontinued norethindrone recently d/t running out but would like to restart med. Not sexually active. Medical History:     Patient Active Problem List   Diagnosis    Chronic neck pain    Onychomycosis    Chronic low back pain without sciatica    Depression with suicidal ideation    Severe recurrent major depression without psychotic features ([facility] [address]       Medications:       Prior to Admission medications    Medication Sig Start Date End Date Taking? Authorizing Provider   Misc.  Devices (WRIST BRACE) MISC Neutral wrist splint for bilateral carpal tunnel syndrome, wear as needed for pain and numbness 21  Yes Monique Hernandez DO   norethindrone (AYGESTIN) 5 MG tablet Take 1 tablet by mouth daily 2/23/21  Yes Nora Mann DO   methocarbamol (ROBAXIN) 500 MG tablet TAKE 1 TABLET BY MOUTH THREE TIMES DAILY AS NEEDED 2/17/21  Yes Nora Mann DO   omeprazole (PRILOSEC) 40 MG delayed release capsule TAKE 1 CAPSULE BY MOUTH EVERY DAY 12/16/20  Yes Nora Mann DO   traZODone (DESYREL) 50 MG tablet TAKE 1 TO 2 TABLETS BY MOUTH NIGHTLY AS NEEDED for sleep 12/14/20  Yes Nora Mann DO   hydrOXYzine (ATARAX) 50 MG tablet TAKE 1 TABLET BY MOUTH THREE TIMES DAILY AS NEEDED 11/19/20  Yes Historical Provider, MD   albuterol sulfate  (90 Base) MCG/ACT inhaler Inhale 2 puffs into the lungs 4 times daily 10/21/20  Yes Elena Elam MD   escitalopram (LEXAPRO) 5 MG tablet Take 1 tablet by mouth daily 10/22/20  Yes Elena Elam MD   fluticasone (FLONASE) 50 MCG/ACT nasal spray use 2 (TWO) sprays by nasal route daily 10/12/20  Yes Nroa Mann DO   cetirizine (ZYRTEC) 10 MG tablet TAKE 2 TABLET BY MOUTH EVERY DAY 8/31/20  Yes Nora Mann DO   gabapentin (NEURONTIN) 400 MG capsule Take 1 capsule by mouth 3 times daily. 6/25/19  Yes Historical Provider, MD        Allergies:       Patient has no known allergies. Review ofSystems:     Positive and Negative as described in HPI    Review of Systems    Physical Exam:     Vitals:  /70   Pulse 77   Ht 5' 6\" (1.676 m)   Wt 210 lb (95.3 kg)   SpO2 99%   BMI 33.89 kg/m²   Physical Exam  Vitals signs and nursing note reviewed. Constitutional:       General: She is not in acute distress. Appearance: Normal appearance. She is well-developed. She is not ill-appearing. Cardiovascular:      Rate and Rhythm: Normal rate and regular rhythm. Heart sounds: Normal heart sounds. Pulmonary:      Effort: Pulmonary effort is normal.      Breath sounds: Normal breath sounds. Musculoskeletal:      Comments: cuca wrists neg tinel, pos phalen.  Normal strength cuca hands. Neurological:      Mental Status: She is alert and oriented to person, place, and time. Psychiatric:         Mood and Affect: Mood normal.         Behavior: Behavior normal.         Data:     Lab Results   Component Value Date     10/18/2020    K 3.7 10/18/2020     10/18/2020    CO2 24 10/18/2020    BUN 3 10/18/2020    CREATININE 0.68 10/18/2020    GLUCOSE 113 10/18/2020    PROT 7.7 10/18/2020    LABALBU 4.7 10/18/2020    BILITOT 0.21 10/18/2020    ALKPHOS 68 10/18/2020    AST 20 10/18/2020    ALT 19 10/18/2020     Lab Results   Component Value Date    WBC 8.3 10/18/2020    RBC 4.65 10/18/2020    HGB 13.4 10/18/2020    HCT 39.3 10/18/2020    MCV 84.5 10/18/2020    MCH 28.9 10/18/2020    MCHC 34.2 10/18/2020    RDW 12.8 10/18/2020     10/18/2020    MPV NOT REPORTED 10/18/2020     Lab Results   Component Value Date    TSH 0.69 10/22/2019     Lab Results   Component Value Date    LABA1C 5.3 09/19/2018         Assessment & Plan:        Diagnosis Orders   1. Bilateral carpal tunnel syndrome  Misc. Devices (WRIST BRACE) MISC   2. Dysmenorrhea     3. History of alcoholism ([facility] [address]     4. Major depressive disorder, recurrent severe without psychotic features ([facility] [address]     symptoms and exam c/w cuca CTS, also some L ulnar neuropathy which she had confirmed on EMG few yrs ago. Advised stretches as below, neutral wrist splint overnight. Continue gabapentin, which she takes for foot pain. Restart norethindrone for irreg menses and dysmenorrhea  H/o alcoholism in remission, doing counseling - cont same  Depression well-controlled, cont lexapro and again counseling      Requested Prescriptions     Signed Prescriptions Disp Refills    Misc.  Devices (WRIST BRACE) MISC 2 each 0     Sig: Neutral wrist splint for bilateral carpal tunnel syndrome, wear as needed for pain and numbness    norethindrone (AYGESTIN) 5 MG tablet 30 tablet 5     Sig: Take 1 tablet by mouth daily         Patient Instructions SURVEY:    You may be receiving a survey from [employer] regarding your visit today. You may get this in the mail, through your MyChart or in your email. Please complete the survey to enable us to provide the highest quality of care to you and your family. If you cannot score us as very good ( 5 Stars) on any question, please feel free to call the office to discuss how we could have made your experience exceptional.     Thank you. Clinical Care Team:  Dr. Nora Mann, DO Tonny López, [address] [place] [address] Team:  100 [facility]    Patient Education        Carpal Tunnel Syndrome: Exercises  Introduction  Here are some examples of exercises for you to try. The exercises may be suggested for a condition or for rehabilitation. Start each exercise slowly. Ease off the exercises if you start to have pain. You will be told when to start these exercises and which ones will work best for you. Warm-up stretches  When you no longer have pain or numbness, you can do exercises to help prevent carpal tunnel syndrome from coming back. Do not do any stretch or movement that is uncomfortable or painful. 1. Rotate your wrist up, down, and from side to side. Repeat 4 times. 2. Stretch your fingers far apart. Relax them, and then stretch them again. Repeat 4 times. 3. Stretch your thumb by pulling it back gently, holding it, and then releasing it. Repeat 4 times. How to do the exercises  Prayer stretch   1. Start with your palms together in front of your chest just below your chin. 2. Slowly lower your hands toward your waistline, keeping your hands close to your stomach and your palms together until you feel a mild to moderate stretch under your forearms. 3. Hold for at least 15 to 30 seconds. Repeat 2 to 4 times. Wrist flexor stretch   1.  Extend your arm in front of you with your palm up.  2. Bend your wrist, pointing your hand toward the floor. 3. With your other hand, gently bend your wrist farther until you feel a mild to moderate stretch in your forearm. 4. Hold for at least 15 to 30 seconds. Repeat 2 to 4 times. Wrist extensor stretch   1. Repeat steps 1 through 4 of the stretch above, but begin with your extended hand palm down. Follow-up care is a key part of your treatment and safety. Be sure to make and go to all appointments, and call your doctor if you are having problems. It's also a good idea to know your test results and keep a list of the medicines you take. Where can you learn more? Go to https://infotope GmbH.dPoint Technologies. org and sign in to your Kior account. Enter C455 in the AppleTreeBook box to learn more about \"Carpal Tunnel Syndrome: Exercises. \"     If you do not have an account, please click on the \"Sign Up Now\" link. Current as of: March 2, 2020               Content Version: 12.6  © 5568-9778 [employer], [employer]. Care instructions adapted under license by [facility] ([facility]). If you have questions about a medical condition or this instruction, always ask your healthcare professional. [place] [address] any warranty or liability for your use of this information. Patient Education        Wrist Tendinitis: Exercises  Introduction  Here are some examples of exercises for you to try. The exercises may be suggested for a condition or for rehabilitation. Start each exercise slowly. Ease off the exercises if you start to have pain. You will be told when to start these exercises and which ones will work best for you. How to do the exercises  Wrist flexion and extension   4. Place your forearm on a table, with your hand and affected wrist extended beyond the table, palm down. 5. Bend your wrist to move your hand upward and allow your hand to close into a fist, then lower your hand and allow your fingers to relax.  Hold each position for about 6 seconds. 6. Repeat 8 to 12 times. Hand flips   4. While seated, place your forearm and affected wrist on your thigh, palm down. 5. Flip your hand over so the back of your hand rests on your thigh and your palm is up. Alternate between palm up and palm down while keeping your forearm on your thigh. 6. Repeat 8 to 12 times. Wrist radial and ulnar deviation   5. Hold your affected hand out in front of you, palm down. 6. Slowly bend your wrist as far as you can from side to side. Hold each position for about 6 seconds. 7. Repeat 8 to 12 times. Wrist extensor stretch   2. Extend the arm with the affected wrist in front of you and point your fingers toward the floor. 3. With your other hand, gently bend your wrist farther until you feel a mild to moderate stretch in your forearm. 4. Hold the stretch for at least 15 to 30 seconds. 5. Repeat 2 to 4 times. 6. When you can do this stretch with ease and no pain, repeat steps 1 through 4. But this time extend your affected arm in front of you and make a fist with your palm facing down. Then bend your wrist, pointing your fist toward the floor. Wrist flexor stretch   1. Extend the arm with the affected wrist in front of you with your palm facing away from your body. 2. Bend back your wrist, pointing your hand up toward the ceiling. 3. With your other hand, gently bend your wrist farther until you feel a mild to moderate stretch in your forearm. 4. Hold the stretch for at least 15 to 30 seconds. 5. Repeat 2 to 4 times. 6. Repeat steps 1 through 5, but this time extend your affected arm in front of you with your palm facing up. Then bend back your wrist, pointing your hand toward the floor. Follow-up care is a key part of your treatment and safety. Be sure to make and go to all appointments, and call your doctor if you are having problems.  It's also a good idea to know your test results and keep a list of the medicines you take.  Where can you learn more? Go to https://chpepiceweb.healthBuyPlayWin. org and sign in to your Actiont account. Enter W943 in the [facility] box to learn more about \"Wrist Tendinitis: Exercises. \"     If you do not have an account, please click on the \"Sign Up Now\" link. Current as of: March 2, 2020               Content Version: 12.6  © 7733-4560 [employer], [employer]orporated. Care instructions adapted under license by [facility] ([place] [facility]). If you have questions about a medical condition or this instruction, always ask your healthcare professional. [place] [address] any warranty or liability for your use of this information. Erica Becerra received counseling on the following healthy behaviors: medication adherence  Reviewed prior labs and health maintenance. Continue current medications, diet and exercise. Discussed use, benefit, and side effects of prescribed medications. Barriers to medication compliance addressed. Patient given educational materials - see patient instructions. All patient questions answered. Patient voiced understanding.        Electronically signed by Edmar Burgos DO on 2/25/2021 at 11:23 PM  [address]  Dept: [phone]

## 2021-02-23 NOTE — PATIENT INSTRUCTIONS
SURVEY:    You may be receiving a survey from [employer] regarding your visit today. You may get this in the mail, through your MyChart or in your email. Please complete the survey to enable us to provide the highest quality of care to you and your family. If you cannot score us as very good ( 5 Stars) on any question, please feel free to call the office to discuss how we could have made your experience exceptional.     Thank you. Clinical Care Team:  DO Romero Crews Ace, [address] [place] [address] Team:  100 [facility]    Patient Education        Carpal Tunnel Syndrome: Exercises  Introduction  Here are some examples of exercises for you to try. The exercises may be suggested for a condition or for rehabilitation. Start each exercise slowly. Ease off the exercises if you start to have pain. You will be told when to start these exercises and which ones will work best for you. Warm-up stretches  When you no longer have pain or numbness, you can do exercises to help prevent carpal tunnel syndrome from coming back. Do not do any stretch or movement that is uncomfortable or painful. 1. Rotate your wrist up, down, and from side to side. Repeat 4 times. 2. Stretch your fingers far apart. Relax them, and then stretch them again. Repeat 4 times. 3. Stretch your thumb by pulling it back gently, holding it, and then releasing it. Repeat 4 times. How to do the exercises  Prayer stretch   1. Start with your palms together in front of your chest just below your chin. 2. Slowly lower your hands toward your waistline, keeping your hands close to your stomach and your palms together until you feel a mild to moderate stretch under your forearms. 3. Hold for at least 15 to 30 seconds. Repeat 2 to 4 times. Wrist flexor stretch   1.  Extend your arm in front of you with your palm up.  2. Bend your wrist, pointing your hand toward the floor. 3. With your other hand, gently bend your wrist farther until you feel a mild to moderate stretch in your forearm. 4. Hold for at least 15 to 30 seconds. Repeat 2 to 4 times. Wrist extensor stretch   1. Repeat steps 1 through 4 of the stretch above, but begin with your extended hand palm down. Follow-up care is a key part of your treatment and safety. Be sure to make and go to all appointments, and call your doctor if you are having problems. It's also a good idea to know your test results and keep a list of the medicines you take. Where can you learn more? Go to https://Jellycoaster.Popular [email or web address] org and sign in to your ContactMonkey account. Enter U170 in the [employer] box to learn more about \"Carpal Tunnel Syndrome: Exercises. \"     If you do not have an account, please click on the \"Sign Up Now\" link. Current as of: March 2, 2020               Content Version: 12.6  © 6178-1819 Storehouse, [employer]. Care instructions adapted under license by [facility] ([facility]). If you have questions about a medical condition or this instruction, always ask your healthcare professional. [place] [address] any warranty or liability for your use of this information. Patient Education        Wrist Tendinitis: Exercises  Introduction  Here are some examples of exercises for you to try. The exercises may be suggested for a condition or for rehabilitation. Start each exercise slowly. Ease off the exercises if you start to have pain. You will be told when to start these exercises and which ones will work best for you. How to do the exercises  Wrist flexion and extension   4. Place your forearm on a table, with your hand and affected wrist extended beyond the table, palm down. 5. Bend your wrist to move your hand upward and allow your hand to close into a fist, then lower your hand and allow your fingers to relax.  Hold each take.  Where can you learn more? Go to https://chpepiceweb.healthInVisM. org and sign in to your Grow Mobilet account. Enter W417 in the Gousto box to learn more about \"Wrist Tendinitis: Exercises. \"     If you do not have an account, please click on the \"Sign Up Now\" link. Current as of: March 2, 2020               Content Version: 12.6  © 2006-2020 [employer]orporated. Care instructions adapted under license by [facility] ([facility]). If you have questions about a medical condition or this instruction, always ask your healthcare professional. [address] any warranty or liability for your use of this information.

## 2021-03-01 ENCOUNTER — TELEPHONE (OUTPATIENT)
Dept: FAMILY MEDICINE CLINIC | Age: 41
End: 2021-03-01

## 2021-03-01 NOTE — TELEPHONE ENCOUNTER
Patient wants a letter stating that she doesn't have to wear a mask at work - doesn't have voicemail so she will call back later    Health Maintenance   Topic Date Due    Varicella vaccine (1 of 2 - 2-dose childhood series) 03/26/1983    Pneumococcal 0-64 years Vaccine (1 of 1 - PPSV23) 03/26/1988    DTaP/Tdap/Td vaccine (1 - Tdap) 03/26/2001    Flu vaccine (1) 09/01/2020    Cervical cancer screen  01/05/2026    Hepatitis C screen  Completed    HIV screen  Completed    Hepatitis A vaccine  Aged Out    Hepatitis B vaccine  Aged Out    Hib vaccine  Aged Out    Meningococcal (ACWY) vaccine  Aged Out             (applicable per patient's age: Cancer Screenings, Depression Screening, Fall Risk Screening, Immunizations)    Hemoglobin A1C (%)   Date Value   09/19/2018 5.3     AST (U/L)   Date Value   10/18/2020 20     ALT (U/L)   Date Value   10/18/2020 19     BUN (mg/dL)   Date Value   10/18/2020 3 (L)      (goal A1C is < 7)   (goal LDL is <100) need 30-50% reduction from baseline     BP Readings from Last 3 Encounters:   02/23/21 120/70   02/15/21 130/72   01/05/21 124/73    (goal /80)      All Future Testing planned in [facility]:  Lab Frequency Next Occurrence       Next Visit Date:  No future appointments.          Patient Active Problem List:     Chronic neck pain     Onychomycosis     Chronic low back pain without sciatica     Depression with suicidal ideation     Severe recurrent major depression without psychotic features ([facility] [address]

## 2021-03-01 NOTE — TELEPHONE ENCOUNTER
States that since having pneumonia in 2019 and covid 2020 she gets sob very easy. She is using her inhaler several times a day and also haivng dizzy spells with masks. . This has been since having Covid.    Fax to  [phone]

## 2021-03-01 NOTE — LETTER
[facility]  [address] [place] [id number]  Phone: [phone]  Fax: Fior [address], DO        March 1, 2021    Daniel Meraz  1980  [address] [place] [address]    It is my medical opinion that Lora Daley should not have to wear a mask while at work due to a medical concern. If you have any questions or concerns, please don't hesitate to call.     Sincerely,          Edita Velasco, DO

## 2021-03-12 ENCOUNTER — HOSPITAL ENCOUNTER (EMERGENCY)
Age: 41
Discharge: HOME | End: 2021-03-12
Attending: FAMILY MEDICINE
Payer: COMMERCIAL

## 2021-03-12 VITALS
TEMPERATURE: 98.2 F | HEART RATE: 72 BPM | SYSTOLIC BLOOD PRESSURE: 104 MMHG | BODY MASS INDEX: 32.47 KG/M2 | OXYGEN SATURATION: 100 % | WEIGHT: 202 LBS | RESPIRATION RATE: 16 BRPM | HEIGHT: 66 IN | DIASTOLIC BLOOD PRESSURE: 73 MMHG

## 2021-03-12 DIAGNOSIS — Z87.448 HX OF PYELONEPHRITIS: ICD-10-CM

## 2021-03-12 DIAGNOSIS — Z87.440 PERSONAL HISTORY UTI: ICD-10-CM

## 2021-03-12 DIAGNOSIS — N30.01 ACUTE CYSTITIS WITH HEMATURIA: Primary | ICD-10-CM

## 2021-03-12 LAB
-: ABNORMAL
ABSOLUTE EOS #: 0.2 K/UL (ref 0–0.4)
ABSOLUTE IMMATURE GRANULOCYTE: ABNORMAL K/UL (ref 0–0.3)
ABSOLUTE LYMPH #: 3.2 K/UL (ref 1–4.8)
ABSOLUTE MONO #: 1.1 K/UL (ref 0–1)
ALBUMIN SERPL-MCNC: 4.5 G/DL (ref 3.5–5.2)
ALBUMIN/GLOBULIN RATIO: ABNORMAL (ref 1–2.5)
ALP BLD-CCNC: 49 U/L (ref 35–104)
ALT SERPL-CCNC: 31 U/L (ref 5–33)
AMORPHOUS: ABNORMAL
ANION GAP SERPL CALCULATED.3IONS-SCNC: 9 MMOL/L (ref 9–17)
AST SERPL-CCNC: 19 U/L
BACTERIA: ABNORMAL
BASOPHILS # BLD: 1 % (ref 0–2)
BASOPHILS ABSOLUTE: 0.1 K/UL (ref 0–0.2)
BILIRUB SERPL-MCNC: 0.11 MG/DL (ref 0.3–1.2)
BILIRUBIN URINE: NEGATIVE
BUN BLDV-MCNC: 12 MG/DL (ref 6–20)
BUN/CREAT BLD: 15 (ref 9–20)
CALCIUM SERPL-MCNC: 9.5 MG/DL (ref 8.6–10.4)
CASTS UA: ABNORMAL /LPF
CHLORIDE BLD-SCNC: 106 MMOL/L (ref 98–107)
CO2: 25 MMOL/L (ref 20–31)
COLOR: YELLOW
COMMENT UA: ABNORMAL
CREAT SERPL-MCNC: 0.81 MG/DL (ref 0.5–0.9)
CRYSTALS, UA: ABNORMAL /HPF
DIFFERENTIAL TYPE: YES
EOSINOPHILS RELATIVE PERCENT: 2 % (ref 0–5)
EPITHELIAL CELLS UA: ABNORMAL /HPF
GFR AFRICAN AMERICAN: >60 ML/MIN
GFR NON-AFRICAN AMERICAN: >60 ML/MIN
GFR SERPL CREATININE-BSD FRML MDRD: ABNORMAL ML/MIN/{1.73_M2}
GFR SERPL CREATININE-BSD FRML MDRD: ABNORMAL ML/MIN/{1.73_M2}
GLUCOSE BLD-MCNC: 80 MG/DL (ref 70–99)
GLUCOSE URINE: NEGATIVE
HCG(URINE) PREGNANCY TEST: NEGATIVE
HCT VFR BLD CALC: 40.8 % (ref 36–46)
HEMOGLOBIN: 13.7 G/DL (ref 12–16)
IMMATURE GRANULOCYTES: ABNORMAL %
KETONES, URINE: NEGATIVE
LEUKOCYTE ESTERASE, URINE: ABNORMAL
LYMPHOCYTES # BLD: 30 % (ref 15–40)
MCH RBC QN AUTO: 28.9 PG (ref 26–34)
MCHC RBC AUTO-ENTMCNC: 33.7 G/DL (ref 31–37)
MCV RBC AUTO: 85.9 FL (ref 80–100)
MONOCYTES # BLD: 10 % (ref 4–8)
MUCUS: ABNORMAL
NITRITE, URINE: NEGATIVE
NRBC AUTOMATED: ABNORMAL PER 100 WBC
OTHER OBSERVATIONS UA: ABNORMAL
PDW BLD-RTO: 13.7 % (ref 12.1–15.2)
PH UA: 5 (ref 5–8)
PLATELET # BLD: 329 K/UL (ref 140–450)
PLATELET ESTIMATE: ABNORMAL
PMV BLD AUTO: ABNORMAL FL (ref 6–12)
POTASSIUM SERPL-SCNC: 4.1 MMOL/L (ref 3.7–5.3)
PROTEIN UA: ABNORMAL
RBC # BLD: 4.74 M/UL (ref 4–5.2)
RBC # BLD: ABNORMAL 10*6/UL
RBC UA: ABNORMAL /HPF (ref 0–2)
RENAL EPITHELIAL, UA: ABNORMAL /HPF
SEG NEUTROPHILS: 57 % (ref 47–75)
SEGMENTED NEUTROPHILS ABSOLUTE COUNT: 6.2 K/UL (ref 2.5–7)
SODIUM BLD-SCNC: 140 MMOL/L (ref 135–144)
SPECIFIC GRAVITY UA: 1.02 (ref 1–1.03)
TOTAL PROTEIN: 7.3 G/DL (ref 6.4–8.3)
TRICHOMONAS: ABNORMAL
TURBIDITY: CLEAR
URINE HGB: ABNORMAL
UROBILINOGEN, URINE: NORMAL
WBC # BLD: 10.9 K/UL (ref 3.5–11)
WBC # BLD: ABNORMAL 10*3/UL
WBC UA: ABNORMAL /HPF
YEAST: ABNORMAL

## 2021-03-12 PROCEDURE — 99283 EMERGENCY DEPT VISIT LOW MDM: CPT

## 2021-03-12 PROCEDURE — 96375 TX/PRO/DX INJ NEW DRUG ADDON: CPT

## 2021-03-12 PROCEDURE — 80053 COMPREHEN METABOLIC PANEL: CPT

## 2021-03-12 PROCEDURE — 85025 COMPLETE CBC W/AUTO DIFF WBC: CPT

## 2021-03-12 PROCEDURE — 81001 URINALYSIS AUTO W/SCOPE: CPT

## 2021-03-12 PROCEDURE — 81025 URINE PREGNANCY TEST: CPT

## 2021-03-12 PROCEDURE — 96374 THER/PROPH/DIAG INJ IV PUSH: CPT

## 2021-03-12 PROCEDURE — 87186 SC STD MICRODIL/AGAR DIL: CPT

## 2021-03-12 PROCEDURE — 87088 URINE BACTERIA CULTURE: CPT

## 2021-03-12 PROCEDURE — 87086 URINE CULTURE/COLONY COUNT: CPT

## 2021-03-12 PROCEDURE — 6360000002 HC RX W HCPCS: Performed by: FAMILY MEDICINE

## 2021-03-12 RX ORDER — ONDANSETRON 2 MG/ML
4 INJECTION INTRAMUSCULAR; INTRAVENOUS ONCE
Status: COMPLETED | OUTPATIENT
Start: 2021-03-12 | End: 2021-03-12

## 2021-03-12 RX ORDER — SULFAMETHOXAZOLE AND TRIMETHOPRIM 800; 160 MG/1; MG/1
1 TABLET ORAL 2 TIMES DAILY
Qty: 14 TABLET | Refills: 0 | Status: SHIPPED | OUTPATIENT
Start: 2021-03-12 | End: 2021-03-19

## 2021-03-12 RX ORDER — KETOROLAC TROMETHAMINE 30 MG/ML
30 INJECTION, SOLUTION INTRAMUSCULAR; INTRAVENOUS ONCE
Status: COMPLETED | OUTPATIENT
Start: 2021-03-12 | End: 2021-03-12

## 2021-03-12 RX ADMIN — KETOROLAC TROMETHAMINE 30 MG: 30 INJECTION, SOLUTION INTRAMUSCULAR at 18:24

## 2021-03-12 RX ADMIN — ONDANSETRON 4 MG: 2 INJECTION INTRAMUSCULAR; INTRAVENOUS at 18:24

## 2021-03-12 SDOH — HEALTH STABILITY: MENTAL HEALTH: HOW OFTEN DO YOU HAVE A DRINK CONTAINING ALCOHOL?: NEVER

## 2021-03-12 ASSESSMENT — ENCOUNTER SYMPTOMS
DIARRHEA: 1
CONSTIPATION: 1
NAUSEA: 1
VOMITING: 0
BACK PAIN: 1

## 2021-03-12 ASSESSMENT — PAIN DESCRIPTION - PAIN TYPE: TYPE: ACUTE PAIN

## 2021-03-12 ASSESSMENT — PAIN SCALES - GENERAL: PAINLEVEL_OUTOF10: 7

## 2021-03-12 NOTE — ED PROVIDER NOTES
[facility]  eMERGENCY dEPARTMENT eNCOUnter          [address] [facility]       Chief Complaint   Patient presents with    Urinary Tract Infection     Patient arrives to ER today with complaints of dysuria, nausea, and bilateral flank pain that began on Monday. Pt reports hx of pyelonephritis and UTI's in the past.     Flank Pain       Nurses Notes reviewed and I agree except as noted in the HPI. HISTORY OF PRESENT ILLNESS    Rika Sanders is a 47 y.o. female who presents to the emergency room via private vehicle, patient complaining of possible urinary tract infection, symptom onset 4 days ago, patient states \"trying to treat with water and cranberry, not working\". Patient states she is also \"feeling sick all over\", describing generalized achiness, denies any fever chills, has had nausea without vomiting, headache, initially had some diarrhea the more constipation. Patient denies any trauma or falls, denies any strikes of the head. Denies known sick contacts but does patient works in a [employer] where the others may be sick without her knowing. Patient rates her all discomfort 6-7/10. Patient states history of UTIs and pyelonephritis in the past.    PCP: Kirill Link    REVIEW OF SYSTEMS     Review of Systems   Constitutional: Negative for chills and fever. Gastrointestinal: Positive for constipation, diarrhea and nausea. Negative for vomiting. Genitourinary: Negative for flank pain. Musculoskeletal: Positive for back pain and myalgias. Neurological: Positive for headaches. All other systems reviewed and are negative. PAST MEDICAL HISTORY    has a past medical history of Anxiety, Bulging of lumbar intervertebral disc without myelopathy, Chronic back pain, Depression, GERD (gastroesophageal reflux disease), and Substance abuse ([facility] [address] SURGICAL HISTORY      has a past surgical history that includes Pilonidal cyst excision (03/2018);  Colonoscopy (05/26/2020); and Plantar fascia surgery (Left, 06/11/2020). CURRENT MEDICATIONS       Previous Medications    ALBUTEROL SULFATE  (90 BASE) MCG/ACT INHALER    Inhale 2 puffs into the lungs 4 times daily    CETIRIZINE (ZYRTEC) 10 MG TABLET    TAKE 2 TABLET BY MOUTH EVERY DAY    ESCITALOPRAM (LEXAPRO) 5 MG TABLET    Take 1 tablet by mouth daily    GABAPENTIN (NEURONTIN) 400 MG CAPSULE    Take 1 capsule by mouth 3 times daily. HYDROXYZINE (ATARAX) 50 MG TABLET    TAKE 1 TABLET BY MOUTH THREE TIMES DAILY AS NEEDED    METHOCARBAMOL (ROBAXIN) 500 MG TABLET    TAKE 1 TABLET BY MOUTH THREE TIMES DAILY AS NEEDED    MISC. DEVICES (WRIST BRACE) MISC    Neutral wrist splint for bilateral carpal tunnel syndrome, wear as needed for pain and numbness    NORETHINDRONE (AYGESTIN) 5 MG TABLET    Take 1 tablet by mouth daily    OMEPRAZOLE (PRILOSEC) 40 MG DELAYED RELEASE CAPSULE    TAKE 1 CAPSULE BY MOUTH EVERY DAY    TRAZODONE (DESYREL) 50 MG TABLET    TAKE 1 TO 2 TABLETS BY MOUTH NIGHTLY AS NEEDED for sleep       ALLERGIES     has No Known Allergies. FAMILY HISTORY     She indicated that her mother is alive. family history includes Diabetes in her mother. SOCIAL HISTORY      reports that she has quit smoking. Her smoking use included cigarettes. She has a 2.00 pack-year smoking history. She has never used smokeless tobacco. She reports previous alcohol use. She reports previous drug use. PHYSICAL EXAM     INITIAL VITALS:  height is 5' 6\" (1.676 m) and weight is 202 lb (91.6 kg). Her oral temperature is 98.2 °F (36.8 °C). Her blood pressure is 104/73 and her pulse is 72. Her respiration is 16 and oxygen saturation is 100%. Physical Exam   Constitutional: Patient is oriented to person, place, and time. Patient appears well-developed and well-nourished. Patient is active and cooperative. HENT:   Head: Normocephalic and atraumatic. Head is without contusion. Right Ear: Hearing and external ear normal. No drainage. following components:    Monocytes 10 (*)     Absolute Mono # 1.10 (*)     All other components within normal limits   COMPREHENSIVE METABOLIC PANEL W/ REFLEX TO MG FOR LOW K - Abnormal; Notable for the following components: Total Bilirubin 0.11 (*)     All other components within normal limits   MICROSCOPIC URINALYSIS - Abnormal; Notable for the following components:    Bacteria, UA 1+ (*)     All other components within normal limits   CULTURE, URINE   PREGNANCY, URINE       EMERGENCY DEPARTMENT COURSE:   Vitals:    Vitals:    03/12/21 1806   BP: 104/73   Pulse: 72   Resp: 16   Temp: 98.2 °F (36.8 °C)   TempSrc: Oral   SpO2: 100%   Weight: 202 lb (91.6 kg)   Height: 5' 6\" (1.676 m)     Patient history and physical exam taken at bedside, discussed patient symptoms and exam findings, discussed initial work-up to include blood and urine studies, IV access, will give IV ketorolac and IV Zofran for pain and nausea initially. Patient sitting bed semi-Fowlers, acknowledges    OARRS = 190, with regular prescriptions for gabapentin through podiatry, last narcotic prescription 6/2020 through podiatry, last BZD prescription 10/2020 through primary care    EMR reviewed, noting history of chronic back and chronic neck pain, bulging lumbar disks, depression/anxiety, GERD    Urinalysis reviewed, noting 10-20 white blood cells, 1+ bacteria, 1+ leukocytes, negative nitrite, on a clean-catch urine, negative urine pregnancy.     Patient updated on urine findings, waiting blood lab work to come back, acknowledges    Additional lab work-up reviewed, WBC 10.9 with 57% PMNs no reported bandemia, normal kidney function normal electrolytes, normal hepatic function    Discussed with patient overall work-up, we do note abnormalities of urine, urine will be sent for urine culture, we discussed treating with antibiotics now versus waiting for culture results, and upon further discussion with patient go ahead and treat, confirmed allergies, will send prescription to pharmacy for Bactrim DS for 7 days, advised patient stay very well-hydrated, follow-up with primary care, return to ER if any symptoms change worse other concerns, patient acknowledges    FINAL IMPRESSION      1. Acute cystitis with hematuria    2. Personal history UTI    3. Hx of pyelonephritis          DISPOSITION/PLAN   Discharge    PATIENT REFERRED TO:  Monique Hernandez DO  [address] [facility]  [phone]    Call       [facility]  [address] [facility] [address] [phone]    As needed, If symptoms worsen      DISCHARGE MEDICATIONS:  New Prescriptions    SULFAMETHOXAZOLE-TRIMETHOPRIM (BACTRIM DS) 800-160 MG PER TABLET    Take 1 tablet by mouth 2 times daily for 7 days           Summation      Patient Course: Discharge    ED Medications administered this visit:    Medications   ketorolac (TORADOL) injection 30 mg (30 mg Intravenous Given 3/12/21 1824)   ondansetron (ZOFRAN) injection 4 mg (4 mg Intravenous Given 3/12/21 1824)       New Prescriptions from this visit:    New Prescriptions    SULFAMETHOXAZOLE-TRIMETHOPRIM (BACTRIM DS) 800-160 MG PER TABLET    Take 1 tablet by mouth 2 times daily for 7 days       Follow-up:  Monique Hernandez DO  [address] [facility]  [phone]    Call       [facility]  [address] [facility] [address] [phone]    As needed, If symptoms worsen        Final Impression:   1. Acute cystitis with hematuria    2. Personal history UTI    3.  Hx of pyelonephritis               (Please note that portions of this note were completed with a voice recognition program.  Efforts were made to edit the dictations but occasionally words are mis-transcribed.)    MD Alondra Newman MD  03/12/21 [id number]

## 2021-03-14 LAB
CULTURE: ABNORMAL
Lab: ABNORMAL
SPECIMEN DESCRIPTION: ABNORMAL

## 2021-03-22 ENCOUNTER — HOSPITAL ENCOUNTER (OUTPATIENT)
Dept: PREADMISSION TESTING | Age: 41
Setting detail: SPECIMEN
Discharge: HOME | End: 2021-03-22
Payer: COMMERCIAL

## 2021-03-22 DIAGNOSIS — J34.89 NASAL DRAINAGE: ICD-10-CM

## 2021-03-22 DIAGNOSIS — R42 DIZZINESS: ICD-10-CM

## 2021-03-22 DIAGNOSIS — R05.9 COUGH: Primary | ICD-10-CM

## 2021-03-22 DIAGNOSIS — R43.0 LOSS OF SMELL: ICD-10-CM

## 2021-03-22 DIAGNOSIS — R05.9 COUGH: ICD-10-CM

## 2021-03-22 DIAGNOSIS — R43.2 LOSS OF TASTE: ICD-10-CM

## 2021-03-22 PROCEDURE — U0005 INFEC AGEN DETEC AMPLI PROBE: HCPCS

## 2021-03-22 PROCEDURE — U0003 INFECTIOUS AGENT DETECTION BY NUCLEIC ACID (DNA OR RNA); SEVERE ACUTE RESPIRATORY SYNDROME CORONAVIRUS 2 (SARS-COV-2) (CORONAVIRUS DISEASE [COVID-19]), AMPLIFIED PROBE TECHNIQUE, MAKING USE OF HIGH THROUGHPUT TECHNOLOGIES AS DESCRIBED BY CMS-2020-01-R: HCPCS

## 2021-03-23 LAB
SARS-COV-2: ABNORMAL
SARS-COV-2: DETECTED
SOURCE: ABNORMAL

## 2021-03-24 ENCOUNTER — TELEPHONE (OUTPATIENT)
Dept: PRIMARY CARE CLINIC | Age: 41
End: 2021-03-24

## 2021-03-24 ENCOUNTER — CLINICAL DOCUMENTATION (OUTPATIENT)
Dept: FAMILY MEDICINE CLINIC | Age: 41
End: 2021-03-24

## 2021-03-24 DIAGNOSIS — U07.1 COVID-19: ICD-10-CM

## 2021-03-24 PROBLEM — E66.9 OBESITY WITH BODY MASS INDEX 30 OR GREATER: Status: ACTIVE | Noted: 2021-03-24

## 2021-04-21 RX ORDER — METHOCARBAMOL 500 MG/1
TABLET, FILM COATED ORAL
Qty: 180 TABLET | Refills: 1 | Status: SHIPPED | OUTPATIENT
Start: 2021-04-21 | End: 2021-10-29 | Stop reason: SDUPTHER

## 2021-04-21 RX ORDER — CETIRIZINE HYDROCHLORIDE 10 MG/1
TABLET ORAL
Qty: 180 TABLET | Refills: 1 | Status: SHIPPED | OUTPATIENT
Start: 2021-04-21 | End: 2021-09-07 | Stop reason: SDUPTHER

## 2021-04-21 RX ORDER — OMEPRAZOLE 40 MG/1
CAPSULE, DELAYED RELEASE ORAL
Qty: 90 CAPSULE | Refills: 1 | Status: SHIPPED | OUTPATIENT
Start: 2021-04-21 | End: 2021-10-29 | Stop reason: SDUPTHER

## 2021-04-21 RX ORDER — TRAZODONE HYDROCHLORIDE 50 MG/1
TABLET ORAL
Qty: 180 TABLET | Refills: 1 | Status: SHIPPED | OUTPATIENT
Start: 2021-04-21 | End: 2021-10-04

## 2021-04-21 RX ORDER — IBUPROFEN 800 MG/1
800 TABLET ORAL EVERY 6 HOURS PRN
Qty: 120 TABLET | Refills: 3 | Status: SHIPPED | OUTPATIENT
Start: 2021-04-21 | End: 2022-05-02

## 2021-04-21 NOTE — TELEPHONE ENCOUNTER
Last visit:  2/23/2021  Next Visit Date:  No future appointments. Medication List:  Prior to Admission medications    Medication Sig Start Date End Date Taking? Authorizing Provider   methocarbamol (ROBAXIN) 500 MG tablet TAKE 1 TABLET BY MOUTH THREE TIMES DAILY AS NEEDED 3/12/21   Gar Coldshelby, DO   Misc. Devices (WRIST BRACE) MISC Neutral wrist splint for bilateral carpal tunnel syndrome, wear as needed for pain and numbness 2/23/21 Garth Colder, DO   norethindrone (AYGESTIN) 5 MG tablet Take 1 tablet by mouth daily 2/23/21 Gar Colder, DO   omeprazole (PRILOSEC) 40 MG delayed release capsule TAKE 1 CAPSULE BY MOUTH EVERY DAY 12/16/20 Garth Colder, DO   traZODone (DESYREL) 50 MG tablet TAKE 1 TO 2 TABLETS BY MOUTH NIGHTLY AS NEEDED for sleep 12/14/20 Garth Colder, DO   hydrOXYzine (ATARAX) 50 MG tablet TAKE 1 TABLET BY MOUTH THREE TIMES DAILY AS NEEDED 11/19/20   Historical Provider, MD   albuterol sulfate  (90 Base) MCG/ACT inhaler Inhale 2 puffs into the lungs 4 times daily 10/21/20   Buddy Mcdaniel MD   escitalopram (LEXAPRO) 5 MG tablet Take 1 tablet by mouth daily 10/22/20   Buddy Mcdaniel MD   cetirizine (ZYRTEC) 10 MG tablet TAKE 2 TABLET BY MOUTH EVERY DAY 8/31/20   [facility] Colder, DO   gabapentin (NEURONTIN) 400 MG capsule Take 1 capsule by mouth 3 times daily.   6/25/19   Historical Provider, MD

## 2021-05-14 ENCOUNTER — OFFICE VISIT (OUTPATIENT)
Dept: FAMILY MEDICINE CLINIC | Age: 41
End: 2021-05-14
Payer: COMMERCIAL

## 2021-05-14 VITALS
HEART RATE: 74 BPM | DIASTOLIC BLOOD PRESSURE: 60 MMHG | OXYGEN SATURATION: 98 % | HEIGHT: 66 IN | SYSTOLIC BLOOD PRESSURE: 102 MMHG | WEIGHT: 204 LBS | BODY MASS INDEX: 32.78 KG/M2

## 2021-05-14 DIAGNOSIS — G56.01 RIGHT CARPAL TUNNEL SYNDROME: ICD-10-CM

## 2021-05-14 DIAGNOSIS — R04.2 HEMOPTYSIS: ICD-10-CM

## 2021-05-14 DIAGNOSIS — N63.0 BREAST LUMP: Primary | ICD-10-CM

## 2021-05-14 PROBLEM — U07.1 COVID-19: Status: RESOLVED | Noted: 2021-03-24 | Resolved: 2021-05-14

## 2021-05-14 PROCEDURE — 1036F TOBACCO NON-USER: CPT | Performed by: FAMILY MEDICINE

## 2021-05-14 PROCEDURE — 99214 OFFICE O/P EST MOD 30 MIN: CPT | Performed by: FAMILY MEDICINE

## 2021-05-14 PROCEDURE — G8427 DOCREV CUR MEDS BY ELIG CLIN: HCPCS | Performed by: FAMILY MEDICINE

## 2021-05-14 PROCEDURE — G8417 CALC BMI ABV UP PARAM F/U: HCPCS | Performed by: FAMILY MEDICINE

## 2021-05-14 RX ORDER — PREDNISONE 20 MG/1
40 TABLET ORAL DAILY
Qty: 10 TABLET | Refills: 0 | Status: SHIPPED | OUTPATIENT
Start: 2021-05-14 | End: 2021-05-19

## 2021-05-14 NOTE — PROGRESS NOTES
Name: Dejah Guzman  : 1980         Chief Complaint:     Chief Complaint   Patient presents with    Hand Pain    Hip Pain    Breast Mass     left breast lump, noticed a month ago. there was a bruise in the area. of Present Illness:      Dejah Guzman is a 46 y.o.  female who presents with Hand Pain, Hip Pain, and Breast Mass (left breast lump, noticed a month ago. there was a bruise in the area. )      HPI    Lump left breast just above the nipple, 2 months ago had hit the breast at work and had a significant bruise there. Noticed this lump about a month ago and does not believe it has really decreased in size since then. Not tender to touch. Has not noticed any pain or lumps in the axilla. Coughing up a little bit of blood. Had a recent repeat Covid infection. She had had significant illness with Covid in October, but in March she was sick just for a few days, similar to a cold, and lost her taste and smell just for 2 days. Chronic low back pain continues. Sees chiropractor once or twice a month and uses ibuprofen, Tylenol, and Aleve. Also has significant pain in both hands, right radial aspect and left ulnar aspect. Clicking of the left pinky finger, does not get stuck in flexion. Worst pain is in the right palm near the second MCP, right first 3 fingers. She does some stretches, indicates wrist extension, no wrist flexion stretching. Wears a brace at home but has not tried wearing it at work because she needs to have good mobility of the hand. She has applied for a different position with the same company, , and hopes to find out next week if she may be getting that. Medical History:     Patient Active Problem List   Diagnosis    Chronic neck pain    Onychomycosis    Chronic low back pain without sciatica    Obesity with body mass index 30 or greater       Medications:       Prior to Admission medications    Medication Sig Start Date End Date Taking? Authorizing Provider   predniSONE (DELTASONE) 20 MG tablet Take 2 tablets by mouth daily for 5 days 5/14/21 5/19/21 Yes Hayti Manifold, DO   cetirizine (ZYRTEC) 10 MG tablet TAKE 2 TABLET BY MOUTH EVERY DAY 4/21/21  Yes Hayti Manifold, DO   methocarbamol (ROBAXIN) 500 MG tablet 1 tablet by mouth 3 times daily as needed 4/21/21  Yes Hayti Manifold, DO   omeprazole (PRILOSEC) 40 MG delayed release capsule TAKE 1 CAPSULE BY MOUTH EVERY DAY 4/21/21  Yes Hayti Manifold, DO   traZODone (DESYREL) 50 MG tablet TAKE 1 TO 2 TABLETS BY MOUTH NIGHTLY AS NEEDED for sleep 4/21/21  Yes Hayti Manifold, DO   ibuprofen (ADVIL;MOTRIN) 800 MG tablet Take 1 tablet by mouth every 6 hours as needed for Pain (for back pain) 4/21/21  Yes Hayti Manifold, DO   Misc. Devices (WRIST BRACE) MISC Neutral wrist splint for bilateral carpal tunnel syndrome, wear as needed for pain and numbness 2/23/21  Yes Hayti Manifold, DO   norethindrone (AYGESTIN) 5 MG tablet Take 1 tablet by mouth daily 2/23/21  Yes Hayti Manifold, DO   hydrOXYzine (ATARAX) 50 MG tablet TAKE 1 TABLET BY MOUTH THREE TIMES DAILY AS NEEDED 11/19/20  Yes Historical Provider, MD   albuterol sulfate  (90 Base) MCG/ACT inhaler Inhale 2 puffs into the lungs 4 times daily 10/21/20  Yes Shikha Espino MD   escitalopram (LEXAPRO) 5 MG tablet Take 1 tablet by mouth daily 10/22/20  Yes Shikha Espino MD   gabapentin (NEURONTIN) 400 MG capsule Take 1 capsule by mouth 3 times daily. 6/25/19  Yes Historical Provider, MD        Allergies:       Patient has no known allergies. Physical Exam:     Vitals:  /60   Pulse 74   Ht 5' 6\" (1.676 m)   Wt 204 lb (92.5 kg)   SpO2 98%   BMI 32.93 kg/m²   Physical Exam  Constitutional:       Appearance: Normal appearance. She is not ill-appearing. Cardiovascular:      Rate and Rhythm: Normal rate and regular rhythm. Heart sounds: No murmur heard.      Pulmonary:      Effort: Pulmonary effort is normal. Breath sounds: Normal breath sounds. No wheezing or rales. Chest:      Breasts:         Left: Mass (Soft lump immediately superior to the areola, approximately 1.5 to 2 cm diameter, smooth borders, mildly tender) present. Musculoskeletal:      Comments: Right hand negative Tinel, positive Phalen. Some clicking of left fifth finger PIP, no triggering   Psychiatric:         Mood and Affect: Mood normal.         Behavior: Behavior normal.         Assessment & Plan:        Diagnosis Orders   1. Breast lump  US BREAST COMPLETE LEFT    SARAH DIGITAL DIAGNOSTIC W OR WO CAD LEFT   2. Right carpal tunnel syndrome     3. Hemoptysis     Left breast lump in an area of a recent bruise, strongly suspect hematoma but given persistence without decrease in size will check ultrasound and diagnostic mammogram of the area. Right carpal tunnel syndrome very bothersome, works with her hands at her job. Prednisone burst.  Advised of possible agitation but patient has not had same in the past and will monitor for same. Advised to add flexion stretches. If not improving will refer to orthopedics for possible release. Of note, she may be changing jobs which would greatly relieve the amount of stress on her hands. She should find out about this next week. Small amount of hemoptysis, had had Covid, suspect from upper airway source but follow-up if not improving. Former smoker. Young age. Weight loss in the past few months which may be related to new job, no other constitutional symptoms. Requested Prescriptions     Signed Prescriptions Disp Refills    predniSONE (DELTASONE) 20 MG tablet 10 tablet 0     Sig: Take 2 tablets by mouth daily for 5 days         Patient Instructions   SURVEY:    You may be receiving a survey from [employer] regarding your visit today. You may get this in the mail, through your MyChart or in your email.      Please complete the survey to enable us to provide the highest quality of care to you and your family. If you cannot score us as very good ( 5 Stars) on any question, please feel free to call the office to discuss how we could have made your experience exceptional.     Thank you. Clinical Care Team:  DO Manas Childers Lander, [facility] Team:  Trini Gomes received counseling on the following healthy behaviors: medication adherence  Reviewed prior labs and health maintenance. Continue current medications, diet and exercise. Discussed use, benefit, and side effects of prescribed medications. Barriers to medication compliance addressed. Patient given educational materials - see patient instructions. All patient questions answered.   Patient voiced understanding.     signed by Howard Butler DO on 5/17/2021 at 5:58 PM  [address]  [address] [place] [id number]  Dept: [phone]

## 2021-05-17 PROBLEM — F32.A DEPRESSION WITH SUICIDAL IDEATION: Status: RESOLVED | Noted: 2020-10-18 | Resolved: 2021-05-17

## 2021-05-17 PROBLEM — R45.851 DEPRESSION WITH SUICIDAL IDEATION: Status: RESOLVED | Noted: 2020-10-18 | Resolved: 2021-05-17

## 2021-06-08 ENCOUNTER — HOSPITAL ENCOUNTER (OUTPATIENT)
Dept: ULTRASOUND IMAGING | Age: 41
Discharge: HOME | End: 2021-06-10
Payer: COMMERCIAL

## 2021-06-08 ENCOUNTER — HOSPITAL ENCOUNTER (OUTPATIENT)
Dept: MAMMOGRAPHY | Age: 41
Discharge: HOME | End: 2021-06-10
Payer: COMMERCIAL

## 2021-06-08 DIAGNOSIS — N63.0 BREAST LUMP: ICD-10-CM

## 2021-06-08 PROCEDURE — 77066 DX MAMMO INCL CAD BI: CPT

## 2021-06-11 ENCOUNTER — OFFICE VISIT (OUTPATIENT)
Dept: FAMILY MEDICINE CLINIC | Age: 41
End: 2021-06-11
Payer: COMMERCIAL

## 2021-06-11 VITALS
SYSTOLIC BLOOD PRESSURE: 100 MMHG | HEIGHT: 66 IN | BODY MASS INDEX: 32.78 KG/M2 | WEIGHT: 204 LBS | OXYGEN SATURATION: 99 % | HEART RATE: 78 BPM | DIASTOLIC BLOOD PRESSURE: 60 MMHG

## 2021-06-11 DIAGNOSIS — G56.01 RIGHT CARPAL TUNNEL SYNDROME: Primary | ICD-10-CM

## 2021-06-11 DIAGNOSIS — F33.2 MAJOR DEPRESSIVE DISORDER, RECURRENT SEVERE WITHOUT PSYCHOTIC FEATURES (HCC): ICD-10-CM

## 2021-06-11 DIAGNOSIS — M79.641 RIGHT HAND PAIN: ICD-10-CM

## 2021-06-11 PROCEDURE — 1036F TOBACCO NON-USER: CPT | Performed by: FAMILY MEDICINE

## 2021-06-11 PROCEDURE — 99213 OFFICE O/P EST LOW 20 MIN: CPT | Performed by: FAMILY MEDICINE

## 2021-06-11 PROCEDURE — G8427 DOCREV CUR MEDS BY ELIG CLIN: HCPCS | Performed by: FAMILY MEDICINE

## 2021-06-11 PROCEDURE — G8417 CALC BMI ABV UP PARAM F/U: HCPCS | Performed by: FAMILY MEDICINE

## 2021-06-11 RX ORDER — ESCITALOPRAM OXALATE 10 MG/1
10 TABLET ORAL DAILY
Qty: 30 TABLET | Refills: 2 | Status: SHIPPED | OUTPATIENT
Start: 2021-06-11 | End: 2022-02-01 | Stop reason: SDUPTHER

## 2021-06-11 RX ORDER — HYDROXYZINE HYDROCHLORIDE 25 MG/1
25 TABLET, FILM COATED ORAL EVERY 8 HOURS PRN
Qty: 60 TABLET | Refills: 0 | Status: SHIPPED | OUTPATIENT
Start: 2021-06-11 | End: 2021-07-06

## 2021-06-11 SDOH — ECONOMIC STABILITY: FOOD INSECURITY: WITHIN THE PAST 12 MONTHS, YOU WORRIED THAT YOUR FOOD WOULD RUN OUT BEFORE YOU GOT MONEY TO BUY MORE.: NEVER TRUE

## 2021-06-11 SDOH — ECONOMIC STABILITY: FOOD INSECURITY: WITHIN THE PAST 12 MONTHS, THE FOOD YOU BOUGHT JUST DIDN'T LAST AND YOU DIDN'T HAVE MONEY TO GET MORE.: NEVER TRUE

## 2021-06-11 ASSESSMENT — SOCIAL DETERMINANTS OF HEALTH (SDOH): HOW HARD IS IT FOR YOU TO PAY FOR THE VERY BASICS LIKE FOOD, HOUSING, MEDICAL CARE, AND HEATING?: NOT HARD AT ALL

## 2021-06-11 NOTE — PROGRESS NOTES
Name: Radha Bryson  : 1980         Chief Complaint:     Chief Complaint   Patient presents with    Anxiety     high anxiety and depression, fatigue, pain, lack of interest, urge to use but has not. can't get her psych to call her back       History of Present Illness:      Radha Bryson is a 46 y.o.  female who presents with Anxiety (high anxiety and depression, fatigue, pain, lack of interest, urge to use but has not. can't get her psych to call her back)      HPI    R wrist and hand pain continues. Was unable to change jobs at work, so it continues to bother her throughout the day and also at night. Interested in next step. C/o depressed mood, lack of interest in things. Goes to work and J[employer] and otherwise sits on the couch watching tv. Tired all the time. Anxiety flared, went through breast imaging this wk and was very anxious, actually took 3 days off work because of feeling so anxious. Has atarax on-hand, had been rx in hospital TID PRN, and when psych refilled it they wrote for QHS. She has been taking it in the evening most nights recently and sometimes other times. One time took it during work day and found she was a little dazed, so now avoiding morning dosing. Has been on lexapro, can't get a hold of psych to adjust dose. Does feel it's helped, noticed difference changing from trintellix to lexapro. Taking trazodone 50 mg and melatonin 5 mg nightly and sleeps well. She keeps up with her Bible reading and devotionals, which she credits with helping her maintain her sobriety. She has had some urges to abuse alcohol but has avoided it. Medical History:     Patient Active Problem List   Diagnosis    Chronic neck pain    Onychomycosis    Chronic low back pain without sciatica    Obesity with body mass index 30 or greater       Medications:       Prior to Admission medications    Medication Sig Start Date End Date Taking?  Authorizing Provider   hydrOXYzine (ATARAX) 25 MG tablet Take 1 tablet by mouth every 8 hours as needed for Anxiety 6/11/21  Yes Brain Sherif, DO   escitalopram (LEXAPRO) 10 MG tablet Take 1 tablet by mouth daily 6/11/21  Yes Brain Sherif, DO   cetirizine (ZYRTEC) 10 MG tablet TAKE 2 TABLET BY MOUTH EVERY DAY 4/21/21   Brain Sherif, DO   methocarbamol (ROBAXIN) 500 MG tablet 1 tablet by mouth 3 times daily as needed 4/21/21   Brain Sherif, DO   omeprazole (PRILOSEC) 40 MG delayed release capsule TAKE 1 CAPSULE BY MOUTH EVERY DAY 4/21/21   Brain Sherif, DO   traZODone (DESYREL) 50 MG tablet TAKE 1 TO 2 TABLETS BY MOUTH NIGHTLY AS NEEDED for sleep 4/21/21   Brain Sherif, DO   ibuprofen (ADVIL;MOTRIN) 800 MG tablet Take 1 tablet by mouth every 6 hours as needed for Pain (for back pain) 4/21/21   Brain Sherif, DO   Misc. Devices (WRIST BRACE) MISC Neutral wrist splint for bilateral carpal tunnel syndrome, wear as needed for pain and numbness 2/23/21   Brain Sherif, DO   norethindrone (AYGESTIN) 5 MG tablet Take 1 tablet by mouth daily 2/23/21   Brain Sherif, DO   albuterol sulfate  (90 Base) MCG/ACT inhaler Inhale 2 puffs into the lungs 4 times daily 10/21/20   Jean Carlos Timmons MD   gabapentin (NEURONTIN) 400 MG capsule Take 1 capsule by mouth 3 times daily. 6/25/19   Historical Provider, MD        Allergies:       Patient has no known allergies. Physical Exam:     Vitals:  /60   Pulse 78   Ht 5' 6\" (1.676 m)   Wt 204 lb (92.5 kg)   SpO2 99%   BMI 32.93 kg/m²   Physical Exam  Vitals and nursing note reviewed. Constitutional:       General: She is not in acute distress. Appearance: She is well-developed. Pulmonary:      Effort: Pulmonary effort is normal.   Skin:     General: Skin is warm and dry. Neurological:      Mental Status: She is alert and oriented to person, place, and time.    Psychiatric:         Behavior: Behavior normal.         Judgment: Judgment normal.      Comments: Appears sad throughout visit of prescribed medications. Barriers to medication compliance addressed. Patient given educational materials - see patient instructions. All patient questions answered.   Patient voiced understanding.     signed by Galo Champagne DO on 6/15/2021 at 6:53 PM  [address]  Dept: [phone]

## 2021-07-02 ENCOUNTER — HOSPITAL ENCOUNTER (OUTPATIENT)
Dept: GENERAL RADIOLOGY | Age: 41
Discharge: HOME | End: 2021-07-04
Payer: COMMERCIAL

## 2021-07-02 ENCOUNTER — HOSPITAL ENCOUNTER (OUTPATIENT)
Age: 41
Discharge: HOME | End: 2021-07-04
Payer: COMMERCIAL

## 2021-07-02 ENCOUNTER — HOSPITAL ENCOUNTER (OUTPATIENT)
Age: 41
Discharge: HOME | End: 2021-07-02
Payer: COMMERCIAL

## 2021-07-02 ENCOUNTER — OFFICE VISIT (OUTPATIENT)
Dept: FAMILY MEDICINE CLINIC | Age: 41
End: 2021-07-02
Payer: COMMERCIAL

## 2021-07-02 VITALS
HEIGHT: 66 IN | OXYGEN SATURATION: 99 % | DIASTOLIC BLOOD PRESSURE: 70 MMHG | BODY MASS INDEX: 32.78 KG/M2 | HEART RATE: 64 BPM | WEIGHT: 204 LBS | SYSTOLIC BLOOD PRESSURE: 110 MMHG

## 2021-07-02 DIAGNOSIS — F33.2 MAJOR DEPRESSIVE DISORDER, RECURRENT SEVERE WITHOUT PSYCHOTIC FEATURES (HCC): ICD-10-CM

## 2021-07-02 DIAGNOSIS — M79.642 BILATERAL HAND PAIN: ICD-10-CM

## 2021-07-02 DIAGNOSIS — M79.641 BILATERAL HAND PAIN: ICD-10-CM

## 2021-07-02 DIAGNOSIS — L40.9 PSORIASIS: Primary | ICD-10-CM

## 2021-07-02 DIAGNOSIS — M25.50 POLYARTHRALGIA: ICD-10-CM

## 2021-07-02 DIAGNOSIS — L40.9 PSORIASIS: ICD-10-CM

## 2021-07-02 LAB
RHEUMATOID FACTOR: <10 IU/ML
SEDIMENTATION RATE, ERYTHROCYTE: 5 MM (ref 0–20)

## 2021-07-02 PROCEDURE — 86225 DNA ANTIBODY NATIVE: CPT

## 2021-07-02 PROCEDURE — 96372 THER/PROPH/DIAG INJ SC/IM: CPT | Performed by: FAMILY MEDICINE

## 2021-07-02 PROCEDURE — 86812 HLA TYPING A B OR C: CPT

## 2021-07-02 PROCEDURE — 99214 OFFICE O/P EST MOD 30 MIN: CPT | Performed by: FAMILY MEDICINE

## 2021-07-02 PROCEDURE — 73130 X-RAY EXAM OF HAND: CPT

## 2021-07-02 PROCEDURE — 86431 RHEUMATOID FACTOR QUANT: CPT

## 2021-07-02 PROCEDURE — G8417 CALC BMI ABV UP PARAM F/U: HCPCS | Performed by: FAMILY MEDICINE

## 2021-07-02 PROCEDURE — 36415 COLL VENOUS BLD VENIPUNCTURE: CPT

## 2021-07-02 PROCEDURE — 86038 ANTINUCLEAR ANTIBODIES: CPT

## 2021-07-02 PROCEDURE — 85652 RBC SED RATE AUTOMATED: CPT

## 2021-07-02 PROCEDURE — 1036F TOBACCO NON-USER: CPT | Performed by: FAMILY MEDICINE

## 2021-07-02 PROCEDURE — G8427 DOCREV CUR MEDS BY ELIG CLIN: HCPCS | Performed by: FAMILY MEDICINE

## 2021-07-02 RX ORDER — TRIAMCINOLONE ACETONIDE 40 MG/ML
40 INJECTION, SUSPENSION INTRA-ARTICULAR; INTRAMUSCULAR ONCE
Status: COMPLETED | OUTPATIENT
Start: 2021-07-02 | End: 2021-07-02

## 2021-07-02 RX ADMIN — TRIAMCINOLONE ACETONIDE 40 MG: 40 INJECTION, SUSPENSION INTRA-ARTICULAR; INTRAMUSCULAR at 10:48

## 2021-07-02 NOTE — PROGRESS NOTES
MD   hydrOXYzine (ATARAX) 25 MG tablet TAKE 1 TABLET BY MOUTH EVERY 8 HOURS AS NEEDED FOR ANXIETY 7/6/21   Staciadb Burciaga DO   traMADol (ULTRAM) 50 MG tablet Take 1 tablet by mouth every 6 hours as needed for Pain for up to 3 days. Intended supply: 3 days. Take lowest dose possible to manage pain 7/6/21 7/9/21  Stacia Burciaga DO   Misc. Devices (WRIST BRACE) MISC Neutral wrist splint for bilateral carpal tunnel syndrome, wear as needed for pain and numbness 2/23/21   Staciadb Burciaga DO        Allergies:       Patient has no known allergies. Physical Exam:     Vitals:  /70   Pulse 64   Ht 5' 6\" (1.676 m)   Wt 204 lb (92.5 kg)   SpO2 99%   BMI 32.93 kg/m²   Physical Exam  Constitutional:       Appearance: Normal appearance. She is not ill-appearing. Musculoskeletal:      Comments: Tender swelling R 2nd MCP. TTP cuca 1st CMC and CMP. No erythema or warmth of joints of hands or wrists. Skin:     Comments: Small patches of erythematous macular rash with white scaling on cuca dorsal hands, L olecranon         Data:     Lab Results   Component Value Date     03/12/2021    K 4.1 03/12/2021     03/12/2021    CO2 25 03/12/2021    BUN 12 03/12/2021    CREATININE 0.81 03/12/2021    GLUCOSE 80 03/12/2021    PROT 7.3 03/12/2021    LABALBU 4.5 03/12/2021    BILITOT 0.11 03/12/2021    ALKPHOS 49 03/12/2021    AST 19 03/12/2021    ALT 31 03/12/2021     Lab Results   Component Value Date    WBC 10.9 03/12/2021    RBC 4.74 03/12/2021    HGB 13.7 03/12/2021    HCT 40.8 03/12/2021    MCV 85.9 03/12/2021    MCH 28.9 03/12/2021    MCHC 33.7 03/12/2021    RDW 13.7 03/12/2021     03/12/2021    MPV NOT REPORTED 03/12/2021     Lab Results   Component Value Date    TSH 0.69 10/22/2019     Lab Results   Component Value Date    LABA1C 5.3 09/19/2018         Assessment & Plan:        Diagnosis Orders   1.  Psoriasis  CHINO Screen with Reflex    Rheumatoid Factor    Sedimentation Rate    HLA-B27 Antigen triamcinolone acetonide (KENALOG-40) injection 40 mg   2. Polyarthralgia  CHINO Screen with Reflex    Rheumatoid Factor    Sedimentation Rate    HLA-B27 Antigen    triamcinolone acetonide (KENALOG-40) injection 40 mg   3. Bilateral hand pain  CHINO Screen with Reflex    Rheumatoid Factor    Sedimentation Rate    HLA-B27 Antigen    XR HAND RIGHT (MIN 3 VIEWS)    XR HAND LEFT (MIN 3 VIEWS)    triamcinolone acetonide (KENALOG-40) injection 40 mg   4. Major depressive disorder, recurrent severe without psychotic features ([facility] [address]     Bilateral hand pain, signs of inflammation in right second MCP, does also have mild psoriasis. Checking labs for inflammatory arthritis including psoriatic arthritis. Xrays. Kenalog injection given. Was already referred to ortho. Depression controlled, cont same dosing lexapro. Requested Prescriptions      No prescriptions requested or ordered in this encounter         There are no Patient Instructions on file for this visit. Micheal Donal received counseling on the following healthy behaviors: medication adherence  Reviewed prior labs and health maintenance. Continue current medications, diet and exercise. Discussed use, benefit, and side effects of prescribed medications. Barriers to medication compliance addressed. Patient given educational materials - see patient instructions. All patient questions answered.   Patient voiced understanding.     signed by Narciso Kayser, DO on 7/6/2021 at 11:17 PM  [address]  Dept: [phone]

## 2021-07-04 LAB
ANTI DNA DOUBLE STRANDED: 2.5 IU/ML
ANTI-NUCLEAR ANTIBODY (ANA): NEGATIVE
ENA ANTIBODIES SCREEN: 0.1 U/ML
HLA B27: NEGATIVE

## 2021-07-06 ENCOUNTER — TELEPHONE (OUTPATIENT)
Dept: FAMILY MEDICINE CLINIC | Age: 41
End: 2021-07-06

## 2021-07-06 DIAGNOSIS — M79.641 BILATERAL HAND PAIN: Primary | ICD-10-CM

## 2021-07-06 DIAGNOSIS — M79.642 BILATERAL HAND PAIN: Primary | ICD-10-CM

## 2021-07-06 RX ORDER — TRAMADOL HYDROCHLORIDE 50 MG/1
50 TABLET ORAL EVERY 6 HOURS PRN
Qty: 12 TABLET | Refills: 0 | Status: SHIPPED | OUTPATIENT
Start: 2021-07-06 | End: 2021-07-09

## 2021-07-06 NOTE — TELEPHONE ENCOUNTER
----- Message from Kizzy Valenzuela DO sent at 7/6/2021  8:18 AM EDT -----  Blood testing for inflammatory conditions was all negative. Has been referred to ortho.

## 2021-07-06 NOTE — TELEPHONE ENCOUNTER
Patient notified,     States that her hands felt better for 2 days. Now in bad pain and swelling in her hands, right worse than left. Asking for something to help.  Unable to go to work today, has appt on Friday with Caden Garvey

## 2021-07-27 DIAGNOSIS — M79.641 BILATERAL HAND PAIN: ICD-10-CM

## 2021-07-27 DIAGNOSIS — M79.642 BILATERAL HAND PAIN: ICD-10-CM

## 2021-07-27 RX ORDER — TRAMADOL HYDROCHLORIDE 50 MG/1
TABLET ORAL
Qty: 12 TABLET | Refills: 0 | OUTPATIENT
Start: 2021-07-27

## 2021-09-07 ENCOUNTER — TELEPHONE (OUTPATIENT)
Dept: FAMILY MEDICINE CLINIC | Age: 41
End: 2021-09-07

## 2021-09-07 RX ORDER — ACETAMINOPHEN AND CODEINE PHOSPHATE 120; 12 MG/5ML; MG/5ML
1 SOLUTION ORAL DAILY
Qty: 30 TABLET | Refills: 5 | Status: SHIPPED | OUTPATIENT
Start: 2021-09-07 | End: 2022-01-21

## 2021-09-07 RX ORDER — CETIRIZINE HYDROCHLORIDE 10 MG/1
TABLET ORAL
Qty: 90 TABLET | Refills: 1 | Status: SHIPPED | OUTPATIENT
Start: 2021-09-07 | End: 2021-12-20 | Stop reason: ALTCHOICE

## 2021-09-07 NOTE — TELEPHONE ENCOUNTER
Last OV: 7/2/2021    Patient called requesting to be placed back on Birth control as well as placed on Zyrtec 10mg one daily as insurance will not pay for 2 daily. - pharmacy preference - [facility].

## 2021-09-07 NOTE — TELEPHONE ENCOUNTER
rx sent. Zyrtec 10 mg daily and progestin-only birth control, no estrogen d/t age and recent smoking. Needs to take every day at same time to be effective, usually HS dosing.

## 2021-09-19 ENCOUNTER — HOSPITAL ENCOUNTER (EMERGENCY)
Age: 41
Discharge: HOME | End: 2021-09-19
Attending: EMERGENCY MEDICINE
Payer: COMMERCIAL

## 2021-09-19 VITALS
WEIGHT: 187 LBS | BODY MASS INDEX: 28.34 KG/M2 | RESPIRATION RATE: 16 BRPM | OXYGEN SATURATION: 98 % | HEART RATE: 85 BPM | DIASTOLIC BLOOD PRESSURE: 61 MMHG | TEMPERATURE: 101.5 F | HEIGHT: 68 IN | SYSTOLIC BLOOD PRESSURE: 110 MMHG

## 2021-09-19 DIAGNOSIS — N12 PYELONEPHRITIS: Primary | ICD-10-CM

## 2021-09-19 LAB
-: ABNORMAL
ABSOLUTE EOS #: 0 K/UL (ref 0–0.4)
ABSOLUTE IMMATURE GRANULOCYTE: ABNORMAL K/UL (ref 0–0.3)
ABSOLUTE LYMPH #: 1 K/UL (ref 1–4.8)
ABSOLUTE MONO #: 1 K/UL (ref 0–1)
AMORPHOUS: ABNORMAL
ANION GAP SERPL CALCULATED.3IONS-SCNC: 11 MMOL/L (ref 9–17)
BACTERIA: ABNORMAL
BASOPHILS # BLD: 0 % (ref 0–2)
BASOPHILS ABSOLUTE: 0 K/UL (ref 0–0.2)
BILIRUBIN URINE: NEGATIVE
BUN BLDV-MCNC: 7 MG/DL (ref 6–20)
BUN/CREAT BLD: 8 (ref 9–20)
CALCIUM SERPL-MCNC: 9.2 MG/DL (ref 8.6–10.4)
CASTS UA: ABNORMAL /LPF
CHLORIDE BLD-SCNC: 99 MMOL/L (ref 98–107)
CO2: 26 MMOL/L (ref 20–31)
COLOR: YELLOW
COMMENT UA: ABNORMAL
CREAT SERPL-MCNC: 0.88 MG/DL (ref 0.5–0.9)
CRYSTALS, UA: ABNORMAL /HPF
DIFFERENTIAL TYPE: YES
EOSINOPHILS RELATIVE PERCENT: 0 % (ref 0–5)
EPITHELIAL CELLS UA: ABNORMAL /HPF
GFR AFRICAN AMERICAN: >60 ML/MIN
GFR NON-AFRICAN AMERICAN: >60 ML/MIN
GFR SERPL CREATININE-BSD FRML MDRD: ABNORMAL ML/MIN/{1.73_M2}
GFR SERPL CREATININE-BSD FRML MDRD: ABNORMAL ML/MIN/{1.73_M2}
GLUCOSE BLD-MCNC: 95 MG/DL (ref 70–99)
GLUCOSE URINE: NEGATIVE
HCG(URINE) PREGNANCY TEST: NEGATIVE
HCT VFR BLD CALC: 40.5 % (ref 36–46)
HEMOGLOBIN: 13.9 G/DL (ref 12–16)
IMMATURE GRANULOCYTES: ABNORMAL %
KETONES, URINE: NEGATIVE
LEUKOCYTE ESTERASE, URINE: ABNORMAL
LYMPHOCYTES # BLD: 8 % (ref 15–40)
MCH RBC QN AUTO: 29.2 PG (ref 26–34)
MCHC RBC AUTO-ENTMCNC: 34.3 G/DL (ref 31–37)
MCV RBC AUTO: 85.2 FL (ref 80–100)
MONOCYTES # BLD: 8 % (ref 4–8)
MUCUS: ABNORMAL
NITRITE, URINE: NEGATIVE
NRBC AUTOMATED: ABNORMAL PER 100 WBC
OTHER OBSERVATIONS UA: ABNORMAL
PDW BLD-RTO: 12.6 % (ref 12.1–15.2)
PH UA: 8 (ref 5–8)
PLATELET # BLD: 332 K/UL (ref 140–450)
PLATELET ESTIMATE: ABNORMAL
PMV BLD AUTO: ABNORMAL FL (ref 6–12)
POTASSIUM SERPL-SCNC: 3.9 MMOL/L (ref 3.7–5.3)
PROTEIN UA: ABNORMAL
RBC # BLD: 4.76 M/UL (ref 4–5.2)
RBC # BLD: ABNORMAL 10*6/UL
RBC UA: ABNORMAL /HPF (ref 0–2)
RENAL EPITHELIAL, UA: ABNORMAL /HPF
SEG NEUTROPHILS: 84 % (ref 47–75)
SEGMENTED NEUTROPHILS ABSOLUTE COUNT: 10 K/UL (ref 2.5–7)
SODIUM BLD-SCNC: 136 MMOL/L (ref 135–144)
SPECIFIC GRAVITY UA: 1.01 (ref 1–1.03)
TRICHOMONAS: ABNORMAL
TURBIDITY: ABNORMAL
URINE HGB: ABNORMAL
UROBILINOGEN, URINE: NORMAL
WBC # BLD: 12 K/UL (ref 3.5–11)
WBC # BLD: ABNORMAL 10*3/UL
WBC UA: ABNORMAL /HPF
YEAST: ABNORMAL

## 2021-09-19 PROCEDURE — 6360000002 HC RX W HCPCS: Performed by: EMERGENCY MEDICINE

## 2021-09-19 PROCEDURE — 86403 PARTICLE AGGLUT ANTBDY SCRN: CPT

## 2021-09-19 PROCEDURE — 85025 COMPLETE CBC W/AUTO DIFF WBC: CPT

## 2021-09-19 PROCEDURE — 2580000003 HC RX 258: Performed by: EMERGENCY MEDICINE

## 2021-09-19 PROCEDURE — 81025 URINE PREGNANCY TEST: CPT

## 2021-09-19 PROCEDURE — 87077 CULTURE AEROBIC IDENTIFY: CPT

## 2021-09-19 PROCEDURE — 87186 SC STD MICRODIL/AGAR DIL: CPT

## 2021-09-19 PROCEDURE — 80048 BASIC METABOLIC PNL TOTAL CA: CPT

## 2021-09-19 PROCEDURE — 96365 THER/PROPH/DIAG IV INF INIT: CPT

## 2021-09-19 PROCEDURE — 87086 URINE CULTURE/COLONY COUNT: CPT

## 2021-09-19 PROCEDURE — 81001 URINALYSIS AUTO W/SCOPE: CPT

## 2021-09-19 PROCEDURE — 96375 TX/PRO/DX INJ NEW DRUG ADDON: CPT

## 2021-09-19 PROCEDURE — 99283 EMERGENCY DEPT VISIT LOW MDM: CPT

## 2021-09-19 RX ORDER — NITROFURANTOIN 25; 75 MG/1; MG/1
100 CAPSULE ORAL 2 TIMES DAILY
Qty: 14 CAPSULE | Refills: 0 | Status: SHIPPED | OUTPATIENT
Start: 2021-09-19 | End: 2021-09-26

## 2021-09-19 RX ORDER — ONDANSETRON 2 MG/ML
4 INJECTION INTRAMUSCULAR; INTRAVENOUS ONCE
Status: COMPLETED | OUTPATIENT
Start: 2021-09-19 | End: 2021-09-19

## 2021-09-19 RX ORDER — KETOROLAC TROMETHAMINE 30 MG/ML
30 INJECTION, SOLUTION INTRAMUSCULAR; INTRAVENOUS ONCE
Status: COMPLETED | OUTPATIENT
Start: 2021-09-19 | End: 2021-09-19

## 2021-09-19 RX ORDER — DICLOFENAC SODIUM 75 MG/1
75 TABLET, DELAYED RELEASE ORAL 2 TIMES DAILY
COMMUNITY
Start: 2021-08-09 | End: 2021-12-20 | Stop reason: ALTCHOICE

## 2021-09-19 RX ORDER — 0.9 % SODIUM CHLORIDE 0.9 %
1000 INTRAVENOUS SOLUTION INTRAVENOUS ONCE
Status: COMPLETED | OUTPATIENT
Start: 2021-09-19 | End: 2021-09-19

## 2021-09-19 RX ADMIN — ONDANSETRON 4 MG: 2 INJECTION INTRAMUSCULAR; INTRAVENOUS at 16:33

## 2021-09-19 RX ADMIN — SODIUM CHLORIDE 1000 ML: 9 INJECTION, SOLUTION INTRAVENOUS at 15:51

## 2021-09-19 RX ADMIN — CEFTRIAXONE SODIUM 1000 MG: 1 INJECTION, POWDER, FOR SOLUTION INTRAMUSCULAR; INTRAVENOUS at 15:51

## 2021-09-19 RX ADMIN — KETOROLAC TROMETHAMINE 30 MG: 30 INJECTION, SOLUTION INTRAMUSCULAR; INTRAVENOUS at 16:34

## 2021-09-19 ASSESSMENT — PAIN SCALES - GENERAL
PAINLEVEL_OUTOF10: 8
PAINLEVEL_OUTOF10: 8

## 2021-09-19 ASSESSMENT — PAIN DESCRIPTION - DESCRIPTORS: DESCRIPTORS: SORE;TENDER

## 2021-09-19 ASSESSMENT — PAIN DESCRIPTION - LOCATION: LOCATION: BACK

## 2021-09-19 ASSESSMENT — PAIN DESCRIPTION - FREQUENCY: FREQUENCY: CONTINUOUS

## 2021-09-19 ASSESSMENT — PAIN DESCRIPTION - ORIENTATION: ORIENTATION: LOWER

## 2021-09-19 NOTE — ED PROVIDER NOTES
[facility] COMPLAINT    Chief Complaint   Patient presents with    Urinary Tract Infection     burning and painful to urinate for a few days and a fever today at home     Fever       HPI    Jerry Jones is a 46 y.o. female who presentsto ED from home. By car. With complaint of burning and painful urination for several days  Onset several days ago. Patient presents with nausea and low back pain. Intensity of symptoms moderate pain in the lower back. Location of symptoms low back. Patient presents with burning and painful urination for several days. Patient also had fever at home.       PAST MEDICAL HISTORY    Past Medical History:   Diagnosis Date    Anxiety     Bulging of lumbar intervertebral disc without myelopathy     L1    Chronic back pain     Depression     GERD (gastroesophageal reflux disease)     Substance abuse (HCC)     alcohol and opiates       SURGICAL HISTORY    Past Surgical History:   Procedure Laterality Date    COLONOSCOPY  05/26/2020    PILONIDAL CYST EXCISION  03/2018    PLANTAR FASCIA SURGERY Left 06/11/2020       CURRENT MEDICATIONS    Current Outpatient Rx   Medication Sig Dispense Refill    diclofenac (VOLTAREN) 75 MG EC tablet Take 75 mg by mouth 2 times daily      nitrofurantoin, macrocrystal-monohydrate, (MACROBID) 100 MG capsule Take 1 capsule by mouth 2 times daily for 7 days 14 capsule 0    cetirizine (ZYRTEC) 10 MG tablet TAKE 1 TABLET BY MOUTH EVERY DAY 90 tablet 1    norethindrone (ORTHO MICRONOR) 0.35 MG tablet Take 1 tablet by mouth daily 30 tablet 5    hydrOXYzine (ATARAX) 25 MG tablet TAKE 1 TABLET BY MOUTH EVERY 8 HOURS AS NEEDED FOR ANXIETY 60 tablet 2    escitalopram (LEXAPRO) 10 MG tablet Take 1 tablet by mouth daily 30 tablet 2    methocarbamol (ROBAXIN) 500 MG tablet 1 tablet by mouth 3 times daily as needed 180 tablet 1    omeprazole (PRILOSEC) 40 MG delayed release capsule TAKE 1 CAPSULE BY MOUTH EVERY DAY 90 capsule 1    traZODone (DESYREL) 50 MG tablet TAKE 1 TO 2 TABLETS BY MOUTH NIGHTLY AS NEEDED for sleep 180 tablet 1    albuterol sulfate  (90 Base) MCG/ACT inhaler Inhale 2 puffs into the lungs 4 times daily 1 Inhaler 3    gabapentin (NEURONTIN) 400 MG capsule Take 1 capsule by mouth 3 times daily. 0    ibuprofen (ADVIL;MOTRIN) 800 MG tablet Take 1 tablet by mouth every 6 hours as needed for Pain (for back pain) 120 tablet 3    Misc. Devices (WRIST BRACE) MISC Neutral wrist splint for bilateral carpal tunnel syndrome, wear as needed for pain and numbness 2 each 0       ALLERGIES    No Known Allergies    FAMILY HISTORY    Family History   Problem Relation Age of Onset    Diabetes Mother        SOCIAL HISTORY    Social History     Socioeconomic History    Marital status:      Spouse name: None    Number of children: None    Years of education: None    Highest education level: None   Occupational History    None   Tobacco Use    Smoking status: Former Smoker     Packs/day: 1.00     Years: 2.00     Pack years: 2.00     Types: Cigarettes    Smokeless tobacco: Never Used   Vaping Use    Vaping Use: Never used   Substance and Sexual Activity    Alcohol use: Not Currently     Comment: in the past    Drug use: Not Currently     Comment: Pt has been sober for 30 days    Sexual activity: Not Currently   Other Topics Concern    None   Social History Narrative    None     Social Determinants of Health     Financial Resource Strain: Low Risk     Difficulty of Paying Living Expenses: Not hard at all   Food Insecurity: No Food Insecurity    Worried About Running Out of Food in the Last Year: Never true    Yadiel of Food in the Last Year: Never true   Transportation Needs:     Lack of Transportation (Medical):      Lack of Transportation (Non-Medical):    Physical Activity:     Days of Exercise per Week:     Minutes of Exercise per Session:    Stress:     Feeling of Stress :    Social Connections:     Frequency of Communication with Friends and Family:     Frequency of Social Gatherings with Friends and Family:     Attends Je[employer] witness Services:     Active Member of Clubs or Organizations:     Attends Club or Organization Meetings:     Marital Status:    Intimate Partner Violence:     Fear of Current or Ex-Partner:     Emotionally Abused:     Physically Abused:     Sexually Abused:            Review of Systems:  Constitutional: Positive for chills and fever eyes:  Denies photophobia or discharge   HENT:  Denies sore throat or ear pain   Respiratory:  Denies cough or shortness of breath   Cardiovascular:  Denies chest pain, palpitations or swelling   GI:  Denies abdominal pain, nausea, vomiting, or diarrhea   Musculoskeletal: Positive for low back pain skin:  Denies rash   Neurologic:  Denies headache, focal weakness or sensory changes   Endocrine:  Denies polyuria or polydypsia   Lymphatic:  Denies swollen glands   Psychiatric:  Denies depression, suicidal ideation or homicidal ideation   All systems negative except as marked. PHYSICAL EXAM    VITAL SIGNS: /61   Pulse 85   Temp 101.5 °F (38.6 °C) (Oral)   Resp 16   Ht 5' 8\" (1.727 m)   Wt 187 lb (84.8 kg)   LMP  (LMP Unknown)   SpO2 98%   BMI 28.43 kg/m²    Constitutional: Ill-appearing female febrile HENT:  Normocephalic, Atraumatic, Bilateral external ears normal, Oropharynx moist, No oral exudates, Nose normal. Neck- Normal range of motion, No tenderness, Supple, No stridor. Eyes:  PERRL, EOMI, Conjunctiva normal, No discharge. Respiratory:  Normal breath sounds, No respiratory distress, No wheezing, No chest tenderness. Cardiovascular:  Normal heart rate, Normal rhythm, No murmurs, No rubs, No gallops. GI:  Bowel sounds normal, Soft, No tenderness, No masses, No pulsatile masses. : External genitalia appear normal, No masses or lesions. No discharge. No CVA tenderness.    Musculoskeletal:  Intact distal pulses, No edema, No tenderness, No cyanosis, No clubbing. Good range of motion in all major joints. No tenderness to palpation or major deformities noted. Back- No tenderness. Integument:  Warm, Dry, No erythema, No rash. Lymphatic:  No lymphadenopathy noted. Neurologic:  Alert & oriented x 3, Normal motor function, Normal sensory function, No focal deficits noted. Psychiatric:  Affect normal, Judgment normal, Mood normal.         RADIOLOGY    No orders to display       PROCEDURES        Labs  Labs Reviewed   URINALYSIS - Abnormal; Notable for the following components:       Result Value    Turbidity UA CLOUDY (*)     Urine Hgb 2+ (*)     Protein, UA 2+ (*)     Leukocyte Esterase, Urine 3+ (*)     All other components within normal limits   MICROSCOPIC URINALYSIS - Abnormal; Notable for the following components:    Bacteria, UA 3+ (*)     All other components within normal limits   CBC WITH AUTO DIFFERENTIAL - Abnormal; Notable for the following components:    WBC 12.0 (*)     Seg Neutrophils 84 (*)     Lymphocytes 8 (*)     Segs Absolute 10.00 (*)     All other components within normal limits   BASIC METABOLIC PANEL - Abnormal; Notable for the following components:    Bun/Cre Ratio 8 (*)     All other components within normal limits   CULTURE, URINE   PREGNANCY, URINE             Summation      Patient Course: Patient was given IV fluids in ED. Patient is given Rocephin. Patient feels better and wants to go home. Patient will be sent home on Macrobid twice daily for 7 days. The warning signs were discussed. Return to ED if worse.       ED Medications administered this visit:    Medications   0.9 % sodium chloride bolus (1,000 mLs IntraVENous New Bag 9/19/21 1551)   cefTRIAXone (ROCEPHIN) 1000 mg IVPB in 50 mL D5W minibag (0 mg IntraVENous Stopped 9/19/21 1626)   ondansetron (ZOFRAN) injection 4 mg (4 mg IntraVENous Given 9/19/21 1633)   ketorolac (TORADOL) injection 30 mg (30 mg IntraVENous Given 9/19/21 1634)       New Prescriptions from this visit:    New Prescriptions    NITROFURANTOIN, MACROCRYSTAL-MONOHYDRATE, (MACROBID) 100 MG CAPSULE    Take 1 capsule by mouth 2 times daily for 7 days       Follow-up:  No follow-up provider specified. Final Impression:   1.  Pyelonephritis               (Please note that portions of this note were completed with a voice recognition program.  Efforts were made to edit the dictations but occasionally words are mis-transcribed.)       Manas Romo MD  09/19/21 [address] [place] [address]  09/19/21 [id number]

## 2021-09-21 LAB
CULTURE: ABNORMAL
CULTURE: ABNORMAL
Lab: ABNORMAL
SPECIMEN DESCRIPTION: ABNORMAL

## 2021-10-04 RX ORDER — TRAZODONE HYDROCHLORIDE 50 MG/1
TABLET ORAL
Qty: 60 TABLET | Refills: 2 | Status: SHIPPED | OUTPATIENT
Start: 2021-10-04 | End: 2021-11-10 | Stop reason: SDUPTHER

## 2021-10-29 ENCOUNTER — OFFICE VISIT (OUTPATIENT)
Dept: FAMILY MEDICINE CLINIC | Age: 41
End: 2021-10-29
Payer: COMMERCIAL

## 2021-10-29 ENCOUNTER — HOSPITAL ENCOUNTER (OUTPATIENT)
Age: 41
Setting detail: SPECIMEN
Discharge: HOME | End: 2021-10-29
Payer: COMMERCIAL

## 2021-10-29 VITALS
HEIGHT: 66 IN | OXYGEN SATURATION: 96 % | WEIGHT: 191 LBS | RESPIRATION RATE: 14 BRPM | BODY MASS INDEX: 30.7 KG/M2 | SYSTOLIC BLOOD PRESSURE: 104 MMHG | DIASTOLIC BLOOD PRESSURE: 68 MMHG | HEART RATE: 79 BPM

## 2021-10-29 DIAGNOSIS — N90.89 VULVAR IRRITATION: ICD-10-CM

## 2021-10-29 DIAGNOSIS — R30.0 BURNING WITH URINATION: Primary | ICD-10-CM

## 2021-10-29 DIAGNOSIS — K59.09 CHRONIC CONSTIPATION: ICD-10-CM

## 2021-10-29 DIAGNOSIS — R30.0 BURNING WITH URINATION: ICD-10-CM

## 2021-10-29 LAB
BILIRUBIN, POC: NEGATIVE
BLOOD URINE, POC: NORMAL
CLARITY, POC: NORMAL
COLOR, POC: YELLOW
GLUCOSE URINE, POC: NEGATIVE
KETONES, POC: NEGATIVE
LEUKOCYTE EST, POC: NEGATIVE
NITRITE, POC: NEGATIVE
PH, POC: 6.5
PROTEIN, POC: NEGATIVE
SPECIFIC GRAVITY, POC: 1.01
UROBILINOGEN, POC: 0.2

## 2021-10-29 PROCEDURE — 87660 TRICHOMONAS VAGIN DIR PROBE: CPT

## 2021-10-29 PROCEDURE — G8484 FLU IMMUNIZE NO ADMIN: HCPCS | Performed by: FAMILY MEDICINE

## 2021-10-29 PROCEDURE — 87480 CANDIDA DNA DIR PROBE: CPT

## 2021-10-29 PROCEDURE — 87491 CHLMYD TRACH DNA AMP PROBE: CPT

## 2021-10-29 PROCEDURE — G8427 DOCREV CUR MEDS BY ELIG CLIN: HCPCS | Performed by: FAMILY MEDICINE

## 2021-10-29 PROCEDURE — 99214 OFFICE O/P EST MOD 30 MIN: CPT | Performed by: FAMILY MEDICINE

## 2021-10-29 PROCEDURE — G8417 CALC BMI ABV UP PARAM F/U: HCPCS | Performed by: FAMILY MEDICINE

## 2021-10-29 PROCEDURE — 81002 URINALYSIS NONAUTO W/O SCOPE: CPT | Performed by: FAMILY MEDICINE

## 2021-10-29 PROCEDURE — 87510 GARDNER VAG DNA DIR PROBE: CPT

## 2021-10-29 PROCEDURE — 87591 N.GONORRHOEAE DNA AMP PROB: CPT

## 2021-10-29 PROCEDURE — 1036F TOBACCO NON-USER: CPT | Performed by: FAMILY MEDICINE

## 2021-10-29 RX ORDER — GABAPENTIN 600 MG/1
TABLET ORAL
COMMUNITY
Start: 2021-10-27 | End: 2022-05-02 | Stop reason: SDUPTHER

## 2021-10-29 RX ORDER — METHOCARBAMOL 500 MG/1
TABLET, FILM COATED ORAL
Qty: 180 TABLET | Refills: 1 | Status: SHIPPED | OUTPATIENT
Start: 2021-10-29 | End: 2021-12-20

## 2021-10-29 RX ORDER — OMEPRAZOLE 40 MG/1
CAPSULE, DELAYED RELEASE ORAL
Qty: 90 CAPSULE | Refills: 1 | Status: SHIPPED | OUTPATIENT
Start: 2021-10-29 | End: 2022-05-23

## 2021-10-29 NOTE — PROGRESS NOTES
Name: Mary Anderson  : 1980         Chief Complaint:     Chief Complaint   Patient presents with    Urinary Tract Infection     burning with urination x 1 month.  Sexually Transmitted Diseases    Constipation     Pt bowel routine has not been normal, will go daily and then not for 2-3 days       History of Present Illness:      Mary Anderson is a 46 y.o.  female who presents with Urinary Tract Infection (burning with urination x 1 month.), Sexually Transmitted Diseases, and Constipation (Pt bowel routine has not been normal, will go daily and then not for 2-3 days)      HPI    Febrile UTI in September and still feels things aren't quite right, burning after urination. Finished antibiotics and overall feels well. Does complain of frequent constipation and bloating. She has had GI trouble for over a yr, had scopes May 2020. Was dx IBS-C and given linzess which she would use on a prn basis, caused significant diarrhea which limited her use of the med. The past month has had a lot of trouble with bloating and constipation. Stools hard to pass, feel oily. Currently taking benefiber with probiotics, 2 tsp once a day, sometimes metamucil. Eats prunes, bananas, apples. Drinks 1-1.5 bottles of water at work, maybe total of 3 in the whole day. Previously when she was doing more physical labor would drink 4-6 bottles per day. After endoscopy she was told she had GERD, internal hemorrhoids, anxiety-related trouble. New sexual partner and has had some irritation. On norethindrone and has light menses, just starting period now. Medical History:     Patient Active Problem List   Diagnosis    Chronic neck pain    Onychomycosis    Chronic low back pain without sciatica    Obesity with body mass index 30 or greater       Medications:       Prior to Admission medications    Medication Sig Start Date End Date Taking?  Authorizing Provider   gabapentin (NEURONTIN) 600 MG tablet  10/27/21  Yes Historical Provider, MD   methocarbamol (ROBAXIN) 500 MG tablet 1 tablet by mouth 3 times daily as needed 10/29/21  Yes Afshin Bennett DO   omeprazole (PRILOSEC) 40 MG delayed release capsule TAKE 1 CAPSULE BY MOUTH EVERY DAY 10/29/21  Yes Afshin Bennett DO   traZODone (DESYREL) 50 MG tablet TAKE 1 TO 2 TABLETS BY MOUTH NIGHTLY AS NEEDED for sleep 10/4/21  Yes Afshin Bennett DO   diclofenac (VOLTAREN) 75 MG EC tablet Take 75 mg by mouth 2 times daily 8/9/21  Yes Historical Provider, MD   cetirizine (ZYRTEC) 10 MG tablet TAKE 1 TABLET BY MOUTH EVERY DAY 9/7/21  Yes Afshin Bennett DO   hydrOXYzine (ATARAX) 25 MG tablet TAKE 1 TABLET BY MOUTH EVERY 8 HOURS AS NEEDED FOR ANXIETY 7/6/21  Yes Afshin Bennett,    escitalopram (LEXAPRO) 10 MG tablet Take 1 tablet by mouth daily 6/11/21  Yes Afshin Bennett DO   ibuprofen (ADVIL;MOTRIN) 800 MG tablet Take 1 tablet by mouth every 6 hours as needed for Pain (for back pain) 4/21/21  Yes Afshin Bennett DO   albuterol sulfate  (90 Base) MCG/ACT inhaler Inhale 2 puffs into the lungs 4 times daily 10/21/20  Yes Donis Adkins MD   norethindrone (ORTHO MICRONOR) 0.35 MG tablet Take 1 tablet by mouth daily 9/7/21   Afshin Bennett DO   Misc. Devices (WRIST BRACE) MISC Neutral wrist splint for bilateral carpal tunnel syndrome, wear as needed for pain and numbness 2/23/21   Afshin Bennett DO        Allergies:       Patient has no known allergies. Physical Exam:     Vitals:  /68   Pulse 79   Resp 14   Ht 5' 6\" (1.676 m)   Wt 191 lb (86.6 kg)   SpO2 96%   BMI 30.83 kg/m²   Physical Exam  Vitals and nursing note reviewed. Constitutional:       General: She is not in acute distress. Appearance: Normal appearance. She is well-developed. She is not ill-appearing. Cardiovascular:      Rate and Rhythm: Normal rate and regular rhythm. Heart sounds: Normal heart sounds.    Pulmonary:      Effort: Pulmonary effort is normal.      Breath sounds: Normal breath sounds. Abdominal:      General: Bowel sounds are normal.      Palpations: Abdomen is soft. Tenderness: There is no abdominal tenderness. Genitourinary:     Labia:         Right: No rash or lesion. Left: No rash or lesion. Vagina: Vaginal discharge (scant brown-tinged) present. Neurological:      Mental Status: She is alert and oriented to person, place, and time. Psychiatric:         Mood and Affect: Mood normal.         Behavior: Behavior normal.         Data:     Lab Results   Component Value Date     09/19/2021    K 3.9 09/19/2021    CL 99 09/19/2021    CO2 26 09/19/2021    BUN 7 09/19/2021    CREATININE 0.88 09/19/2021    GLUCOSE 95 09/19/2021    PROT 7.3 03/12/2021    LABALBU 4.5 03/12/2021    BILITOT 0.11 03/12/2021    ALKPHOS 49 03/12/2021    AST 19 03/12/2021    ALT 31 03/12/2021     Lab Results   Component Value Date    WBC 12.0 09/19/2021    RBC 4.76 09/19/2021    HGB 13.9 09/19/2021    HCT 40.5 09/19/2021    MCV 85.2 09/19/2021    MCH 29.2 09/19/2021    MCHC 34.3 09/19/2021    RDW 12.6 09/19/2021     09/19/2021    MPV NOT REPORTED 09/19/2021     Lab Results   Component Value Date    TSH 0.69 10/22/2019     Lab Results   Component Value Date    LABA1C 5.3 09/19/2018     Results for POC orders placed in visit on 10/29/21   POCT Urinalysis no Micro   Result Value Ref Range    Color, UA Yellow     Clarity, UA Cloudy     Glucose, UA POC Negative     Bilirubin, UA Negative     Ketones, UA Negative     Spec Grav, UA 1.015     Blood, UA POC Trace     pH, UA 6.5     Protein, UA POC Negative     Urobilinogen, UA 0.2     Leukocytes, UA Negative     Nitrite, UA Negative          Assessment & Plan:        Diagnosis Orders   1. Burning with urination  POCT Urinalysis no Micro    Vaginitis DNA Probe    Chlamydia Trachomatis & Neisseria gonorrhoeae (GC) by amplified detection   2.  Vulvar irritation  Vaginitis DNA Probe    Chlamydia Trachomatis & Neisseria gonorrhoeae (GC) by amplified detection   3. Chronic constipation     1-2. Dysuria, recent febrile UTI tx with culture-confirmed appropriate atb, resolution of illness but still some dysuria. Normal UA today as above. Advised increasing water intake, giving things more time. Also testing for STI's.  3. Chronic constipation, persists despite use of fiber supplements. linzess gave too much diarrhea in the past despite use of lowest dose. Advised changing to miralax instead. Again increase water intake. F/u if not improving. Requested Prescriptions     Signed Prescriptions Disp Refills    methocarbamol (ROBAXIN) 500 MG tablet 180 tablet 1     Si tablet by mouth 3 times daily as needed    omeprazole (PRILOSEC) 40 MG delayed release capsule 90 capsule 1     Sig: TAKE 1 CAPSULE BY MOUTH EVERY DAY         There are no Patient Instructions on file for this visit. Debra Blackwell received counseling on the following healthy behaviors: medication adherence  Reviewed prior labs and health maintenance. Continue current medications, diet and exercise. Discussed use, benefit, and side effects of prescribed medications. Barriers to medication compliance addressed. Patient given educational materials - see patient instructions. All patient questions answered.   Patient voiced understanding.     signed by Telma Hernadez DO on 2021 at 10:30 PM  [address]  Dept: [phone]

## 2021-10-30 LAB
DIRECT EXAM: NORMAL
Lab: NORMAL
SPECIMEN DESCRIPTION: NORMAL

## 2021-11-01 LAB
C TRACH DNA GENITAL QL NAA+PROBE: NEGATIVE
N. GONORRHOEAE DNA: NEGATIVE
SPECIMEN DESCRIPTION: NORMAL

## 2021-11-10 NOTE — TELEPHONE ENCOUNTER
Patient states she had to switch pharmacies due to her insurance. She would like to know if a 90 day RX of Trazodone and Hydroxyzine can be sent to [facility] in [place]? Patient last seen 10/29/21. No future appt found. Health Maintenance   Topic Date Due    Varicella vaccine (1 of 2 - 2-dose childhood series) Never done    Pneumococcal 0-64 years Vaccine (1 of 2 - PPSV23) Never done    COVID-19 Vaccine (1) Never done    DTaP/Tdap/Td vaccine (1 - Tdap) Never done    Flu vaccine (1) 09/01/2021    Cervical cancer screen  04/07/2025    Hepatitis C screen  Completed    HIV screen  Completed    Hepatitis A vaccine  Aged Out    Hepatitis B vaccine  Aged Out    Hib vaccine  Aged Out    Meningococcal (ACWY) vaccine  Aged Out             (applicable per patient's age: Cancer Screenings, Depression Screening, Fall Risk Screening, Immunizations)    Hemoglobin A1C (%)   Date Value   09/19/2018 5.3     AST (U/L)   Date Value   03/12/2021 19     ALT (U/L)   Date Value   03/12/2021 31     BUN (mg/dL)   Date Value   09/19/2021 7      (goal A1C is < 7)   (goal LDL is <100) need 30-50% reduction from baseline     BP Readings from Last 3 Encounters:   10/29/21 104/68   09/19/21 110/61   07/02/21 110/70    (goal /80)      All Future Testing planned in [facility]:  Lab Frequency Next Occurrence       Next Visit Date:  No future appointments.          Patient Active Problem List:     Chronic neck pain     Onychomycosis     Chronic low back pain without sciatica     Obesity with body mass index 30 or greater

## 2021-11-11 RX ORDER — HYDROXYZINE HYDROCHLORIDE 25 MG/1
25 TABLET, FILM COATED ORAL 2 TIMES DAILY PRN
Qty: 180 TABLET | Refills: 1 | Status: SHIPPED | OUTPATIENT
Start: 2021-11-11 | End: 2022-08-08

## 2021-11-11 RX ORDER — TRAZODONE HYDROCHLORIDE 50 MG/1
TABLET ORAL
Qty: 90 TABLET | Refills: 1 | Status: SHIPPED | OUTPATIENT
Start: 2021-11-11 | End: 2022-02-28

## 2021-12-20 ENCOUNTER — OFFICE VISIT (OUTPATIENT)
Dept: FAMILY MEDICINE CLINIC | Age: 41
End: 2021-12-20
Payer: COMMERCIAL

## 2021-12-20 VITALS
SYSTOLIC BLOOD PRESSURE: 116 MMHG | BODY MASS INDEX: 32.3 KG/M2 | DIASTOLIC BLOOD PRESSURE: 62 MMHG | HEART RATE: 61 BPM | OXYGEN SATURATION: 99 % | HEIGHT: 66 IN | WEIGHT: 201 LBS

## 2021-12-20 DIAGNOSIS — N92.6 IRREGULAR MENSES: ICD-10-CM

## 2021-12-20 DIAGNOSIS — M54.50 CHRONIC BILATERAL LOW BACK PAIN WITHOUT SCIATICA: Primary | ICD-10-CM

## 2021-12-20 DIAGNOSIS — F41.9 ANXIETY AND DEPRESSION: ICD-10-CM

## 2021-12-20 DIAGNOSIS — K59.09 CHRONIC CONSTIPATION: ICD-10-CM

## 2021-12-20 DIAGNOSIS — G89.29 CHRONIC BILATERAL LOW BACK PAIN WITHOUT SCIATICA: Primary | ICD-10-CM

## 2021-12-20 DIAGNOSIS — F32.A ANXIETY AND DEPRESSION: ICD-10-CM

## 2021-12-20 DIAGNOSIS — R30.0 DYSURIA: ICD-10-CM

## 2021-12-20 PROCEDURE — G8484 FLU IMMUNIZE NO ADMIN: HCPCS | Performed by: FAMILY MEDICINE

## 2021-12-20 PROCEDURE — 99214 OFFICE O/P EST MOD 30 MIN: CPT | Performed by: FAMILY MEDICINE

## 2021-12-20 PROCEDURE — 1036F TOBACCO NON-USER: CPT | Performed by: FAMILY MEDICINE

## 2021-12-20 PROCEDURE — G8427 DOCREV CUR MEDS BY ELIG CLIN: HCPCS | Performed by: FAMILY MEDICINE

## 2021-12-20 PROCEDURE — G8417 CALC BMI ABV UP PARAM F/U: HCPCS | Performed by: FAMILY MEDICINE

## 2021-12-20 RX ORDER — CARISOPRODOL 350 MG/1
350 TABLET ORAL 3 TIMES DAILY PRN
Qty: 30 TABLET | Refills: 0 | Status: SHIPPED | OUTPATIENT
Start: 2021-12-20 | End: 2022-01-03 | Stop reason: SDUPTHER

## 2021-12-20 NOTE — PROGRESS NOTES
Name: Carmelita Greene  : 1980         Chief Complaint:     Chief Complaint   Patient presents with    Menstrual Problem     period won't stop, stops for 1.5 weeks and restarts.  Dysuria    Fatigue    Back Pain       History of Present Illness:      Carmelita Greene is a 46 y.o.  female who presents with Menstrual Problem (period won't stop, stops for 1.5 weeks and restarts. ), Dysuria, Fatigue, and Back Pain      HPI    Continues to have trouble with constipation, been going on for a while. No consistent meds for it currently. Intermittent dysuria, can happen for a couple days at a time and was quite severe a few days ago. Drinking 4-5 bottles of water per day. 2 coffees in the morning. No pop. irreg and frequent menses since starting norethindrone, at most goes 1.5 wks without bleeding. Back pain, non-radiating, very bothersome. Hasn't really been taking nsaid's on a regular basis. Sees chiro at least once a month. Menstrual cramps tend to be in her back. chiro helps neck more than back. Has to use heat on low back first thing in the morning. Recalls she had taken soma in the past which helped a lot more than robaxin does. Anxiety and stress bothersome, feels it in her jaws. Sees Dr Fatoumata Gimenez and taking lexapro 15 mg daily, hydroxyzine 25 mg every morning and sometimes repeats later in the day (though it really doesn't help), trazodone 150 mg nightly for sleep. Got promotion at work, stressful. Got  within past couple mos. Little adjustment with living with someone for first time in a long time. Medical History:     Patient Active Problem List   Diagnosis    Chronic neck pain    Onychomycosis    Chronic low back pain without sciatica    Obesity with body mass index 30 or greater       Medications:       Prior to Admission medications    Medication Sig Start Date End Date Taking?  Authorizing Provider   carisoprodol (SOMA) 350 MG tablet Take 1 tablet by mouth 3 times daily as needed for Muscle spasms for up to 10 days. 12/20/21 12/30/21 Yes Afshin Bennett, DO   hydrOXYzine (ATARAX) 25 MG tablet Take 1 tablet by mouth 2 times daily as needed for Anxiety 11/11/21  Yes Afshin Bennett, DO   traZODone (DESYREL) 50 MG tablet 1 po nightly 11/11/21  Yes Afshin Bennett, DO   gabapentin (NEURONTIN) 600 MG tablet  10/27/21  Yes Historical Provider, MD   omeprazole (PRILOSEC) 40 MG delayed release capsule TAKE 1 CAPSULE BY MOUTH EVERY DAY 10/29/21  Yes Afshin Bennett, DO   norethindrone (ORTHO MICRONOR) 0.35 MG tablet Take 1 tablet by mouth daily 9/7/21  Yes Afshin Bennett, DO   escitalopram (LEXAPRO) 10 MG tablet Take 1 tablet by mouth daily 6/11/21  Yes Afshin Bennett, DO   ibuprofen (ADVIL;MOTRIN) 800 MG tablet Take 1 tablet by mouth every 6 hours as needed for Pain (for back pain) 4/21/21  Yes Afshin Bennett, DO   albuterol sulfate  (90 Base) MCG/ACT inhaler Inhale 2 puffs into the lungs 4 times daily 10/21/20  Yes Donis Adkins MD        Allergies:       Patient has no known allergies.     Physical Exam:     Vitals:  /62   Pulse 61   Ht 5' 6\" (1.676 m)   Wt 201 lb (91.2 kg)   SpO2 99%   BMI 32.44 kg/m²   Physical Exam    Data:     Lab Results   Component Value Date     09/19/2021    K 3.9 09/19/2021    CL 99 09/19/2021    CO2 26 09/19/2021    BUN 7 09/19/2021    CREATININE 0.88 09/19/2021    GLUCOSE 95 09/19/2021    PROT 7.3 03/12/2021    LABALBU 4.5 03/12/2021    BILITOT 0.11 03/12/2021    ALKPHOS 49 03/12/2021    AST 19 03/12/2021    ALT 31 03/12/2021     Lab Results   Component Value Date    WBC 12.0 09/19/2021    RBC 4.76 09/19/2021    HGB 13.9 09/19/2021    HCT 40.5 09/19/2021    MCV 85.2 09/19/2021    MCH 29.2 09/19/2021    MCHC 34.3 09/19/2021    RDW 12.6 09/19/2021     09/19/2021    MPV NOT REPORTED 09/19/2021     Lab Results   Component Value Date    TSH 0.69 10/22/2019     Lab Results   Component Value Date    LABA1C 5.3 09/19/2018 Assessment & Plan:        Diagnosis Orders   1. Chronic bilateral low back pain without sciatica  XR LUMBAR SPINE (MIN 4 VIEWS)    US NON OB TRANSVAGINAL    carisoprodol (SOMA) 350 MG tablet   2. Irregular menses  US NON OB TRANSVAGINAL   3. Dysuria     4. Anxiety and depression     5. Chronic constipation     1. Severe, non-radiating low back pain. Underwent PT mid 2019 which helped some at the time per reports. May be msk but I do have some suspicion also for uterine fibroids or other pathology, gavin with painful and heavy menses. US and xr ordered. May start soma, which had helped in the past.   2. irreg menses likely r/t use of progestin-only pill, which is safest at her age. She did quit smoking and potentially could start combined OCP again. However, her preference was either to stay on this one or discontinue altogether. Checking US first.  3. Recurrent dysuria c/w interstitial cystitis/urethritis. Reviewed neg testing from today and last visit. Cont good hydration and avoid bladder irritants. 4. Anxiety and depression remain bothersome. Cont following with psychiatry. 5. Constipation, cont working on fiber intake and may try otc meds. Requested Prescriptions     Signed Prescriptions Disp Refills    carisoprodol (SOMA) 350 MG tablet 30 tablet 0     Sig: Take 1 tablet by mouth 3 times daily as needed for Muscle spasms for up to 10 days. Patient Instructions   SURVEY:    You may be receiving a survey from [employer] regarding your visit today. You may get this in the mail, through your MyChart or in your email. Please complete the survey to enable us to provide the highest quality of care to you and your family. If you cannot score us as very good ( 5 Stars) on any question, please feel free to call the office to discuss how we could have made your experience exceptional.     Thank you.     Clinical Care Team:  Dr. Michael Swift, DO Rikki Calderón, [facility] Team:  Dean Mario received counseling on the following healthy behaviors: medication adherence  Reviewed prior labs and health maintenance. Continue current medications, diet and exercise. Discussed use, benefit, and side effects of prescribed medications. Barriers to medication compliance addressed. Patient given educational materials - see patient instructions. All patient questions answered.   Patient voiced understanding.     signed by Danilo Olivares DO on 12/21/2021 at 8:44 PM  [address]  [address] [place] [address]  Dept: [phone]

## 2021-12-20 NOTE — PATIENT INSTRUCTIONS
SURVEY:    You may be receiving a survey from [employer] regarding your visit today. You may get this in the mail, through your MyChart or in your email. Please complete the survey to enable us to provide the highest quality of care to you and your family. If you cannot score us as very good ( 5 Stars) on any question, please feel free to call the office to discuss how we could have made your experience exceptional.     Thank you.     Clinical Care Team:  Dr. Ezekiel Hillman, DO [facility], [address] [place] [address] Team:  [place] [address]

## 2021-12-22 ENCOUNTER — HOSPITAL ENCOUNTER (OUTPATIENT)
Age: 41
Discharge: HOME | End: 2021-12-24
Payer: COMMERCIAL

## 2021-12-22 ENCOUNTER — HOSPITAL ENCOUNTER (OUTPATIENT)
Dept: ULTRASOUND IMAGING | Age: 41
Discharge: HOME | End: 2021-12-24
Payer: COMMERCIAL

## 2021-12-22 ENCOUNTER — HOSPITAL ENCOUNTER (OUTPATIENT)
Dept: GENERAL RADIOLOGY | Age: 41
Discharge: HOME | End: 2021-12-24
Payer: COMMERCIAL

## 2021-12-22 DIAGNOSIS — G89.29 CHRONIC BILATERAL LOW BACK PAIN WITHOUT SCIATICA: ICD-10-CM

## 2021-12-22 DIAGNOSIS — M54.50 CHRONIC BILATERAL LOW BACK PAIN WITHOUT SCIATICA: ICD-10-CM

## 2021-12-22 DIAGNOSIS — N92.6 IRREGULAR MENSES: ICD-10-CM

## 2021-12-22 PROCEDURE — 76830 TRANSVAGINAL US NON-OB: CPT

## 2021-12-22 PROCEDURE — 72110 X-RAY EXAM L-2 SPINE 4/>VWS: CPT

## 2021-12-23 DIAGNOSIS — G89.29 CHRONIC BILATERAL LOW BACK PAIN WITHOUT SCIATICA: Primary | ICD-10-CM

## 2021-12-23 DIAGNOSIS — M54.50 CHRONIC BILATERAL LOW BACK PAIN WITHOUT SCIATICA: Primary | ICD-10-CM

## 2022-01-03 ENCOUNTER — TELEPHONE (OUTPATIENT)
Dept: FAMILY MEDICINE CLINIC | Age: 42
End: 2022-01-03

## 2022-01-03 DIAGNOSIS — M54.50 CHRONIC BILATERAL LOW BACK PAIN WITHOUT SCIATICA: ICD-10-CM

## 2022-01-03 DIAGNOSIS — G89.29 CHRONIC BILATERAL LOW BACK PAIN WITHOUT SCIATICA: ICD-10-CM

## 2022-01-03 RX ORDER — CARISOPRODOL 350 MG/1
350 TABLET ORAL 3 TIMES DAILY PRN
Qty: 30 TABLET | Refills: 0 | Status: SHIPPED | OUTPATIENT
Start: 2022-01-03 | End: 2022-01-14 | Stop reason: SDUPTHER

## 2022-01-03 NOTE — TELEPHONE ENCOUNTER
Patient asking for  A refill on SOMA - DM jesus alberto      Also provided her with PT phone number to ask her to connect with them to schedule

## 2022-01-03 NOTE — TELEPHONE ENCOUNTER
Last visit:  12/20/2021  Next Visit Date:    Future Appointments   Date Time Provider Felipe Mancini   1/11/2022  3:30 PM Danisha Bellamy, PT [facility] PT Messi Meza         Medication List:  Prior to Admission medications    Medication Sig Start Date End Date Taking?  Authorizing Provider   hydrOXYzine (ATARAX) 25 MG tablet Take 1 tablet by mouth 2 times daily as needed for Anxiety 11/11/21   Vinnie Brizuela, DO   traZODone (DESYREL) 50 MG tablet 1 po nightly 11/11/21   Vinnie Brizuela, DO   gabapentin (NEURONTIN) 600 MG tablet  10/27/21   Historical Provider, MD   omeprazole (PRILOSEC) 40 MG delayed release capsule TAKE 1 CAPSULE BY MOUTH EVERY DAY 10/29/21   Vinnie Brizuela, DO   norethindrone (ORTHO MICRONOR) 0.35 MG tablet Take 1 tablet by mouth daily 9/7/21   Vinnie Brizuela, DO   escitalopram (LEXAPRO) 10 MG tablet Take 1 tablet by mouth daily 6/11/21   Vinnie Brizuela, DO   ibuprofen (ADVIL;MOTRIN) 800 MG tablet Take 1 tablet by mouth every 6 hours as needed for Pain (for back pain) 4/21/21   Vinnie Brizuela, DO   albuterol sulfate  (90 Base) MCG/ACT inhaler Inhale 2 puffs into the lungs 4 times daily 10/21/20   Giovanna Schuster MD

## 2022-01-11 ENCOUNTER — HOSPITAL ENCOUNTER (OUTPATIENT)
Dept: PHYSICAL THERAPY | Age: 42
Setting detail: THERAPIES SERIES
Discharge: HOME | End: 2022-01-11
Payer: COMMERCIAL

## 2022-01-11 PROCEDURE — 97161 PT EVAL LOW COMPLEX 20 MIN: CPT

## 2022-01-11 ASSESSMENT — PAIN DESCRIPTION - DIRECTION: RADIATING_TOWARDS: ACROSS

## 2022-01-11 ASSESSMENT — PAIN DESCRIPTION - LOCATION: LOCATION: BACK

## 2022-01-11 ASSESSMENT — PAIN DESCRIPTION - ORIENTATION: ORIENTATION: LOWER

## 2022-01-11 ASSESSMENT — PAIN SCALES - GENERAL: PAINLEVEL_OUTOF10: 7

## 2022-01-11 ASSESSMENT — PAIN DESCRIPTION - DESCRIPTORS: DESCRIPTORS: CONSTANT

## 2022-01-11 NOTE — PROGRESS NOTES
Phone: [phone]         Fax: [phone]                      Outpatient Physical Therapy                                                                      Evaluation    Date: 2022  Patient: Oscar Tejeda  : 1980  ACCT #: [de-identified]    Referring Practitioner: Dr Symone Hunter         Diagnosis: Back Pain    Treatment Diagnosis: Low Back Pain  Onset Date: 21  Total # of Visits Approved: 18 Per Physician Order  Total # of Visits to Date: 1  No Show: 0  Canceled Appointment: 0     Subjective  Additional Pertinent Hx: Patient presents with chronic low back, pain located across low back. Has been seeing the chiropracter several times per month for back and neck. Morning seems to be better than evenings. Occasionally will have pain down the legs but the constant pain is the low back. Difficulty getting comfortable to sleep at night. Usually sleeps stomach or side with pillow support. 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   XRay reveals 9 degree left convexty   Pain Screening  Patient Currently in Pain: Yes  Pain Assessment  Pain Assessment: 0-10  Pain Level: 7  Pain Location: Back  Pain Orientation: Lower  Pain Radiating Towards: across  Pain Descriptors: Constant  Social/Functional History  Occupation: Full time employment  Type of occupation: Office [employer]     Objective  Observation/Palpation  Palpation: tenderness along L4/5 paraspinals  Spine  Thoracic: hypomobility Mid/low thoracic grade 2  Lumbar: hypomobility  grade 2  Strength RLE  Comment: 3+/5 hip strength  Strength LLE  Comment: 3+/5 hip strength     Additional Measures  Flexibility: poor HS 90/90 = 45, decreased gastroc and hip flexor    Assessment  Body structures, Functions, Activity limitations: Decreased functional mobility ,Decreased ADL status,Decreased strength  Assessment: Patient with poor lower extremity flexibility with decreased thoracic and lumbar joint mobility.   Benefit physical therapy to address problem areas and restore function  Prognosis: Good  Decision Making: Low Complexity  Exam: 21/50 per Oswestry    Clinical Presentation:  Stable/Uncomplicated    Education:   Barriers to Learning: none    Goals  Short term goals  Time Frame for Short term goals: 9  Short term goal 1: Ed / Compliant with HEP  Short term goal 2: Patient report addressing office setting for propoer sitting ergonomics    Long term goals  Time Frame for Long term goals : 25  Long term goal 1: Patient demonstrate 4+/5 B hip strength  Long term goal 2: Report decreased low back pain with ADL's 1-2/10   Long term goal 3: Per Oswestry score > 40/50 (initial score 21/50)    Patient's Goal:  Patient goals : Decrease Pain    Timed Code Treatment Minutes: 0 Minutes  Total Treatment Time: 45     Time In: 1559  Time Out: [facility], PT Date: 1/11/2022

## 2022-01-11 NOTE — PLAN OF CARE
[facility] LIAM TYSON       Phone: [phone]   Date: 2022                      Outpatient Physical Therapy  Fax: [phone]    ACCT #: [de-identified]                     Plan of Care  [facility]#: [id number]  Patient: Luis Dotson  : 1980    Referring Practitioner: Dr Sylvia Dakins         Diagnosis: Back Pain  Onset Date: 21  Treatment Diagnosis: Low Back Pain    Assessment  Body structures, Functions, Activity limitations: Decreased functional mobility ,Decreased ADL status,Decreased strength  Assessment: Patient with poor lower extremity flexibility with decreased thoracic and lumbar joint mobility. Benefit physical therapy to address problem areas and restore function  Prognosis: Good    Treatment Plan   Days: 2 times per week Weeks: 9 weeks Total # of Visits Approved: 18    Patient Education/HEP, Back Education, Therapeutic Exercise, Manual Therapy: Myofacial Release/Cupping, Manual Therapy: High Velocity Low Amplitude Thrust(HVLA), Manual Therapy: Mobilization/Manipulation, Traction, Dry Needling, HP/CP and Electrical Stimulation     Goals  Short term goals  Time Frame for Short term goals: 9  Short term goal 1: Ed / Compliant with HEP  Short term goal 2: Patient report addressing office setting for propoer sitting ergonomics  Long term goals  Time Frame for Long term goals : 25  Long term goal 1: Patient demonstrate 4+/5 B hip strength  Long term goal 2: Report decreased low back pain with ADL's 1-10   Long term goal 3: Per Oswestry score > 40/50 (initial score 21/50)     Carol Rai, PT Date: 2022    ______________________________________ Date: 2022  Physician Signature  By signing above or cosigning electronically, I have reviewed this Plan of Care and certify a need for medically necessary rehabilitation services.

## 2022-01-12 ENCOUNTER — OFFICE VISIT (OUTPATIENT)
Dept: FAMILY MEDICINE CLINIC | Age: 42
End: 2022-01-12
Payer: COMMERCIAL

## 2022-01-12 VITALS
BODY MASS INDEX: 32.47 KG/M2 | SYSTOLIC BLOOD PRESSURE: 100 MMHG | OXYGEN SATURATION: 99 % | DIASTOLIC BLOOD PRESSURE: 64 MMHG | HEART RATE: 73 BPM | HEIGHT: 66 IN | WEIGHT: 202 LBS

## 2022-01-12 DIAGNOSIS — M54.50 CHRONIC BILATERAL LOW BACK PAIN WITHOUT SCIATICA: ICD-10-CM

## 2022-01-12 DIAGNOSIS — G89.29 CHRONIC NECK PAIN: ICD-10-CM

## 2022-01-12 DIAGNOSIS — G89.29 CHRONIC BILATERAL LOW BACK PAIN WITHOUT SCIATICA: ICD-10-CM

## 2022-01-12 DIAGNOSIS — M54.2 CHRONIC NECK PAIN: ICD-10-CM

## 2022-01-12 DIAGNOSIS — R51.9 DAILY HEADACHE: Primary | ICD-10-CM

## 2022-01-12 PROCEDURE — 99213 OFFICE O/P EST LOW 20 MIN: CPT | Performed by: FAMILY MEDICINE

## 2022-01-12 PROCEDURE — 1036F TOBACCO NON-USER: CPT | Performed by: FAMILY MEDICINE

## 2022-01-12 PROCEDURE — G8417 CALC BMI ABV UP PARAM F/U: HCPCS | Performed by: FAMILY MEDICINE

## 2022-01-12 PROCEDURE — G8484 FLU IMMUNIZE NO ADMIN: HCPCS | Performed by: FAMILY MEDICINE

## 2022-01-12 PROCEDURE — G8427 DOCREV CUR MEDS BY ELIG CLIN: HCPCS | Performed by: FAMILY MEDICINE

## 2022-01-12 RX ORDER — TOPIRAMATE 25 MG/1
25 TABLET ORAL NIGHTLY
Qty: 30 TABLET | Refills: 1 | Status: SHIPPED | OUTPATIENT
Start: 2022-01-12 | End: 2022-02-01 | Stop reason: SDUPTHER

## 2022-01-12 ASSESSMENT — PATIENT HEALTH QUESTIONNAIRE - PHQ9
4. FEELING TIRED OR HAVING LITTLE ENERGY: 1
SUM OF ALL RESPONSES TO PHQ QUESTIONS 1-9: 1
5. POOR APPETITE OR OVEREATING: 0
3. TROUBLE FALLING OR STAYING ASLEEP: 0
7. TROUBLE CONCENTRATING ON THINGS, SUCH AS READING THE NEWSPAPER OR WATCHING TELEVISION: 0
SUM OF ALL RESPONSES TO PHQ9 QUESTIONS 1 & 2: 0
6. FEELING BAD ABOUT YOURSELF - OR THAT YOU ARE A FAILURE OR HAVE LET YOURSELF OR YOUR FAMILY DOWN: 0
10. IF YOU CHECKED OFF ANY PROBLEMS, HOW DIFFICULT HAVE THESE PROBLEMS MADE IT FOR YOU TO DO YOUR WORK, TAKE CARE OF THINGS AT HOME, OR GET ALONG WITH OTHER PEOPLE: 0
SUM OF ALL RESPONSES TO PHQ QUESTIONS 1-9: 1
9. THOUGHTS THAT YOU WOULD BE BETTER OFF DEAD, OR OF HURTING YOURSELF: 0
SUM OF ALL RESPONSES TO PHQ QUESTIONS 1-9: 1
SUM OF ALL RESPONSES TO PHQ QUESTIONS 1-9: 1
2. FEELING DOWN, DEPRESSED OR HOPELESS: 0
1. LITTLE INTEREST OR PLEASURE IN DOING THINGS: 0
8. MOVING OR SPEAKING SO SLOWLY THAT OTHER PEOPLE COULD HAVE NOTICED. OR THE OPPOSITE, BEING SO FIGETY OR RESTLESS THAT YOU HAVE BEEN MOVING AROUND A LOT MORE THAN USUAL: 0

## 2022-01-12 NOTE — PROGRESS NOTES
Name: Asif Valenzuela  : 1980         Chief Complaint:     Chief Complaint   Patient presents with    Headache     taking magnesium 400mg which used to help, now having daily headaches. History of Present Illness:      Asif Valenzuela is a 46 y.o.  female who presents with Headache (taking magnesium 400mg which used to help, now having daily headaches. )      HPI    Frequent headaches for a long time, but typically they'd been controlled with HS magnesium. Increased that a few mos ago after COVID since headaches had increased. However, past few mos has had constant headaches about every day. Pain tends to start in brows and radiates to anterior head. Aggravating, constant ache, doesn't tend to get sharp. H/o migraines in her 25s, vomiting with just one but would get light sensitivity. Unsure whether current issues are r/t neck trouble as she always has the neck trouble. LARIOS present as soon as she gets up. Takes ibuprofen 800 about one a day after work. Takes tylenol and 1 excedrine every day after eating breakfast. Few mos ago wasn't having to do this, and headaches were less. chiro twice last wk, immediately found an issue with her neck that he thought could be causing headaches. Will be having regular treatments. First tx did help some but not a dramatic improvement. Menstrual cramping continues in low back, still having menses about every 2 wks. Jorden helps with low back pain at work. Has to sit in her chair from 7-12 every day, which is difficult because of the pain. Has to use heating pad on low back for 20-30 min every morning. Medical History:     Patient Active Problem List   Diagnosis    Chronic neck pain    Onychomycosis    Chronic low back pain without sciatica    Obesity with body mass index 30 or greater       Medications:       Prior to Admission medications    Medication Sig Start Date End Date Taking?  Authorizing Provider   topiramate (TOPAMAX) 25 MG tablet Take 1 tablet by mouth nightly 1/12/22  Yes Ruby Junk, DO   carisoprodol (SOMA) 350 MG tablet Take 1 tablet by mouth 3 times daily as needed for Muscle spasms for up to 30 days. 1/14/22 2/13/22  Ruby Junk, DO   hydrOXYzine (ATARAX) 25 MG tablet Take 1 tablet by mouth 2 times daily as needed for Anxiety 11/11/21   [facility] Junk, DO   traZODone (DESYREL) 50 MG tablet 1 po nightly 11/11/21   Ruby Junk, DO   gabapentin (NEURONTIN) 600 MG tablet  10/27/21   Historical Provider, MD   omeprazole (PRILOSEC) 40 MG delayed release capsule TAKE 1 CAPSULE BY MOUTH EVERY DAY 10/29/21   Ruby Junk, DO   norethindrone (ORTHO MICRONOR) 0.35 MG tablet Take 1 tablet by mouth daily 9/7/21   Ruby Junk, DO   escitalopram (LEXAPRO) 10 MG tablet Take 1 tablet by mouth daily 6/11/21   Ruby Junk, DO   ibuprofen (ADVIL;MOTRIN) 800 MG tablet Take 1 tablet by mouth every 6 hours as needed for Pain (for back pain) 4/21/21   Ruby Junk, DO   albuterol sulfate  (90 Base) MCG/ACT inhaler Inhale 2 puffs into the lungs 4 times daily 10/21/20   Michelle Mahoney MD        Allergies:       Patient has no known allergies. Physical Exam:     Vitals:  /64   Pulse 73   Ht 5' 6\" (1.676 m)   Wt 202 lb (91.6 kg)   SpO2 99%   BMI 32.60 kg/m²   Physical Exam  Vitals and nursing note reviewed. Constitutional:       General: She is not in acute distress. Appearance: She is well-developed. Pulmonary:      Effort: Pulmonary effort is normal.   Skin:     General: Skin is warm and dry. Neurological:      Mental Status: She is alert and oriented to person, place, and time.    Psychiatric:         Judgment: Judgment normal.         Data:     Lab Results   Component Value Date     09/19/2021    K 3.9 09/19/2021    CL 99 09/19/2021    CO2 26 09/19/2021    BUN 7 09/19/2021    CREATININE 0.88 09/19/2021    GLUCOSE 95 09/19/2021    PROT 7.3 03/12/2021    LABALBU 4.5 03/12/2021    BILITOT 0.11 03/12/2021    ALKPHOS 49 03/12/2021    AST 19 03/12/2021    ALT 31 03/12/2021     Lab Results   Component Value Date    WBC 12.0 09/19/2021    RBC 4.76 09/19/2021    HGB 13.9 09/19/2021    HCT 40.5 09/19/2021    MCV 85.2 09/19/2021    MCH 29.2 09/19/2021    MCHC 34.3 09/19/2021    RDW 12.6 09/19/2021     09/19/2021    MPV NOT REPORTED 09/19/2021     Lab Results   Component Value Date    TSH 0.69 10/22/2019     Lab Results   Component Value Date    LABA1C 5.3 09/19/2018         Assessment & Plan:        Diagnosis Orders   1. Daily headache     2. Chronic bilateral low back pain without sciatica     3. Chronic neck pain     Daily headache which typically does not have migraine features. May have element of medication overuse headache, tension related to chronic neck trouble, stress. Differential diagnosis would also include idiopathic intracranial hypertension. Starting Topamax per patient request.  Continue working on the neck and back pain also, physical therapy, heat. Try to decrease use of over-the-counter pain meds, again with possible element of medication overuse headache. Follow-up in 3 to 4 weeks. Requested Prescriptions     Signed Prescriptions Disp Refills    topiramate (TOPAMAX) 25 MG tablet 30 tablet 1     Sig: Take 1 tablet by mouth nightly         Patient Instructions   SURVEY:    You may be receiving a survey from [employer] regarding your visit today. You may get this in the mail, through your MyChart or in your email. Please complete the survey to enable us to provide the highest quality of care to you and your family. If you cannot score us as very good ( 5 Stars) on any question, please feel free to call the office to discuss how we could have made your experience exceptional.     Thank you.     Clinical Care Team:  Dr. Sulaiman Bunn, DO Chapis Moreno, [address] [facility] Team:  Indira Aguilar Jaron Kenny received counseling on the following healthy behaviors: medication adherence  Reviewed prior labs and health maintenance. Continue current medications, diet and exercise. Discussed use, benefit, and side effects of prescribed medications. Barriers to medication compliance addressed. Patient given educational materials - see patient instructions. All patient questions answered.   Patient voiced understanding.     signed by Ruby Monroy DO on 1/16/2022 at 10:47 PM  [address]  Dept: [phone]

## 2022-01-12 NOTE — PATIENT INSTRUCTIONS
SURVEY:    You may be receiving a survey from [employer] regarding your visit today. You may get this in the mail, through your MyChart or in your email. Please complete the survey to enable us to provide the highest quality of care to you and your family. If you cannot score us as very good ( 5 Stars) on any question, please feel free to call the office to discuss how we could have made your experience exceptional.     Thank you.     Clinical Care Team:  DO Neo Hoover, [address] [place] [address] Team:  [address] [place] [address]

## 2022-01-13 ENCOUNTER — APPOINTMENT (OUTPATIENT)
Dept: PHYSICAL THERAPY | Age: 42
End: 2022-01-13
Payer: COMMERCIAL

## 2022-01-13 DIAGNOSIS — G89.29 CHRONIC BILATERAL LOW BACK PAIN WITHOUT SCIATICA: ICD-10-CM

## 2022-01-13 DIAGNOSIS — M54.50 CHRONIC BILATERAL LOW BACK PAIN WITHOUT SCIATICA: ICD-10-CM

## 2022-01-13 NOTE — TELEPHONE ENCOUNTER
Patient asking for a new script for Soma - patient uses Drug [facility] Maintenance   Topic Date Due    Varicella vaccine (1 of 2 - 2-dose childhood series) Never done    COVID-19 Vaccine (1) Never done    Pneumococcal 0-64 years Vaccine (1 of 2 - PPSV23) Never done    DTaP/Tdap/Td vaccine (1 - Tdap) Never done    Flu vaccine (1) 09/01/2021    Depression Monitoring  01/12/2023    Cervical cancer screen  04/07/2025    Hepatitis C screen  Completed    HIV screen  Completed    Hepatitis A vaccine  Aged Out    Hepatitis B vaccine  Aged Out    Hib vaccine  Aged Out    Meningococcal (ACWY) vaccine  Aged Out             (applicable per patient's age: Cancer Screenings, Depression Screening, Fall Risk Screening, Immunizations)    Hemoglobin A1C (%)   Date Value   09/19/2018 5.3     AST (U/L)   Date Value   03/12/2021 19     ALT (U/L)   Date Value   03/12/2021 31     BUN (mg/dL)   Date Value   09/19/2021 7      (goal A1C is < 7)   (goal LDL is <100) need 30-50% reduction from baseline     BP Readings from Last 3 Encounters:   01/12/22 100/64   12/20/21 116/62   10/29/21 104/68    (goal /80)      All Future Testing planned in [facility]:  Lab Frequency Next Occurrence       Next Visit Date:  Future Appointments   Date Time Provider Felipe Mancini   1/24/2022  4:45 PM Arabella Rose [facility] PT Jessi Arreola   1/27/2022  3:45 PM Dariela Garzon [facility] PT Jessi Arreola   1/31/2022  3:30 PM Arabella Rose PTA [facility] PT Jessi Arreola   2/1/2022  3:20 PM Carli Gallegos DO [facility]   2/3/2022  3:45 PM Dariela Garzon PT [facility] PT Jessi Arreola            Patient Active Problem List:     Chronic neck pain     Onychomycosis     Chronic low back pain without sciatica     Obesity with body mass index 30 or greater

## 2022-01-14 ENCOUNTER — TELEPHONE (OUTPATIENT)
Dept: FAMILY MEDICINE CLINIC | Age: 42
End: 2022-01-14

## 2022-01-14 RX ORDER — CARISOPRODOL 350 MG/1
350 TABLET ORAL 3 TIMES DAILY PRN
Qty: 90 TABLET | Refills: 0 | Status: SHIPPED | OUTPATIENT
Start: 2022-01-14 | End: 2022-02-14 | Stop reason: SDUPTHER

## 2022-01-14 NOTE — TELEPHONE ENCOUNTER
Amelia Kessler will not be covered, prior Adilene Grace will not be approved due to answers given for the use of an immediate release opiod

## 2022-01-14 NOTE — TELEPHONE ENCOUNTER
Please let pt know the soma is no longer covered so we'll need to go back to robaxin. Recommend pain mgmt referral also.

## 2022-01-18 ENCOUNTER — APPOINTMENT (OUTPATIENT)
Dept: PHYSICAL THERAPY | Age: 42
End: 2022-01-18
Payer: COMMERCIAL

## 2022-01-19 ENCOUNTER — APPOINTMENT (OUTPATIENT)
Dept: PHYSICAL THERAPY | Age: 42
End: 2022-01-19
Payer: COMMERCIAL

## 2022-01-20 NOTE — TELEPHONE ENCOUNTER
Last OV: 1/12/2022  Last RX:    Next scheduled apt: 2/1/2022        Rx request for Norethaindrone 0.35mg

## 2022-01-21 ENCOUNTER — APPOINTMENT (OUTPATIENT)
Dept: PHYSICAL THERAPY | Age: 42
End: 2022-01-21
Payer: COMMERCIAL

## 2022-01-21 RX ORDER — NORETHINDRONE 0.35 MG/1
TABLET ORAL
Qty: 84 TABLET | Refills: 1 | Status: SHIPPED | OUTPATIENT
Start: 2022-01-21 | End: 2022-02-01

## 2022-01-27 ENCOUNTER — HOSPITAL ENCOUNTER (OUTPATIENT)
Dept: PHYSICAL THERAPY | Age: 42
Setting detail: THERAPIES SERIES
Discharge: HOME | End: 2022-01-27
Payer: COMMERCIAL

## 2022-01-27 PROCEDURE — 97140 MANUAL THERAPY 1/> REGIONS: CPT

## 2022-01-27 PROCEDURE — 97110 THERAPEUTIC EXERCISES: CPT

## 2022-01-27 ASSESSMENT — PAIN SCALES - GENERAL: PAINLEVEL_OUTOF10: 6

## 2022-01-27 NOTE — PROGRESS NOTES
Out: 1630  Timed Code Treatment Minutes: 40 Minutes  Total Treatment Time: Kevin Joseph PT     Date: 1/27/2022

## 2022-01-31 ENCOUNTER — HOSPITAL ENCOUNTER (OUTPATIENT)
Dept: PHYSICAL THERAPY | Age: 42
Setting detail: THERAPIES SERIES
Discharge: HOME | End: 2022-01-31
Payer: COMMERCIAL

## 2022-01-31 PROCEDURE — 97110 THERAPEUTIC EXERCISES: CPT

## 2022-01-31 PROCEDURE — 97140 MANUAL THERAPY 1/> REGIONS: CPT

## 2022-01-31 ASSESSMENT — PAIN SCALES - GENERAL: PAINLEVEL_OUTOF10: 5

## 2022-01-31 NOTE — PROGRESS NOTES
Phone: [phone]      Fax: [phone]                            Outpatient Physical Therapy                                                                            Daily Note    Date: 2022  Patient Name: Nolan Villanueva        MRN: [id number]   ACCT#:  [de-identified]  : 1980  (46 y.o.)    Referring Practitioner: Dr Iveth Ivey         Diagnosis: Back Pain  Treatment Diagnosis: Low Back Pain    Onset Date: 21  Total # of Visits Approved: 18 Per Physician Order  Total # of Visits to Date: 3  No Show: 0  Canceled Appointment: 0    Pre-Treatment Pain:  5/10     Assessment  Assessment: Patient reports LBP is a 5/10 today. Patient reports she was sore Friday night into Saturday. Also notes yesterday evening she was pretty painful reading pain 7-8/10 without reason. Continue with with supine exercises and manual as outlined. Following manual pain remains a 5/10.    Chart Reviewed: Yes    Plan  Plan: Continue with current plan    Exercises/Modalities/Manual:  See DocFlow Sheet    Education:   Barriers to Learning: none    Goals  (Total # of Visits to Date: 3)   Short Term Goals - Time Frame for Short term goals: 9  Short term goal 1: Ed / Compliant with HEP  Short term goal 2: Patient report addressing office setting for propoer sitting ergonomics    Long Term Goals - Time Frame for Long term goals : 25  Long term goal 1: Patient demonstrate 4+/5 B hip strength  Long term goal 2: Report decreased low back pain with ADL's 1-2/10   Long term goal 3: Per Oswestry score > 40/50 (initial score 21/50)    Post Treatment Pain:  5/10    Time In: 1535    Time Out : 1615  Timed Code Treatment Minutes: 40 Minutes  Total Treatment Time: 40 Minutes    DON Trejo directly supervised by Yahaira Gonzalez, [place]       Date: 2022

## 2022-02-01 ENCOUNTER — OFFICE VISIT (OUTPATIENT)
Dept: FAMILY MEDICINE CLINIC | Age: 42
End: 2022-02-01
Payer: COMMERCIAL

## 2022-02-01 VITALS
BODY MASS INDEX: 32.95 KG/M2 | DIASTOLIC BLOOD PRESSURE: 60 MMHG | WEIGHT: 205 LBS | HEIGHT: 66 IN | SYSTOLIC BLOOD PRESSURE: 100 MMHG

## 2022-02-01 DIAGNOSIS — F32.A ANXIETY AND DEPRESSION: ICD-10-CM

## 2022-02-01 DIAGNOSIS — L40.9 PSORIASIS: Primary | ICD-10-CM

## 2022-02-01 DIAGNOSIS — R51.9 DAILY HEADACHE: ICD-10-CM

## 2022-02-01 DIAGNOSIS — G89.29 CHRONIC BILATERAL LOW BACK PAIN WITHOUT SCIATICA: ICD-10-CM

## 2022-02-01 DIAGNOSIS — F41.9 ANXIETY AND DEPRESSION: ICD-10-CM

## 2022-02-01 DIAGNOSIS — M54.50 CHRONIC BILATERAL LOW BACK PAIN WITHOUT SCIATICA: ICD-10-CM

## 2022-02-01 PROCEDURE — 99214 OFFICE O/P EST MOD 30 MIN: CPT | Performed by: FAMILY MEDICINE

## 2022-02-01 PROCEDURE — G8417 CALC BMI ABV UP PARAM F/U: HCPCS | Performed by: FAMILY MEDICINE

## 2022-02-01 PROCEDURE — 1036F TOBACCO NON-USER: CPT | Performed by: FAMILY MEDICINE

## 2022-02-01 PROCEDURE — G8427 DOCREV CUR MEDS BY ELIG CLIN: HCPCS | Performed by: FAMILY MEDICINE

## 2022-02-01 PROCEDURE — G8484 FLU IMMUNIZE NO ADMIN: HCPCS | Performed by: FAMILY MEDICINE

## 2022-02-01 RX ORDER — ESCITALOPRAM OXALATE 10 MG/1
15 TABLET ORAL DAILY
Qty: 135 TABLET | Refills: 1 | Status: SHIPPED | OUTPATIENT
Start: 2022-02-01 | End: 2022-03-23

## 2022-02-01 RX ORDER — TOPIRAMATE 25 MG/1
25 TABLET ORAL NIGHTLY
Qty: 90 TABLET | Refills: 1 | Status: SHIPPED | OUTPATIENT
Start: 2022-02-01 | End: 2022-06-06

## 2022-02-01 NOTE — PROGRESS NOTES
Name: Kemar Taylor  : 1980         Chief Complaint:     Chief Complaint   Patient presents with    Headache       History of Present Illness:      Kemar Taylor is a 46 y.o.  female who presents with Headache      HPI    Follow-up frequent headaches. Patient has done very well since starting Topamax, has no adverse effects and has not had any headaches. Still has some discomfort in her neck but much more tolerable since the headaches resolved. Low back pain continues, still doing therapy and finds certain things she does at PT worsen her pain, sometimes even for a couple days. Chronic itchy rash on hands and cuca elbows, flared right now. Uses aveeno, no other tx. Sometimes gets similar rash posterior scalp, not currently. It tends to scale more than the other areas. Stopped birth control a couple days ago. Started bleeding today. lmp had been 2-3 wks ago.  and not specifically trying to conceive but not avoiding either. Pt has had no pregnancies.  has a child. Medical History:     Patient Active Problem List   Diagnosis    Chronic neck pain    Onychomycosis    Chronic low back pain without sciatica    Obesity with body mass index 30 or greater       Medications:       Prior to Admission medications    Medication Sig Start Date End Date Taking? Authorizing Provider   escitalopram (LEXAPRO) 10 MG tablet Take 1.5 tablets by mouth daily 22  Yes Ariel Real DO   triamcinolone (KENALOG) 0.1 % ointment Apply topically 2 times daily 22  Yes Ariel Real DO   topiramate (TOPAMAX) 25 MG tablet Take 1 tablet by mouth nightly 22  Yes Ariel Real DO   carisoprodol (SOMA) 350 MG tablet Take 1 tablet by mouth 3 times daily as needed for Muscle spasms for up to 30 days.  22 Yes Ariel Real DO   hydrOXYzine (ATARAX) 25 MG tablet Take 1 tablet by mouth 2 times daily as needed for Anxiety 21  Yes Ariel Real DO   traZODone ([facility]) 50 MG tablet 1 po nightly 11/11/21  Yes Stephanie Agee DO   gabapentin (NEURONTIN) 600 MG tablet  10/27/21  Yes Historical Provider, MD   omeprazole (PRILOSEC) 40 MG delayed release capsule TAKE 1 CAPSULE BY MOUTH EVERY DAY 10/29/21  Yes Stephanie Agee DO   ibuprofen (ADVIL;MOTRIN) 800 MG tablet Take 1 tablet by mouth every 6 hours as needed for Pain (for back pain) 4/21/21  Yes Stephanie Agee DO   albuterol sulfate  (90 Base) MCG/ACT inhaler Inhale 2 puffs into the lungs 4 times daily 10/21/20  Yes Ev Byrd MD        Allergies:       Patient has no known allergies. Physical Exam:     Vitals:  /60   Ht 5' 6\" (1.676 m)   Wt 205 lb (93 kg)   BMI 33.09 kg/m²   Physical Exam  Vitals and nursing note reviewed. Constitutional:       General: She is not in acute distress. Appearance: She is well-developed. Pulmonary:      Effort: Pulmonary effort is normal.   Skin:     General: Skin is warm and dry. Comments: cuca dorsal hands and elbows: bright pink well-demarcated lesions, slightly raised, with scant scaling   Neurological:      Mental Status: She is alert and oriented to person, place, and time.    Psychiatric:         Judgment: Judgment normal.         Data:     Lab Results   Component Value Date     09/19/2021    K 3.9 09/19/2021    CL 99 09/19/2021    CO2 26 09/19/2021    BUN 7 09/19/2021    CREATININE 0.88 09/19/2021    GLUCOSE 95 09/19/2021    PROT 7.3 03/12/2021    LABALBU 4.5 03/12/2021    BILITOT 0.11 03/12/2021    ALKPHOS 49 03/12/2021    AST 19 03/12/2021    ALT 31 03/12/2021     Lab Results   Component Value Date    WBC 12.0 09/19/2021    RBC 4.76 09/19/2021    HGB 13.9 09/19/2021    HCT 40.5 09/19/2021    MCV 85.2 09/19/2021    MCH 29.2 09/19/2021    MCHC 34.3 09/19/2021    RDW 12.6 09/19/2021     09/19/2021    MPV NOT REPORTED 09/19/2021     Lab Results   Component Value Date    TSH 0.69 10/22/2019     Lab Results   Component Value Date    LABA1C 5.3 09/19/2018         Assessment & Plan:        Diagnosis Orders   1. Psoriasis     2. Chronic bilateral low back pain without sciatica     3. Daily headache     4. Anxiety and depression       1. Psoriasis on both hands and elbows, very itchy. Advised continue moisturizing and may also try topical steroid. If this does not work we will try Elidel. May use over-the-counter products on scalp as needed. 2. Low back pain continues, continue therapy and also work on same exercises at home. Encouraged her to let the therapists know which things are uncomfortable for her so that they can avoid those. 3.  Daily headaches resolved with Topamax, continue same dosing. Pt no longer on oral contraceptive, still rather low chance of pregnancy given age and h/o irreg menses but possible so depending on menses may need to change to other med. approx 1% chance of oral clefts infants with maternal topamax use. 4.  Anxiety and depression controlled. Patient plans no longer to see psychiatry as her psychiatrist left and she does not wish to establish with the new one. Continue same Rx and we will follow, consider referral back to psychiatry if having new issues. Requested Prescriptions     Signed Prescriptions Disp Refills    escitalopram (LEXAPRO) 10 MG tablet 135 tablet 1     Sig: Take 1.5 tablets by mouth daily    triamcinolone (KENALOG) 0.1 % ointment 80 g 2     Sig: Apply topically 2 times daily    topiramate (TOPAMAX) 25 MG tablet 90 tablet 1     Sig: Take 1 tablet by mouth nightly         There are no Patient Instructions on file for this visit. Carmencita Severance received counseling on the following healthy behaviors: medication adherence  Reviewed prior labs and health maintenance. Continue current medications, diet and exercise. Discussed use, benefit, and side effects of prescribed medications. Barriers to medication compliance addressed. Patient given educational materials - see patient instructions.     All patient questions answered.   Patient voiced understanding.     signed by Vinnie Brizuela DO on 2/3/2022 at 9:37 AM  [address]  Dept: [phone]

## 2022-02-03 ENCOUNTER — HOSPITAL ENCOUNTER (OUTPATIENT)
Dept: PHYSICAL THERAPY | Age: 42
Setting detail: THERAPIES SERIES
End: 2022-02-03
Payer: COMMERCIAL

## 2022-02-08 ENCOUNTER — HOSPITAL ENCOUNTER (OUTPATIENT)
Dept: PHYSICAL THERAPY | Age: 42
Setting detail: THERAPIES SERIES
Discharge: HOME | End: 2022-02-08
Payer: COMMERCIAL

## 2022-02-08 PROCEDURE — 97012 MECHANICAL TRACTION THERAPY: CPT

## 2022-02-08 ASSESSMENT — PAIN SCALES - GENERAL: PAINLEVEL_OUTOF10: 7

## 2022-02-08 NOTE — PROGRESS NOTES
Phone: [address]      Fax: [phone]                            Outpatient Physical Therapy                                                                            Daily Note    Date: 2022  Patient Name: Alfonzo Prader        MRN: [id number]   ACCT#:  [de-identified]  : 1980  (46 y.o.)    Referring Practitioner: Dr Elena Mishra         Diagnosis: Back Pain  Treatment Diagnosis: Low Back Pain    Onset Date: 21  Total # of Visits Approved: 18 Per Physician Order  Total # of Visits to Date: 4  No Show: 0  Canceled Appointment: 0    Pre-Treatment Pain:  7/10     Assessment  Assessment: Pt educated on importance of HEP, pt admits to non-compliance with HEP. Pt report she has helped move her mother(mainly packing) and helped clean her sisters apartment over the weekend with increased pain. Discussed with pt the poor fco to prone position, which makes manual challenging. Pt agreeable to try lumbar traction with HP. Pt post session reports slightly better, HP helps.   Will monitor fco, plan to reinstate ther ex next visit  Chart Reviewed: Yes    Plan  Plan: Continue with current plan    Exercises/Modalities/Manual:  See DocFlow Sheet    Education: lumbar traction     Barriers to Learning: none    Goals  (Total # of Visits to Date: 4)   Short Term Goals - Time Frame for Short term goals: 9  Short term goal 1: Ed / Compliant with HEP  Short term goal 2: Patient report addressing office setting for propoer sitting ergonomics     Long Term Goals - Time Frame for Long term goals : 25  Long term goal 1: Patient demonstrate 4+/5 B hip strength  Long term goal 2: Report decreased low back pain with ADL's 1-2/10   Long term goal 3: Per Oswestry score > 40/50 (initial score 21/50)     Post Treatment Pain:  6/10    Time In: 1505  Time Out: 1540  Timed Code Treatment Minutes: 0 Minutes  Total Treatment Time: 72 Leodan Clements, PT     Date: 2022

## 2022-02-14 DIAGNOSIS — M54.50 CHRONIC BILATERAL LOW BACK PAIN WITHOUT SCIATICA: ICD-10-CM

## 2022-02-14 DIAGNOSIS — G89.29 CHRONIC BILATERAL LOW BACK PAIN WITHOUT SCIATICA: ICD-10-CM

## 2022-02-14 RX ORDER — CARISOPRODOL 350 MG/1
350 TABLET ORAL 3 TIMES DAILY PRN
Qty: 90 TABLET | Refills: 0 | Status: SHIPPED | OUTPATIENT
Start: 2022-02-14 | End: 2022-03-14 | Stop reason: SDUPTHER

## 2022-02-15 ENCOUNTER — HOSPITAL ENCOUNTER (OUTPATIENT)
Dept: PHYSICAL THERAPY | Age: 42
Setting detail: THERAPIES SERIES
Discharge: HOME | End: 2022-02-15
Payer: COMMERCIAL

## 2022-02-15 PROCEDURE — 97110 THERAPEUTIC EXERCISES: CPT

## 2022-02-15 PROCEDURE — 97012 MECHANICAL TRACTION THERAPY: CPT

## 2022-02-15 ASSESSMENT — PAIN SCALES - GENERAL: PAINLEVEL_OUTOF10: 6

## 2022-02-15 NOTE — PROGRESS NOTES
Phone: [address]      Fax: [phone]                            Outpatient Physical Therapy                                                                            Daily Note    Date: 2/15/2022  Patient Name: Jovanni Hernandez        MRN: [id number]   ACCT#:  [de-identified]  : 1980  (46 y.o.)    Referring Practitioner: Dr Dipti Leary         Diagnosis: Back Pain  Treatment Diagnosis: Low Back Pain    Onset Date: 21  Total # of Visits Approved: 18 Per Physician Order  Total # of Visits to Date: 5  No Show: 0  Canceled Appointment: 0    Pre-Treatment Pain:  6/10     Assessment  Assessment: Patient reports L hip and LBP is a 6/10. Patient states the morning after last session her LBP was a 1/10 which lasted for a few days, but then increased to a 6-7/10 over the weekend. Patient notes she was pretty sedintary over the weekend. Continued with stretching exercises followed by static lumbar traction with no complaint from patient. Following session patient reports LBP is a 3-4/10.    Chart Reviewed: Yes    Plan  Plan: Continue with current plan    Exercises/Modalities/Manual:  See DocFlow Sheet    Education:   Barriers to Learning: none    Goals  (Total # of Visits to Date: 5)   Short Term Goals - Time Frame for Short term goals: 9  Short term goal 1: Ed / Compliant with HEP  Short term goal 2: Patient report addressing office setting for propoer sitting ergonomics    Long Term Goals - Time Frame for Long term goals : 25  Long term goal 1: Patient demonstrate 4+/5 B hip strength  Long term goal 2: Report decreased low back pain with ADL's 1-2/10   Long term goal 3: Per Oswestry score > 40/50 (initial score 21/50)    Post Treatment Pain:  3-4/10    Time In: 1605    Time Out : 1635  Timed Code Treatment Minutes: 10 Minutes  Total Treatment Time: 30 Minutes    DON Trejo directly supervised by Ladi Nichols PTA       Date: 2/15/2022

## 2022-02-16 ENCOUNTER — HOSPITAL ENCOUNTER (OUTPATIENT)
Dept: PHYSICAL THERAPY | Age: 42
Setting detail: THERAPIES SERIES
Discharge: HOME | End: 2022-02-16
Payer: COMMERCIAL

## 2022-02-16 PROCEDURE — 97012 MECHANICAL TRACTION THERAPY: CPT

## 2022-02-16 PROCEDURE — 97110 THERAPEUTIC EXERCISES: CPT

## 2022-02-16 ASSESSMENT — PAIN SCALES - GENERAL: PAINLEVEL_OUTOF10: 4

## 2022-02-18 ENCOUNTER — APPOINTMENT (OUTPATIENT)
Dept: PHYSICAL THERAPY | Age: 42
End: 2022-02-18
Payer: COMMERCIAL

## 2022-02-23 ENCOUNTER — HOSPITAL ENCOUNTER (OUTPATIENT)
Dept: PHYSICAL THERAPY | Age: 42
Setting detail: THERAPIES SERIES
Discharge: HOME | End: 2022-02-23
Payer: COMMERCIAL

## 2022-02-23 PROCEDURE — 97012 MECHANICAL TRACTION THERAPY: CPT

## 2022-02-23 PROCEDURE — 97110 THERAPEUTIC EXERCISES: CPT

## 2022-02-23 ASSESSMENT — PAIN SCALES - GENERAL: PAINLEVEL_OUTOF10: 6

## 2022-02-23 NOTE — PROGRESS NOTES
Phone: [address]      Fax: [phone]                            Outpatient Physical Therapy                                                                            Daily Note    Date: 2022  Patient Name: Maria Alejandra Tony        MRN: [id number]   ACCT#:  [de-identified]  : 1980  (46 y.o.)    Referring Practitioner: Dr Leopold Coss         Diagnosis: Back Pain  Treatment Diagnosis: Low Back Pain    Onset Date: 21  Total # of Visits Approved: 18 Per Physician Order  Total # of Visits to Date: 7  No Show: 0  Canceled Appointment: 0    Pre-Treatment Pain:  6/10     Assessment  Assessment: Patient states LBP is a 6/10 this afternoon. Patient notes feeling good following last session, but over the past few days pain has increased. Continued with postural stretching and static lumbar traction. Increased traction pull to 56# this date with good tolerance. Following session patient states pain decreased to a 3-4/10.   Chart Reviewed: Yes    Plan  Plan: Continue with current plan    Exercises/Modalities/Manual:  See DocFlow Sheet    Education:      Barriers to Learning: none    Goals  (Total # of Visits to Date: 7)   Short Term Goals - Time Frame for Short term goals: 9  Short term goal 1: Ed / Compliant with HEP  Short term goal 2: Patient report addressing office setting for propoer sitting ergonomics    Long Term Goals - Time Frame for Long term goals : 25  Long term goal 1: Patient demonstrate 4+/5 B hip strength  Long term goal 2: Report decreased low back pain with ADL's 1-2/10   Long term goal 3: Per Oswestry score > 40/50 (initial score 21/50)    Post Treatment Pain:  3-4/10    Time In: 1509    Time Out : 1544   Timed Code Treatment Minutes: 15 Minutes  Total Treatment Time: 28 Minutes    Dwain Bateman, [place]     Date: 2022

## 2022-02-24 ENCOUNTER — HOSPITAL ENCOUNTER (OUTPATIENT)
Dept: PHYSICAL THERAPY | Age: 42
Setting detail: THERAPIES SERIES
Discharge: HOME | End: 2022-02-24
Payer: COMMERCIAL

## 2022-02-24 PROCEDURE — 97110 THERAPEUTIC EXERCISES: CPT

## 2022-02-24 PROCEDURE — 97012 MECHANICAL TRACTION THERAPY: CPT

## 2022-02-24 ASSESSMENT — PAIN SCALES - GENERAL: PAINLEVEL_OUTOF10: 5

## 2022-02-24 NOTE — PROGRESS NOTES
Phone: [address]      Fax: [phone]                            Outpatient Physical Therapy                                                                            Daily Note    Date: 2022  Patient Name: Rosendo Trotter        MRN: [id number]   ACCT#:  [de-identified]  : 1980  (46 y.o.)    Referring Practitioner: Dr Mariaa Mitchell         Diagnosis: Back Pain  Treatment Diagnosis: Low Back Pain    Onset Date: 21  Total # of Visits Approved: 18 Per Physician Order  Total # of Visits to Date: 8  No Show: 0  Canceled Appointment: 0    Pre-Treatment Pain: 5-6/10     Assessment  Assessment: No change in pain today and continues to rate as 5-6/10. Continued with exercises followed by static lumbar traction. Increased pull on traction to 58# per patient request with good tolerance.   Chart Reviewed: Yes    Plan  Plan: Continue with current plan    Exercises/Modalities/Manual:  See DocFlow Sheet    Education:      Barriers to Learning: none    Goals  (Total # of Visits to Date: 8)   Short Term Goals - Time Frame for Short term goals: 9  Short term goal 1: Ed / Compliant with HEP  Short term goal 2: Patient report addressing office setting for propoer sitting ergonomics             Long Term Goals - Time Frame for Long term goals : 25  Long term goal 1: Patient demonstrate 4+/5 B hip strength  Long term goal 2: Report decreased low back pain with ADL's 1-2/10   Long term goal 3: Per Oswestry score > 40/50 (initial score 21/50)          Post Treatment Pain:  4/10    Time In: 1514  Time Out : 1558        Timed Code Treatment Minutes: 36 Minutes  Total Treatment Time: 0668 [facility]     Date: 2022

## 2022-02-28 RX ORDER — TRAZODONE HYDROCHLORIDE 50 MG/1
TABLET ORAL
Qty: 60 TABLET | Refills: 5 | Status: SHIPPED | OUTPATIENT
Start: 2022-02-28 | End: 2022-05-02 | Stop reason: SDUPTHER

## 2022-02-28 NOTE — TELEPHONE ENCOUNTER
Last visit:  2/1/2022  Next Visit Date:    Future Appointments   Date Time Provider Felipe Mancini   3/1/2022  4:00 PM [facility] [place]   3/3/2022  3:45 PM Parviz Melendez, PT MW PT [facility]         Medication List:  Prior to Admission medications    Medication Sig Start Date End Date Taking? Authorizing Provider   carisoprodol (SOMA) 350 MG tablet Take 1 tablet by mouth 3 times daily as needed for Muscle spasms for up to 30 days.  2/14/22 3/16/22  Symone Soto, DO   escitalopram (LEXAPRO) 10 MG tablet Take 1.5 tablets by mouth daily 2/1/22   Symone Soto, DO   triamcinolone (KENALOG) 0.1 % ointment Apply topically 2 times daily 2/1/22   Symone Soto, DO   topiramate (TOPAMAX) 25 MG tablet Take 1 tablet by mouth nightly 2/1/22   Symone Soto, DO   hydrOXYzine (ATARAX) 25 MG tablet Take 1 tablet by mouth 2 times daily as needed for Anxiety 11/11/21   Symone Soto, DO   traZODone (DESYREL) 50 MG tablet 1 po nightly 11/11/21   Symone Soto, DO   gabapentin (NEURONTIN) 600 MG tablet  10/27/21   Historical Provider, MD   omeprazole (PRILOSEC) 40 MG delayed release capsule TAKE 1 CAPSULE BY MOUTH EVERY DAY 10/29/21   Symone Soto, DO   ibuprofen (ADVIL;MOTRIN) 800 MG tablet Take 1 tablet by mouth every 6 hours as needed for Pain (for back pain) 4/21/21   Symone Soto, DO   albuterol sulfate  (90 Base) MCG/ACT inhaler Inhale 2 puffs into the lungs 4 times daily 10/21/20   Yareli Ely MD

## 2022-03-01 ENCOUNTER — HOSPITAL ENCOUNTER (OUTPATIENT)
Dept: PHYSICAL THERAPY | Age: 42
Setting detail: THERAPIES SERIES
Discharge: HOME | End: 2022-03-01
Payer: COMMERCIAL

## 2022-03-01 PROCEDURE — 97012 MECHANICAL TRACTION THERAPY: CPT

## 2022-03-01 PROCEDURE — 97110 THERAPEUTIC EXERCISES: CPT

## 2022-03-01 ASSESSMENT — PAIN SCALES - GENERAL: PAINLEVEL_OUTOF10: 6

## 2022-03-01 NOTE — PROGRESS NOTES
Phone: [address]      Fax: [phone]                            Outpatient Physical Therapy                                                                            Daily Note    Date: 3/1/2022  Patient Name: Alexey Valdez        MRN: [id number]   ACCT#:  [de-identified]  : 1980  (46 y.o.)    Referring Practitioner: Dr Ruiz Center         Diagnosis: Back Pain  Treatment Diagnosis: Low Back Pain    Onset Date: 21  Total # of Visits Approved: 18 Per Physician Order  Total # of Visits to Date: 9  No Show: 0  Canceled Appointment: 0    Pre-Treatment Pain:  610     Assessment  Assessment: Patient states LBP is a 6/10 this afternoon. Patient notes traction provides her relief for approx 1-2 days after therapy, but then pain returns. Increased traction pull to 60# this date. Following session patient notes no change in pain level. Will check with patient's insurance if dry needling is covered.   Chart Reviewed: Yes    Plan  Plan: Continue with current plan    Exercises/Modalities/Manual:  See DocFlow Sheet    Education:      Barriers to Learning: none    Goals  (Total # of Visits to Date: 5)   Short Term Goals - Time Frame for Short term goals: 9  Short term goal 1: Ed / Compliant with HEP -  NOT MET  Short term goal 2: Patient report addressing office setting for propoer sitting ergonomics - MET    Long Term Goals - Time Frame for Long term goals : 25  Long term goal 1: Patient demonstrate 4+/5 B hip strength  Long term goal 2: Report decreased low back pain with ADL's 1-2/10   Long term goal 3: Per Oswestry score > 40/50 (initial score 21/50)    Post Treatment Pain:  6/10    Time In: 1610    Time Out : 1640   Timed Code Treatment Minutes: 20 Minutes  Total Treatment Time: 30 Minutes    [place], [place]     Date: 3/1/2022

## 2022-03-03 ENCOUNTER — HOSPITAL ENCOUNTER (OUTPATIENT)
Dept: PHYSICAL THERAPY | Age: 42
Setting detail: THERAPIES SERIES
Discharge: HOME | End: 2022-03-03
Payer: COMMERCIAL

## 2022-03-03 NOTE — PROGRESS NOTES
Physical Therapy  [facility]    Date: 3/3/2022  Patient Name: Sharmaine Avalos        : 1980       Patient not feeling well and not able to come to therapy.       Adriana Neil Shock Date: 3/3/2022

## 2022-03-04 ENCOUNTER — OFFICE VISIT (OUTPATIENT)
Dept: FAMILY MEDICINE CLINIC | Age: 42
End: 2022-03-04
Payer: COMMERCIAL

## 2022-03-04 VITALS
HEIGHT: 66 IN | HEART RATE: 78 BPM | SYSTOLIC BLOOD PRESSURE: 100 MMHG | DIASTOLIC BLOOD PRESSURE: 60 MMHG | OXYGEN SATURATION: 97 % | WEIGHT: 199 LBS | BODY MASS INDEX: 31.98 KG/M2

## 2022-03-04 DIAGNOSIS — M54.50 CHRONIC BILATERAL LOW BACK PAIN WITHOUT SCIATICA: Primary | ICD-10-CM

## 2022-03-04 DIAGNOSIS — G89.29 CHRONIC BILATERAL LOW BACK PAIN WITHOUT SCIATICA: Primary | ICD-10-CM

## 2022-03-04 PROCEDURE — 96372 THER/PROPH/DIAG INJ SC/IM: CPT | Performed by: STUDENT IN AN ORGANIZED HEALTH CARE EDUCATION/TRAINING PROGRAM

## 2022-03-04 PROCEDURE — 1036F TOBACCO NON-USER: CPT | Performed by: STUDENT IN AN ORGANIZED HEALTH CARE EDUCATION/TRAINING PROGRAM

## 2022-03-04 PROCEDURE — G8427 DOCREV CUR MEDS BY ELIG CLIN: HCPCS | Performed by: STUDENT IN AN ORGANIZED HEALTH CARE EDUCATION/TRAINING PROGRAM

## 2022-03-04 PROCEDURE — G8417 CALC BMI ABV UP PARAM F/U: HCPCS | Performed by: STUDENT IN AN ORGANIZED HEALTH CARE EDUCATION/TRAINING PROGRAM

## 2022-03-04 PROCEDURE — 99214 OFFICE O/P EST MOD 30 MIN: CPT | Performed by: STUDENT IN AN ORGANIZED HEALTH CARE EDUCATION/TRAINING PROGRAM

## 2022-03-04 PROCEDURE — G8484 FLU IMMUNIZE NO ADMIN: HCPCS | Performed by: STUDENT IN AN ORGANIZED HEALTH CARE EDUCATION/TRAINING PROGRAM

## 2022-03-04 RX ORDER — PREDNISONE 20 MG/1
40 TABLET ORAL DAILY
Qty: 10 TABLET | Refills: 0 | Status: SHIPPED | OUTPATIENT
Start: 2022-03-04 | End: 2022-03-09

## 2022-03-04 RX ORDER — KETOROLAC TROMETHAMINE 30 MG/ML
30 INJECTION, SOLUTION INTRAMUSCULAR; INTRAVENOUS ONCE
Status: COMPLETED | OUTPATIENT
Start: 2022-03-04 | End: 2022-03-04

## 2022-03-04 RX ADMIN — KETOROLAC TROMETHAMINE 30 MG: 30 INJECTION, SOLUTION INTRAMUSCULAR; INTRAVENOUS at 11:12

## 2022-03-04 ASSESSMENT — ENCOUNTER SYMPTOMS
NAUSEA: 0
ABDOMINAL PAIN: 0
WHEEZING: 0
DIARRHEA: 0
COUGH: 0
VOMITING: 0
SINUS PAIN: 0
BACK PAIN: 1
RHINORRHEA: 0

## 2022-03-04 NOTE — LETTER
[facility] [address] [address]  Phone: [phone]  Fax: [phone], DO        March 4, 2022     Patient: Heena Clark   YOB: 1980   Date of Visit: 3/4/2022       To Whom It May Concern: It is my medical opinion that Tania Jack may return to work on 03/07/2022. Please excuse her absence from 03/01/22 until then. If you have any questions or concerns, please don't hesitate to call.     Sincerely,          Mindi Luevano, DO

## 2022-03-04 NOTE — PROGRESS NOTES
HPI Notes    Name: Melody Regan  : 1980         Chief Complaint:     Chief Complaint   Patient presents with    Back Pain     chronic bilateral low abck pain without sciatica. has been doing PT with traction. currently on day 3 of exacerbation. Pt is looking into accupuncture for patient. History of Present Illness:      HPI    This is a 72-year-old woman presenting for evaluation of acute on chronic bilateral low back pain without sciatica. She has been engaging with physical therapy, specifically utilizing traction. She is currently considering dry needling therapy with physical therapy. Her current regimen for this problem includes Soma, as well as gabapentin. Of note, she had has a history of substance use disorder, and has since recovered. She reports that this flare has been worsening since two days ago. She has not been to work in 3 days because of this problem and has missed PT on Thursday. She is describing the discomfort is now intense and bothersome, and is less able to have brief relief by getting up and walking around. The character and location of the discomfort have not changed; still a sharp, constant discomfort localized to the sacral base. She has no red flag signs for cauda equina since this started. She has also had worsening discomfort during her menstrual period, and she is currently on day 2 of her menstrual flow.     Past Medical History:     Past Medical History:   Diagnosis Date    Anxiety     Bulging of lumbar intervertebral disc without myelopathy     L1    Chronic back pain     Depression     GERD (gastroesophageal reflux disease)     Substance abuse (HCC)     alcohol and opiates      Reviewed all health maintenance requirements and ordered appropriate tests  Health Maintenance Due   Topic Date Due    Varicella vaccine (1 of 2 - 2-dose childhood series) Never done    COVID-19 Vaccine (1) Never done    DTaP/Tdap/Td vaccine (1 - Tdap) Never done    Flu vaccine (1) 09/01/2021       Past Surgical History:     Past Surgical History:   Procedure Laterality Date    COLONOSCOPY  05/26/2020    PILONIDAL CYST EXCISION  03/2018    PLANTAR FASCIA SURGERY Left 06/11/2020        Medications:       Prior to Admission medications    Medication Sig Start Date End Date Taking? Authorizing Provider   traZODone (DESYREL) 50 MG tablet TAKE 1 TO 2 TABLETS NIGHTLY AS NEEDED 2/28/22   [place] Ates, DO   carisoprodol (SOMA) 350 MG tablet Take 1 tablet by mouth 3 times daily as needed for Muscle spasms for up to 30 days. 2/14/22 3/16/22  Bhavesh Ates, DO   escitalopram (LEXAPRO) 10 MG tablet Take 1.5 tablets by mouth daily 2/1/22   [place] Ates, DO   triamcinolone (KENALOG) 0.1 % ointment Apply topically 2 times daily 2/1/22   Bhavesh Ates, DO   topiramate (TOPAMAX) 25 MG tablet Take 1 tablet by mouth nightly 2/1/22   [place] Ates, DO   hydrOXYzine (ATARAX) 25 MG tablet Take 1 tablet by mouth 2 times daily as needed for Anxiety 11/11/21 Barney Ates, DO   gabapentin (NEURONTIN) 600 MG tablet  10/27/21   Historical Provider, MD   omeprazole (PRILOSEC) 40 MG delayed release capsule TAKE 1 CAPSULE BY MOUTH EVERY DAY 10/29/21   [place] Ates, DO   ibuprofen (ADVIL;MOTRIN) 800 MG tablet Take 1 tablet by mouth every 6 hours as needed for Pain (for back pain) 4/21/21   Bhavesh Ates, DO   albuterol sulfate  (90 Base) MCG/ACT inhaler Inhale 2 puffs into the lungs 4 times daily 10/21/20   Willis Higuera MD        Allergies:       Patient has no known allergies. Social History:     Tobacco:    reports that she has quit smoking. Her smoking use included cigarettes. She has a 2.00 pack-year smoking history. She has never used smokeless tobacco.  Alcohol:      reports previous alcohol use. Drug Use:  reports previous drug use.     Family History:     Family History   Problem Relation Age of Onset    Diabetes Mother        Review of Systems:     Review of Systems   Constitutional: Negative for fever. HENT: Negative for rhinorrhea, sinus pain and sneezing. Respiratory: Negative for cough and wheezing. Cardiovascular: Negative for chest pain. Gastrointestinal: Negative for abdominal pain, diarrhea, nausea and vomiting. Genitourinary: Negative for menstrual problem and vaginal discharge. Musculoskeletal: Positive for back pain. Skin: Negative for rash. Neurological: Negative for headaches. Psychiatric/Behavioral: Negative for sleep disturbance. Physical Exam:     Vitals:  /60   Pulse 78   Ht 5' 6\" (1.676 m)   Wt 199 lb (90.3 kg)   SpO2 97%   BMI 32.12 kg/m²       Physical Exam  Vitals and nursing note reviewed. Constitutional:       General: She is not in acute distress. Appearance: Normal appearance. She is obese. Musculoskeletal:         General: Tenderness present. Comments: Bilateral sacral base tenderness, worsened by forward bending   Skin:     General: Skin is warm and dry. Capillary Refill: Capillary refill takes less than 2 seconds. Neurological:      General: No focal deficit present. Mental Status: She is alert and oriented to person, place, and time. Mental status is at baseline. Cranial Nerves: No cranial nerve deficit. Psychiatric:         Mood and Affect: Mood normal.         Behavior: Behavior normal.         Thought Content:  Thought content normal.                 Data:     Lab Results   Component Value Date     09/19/2021    K 3.9 09/19/2021    CL 99 09/19/2021    CO2 26 09/19/2021    BUN 7 09/19/2021    CREATININE 0.88 09/19/2021    GLUCOSE 95 09/19/2021    PROT 7.3 03/12/2021    LABALBU 4.5 03/12/2021    BILITOT 0.11 03/12/2021    ALKPHOS 49 03/12/2021    AST 19 03/12/2021    ALT 31 03/12/2021     Lab Results   Component Value Date    WBC 12.0 09/19/2021    RBC 4.76 09/19/2021    HGB 13.9 09/19/2021    HCT 40.5 09/19/2021    MCV 85.2 09/19/2021    MCH 29.2 09/19/2021 MCHC 34.3 09/19/2021    RDW 12.6 09/19/2021     09/19/2021    MPV NOT REPORTED 09/19/2021     Lab Results   Component Value Date    TSH 0.69 10/22/2019     Lab Results   Component Value Date    LABA1C 5.3 09/19/2018          Assessment & Plan        Diagnosis Orders   1. Chronic bilateral low back pain without sciatica         1. Appears to be having exacerbation of her chronic low back discomfort. I would recommend injectable ketorolac today, as well as a 5-day prednisone burst.  The Toradol will be useful as it does seem to be associated with menstrual cramping and likely prostaglandin production during that process. I have given her a work excuse note, she may return to work on Monday. Follow up as needed for this problem          Completed Refills   Requested Prescriptions      No prescriptions requested or ordered in this encounter     No follow-ups on file. No orders of the defined types were placed in this encounter. No orders of the defined types were placed in this encounter. Patient Instructions     SURVEY:    You may be receiving a survey from [email or web address] regarding your visit today. You may get this in the mail, through your MyChart or in your email. Please complete the survey to enable us to provide the highest quality of care to you and your family. If you cannot score us as very good ( 5 Stars) on any question, please feel free to call the office to discuss how we could have made your experience exceptional.     Thank you.     Clinical Care Team:  DO Humera Guadalupe, [address] [place] [address] Team:  [facility]        Electronically signed by Yolande Ordonez DO on 3/4/2022 at 10:15 AM           Completed Refills   Requested Prescriptions      No prescriptions requested or ordered in this encounter         Carri Cazares received counseling on the following healthy behaviors: nutrition, exercise and medication adherence  Reviewed prior labs and health maintenance. Continue current medications, diet and exercise. Discussed use, benefit, and side effects of prescribed medications. Barriers to medication compliance addressed. Patient given educational materials - see patient instructions. All patient questions answered. Patient voiced understanding.

## 2022-03-04 NOTE — PATIENT INSTRUCTIONS
SURVEY:    You may be receiving a survey from [employer] regarding your visit today. You may get this in the mail, through your MyChart or in your email. Please complete the survey to enable us to provide the highest quality of care to you and your family. If you cannot score us as very good ( 5 Stars) on any question, please feel free to call the office to discuss how we could have made your experience exceptional.     Thank you.     Clinical Care Team:  Dr. Nicole Toro, DO Juan Narayan, [address] [place] [address] Team:  [address] [place] [address]

## 2022-03-07 ENCOUNTER — TELEPHONE (OUTPATIENT)
Dept: FAMILY MEDICINE CLINIC | Age: 42
End: 2022-03-07

## 2022-03-07 RX ORDER — INDOMETHACIN 75 MG/1
75 CAPSULE, EXTENDED RELEASE ORAL 2 TIMES DAILY WITH MEALS
Qty: 60 CAPSULE | Refills: 3 | Status: SHIPPED | OUTPATIENT
Start: 2022-03-07 | End: 2022-06-17

## 2022-03-07 NOTE — TELEPHONE ENCOUNTER
Patient states that she did not start prednisone until Saturday due to an upset stomach. But still does not have relief, Tramadol did help for a day. Would like something else for pain.  Please advise

## 2022-03-07 NOTE — TELEPHONE ENCOUNTER
Indomethacin sent in. Twice daily as needed with food. Do NOT take at same time as ibuprofen. Use this INSTEAD of ibuprofen.

## 2022-03-08 ENCOUNTER — HOSPITAL ENCOUNTER (OUTPATIENT)
Dept: PHYSICAL THERAPY | Age: 42
Setting detail: THERAPIES SERIES
Discharge: HOME | End: 2022-03-08
Payer: COMMERCIAL

## 2022-03-08 PROCEDURE — 97110 THERAPEUTIC EXERCISES: CPT

## 2022-03-08 PROCEDURE — 97012 MECHANICAL TRACTION THERAPY: CPT

## 2022-03-08 ASSESSMENT — PAIN SCALES - GENERAL: PAINLEVEL_OUTOF10: 4

## 2022-03-08 NOTE — PROGRESS NOTES
Phone: [address]      Fax: [phone]                            Outpatient Physical Therapy                                                                            Daily Note    Date: 3/8/2022  Patient Name: Melody Regan        MRN: [id number]   ACCT#:  [de-identified]  : 1980  (46 y.o.)    Referring Practitioner: Dr Jo Ann Schaefer         Diagnosis: Back Pain  Treatment Diagnosis: Low Back Pain    Onset Date: 21  Total # of Visits Approved: 18 Per Physician Order  Total # of Visits to Date: 10  No Show: 0  Canceled Appointment: 0    Pre-Treatment Pain:  4-5/10     Assessment  Assessment: Pt reports after last PT pain was present for several days. Pt went to  and is now on prenisone which is helping her pain to decrease. Pt reports she would like to try dry needling and will check with [employer] to see if covered. Pt would still like to try traction this date. Will leave  appt, if relief noted after  session pt will complete Friday's appt. If HSA covers dry needling will plan to D/C PT and initiate dry needling under retail program.  If no HSA coverage will refer back to physician for additional testing. Chart Reviewed: Yes    Plan  Plan: Continue with current plan    Exercises/Modalities/Manual:  See DocFlow Sheet    Education: Dry needling not covered by insurance, options for retail dry needling. Discussed f/u with HSA to determine if covered.   Anticipate D/C Friday from PT services due to lack of consistent relief     Barriers to Learning: none    Goals  (Total # of Visits to Date: 10)   Short Term Goals - Time Frame for Short term goals: 9  Short term goal 1: Ed / Compliant with HEP -  NOT MET  Short term goal 2: Patient report addressing office setting for propoer sitting ergonomics - MET     Long Term Goals - Time Frame for Long term goals : 25  Long term goal 1: Patient demonstrate 4+/5 B hip strength  Long term goal 2: Report decreased low back pain with ADL's 1-2/10   Long term goal 3: Per Oswestry score > 40/50 (initial score 21/50)     Post Treatment Pain: 4/10    Time In: 1508  Time Out: 1545  Timed Code Treatment Minutes: 20 Minutes  Total Treatment Time: [place], PT     Date: 3/8/2022

## 2022-03-11 ENCOUNTER — HOSPITAL ENCOUNTER (OUTPATIENT)
Dept: PHYSICAL THERAPY | Age: 42
Setting detail: THERAPIES SERIES
Discharge: HOME | End: 2022-03-11
Payer: COMMERCIAL

## 2022-03-11 PROCEDURE — 97012 MECHANICAL TRACTION THERAPY: CPT

## 2022-03-11 ASSESSMENT — PAIN SCALES - GENERAL: PAINLEVEL_OUTOF10: 7

## 2022-03-11 NOTE — PROGRESS NOTES
Phone: Geno Antoine      Fax: [phone]                            Outpatient Physical Therapy                                                                            Daily Note    Date: 3/11/2022  Patient Name: Asif Valenzuela        MRN: [id number]   ACCT#:  [de-identified]  : 1980  (46 y.o.)    Referring Practitioner: Dr Vick Wilson         Diagnosis: Back Pain  Treatment Diagnosis: Low Back Pain    Onset Date: 21  Total # of Visits Approved: 18 Per Physician Order  Total # of Visits to Date: 11  No Show: 0  Canceled Appointment: 0    Pre-Treatment Pain:  7/10     Assessment  Assessment: Patient arrived reporting pain increaed to 7/10. No exercises completed per pt request as she feels she is in too much pain today. Kim Singh Pt pain at lowest with ADLs is 4/10 per pt. B/L hip ABD strentgh 4/5. Oswestry score  = 24. Patient issued handout on dry needling to educate self at home with possible dry needling sessions.    Chart Reviewed: Yes    Plan  Plan: Discharge    Exercises/Modalities/Manual:  See DocFlow Sheet    Education:      Barriers to Learning: none    Goals  (Total # of Visits to Date: 6)   Short Term Goals - Time Frame for Short term goals: 9  Short term goal 1: Ed / Compliant with HEP -  NOT MET  Short term goal 2: Patient report addressing office setting for propoer sitting ergonomics - MET             Long Term Goals - Time Frame for Long term goals : 25  Long term goal 1: Patient demonstrate 4+/5 B hip strength not met  Long term goal 2: Report decreased low back pain with ADL's 1-2/10  not met  Long term goal 3: Per Oswestry score > 40/50 (initial score 21/50) not met          Post Treatment Pain:  5/10      Time In: 1508  Time Out: 1534  Timed Code Treatment Minutes: 0 Minutes  Total Treatment Time: [address], [facility]     Date: 3/11/2022

## 2022-03-14 DIAGNOSIS — M54.50 CHRONIC BILATERAL LOW BACK PAIN WITHOUT SCIATICA: ICD-10-CM

## 2022-03-14 DIAGNOSIS — G89.29 CHRONIC BILATERAL LOW BACK PAIN WITHOUT SCIATICA: ICD-10-CM

## 2022-03-14 RX ORDER — CARISOPRODOL 350 MG/1
350 TABLET ORAL 3 TIMES DAILY PRN
Qty: 90 TABLET | Refills: 0 | Status: SHIPPED | OUTPATIENT
Start: 2022-03-14 | End: 2022-04-14 | Stop reason: SDUPTHER

## 2022-03-14 NOTE — TELEPHONE ENCOUNTER
Last visit:  3/4/2022  Next Visit Date:  No future appointments. Medication List:  Prior to Admission medications    Medication Sig Start Date End Date Taking? Authorizing Provider   indomethacin (INDOCIN SR) 75 MG extended release capsule Take 1 capsule by mouth 2 times daily (with meals) 3/7/22   Markie Easley, DO   traZODone (DESYREL) 50 MG tablet TAKE 1 TO 2 TABLETS NIGHTLY AS NEEDED 2/28/22   Oleta Organ, DO   carisoprodol (SOMA) 350 MG tablet Take 1 tablet by mouth 3 times daily as needed for Muscle spasms for up to 30 days.  2/14/22 3/16/22  Oleta Organ, DO   escitalopram (LEXAPRO) 10 MG tablet Take 1.5 tablets by mouth daily 2/1/22   Oleta Organ, DO   triamcinolone (KENALOG) 0.1 % ointment Apply topically 2 times daily 2/1/22   Oleta Organ, DO   topiramate (TOPAMAX) 25 MG tablet Take 1 tablet by mouth nightly 2/1/22   Oleta Organ, DO   hydrOXYzine (ATARAX) 25 MG tablet Take 1 tablet by mouth 2 times daily as needed for Anxiety 11/11/21   Oleta Organ, DO   gabapentin (NEURONTIN) 600 MG tablet  10/27/21   Historical Provider, MD   omeprazole (PRILOSEC) 40 MG delayed release capsule TAKE 1 CAPSULE BY MOUTH EVERY DAY 10/29/21   Oleta Organ, DO   ibuprofen (ADVIL;MOTRIN) 800 MG tablet Take 1 tablet by mouth every 6 hours as needed for Pain (for back pain) 4/21/21   Oleta Organ, DO   albuterol sulfate  (90 Base) MCG/ACT inhaler Inhale 2 puffs into the lungs 4 times daily 10/21/20   David Scruggs MD

## 2022-03-14 NOTE — TELEPHONE ENCOUNTER
Patient asking for a new script for Soma - patient uses [facility] Maintenance   Topic Date Due    Varicella vaccine (1 of 2 - 2-dose childhood series) Never done    COVID-19 Vaccine (1) Never done    DTaP/Tdap/Td vaccine (1 - Tdap) Never done    Flu vaccine (1) 09/01/2021    Depression Monitoring  01/12/2023    Cervical cancer screen  04/07/2025    Hepatitis C screen  Completed    HIV screen  Completed    Hepatitis A vaccine  Aged Out    Hepatitis B vaccine  Aged Out    Hib vaccine  Aged Out    Meningococcal (ACWY) vaccine  Aged Out    Pneumococcal 0-64 years Vaccine  Aged Out             (applicable per patient's age: Cancer Screenings, Depression Screening, Fall Risk Screening, Immunizations)    Hemoglobin A1C (%)   Date Value   09/19/2018 5.3     AST (U/L)   Date Value   03/12/2021 19     ALT (U/L)   Date Value   03/12/2021 31     BUN (mg/dL)   Date Value   09/19/2021 7      (goal A1C is < 7)   (goal LDL is <100) need 30-50% reduction from baseline     BP Readings from Last 3 Encounters:   03/04/22 100/60   02/01/22 100/60   01/12/22 100/64    (goal /80)      All Future Testing planned in [facility]:  Lab Frequency Next Occurrence       Next Visit Date:  No future appointments.          Patient Active Problem List:     Chronic neck pain     Onychomycosis     Chronic low back pain without sciatica     Obesity with body mass index 30 or greater

## 2022-03-16 ENCOUNTER — TELEPHONE (OUTPATIENT)
Dept: FAMILY MEDICINE CLINIC | Age: 42
End: 2022-03-16

## 2022-03-16 NOTE — TELEPHONE ENCOUNTER
What you are saying in the media tab is just a scoring scale that they use for back pain at physical therapy. Not any kind of disability determination regarding work or government benefits.   Please schedule patient to discuss time on FMLA

## 2022-03-16 NOTE — TELEPHONE ENCOUNTER
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 Section B needs filled out. Can we amend # 7do pt can have more time off during the week. Paper work states she can have a day off during the week, the nurse stated pt has missed three days already this week.  Please advise

## 2022-03-16 NOTE — TELEPHONE ENCOUNTER
Forms were already corrected and sent, asking for more time off during the week , also has disability FCT from 3/11 in media

## 2022-03-21 ENCOUNTER — TELEPHONE (OUTPATIENT)
Dept: FAMILY MEDICINE CLINIC | Age: 42
End: 2022-03-21

## 2022-03-21 DIAGNOSIS — M54.50 CHRONIC BILATERAL LOW BACK PAIN WITHOUT SCIATICA: Primary | ICD-10-CM

## 2022-03-21 DIAGNOSIS — G89.29 CHRONIC BILATERAL LOW BACK PAIN WITHOUT SCIATICA: Primary | ICD-10-CM

## 2022-03-21 NOTE — TELEPHONE ENCOUNTER
Received documentation from physical therapy stating inadequate benefit. Recommend MRI lumbar spine if patient is agreeable.

## 2022-03-21 NOTE — DISCHARGE SUMMARY
Phone: [address]             Fax: [phone]                            Outpatient Physical Therapy                                                                    Discharge Summary    Patient: Asif Valenzuela  : 1980  ACCT #: [de-identified]   Referring Practitioner: Dr Vick Wilson      Diagnosis: Back Pain    Date Treatment Initiated: 22  Date of Last Treatment: 3/11/22    PT Visit Information  Onset Date: 21  Total # of Visits Approved: 18  Total # of Visits to Date: 11  No Show: 0  Canceled Appointment: 0    Frequency/Duration  Days: 2 times per week  Weeks: 9 weeks    Treatment Received  Patient Education/HEP, Back Education, Therapeutic Exercise, Manual Therapy: Myofacial Release/Cupping, Manual Therapy: Mobilization/Manipulation, Traction and HP/CP    Pain Level: 7    Assessment  Patient arrived to last visit pain increased to 7/10. Pt pain at lowest with ADLs is 4/10 per pt. MMT:  B/L hip ABD strength 4/5. Oswestry score  = 24. Patient issued handout on dry needling for pt review and to determine if she would like to return for retail needling due to no insurance coverage for this service. Reason for Discharge  Optimal Function Achieved    Comments:   Thank you for this referral      Tony Escobar, PT  Date: 3/21/2022

## 2022-03-22 ENCOUNTER — TELEPHONE (OUTPATIENT)
Dept: FAMILY MEDICINE CLINIC | Age: 42
End: 2022-03-22

## 2022-03-23 ENCOUNTER — OFFICE VISIT (OUTPATIENT)
Dept: FAMILY MEDICINE CLINIC | Age: 42
End: 2022-03-23
Payer: COMMERCIAL

## 2022-03-23 VITALS
DIASTOLIC BLOOD PRESSURE: 66 MMHG | OXYGEN SATURATION: 99 % | SYSTOLIC BLOOD PRESSURE: 108 MMHG | HEIGHT: 66 IN | BODY MASS INDEX: 33.11 KG/M2 | HEART RATE: 88 BPM | WEIGHT: 206 LBS

## 2022-03-23 DIAGNOSIS — G89.29 CHRONIC BILATERAL LOW BACK PAIN WITHOUT SCIATICA: ICD-10-CM

## 2022-03-23 DIAGNOSIS — M54.50 CHRONIC BILATERAL LOW BACK PAIN WITHOUT SCIATICA: ICD-10-CM

## 2022-03-23 DIAGNOSIS — R45.851 DEPRESSION WITH SUICIDAL IDEATION: Primary | ICD-10-CM

## 2022-03-23 DIAGNOSIS — F10.20 ALCOHOLISM (HCC): ICD-10-CM

## 2022-03-23 DIAGNOSIS — F32.A DEPRESSION WITH SUICIDAL IDEATION: Primary | ICD-10-CM

## 2022-03-23 DIAGNOSIS — Z09 HOSPITAL DISCHARGE FOLLOW-UP: ICD-10-CM

## 2022-03-23 PROCEDURE — 99495 TRANSJ CARE MGMT MOD F2F 14D: CPT | Performed by: FAMILY MEDICINE

## 2022-03-23 RX ORDER — ESCITALOPRAM OXALATE 20 MG/1
TABLET ORAL
COMMUNITY
Start: 2022-03-22 | End: 2022-07-25 | Stop reason: SDUPTHER

## 2022-03-23 RX ORDER — BUSPIRONE HYDROCHLORIDE 7.5 MG/1
TABLET ORAL
COMMUNITY
Start: 2022-03-22 | End: 2022-03-30

## 2022-03-23 NOTE — PATIENT INSTRUCTIONS
SURVEY:    You may be receiving a survey from [employer] regarding your visit today. You may get this in the mail, through your MyChart or in your email. Please complete the survey to enable us to provide the highest quality of care to you and your family. If you cannot score us as very good ( 5 Stars) on any question, please feel free to call the office to discuss how we could have made your experience exceptional.     Thank you.     Clinical Care Team:  DO Ivone Carpenter, [address] [place] [address] Team:  [address] [place] [address]

## 2022-03-23 NOTE — LETTER
[facility] PRIMARY CARE [place] Mcfadden [address] [place] [address]  Phone: [phone]  Fax: Fior 106, DO        March 23, 2022     Patient: Courtney Starks   YOB: 1980   Date of Visit: 3/23/2022       To Whom It May Concern: It is my medical opinion that Nolan Ricketts has been off work since 3/14/22 due to a medical condition which included a hospitalization. She is expected to return 4/5/22. If you have any questions or concerns, please don't hesitate to call.     Sincerely,        Anjelica Duggan, DO

## 2022-03-27 ASSESSMENT — ENCOUNTER SYMPTOMS
GASTROINTESTINAL NEGATIVE: 1
RESPIRATORY NEGATIVE: 1

## 2022-03-28 ENCOUNTER — HOSPITAL ENCOUNTER (OUTPATIENT)
Dept: PHYSICAL THERAPY | Age: 42
Setting detail: THERAPIES SERIES
Discharge: HOME | End: 2022-03-28
Payer: COMMERCIAL

## 2022-03-28 PROCEDURE — 9900000107 HC NEEDLE INSERTION W/O INJECTION 3 OR MORE MUSCLES (SELF-PAY)

## 2022-03-28 PROCEDURE — 20561 NDL INSJ W/O NJX 3+ MUSC: CPT

## 2022-03-28 ASSESSMENT — PAIN SCALES - GENERAL: PAINLEVEL_OUTOF10: 2

## 2022-03-28 NOTE — PROGRESS NOTES
Phone: Geno Antoine      Fax: [phone]                            Outpatient Physical Therapy                                                                            Daily Note    Date: 3/28/2022  Patient Name: Heather Kitchen        MRN: [id number]   ACCT#:  [de-identified]  : 1980  (38 y.o.)    Referring Practitioner: Dr. Vick Wilson    Referral Date : 21    Diagnosis: Chronic bilateral low back pain without sciatica  Treatment Diagnosis: Low Back Pain    Onset Date: 21  PT Insurance Information: Retail   Total # of Visits Approved: 10 Per Physician Order  Total # of Visits to Date: 1  No Show: 0  Canceled Appointment: 0    Pre-Treatment Pain: 2/10     Assessment  Assessment: Pt returns for Dry needling treatment due to persistant back pain despite PT efforts. Pt reports cleaning at home today with small  with increased pain 2/10 and sciatic discomfort in L LE. Dry needling consent signed and pt educated on Dry needling. Pt reports her pain has been less as she has been off work due to personal leave with less pain noted. Pt with good fco and no increased pain/soreness after session. Pt educated to use HP for soreness/tightness. Chart Reviewed: Yes    Plan  Plan: Plan of care initiated    Exercises/Modalities/Manual:  See DocFlow Sheet    Education: see assessment  Barriers to Learning: none    Goals  (Total # of Visits to Date: 1)   Long Term Goals - Time Frame for Long term goals : 10 visits  Long term goal 1: Pt to report 75% improvement with regard to low back pain. Long term goal 2: Pt to report worst pain following work day 2/10. Long term goal 3: Pt to report no radicular LE pain x3 consecutive days.     Post Treatment Pain:  2/10    Time In: 1635  Time Out: 1713  Timed Code Treatment Minutes: 38 Minutes  Total Treatment Time: Kevin Joseph PT     Date: 3/28/2022

## 2022-03-28 NOTE — PLAN OF CARE
[facility] LIAM TYSON       Phone: [phone]   Date: 3/28/2022                      Outpatient Physical Therapy  Fax: [phone]    ACCT #: [de-identified]                     Plan of Care  [facility]#: [id number]  Patient: Nury Soriano  : 1980    Referring Practitioner: Dr. Symone Hunter    Referral Date : 21    Diagnosis: Chronic bilateral low back pain without sciatica  Onset Date: 21  Treatment Diagnosis: Low Back Pain    Assessment  Body structures, Functions, Activity limitations: Decreased functional mobility ,Decreased ADL status,Decreased strength,Decreased endurance,Increased pain,Decreased posture,Decreased ROM  Assessment: Pt returns for Dry needling treatment due to persistant back pain despite PT efforts. Dry needling consent signed and pt educated on Dry needling. Pt reports her pain has been less as she has been off work due to personal leave with less pain noted. Pt with good fco and no increased pain/soreness after session. Pt educated to use HP for soreness/tightness. Prognosis: Good    Treatment Plan   Days: 2 times per week Weeks: 5 weeks Total # of Visits Approved: 10    Patient Education/HEP, Back Education, Dry Needling and HP/CP     Goals  Long term goals  Time Frame for Long term goals : 10 visits  Long term goal 1: Pt to report 75% improvement with regard to low back pain. Long term goal 2: Pt to report worst pain following work day 2/10. Long term goal 3: Pt to report no radicular LE pain x3 consecutive days. Oksana Damian, PT   Date: 3/28/2022    ______________________________________ Date: 3/28/2022  Physician Signature  By signing above or cosigning electronically, I have reviewed this Plan of Care and certify a need for medically necessary rehabilitation services.

## 2022-03-30 ENCOUNTER — OFFICE VISIT (OUTPATIENT)
Dept: FAMILY MEDICINE CLINIC | Age: 42
End: 2022-03-30
Payer: COMMERCIAL

## 2022-03-30 VITALS — DIASTOLIC BLOOD PRESSURE: 64 MMHG | HEART RATE: 72 BPM | SYSTOLIC BLOOD PRESSURE: 128 MMHG | OXYGEN SATURATION: 99 %

## 2022-03-30 DIAGNOSIS — K59.09 CHRONIC CONSTIPATION: Primary | ICD-10-CM

## 2022-03-30 DIAGNOSIS — F10.20 ALCOHOLISM (HCC): ICD-10-CM

## 2022-03-30 DIAGNOSIS — R60.0 MILD PERIPHERAL EDEMA: ICD-10-CM

## 2022-03-30 DIAGNOSIS — F11.11 NARCOTIC ABUSE IN REMISSION (HCC): ICD-10-CM

## 2022-03-30 PROCEDURE — G8427 DOCREV CUR MEDS BY ELIG CLIN: HCPCS | Performed by: FAMILY MEDICINE

## 2022-03-30 PROCEDURE — G8484 FLU IMMUNIZE NO ADMIN: HCPCS | Performed by: FAMILY MEDICINE

## 2022-03-30 PROCEDURE — 99214 OFFICE O/P EST MOD 30 MIN: CPT | Performed by: FAMILY MEDICINE

## 2022-03-30 PROCEDURE — 1036F TOBACCO NON-USER: CPT | Performed by: FAMILY MEDICINE

## 2022-03-30 PROCEDURE — G8417 CALC BMI ABV UP PARAM F/U: HCPCS | Performed by: FAMILY MEDICINE

## 2022-03-30 RX ORDER — BUPRENORPHINE HYDROCHLORIDE AND NALOXONE HYDROCHLORIDE DIHYDRATE 8; 2 MG/1; MG/1
TABLET SUBLINGUAL
COMMUNITY
Start: 2022-03-23 | End: 2022-05-02 | Stop reason: SDUPTHER

## 2022-03-30 NOTE — PROGRESS NOTES
Name: Laney Garza  : 1980         Chief Complaint:     Chief Complaint   Patient presents with    Depression       History of Present Illness:      Laney Garza is a 38 y.o.  female who presents with Depression      HPI    Patient presents for follow-up of depression and substance abuse. I had seen her a few days ago and at that visit she admitted to relapse into using alcohol but denied misuse of any narcotics or prescription meds. Today she does admit that she had been using narcotics as well as Adderall. She was started on Suboxone a few days ago. Not feeling great since then, has developed lower ext swelling, constipation. Mckinley weber put her on linzess, senokot-S, zofran ODT, water pill (unsure of name or dosing). suboxone 1 pill per day. Group counseling at [facility] with hayley Eldridgeo counseling at [facility] [employer]. She believes she can do both of these on . Unfortunately appointments are not available outside of her work schedule. Rather than participating in IOP she is taking a Holiness class and [facility] class. Medical History:     Patient Active Problem List   Diagnosis    Chronic neck pain    Onychomycosis    Chronic low back pain without sciatica    Obesity with body mass index 30 or greater       Medications:       Prior to Admission medications    Medication Sig Start Date End Date Taking? Authorizing Provider   buprenorphine-naloxone (SUBOXONE) 8-2 MG SUBL SL tablet DISSOLVE 1 TABLET UNDER THE TONGUE TWICE A DAY 3/23/22   Historical Provider, MD   escitalopram (LEXAPRO) 20 MG tablet  3/22/22   Historical Provider, MD   carisoprodol (SOMA) 350 MG tablet Take 1 tablet by mouth 3 times daily as needed for Muscle spasms for up to 30 days.  3/14/22 4/13/22  José Raoms,    indomethacin (INDOCIN SR) 75 MG extended release capsule Take 1 capsule by mouth 2 times daily (with meals) 3/7/22   Sobia Easley DO   traZODone (DESYREL) 50 MG tablet TAKE 1 TO 2 TABLETS NIGHTLY AS NEEDED 2/28/22   Sallie Michel, DO   triamcinolone (KENALOG) 0.1 % ointment Apply topically 2 times daily 2/1/22   Sallie Michel DO   topiramate (TOPAMAX) 25 MG tablet Take 1 tablet by mouth nightly 2/1/22   Sallie Michel, DO   hydrOXYzine (ATARAX) 25 MG tablet Take 1 tablet by mouth 2 times daily as needed for Anxiety 11/11/21   Sallie Michel DO   gabapentin (NEURONTIN) 600 MG tablet  10/27/21   Historical Provider, MD   omeprazole (PRILOSEC) 40 MG delayed release capsule TAKE 1 CAPSULE BY MOUTH EVERY DAY 10/29/21   Sallie Michel DO   ibuprofen (ADVIL;MOTRIN) 800 MG tablet Take 1 tablet by mouth every 6 hours as needed for Pain (for back pain) 4/21/21   Sallie Michel DO   albuterol sulfate  (90 Base) MCG/ACT inhaler Inhale 2 puffs into the lungs 4 times daily 10/21/20   Emelia Brooks MD        Allergies:       Patient has no known allergies. Physical Exam:     Vitals:  /64   Pulse 72   SpO2 99%   Physical Exam  Vitals and nursing note reviewed. Constitutional:       General: She is not in acute distress. Appearance: Normal appearance. She is well-developed. She is not ill-appearing. Cardiovascular:      Rate and Rhythm: Normal rate and regular rhythm. Heart sounds: Normal heart sounds. Comments: Bilateral legs puffy but no pitting edema  Pulmonary:      Effort: Pulmonary effort is normal.      Breath sounds: Normal breath sounds. Abdominal:      General: Bowel sounds are normal.      Palpations: Abdomen is soft. Tenderness: There is no abdominal tenderness. Neurological:      Mental Status: She is alert and oriented to person, place, and time.    Psychiatric:         Mood and Affect: Mood normal.         Behavior: Behavior normal.         Data:     Lab Results   Component Value Date     09/19/2021    K 3.9 09/19/2021    CL 99 09/19/2021    CO2 26 09/19/2021    BUN 7 09/19/2021    CREATININE 0.88 09/19/2021    GLUCOSE 95 09/19/2021    PROT 7.3 03/12/2021    LABALBU 4.5 03/12/2021    BILITOT 0.11 03/12/2021    ALKPHOS 49 03/12/2021    AST 19 03/12/2021    ALT 31 03/12/2021     Lab Results   Component Value Date    WBC 12.0 09/19/2021    RBC 4.76 09/19/2021    HGB 13.9 09/19/2021    HCT 40.5 09/19/2021    MCV 85.2 09/19/2021    MCH 29.2 09/19/2021    MCHC 34.3 09/19/2021    RDW 12.6 09/19/2021     09/19/2021    MPV NOT REPORTED 09/19/2021     Lab Results   Component Value Date    TSH 0.69 10/22/2019     Lab Results   Component Value Date    LABA1C 5.3 09/19/2018         Assessment & Plan:        Diagnosis Orders   1. Chronic constipation     2. Alcoholism ([facility] [address]     3. Narcotic abuse in remission ([facility] [address]     4. Mild peripheral edema       Chronic constipation worse recently. Abdominal bloating associated. Advised to go ahead and start Linzess, typically recommended as a maintenance rather than prn med. Increase fluid and fiber intake. Alcohol and narcotic use, undergoing intensive counseling and Suboxone treatment. Filling out forms for her to go back to work next week but to be off on Mondays so that she can attend counseling appointments. Mild peripheral edema, may be related to activity levels, perhaps mild venous insufficiency, poor water intake. Advised compression stockings, elevate feet when seated, and asked to call and let us know what the water pill prescribed by other physician was. If HCTZ would be okay to continue. Requested Prescriptions      No prescriptions requested or ordered in this encounter         Patient Instructions     SURVEY:    You may be receiving a survey from [employer] regarding your visit today. Please complete the survey to enable us to provide the highest quality of care to you and your family.     If you cannot score us as very good on any question, please call the office to discuss how we could have made your experience exceptional.     Thank you.            signed by Bryan Shipley DO on 4/3/2022 at 10:40 PM  [facility] PRIMARY CARE [place]  [address], [address]  Dept: [phone]

## 2022-03-31 ENCOUNTER — HOSPITAL ENCOUNTER (OUTPATIENT)
Dept: PHYSICAL THERAPY | Age: 42
Setting detail: THERAPIES SERIES
Discharge: HOME | End: 2022-03-31
Payer: COMMERCIAL

## 2022-03-31 PROCEDURE — 9900000107 HC NEEDLE INSERTION W/O INJECTION 3 OR MORE MUSCLES (SELF-PAY)

## 2022-03-31 ASSESSMENT — PAIN SCALES - GENERAL: PAINLEVEL_OUTOF10: 1

## 2022-03-31 NOTE — PROGRESS NOTES
Phone: [phone]      Fax: [phone]                            Outpatient Physical Therapy                                                                            Daily Note    Date: 3/31/2022  Patient Name: Steven Navarro        MRN: [id number]   ACCT#:  [de-identified]  : 1980  (38 y.o.)    Referring Practitioner: Dr. Kenya Lewis    Referral Date : 21    Diagnosis: Chronic bilateral low back pain without sciatica  Treatment Diagnosis: Low Back Pain    Onset Date: 21  PT Insurance Information: Retail   Total # of Visits Approved: 10 Per Physician Order  Total # of Visits to Date: 2  No Show: 0  Canceled Appointment: 0    Pre-Treatment Pain:  1/10     Assessment  Assessment: Pt reports her pain has been very low since last session of needling. 1/10 even with activity including household ADLs. Pt reports that she is noting upper back pain, added upper thoracic treatment this date with good fco. Chart Reviewed: Yes    Plan  Plan: Continue with current plan    Exercises/Modalities/Manual:  See DocFlow Sheet    Education: Held Gluteal/piriformis due to no LE pain and added upper thoracic. Barriers to Learning: none    Goals  (Total # of Visits to Date: 2)     Long Term Goals - Time Frame for Long term goals : 10 visits  Long term goal 1: Pt to report 75% improvement with regard to low back pain. Long term goal 2: Pt to report worst pain following work day 2/10. Long term goal 3: Pt to report no radicular LE pain x3 consecutive days.           Post Treatment Pain:  1/10    Time In: 1640  Time Out: 1716  Timed Code Treatment Minutes: 36 Minutes  Total Treatment Time: [facility], PT     Date: 3/31/2022

## 2022-04-05 ENCOUNTER — HOSPITAL ENCOUNTER (OUTPATIENT)
Dept: PHYSICAL THERAPY | Age: 42
Setting detail: THERAPIES SERIES
Discharge: HOME | End: 2022-04-05
Payer: COMMERCIAL

## 2022-04-05 PROCEDURE — 9900000107 HC NEEDLE INSERTION W/O INJECTION 3 OR MORE MUSCLES (SELF-PAY)

## 2022-04-05 ASSESSMENT — PAIN SCALES - GENERAL: PAINLEVEL_OUTOF10: 3

## 2022-04-05 NOTE — PROGRESS NOTES
Phone: [phone]      Fax: [phone]                            Outpatient Physical Therapy                                                                            Daily Note    Date: 2022  Patient Name: David Ahn        MRN: [id number]   ACCT#:  [de-identified]  : 1980  (38 y.o.)    Referring Practitioner: Dr. Prince Galvez    Referral Date : 21    Diagnosis: Chronic bilateral low back pain without sciatica  Treatment Diagnosis: Low Back Pain    Onset Date: 21  PT Insurance Information: Retail   Total # of Visits Approved: 10 Per Physician Order  Total # of Visits to Date: 3  No Show: 0  Canceled Appointment: 0    Pre-Treatment Pain:  3/10     Assessment  Assessment: Pt reports that she did have soreness post last session, it had subsided until returning to work this date. Pt reports no longer having L LE pain. Pt reports her neck is also feeling better. Pt with good fco to needling this date note R low back aching and B/L twitch at dorsal scap. Pt reports even with return to work this date pain is less and better controlled. Chart Reviewed: Yes    Plan  Plan: Continue with current plan    Exercises/Modalities/Manual:  See DocFlow Sheet    Education: heat at home. Barriers to Learning: none    Goals  (Total # of Visits to Date: 3)   Long Term Goals - Time Frame for Long term goals : 10 visits  Long term goal 1: Pt to report 75% improvement with regard to low back pain. Long term goal 2: Pt to report worst pain following work day 2/10. Long term goal 3: Pt to report no radicular LE pain x3 consecutive days. -MET    Post Treatment Pain: 3/10    Time In: 1637  Time Out: 1713  Timed Code Treatment Minutes: 36 Minutes   Total time: 830 [facility], PT     Date: 2022

## 2022-04-06 ENCOUNTER — TELEPHONE (OUTPATIENT)
Dept: FAMILY MEDICINE CLINIC | Age: 42
End: 2022-04-06

## 2022-04-06 RX ORDER — FUROSEMIDE 20 MG/1
TABLET ORAL
COMMUNITY
Start: 2022-04-04 | End: 2022-04-09 | Stop reason: DRUGHIGH

## 2022-04-06 RX ORDER — BUSPIRONE HYDROCHLORIDE 15 MG/1
TABLET ORAL
COMMUNITY
Start: 2022-04-04 | End: 2022-05-02 | Stop reason: SDUPTHER

## 2022-04-06 RX ORDER — ONDANSETRON 4 MG/1
TABLET, FILM COATED ORAL
COMMUNITY
Start: 2022-03-30 | End: 2022-06-24 | Stop reason: SDUPTHER

## 2022-04-06 RX ORDER — LINACLOTIDE 72 UG/1
CAPSULE, GELATIN COATED ORAL
COMMUNITY
Start: 2022-03-31 | End: 2022-05-03

## 2022-04-06 NOTE — LETTER
[facility] PRIMARY CARE [facility]  [address] [facility] [address]  Phone: [phone]  Fax: Jerryje 141, DO        April 6, 2022     Patient: Damaso Damon   YOB: 1980   Date of Visit: 4/6/2022       To Whom It May Concern: It is my medical opinion that Lorie Fitzpatrick should reduce work hours to 4 hours per day until 4/24/2022. She may return to full duty 4/25/20/22. .    If you have any questions or concerns, please don't hesitate to call.     Sincerely,        Ken Son, DO

## 2022-04-06 NOTE — TELEPHONE ENCOUNTER
left work early today, kept falling asleep, not sure if it's meds or mental exhaustion, possible to try just working half a day for awhile to build back up?

## 2022-04-06 NOTE — TELEPHONE ENCOUNTER
Patient would like to talk to Lani Estrada about her work papers. She states she is falling asleep at work. Patient last seen 3/30/22. Patient also states she has some swelling going on.       Health Maintenance   Topic Date Due    Varicella vaccine (1 of 2 - 2-dose childhood series) Never done    COVID-19 Vaccine (1) Never done    Pneumococcal 0-64 years Vaccine (1 of 2 - PPSV23) Never done    DTaP/Tdap/Td vaccine (1 - Tdap) Never done    Lipid screen  03/26/2022    Flu vaccine (Season Ended) 09/01/2022    Depression Monitoring  01/12/2023    Cervical cancer screen  04/07/2025    Hepatitis C screen  Completed    HIV screen  Completed    Hepatitis A vaccine  Aged Out    Hepatitis B vaccine  Aged Out    Hib vaccine  Aged Out    Meningococcal (ACWY) vaccine  Aged Out             (applicable per patient's age: Cancer Screenings, Depression Screening, Fall Risk Screening, Immunizations)    Hemoglobin A1C (%)   Date Value   09/19/2018 5.3     AST (U/L)   Date Value   03/12/2021 19     ALT (U/L)   Date Value   03/12/2021 31     BUN (mg/dL)   Date Value   09/19/2021 7      (goal A1C is < 7)   (goal LDL is <100) need 30-50% reduction from baseline     BP Readings from Last 3 Encounters:   03/30/22 128/64   03/23/22 108/66   03/04/22 100/60    (goal /80)      All Future Testing planned in [facility]:  Lab Frequency Next Occurrence   MRI LUMBAR SPINE WO CONTRAST Once 05/06/2022       Next Visit Date:  Future Appointments   Date Time Provider Felipe Mancini   4/8/2022  4:30 PM Clemente Plant, PT [facility] PT Daryle Pearson   4/11/2022  4:00 PM Clemente Plant, PT [facility] PT Daryle Pearson            Patient Active Problem List:     Chronic neck pain     Onychomycosis     Chronic low back pain without sciatica     Obesity with body mass index 30 or greater

## 2022-04-08 ENCOUNTER — TELEPHONE (OUTPATIENT)
Dept: FAMILY MEDICINE CLINIC | Age: 42
End: 2022-04-08

## 2022-04-08 ENCOUNTER — HOSPITAL ENCOUNTER (OUTPATIENT)
Dept: PHYSICAL THERAPY | Age: 42
Setting detail: THERAPIES SERIES
Discharge: HOME | End: 2022-04-08
Payer: COMMERCIAL

## 2022-04-08 DIAGNOSIS — M54.50 CHRONIC BILATERAL LOW BACK PAIN WITHOUT SCIATICA: ICD-10-CM

## 2022-04-08 DIAGNOSIS — G89.29 CHRONIC BILATERAL LOW BACK PAIN WITHOUT SCIATICA: ICD-10-CM

## 2022-04-08 PROCEDURE — 20561 NDL INSJ W/O NJX 3+ MUSC: CPT

## 2022-04-08 ASSESSMENT — PAIN DESCRIPTION - LOCATION: LOCATION: BACK

## 2022-04-08 ASSESSMENT — PAIN SCALES - GENERAL: PAINLEVEL_OUTOF10: 2

## 2022-04-08 NOTE — PROGRESS NOTES
Phone: [address]      Fax: [phone]                            Outpatient Physical Therapy                                                                            Daily Note    Date: 2022  Patient Name: Karly Bernal        MRN: [id number]   ACCT#:  [de-identified]  : 1980  (38 y.o.)    Referring Practitioner: Dr. Bridgette Oates    Referral Date : 21    Diagnosis: Chronic bilateral low back pain without sciatica  Treatment Diagnosis: Low Back Pain    Onset Date: 21  PT Insurance Information: Retail   Total # of Visits Approved: 10 Per Physician Order  Total # of Visits to Date: 4  No Show: 0  Canceled Appointment: 0    Pre-Treatment Pain:  2/10     Assessment  Assessment: Pt reports working half days right now due to fatigue with meds. Pt reports that her pain has been maintaining lower pain levels even with 4 hours. Pt reports Tues 22 she will have her MRI. Pt reports 22 she will resume 8hour days at work. Pt reports still no L LE pain. Plan to cont 1x/wk for 2wks then will place on hold 2wks also. Chart Reviewed: Yes    Plan  Plan: Continue with current plan    Exercises/Modalities/Manual:  See DocFlow Sheet    Education: Frequency/visit adjustment. Barriers to Learning: none    Goals  (Total # of Visits to Date: 4)   Short Term Goals -                     Long Term Goals - Time Frame for Long term goals : 10 visits  Long term goal 1: Pt to report 75% improvement with regard to low back pain. Long term goal 2: Pt to report worst pain following work day 2/10. Long term goal 3: Pt to report no radicular LE pain x3 consecutive days. -MET          Post Treatment Pain:  1/10  Time In: 1640  Time Out: 1720  Timed Code Treatment Minutes: 0 Minutes  Total Treatment Time: 36 Minutes    Julianna Hawthorne PT     Date: 2022

## 2022-04-08 NOTE — TELEPHONE ENCOUNTER
Opened encounter so that I could look at [facility] to get name of her Suboxone prescriber. I filled out forms for Jose with more details regarding her time off. I was unable to answer question 2 very well, so they will likely need to reach out to her psych providers also. I already filled out page [phone], but I keep receiving that page again. Please find out if they need some different or additional information on it. If needed we can fill it out based on available information and Dr. Jodi George, or I can fill it out when I get back. Please put her claim number on each page of the form as requested. Thanks!

## 2022-04-09 ENCOUNTER — APPOINTMENT (OUTPATIENT)
Dept: CT IMAGING | Age: 42
End: 2022-04-09
Payer: COMMERCIAL

## 2022-04-09 ENCOUNTER — APPOINTMENT (OUTPATIENT)
Dept: GENERAL RADIOLOGY | Age: 42
End: 2022-04-09
Payer: COMMERCIAL

## 2022-04-09 ENCOUNTER — HOSPITAL ENCOUNTER (EMERGENCY)
Age: 42
Discharge: HOME | End: 2022-04-09
Attending: FAMILY MEDICINE
Payer: COMMERCIAL

## 2022-04-09 VITALS
OXYGEN SATURATION: 98 % | HEIGHT: 66 IN | BODY MASS INDEX: 34.75 KG/M2 | WEIGHT: 216.2 LBS | TEMPERATURE: 98.7 F | DIASTOLIC BLOOD PRESSURE: 76 MMHG | SYSTOLIC BLOOD PRESSURE: 101 MMHG | HEART RATE: 87 BPM | RESPIRATION RATE: 20 BRPM

## 2022-04-09 DIAGNOSIS — L03.116 CELLULITIS OF LEFT LOWER EXTREMITY: ICD-10-CM

## 2022-04-09 DIAGNOSIS — R79.89 ELEVATED D-DIMER: ICD-10-CM

## 2022-04-09 DIAGNOSIS — E77.8 HYPOPROTEINEMIA (HCC): ICD-10-CM

## 2022-04-09 DIAGNOSIS — R60.0 LEG EDEMA: Primary | ICD-10-CM

## 2022-04-09 LAB
ABSOLUTE EOS #: 0.3 K/UL (ref 0–0.4)
ABSOLUTE LYMPH #: 2.7 K/UL (ref 1–4.8)
ABSOLUTE MONO #: 0.8 K/UL (ref 0–1)
ALBUMIN SERPL-MCNC: 3.3 G/DL (ref 3.5–5.2)
ALP BLD-CCNC: 61 U/L (ref 35–104)
ALT SERPL-CCNC: 15 U/L (ref 5–33)
AMPHETAMINE SCREEN URINE: NEGATIVE
ANION GAP SERPL CALCULATED.3IONS-SCNC: 6 MMOL/L (ref 9–17)
AST SERPL-CCNC: 21 U/L
BARBITURATE SCREEN URINE: NEGATIVE
BASOPHILS # BLD: 1 % (ref 0–2)
BASOPHILS ABSOLUTE: 0 K/UL (ref 0–0.2)
BENZODIAZEPINE SCREEN, URINE: NEGATIVE
BILIRUB SERPL-MCNC: <0.1 MG/DL (ref 0.3–1.2)
BILIRUBIN URINE: NEGATIVE
BUN BLDV-MCNC: 11 MG/DL (ref 6–20)
CALCIUM SERPL-MCNC: 8.5 MG/DL (ref 8.6–10.4)
CANNABINOID SCREEN URINE: NEGATIVE
CHLORIDE BLD-SCNC: 105 MMOL/L (ref 98–107)
CO2: 26 MMOL/L (ref 20–31)
COCAINE METABOLITE, URINE: NEGATIVE
COLOR: YELLOW
COMMENT UA: NORMAL
CREAT SERPL-MCNC: 1 MG/DL (ref 0.5–0.9)
D-DIMER QUANTITATIVE: 0.64 MG/L FEU (ref 0–0.59)
DIFFERENTIAL TYPE: YES
EOSINOPHILS RELATIVE PERCENT: 3 % (ref 0–5)
GFR AFRICAN AMERICAN: >60 ML/MIN
GFR NON-AFRICAN AMERICAN: >60 ML/MIN
GFR SERPL CREATININE-BSD FRML MDRD: ABNORMAL ML/MIN/{1.73_M2}
GLUCOSE BLD-MCNC: 95 MG/DL (ref 70–99)
GLUCOSE URINE: NEGATIVE
HCG QUALITATIVE: NEGATIVE
HCT VFR BLD CALC: 31.3 % (ref 36–46)
HEMOGLOBIN: 10.5 G/DL (ref 12–16)
INR BLD: 0.9
KETONES, URINE: NEGATIVE
LEUKOCYTE ESTERASE, URINE: NEGATIVE
LYMPHOCYTES # BLD: 31 % (ref 15–40)
MCH RBC QN AUTO: 28.7 PG (ref 26–34)
MCHC RBC AUTO-ENTMCNC: 33.7 G/DL (ref 31–37)
MCV RBC AUTO: 85.2 FL (ref 80–100)
METHADONE SCREEN, URINE: NEGATIVE
METHAMPHETAMINE, URINE: NEGATIVE
MONOCYTES # BLD: 10 % (ref 4–8)
NITRITE, URINE: NEGATIVE
OPIATES, URINE: NEGATIVE
OXYCODONE SCREEN URINE: NEGATIVE
PARTIAL THROMBOPLASTIN TIME: 29 SEC (ref 23.9–33.8)
PDW BLD-RTO: 13.2 % (ref 12.1–15.2)
PH UA: 6 (ref 5–8)
PHENCYCLIDINE, URINE: NEGATIVE
PLATELET # BLD: 351 K/UL (ref 140–450)
POTASSIUM SERPL-SCNC: 3.9 MMOL/L (ref 3.7–5.3)
PRO-BNP: 67 PG/ML
PROPOXYPHENE, URINE: NEGATIVE
PROTEIN UA: NEGATIVE
PROTHROMBIN TIME: 12.2 SEC (ref 11.5–14.2)
RBC # BLD: 3.67 M/UL (ref 4–5.2)
SEG NEUTROPHILS: 55 % (ref 47–75)
SEGMENTED NEUTROPHILS ABSOLUTE COUNT: 4.9 K/UL (ref 2.5–7)
SODIUM BLD-SCNC: 137 MMOL/L (ref 135–144)
SPECIFIC GRAVITY UA: 1.01 (ref 1–1.03)
TOTAL PROTEIN: 5.8 G/DL (ref 6.4–8.3)
TRICYCLIC ANTIDEPRESSANTS, UR: NEGATIVE
TROPONIN, HIGH SENSITIVITY: 7 NG/L (ref 0–14)
TURBIDITY: CLEAR
URINE HGB: NEGATIVE
UROBILINOGEN, URINE: NORMAL
WBC # BLD: 8.8 K/UL (ref 3.5–11)

## 2022-04-09 PROCEDURE — 71275 CT ANGIOGRAPHY CHEST: CPT

## 2022-04-09 PROCEDURE — 85379 FIBRIN DEGRADATION QUANT: CPT

## 2022-04-09 PROCEDURE — 6370000000 HC RX 637 (ALT 250 FOR IP)

## 2022-04-09 PROCEDURE — 85025 COMPLETE CBC W/AUTO DIFF WBC: CPT

## 2022-04-09 PROCEDURE — 85610 PROTHROMBIN TIME: CPT

## 2022-04-09 PROCEDURE — 80306 DRUG TEST PRSMV INSTRMNT: CPT

## 2022-04-09 PROCEDURE — 80053 COMPREHEN METABOLIC PANEL: CPT

## 2022-04-09 PROCEDURE — 85730 THROMBOPLASTIN TIME PARTIAL: CPT

## 2022-04-09 PROCEDURE — 93005 ELECTROCARDIOGRAM TRACING: CPT | Performed by: FAMILY MEDICINE

## 2022-04-09 PROCEDURE — 99284 EMERGENCY DEPT VISIT MOD MDM: CPT

## 2022-04-09 PROCEDURE — 6360000004 HC RX CONTRAST MEDICATION: Performed by: FAMILY MEDICINE

## 2022-04-09 PROCEDURE — 71045 X-RAY EXAM CHEST 1 VIEW: CPT

## 2022-04-09 PROCEDURE — 84484 ASSAY OF TROPONIN QUANT: CPT

## 2022-04-09 PROCEDURE — 83880 ASSAY OF NATRIURETIC PEPTIDE: CPT

## 2022-04-09 PROCEDURE — 6370000000 HC RX 637 (ALT 250 FOR IP): Performed by: FAMILY MEDICINE

## 2022-04-09 PROCEDURE — 81003 URINALYSIS AUTO W/O SCOPE: CPT

## 2022-04-09 PROCEDURE — 84703 CHORIONIC GONADOTROPIN ASSAY: CPT

## 2022-04-09 PROCEDURE — 36415 COLL VENOUS BLD VENIPUNCTURE: CPT

## 2022-04-09 RX ORDER — CEPHALEXIN 500 MG/1
500 CAPSULE ORAL ONCE
Status: COMPLETED | OUTPATIENT
Start: 2022-04-09 | End: 2022-04-09

## 2022-04-09 RX ORDER — FUROSEMIDE 20 MG/1
20 TABLET ORAL ONCE
Status: COMPLETED | OUTPATIENT
Start: 2022-04-09 | End: 2022-04-09

## 2022-04-09 RX ORDER — SENNA PLUS 8.6 MG/1
1 TABLET ORAL DAILY
COMMUNITY
End: 2022-05-02 | Stop reason: ALTCHOICE

## 2022-04-09 RX ORDER — CEPHALEXIN 500 MG/1
500 CAPSULE ORAL 3 TIMES DAILY
Qty: 30 CAPSULE | Refills: 0 | Status: SHIPPED | OUTPATIENT
Start: 2022-04-09 | End: 2022-05-02

## 2022-04-09 RX ORDER — METHOCARBAMOL 500 MG/1
500 TABLET, FILM COATED ORAL 2 TIMES DAILY
COMMUNITY
End: 2022-05-02

## 2022-04-09 RX ORDER — FUROSEMIDE 20 MG/1
20 TABLET ORAL 2 TIMES DAILY
Qty: 20 TABLET | Refills: 0 | Status: SHIPPED | OUTPATIENT
Start: 2022-04-09 | End: 2022-05-02 | Stop reason: ALTCHOICE

## 2022-04-09 RX ADMIN — FUROSEMIDE 20 MG: 20 TABLET ORAL at 17:57

## 2022-04-09 RX ADMIN — IOPAMIDOL 100 ML: 755 INJECTION, SOLUTION INTRAVENOUS at 19:53

## 2022-04-09 RX ADMIN — RIVAROXABAN 15 MG: 15 TABLET, FILM COATED ORAL at 18:32

## 2022-04-09 RX ADMIN — CEPHALEXIN 500 MG: 500 CAPSULE ORAL at 17:57

## 2022-04-09 ASSESSMENT — PAIN SCALES - GENERAL
PAINLEVEL_OUTOF10: 7
PAINLEVEL_OUTOF10: 0

## 2022-04-09 ASSESSMENT — PAIN DESCRIPTION - LOCATION: LOCATION: FOOT

## 2022-04-09 ASSESSMENT — PAIN - FUNCTIONAL ASSESSMENT
PAIN_FUNCTIONAL_ASSESSMENT: 0-10
PAIN_FUNCTIONAL_ASSESSMENT: 0-10

## 2022-04-09 ASSESSMENT — PAIN DESCRIPTION - ORIENTATION: ORIENTATION: LEFT;RIGHT

## 2022-04-09 ASSESSMENT — PAIN DESCRIPTION - DESCRIPTORS: DESCRIPTORS: ACHING

## 2022-04-09 ASSESSMENT — PAIN DESCRIPTION - PAIN TYPE: TYPE: ACUTE PAIN

## 2022-04-09 ASSESSMENT — PAIN DESCRIPTION - FREQUENCY: FREQUENCY: CONTINUOUS

## 2022-04-09 NOTE — ED NOTES
Pt semi fowlers in bed. Pt denies any needs. Pt significant other at bedside. Pt significant other denies any needs. Pt call light within reach. Vishnu Nam RN  04/09/22 2131

## 2022-04-10 ENCOUNTER — HOSPITAL ENCOUNTER (OUTPATIENT)
Age: 42
Discharge: HOME | End: 2022-04-12
Payer: COMMERCIAL

## 2022-04-10 ENCOUNTER — HOSPITAL ENCOUNTER (OUTPATIENT)
Dept: VASCULAR LAB | Age: 42
Discharge: HOME | End: 2022-04-12
Payer: COMMERCIAL

## 2022-04-10 DIAGNOSIS — R79.89 ELEVATED D-DIMER: ICD-10-CM

## 2022-04-10 DIAGNOSIS — R60.0 LEG EDEMA: ICD-10-CM

## 2022-04-10 LAB
EKG ATRIAL RATE: 66 BPM
EKG P AXIS: 40 DEGREES
EKG P-R INTERVAL: 198 MS
EKG Q-T INTERVAL: 432 MS
EKG QRS DURATION: 114 MS
EKG QTC CALCULATION (BAZETT): 452 MS
EKG R AXIS: -32 DEGREES
EKG T AXIS: 16 DEGREES
EKG VENTRICULAR RATE: 66 BPM

## 2022-04-10 PROCEDURE — 93010 ELECTROCARDIOGRAM REPORT: CPT | Performed by: INTERNAL MEDICINE

## 2022-04-10 PROCEDURE — 93970 EXTREMITY STUDY: CPT

## 2022-04-10 NOTE — ED PROVIDER NOTES
for up to 30 days.  (Patient not taking: Reported on 4/9/2022) 90 tablet 0    indomethacin (INDOCIN SR) 75 MG extended release capsule Take 1 capsule by mouth 2 times daily (with meals) 60 capsule 3    traZODone (DESYREL) 50 MG tablet TAKE 1 TO 2 TABLETS NIGHTLY AS NEEDED 60 tablet 5    triamcinolone (KENALOG) 0.1 % ointment Apply topically 2 times daily (Patient not taking: Reported on 4/9/2022) 80 g 2    topiramate (TOPAMAX) 25 MG tablet Take 1 tablet by mouth nightly 90 tablet 1    hydrOXYzine (ATARAX) 25 MG tablet Take 1 tablet by mouth 2 times daily as needed for Anxiety 180 tablet 1    gabapentin (NEURONTIN) 600 MG tablet       omeprazole (PRILOSEC) 40 MG delayed release capsule TAKE 1 CAPSULE BY MOUTH EVERY DAY 90 capsule 1    ibuprofen (ADVIL;MOTRIN) 800 MG tablet Take 1 tablet by mouth every 6 hours as needed for Pain (for back pain) 120 tablet 3    albuterol sulfate  (90 Base) MCG/ACT inhaler Inhale 2 puffs into the lungs 4 times daily (Patient not taking: Reported on 4/9/2022) 1 Inhaler 3       ALLERGIES    No Known Allergies    FAMILY HISTORY    Family History   Problem Relation Age of Onset    Diabetes Mother        SOCIAL HISTORY    Social History     Socioeconomic History    Marital status:      Spouse name: Not on file    Number of children: Not on file    Years of education: Not on file    Highest education level: Not on file   Occupational History    Not on file   Tobacco Use    Smoking status: Former Smoker     Packs/day: 1.00     Years: 2.00     Pack years: 2.00     Types: Cigarettes    Smokeless tobacco: Never Used   Vaping Use    Vaping Use: Every day   Substance and Sexual Activity    Alcohol use: Not Currently     Comment: in the past    Drug use: Not Currently     Comment: Pt has been sober for 30 days    Sexual activity: Not Currently   Other Topics Concern    Not on file   Social History Narrative    Not on file     Social Determinants of Health Financial Resource Strain: Low Risk     Difficulty of Paying Living Expenses: Not hard at all   Food Insecurity: No Food Insecurity    Worried About Running Out of Food in the Last Year: Never true    Yadiel of Food in the Last Year: Never true   Transportation Needs:     Lack of Transportation (Medical): Not on file    Lack of Transportation (Non-Medical): Not on file   Physical Activity:     Days of Exercise per Week: Not on file    Minutes of Exercise per Session: Not on file   Stress:     Feeling of Stress : Not on file   Social Connections:     Frequency of Communication with Friends and Family: Not on file    Frequency of Social Gatherings with Friends and Family: Not on file    Attends Taoism Services: Not on file    Active Member of [address] [place] [address] [email or web address] or Organizations: Not on file    Attends Club or Organization Meetings: Not on file    Marital Status: Not on file   Intimate Partner Violence:     Fear of Current or Ex-Partner: Not on file    Emotionally Abused: Not on file    Physically Abused: Not on file    Sexually Abused: Not on file   Housing Stability:     Unable to Pay for Housing in the Last Year: Not on file    Number of [place] in the Last Year: Not on file    Unstable Housing in the Last Year: Not on file       PHYSICAL EXAM    VITAL SIGNS: /76   Pulse 87   Temp 98.7 °F (37.1 °C) (Oral)   Resp 20   Ht 5' 6\" (1.676 m)   Wt 216 lb 3.2 oz (98.1 kg)   LMP 03/28/2022   SpO2 98%   BMI 34.90 kg/m²   Constitutional:  Well developed, well nourished, moderate acute distress, non-toxic appearance   HENT:  Atraumatic, external ears normal, nose normal, oropharynx moist.  Neck- normal range of motion, no tenderness, supple   Respiratory:  No respiratory distress, normal breath sounds. Cardiovascular:  Normal rate, normal rhythm, no murmurs, no gallops, no rubs   GI:  Soft, nondistended, normal bowel sounds, nontender   Musculoskeletal: 2+ bilateral lower extremity edema. Left lower leg with erythema tenderness and warmth. Jadiel Mer' sign negative. Integument:  Well hydrated, no rash   Neurologic: Grossly intact    RADIOLOGY/PROCEDURES      Chest x-ray revealed mild pulmonary vascular congestion. No focal pneumonia. CTA of the chest revealed no evidence for large central pulmonary embolus to the level of the distal right and left pulmonary arteries. Evaluation of the lobar segmental and subsegmental branches is nondiagnostic due to insufficient opacification. Mild dilatation of the main pulmonary artery, as can be seen with pulmonary hypertension. Mild cardiomegaly without evidence for right heart strain. Faint scattered groundglass airspace disease in the right upper lobe and periphery of the right lower lobe. This is nonspecific but could represent a mild infectious or inflammatory process in the appropriate clinical setting. No pleural effusions. No pulmonary edema. ED COURSE & MEDICAL DECISION MAKING    Pertinent Labs & Imaging studies reviewed. (See chart for details)  Labs Reviewed   D-DIMER, QUANTITATIVE - Abnormal; Notable for the following components:       Result Value    D-Dimer, Quant 0.64 (*)     All other components within normal limits   CBC WITH AUTO DIFFERENTIAL - Abnormal; Notable for the following components:    RBC 3.67 (*)     Hemoglobin 10.5 (*)     Hematocrit 31.3 (*)     Monocytes 10 (*)     All other components within normal limits   COMPREHENSIVE METABOLIC PANEL - Abnormal; Notable for the following components:    CREATININE 1.00 (*)     Calcium 8.5 (*)     Anion Gap 6 (*)     Total Bilirubin <0.10 (*)     Total Protein 5.8 (*)     Albumin 3.3 (*)     All other components within normal limits   APTT   TROPONIN   PROTIME-INR   BRAIN NATRIURETIC PEPTIDE   HCG, SERUM, QUALITATIVE   URINALYSIS   URINE DRUG SCREEN     Venous Doppler of bilateral lower extremities was ordered for tomorrow. Patient was started on Xarelto 15 mg twice a day.   Patient was advised to follow-up with her PCP for further evaluation of hyperproteinemia and anemia. FINAL IMPRESSION    1. Leg edema  2. Cellulitis of left lower extremity  3    elevated D-dimer  4    hypoproteinemia. Summation      Patient Course: Patient was stable on discharge. Venous Doppler has been ordered for tomorrow. ED Medications administered this visit:    Medications   cephALEXin (KEFLEX) capsule 500 mg (500 mg Oral Given 4/9/22 1757)   furosemide (LASIX) tablet 20 mg (20 mg Oral Given 4/9/22 1757)   rivaroxaban (XARELTO) tablet 15 mg (15 mg Oral Given 4/9/22 1832)   iopamidol (ISOVUE-370) 76 % injection 100 mL (100 mLs IntraVENous Given 4/9/22 1953)       New Prescriptions from this visit:    Discharge Medication List as of 4/9/2022  9:36 PM      START taking these medications    Details   cephALEXin (KEFLEX) 500 MG capsule Take 1 capsule by mouth 3 times daily, Disp-30 capsule, R-0Normal      furosemide (LASIX) 20 MG tablet Take 1 tablet by mouth 2 times daily, Disp-20 tablet, R-0Normal      rivaroxaban (XARELTO) 15 MG TABS tablet Take 1 tablet by mouth 2 times daily (with meals), Disp-6 tablet, R-0Normal             Follow-up:  Kemi Song DO  [facility] [address] [phone]    Call in 2 days          Final Impression:   1. Leg edema    2. Cellulitis of left lower extremity    3. Elevated d-dimer    4.  Hypoproteinemia ([facility] [address]               (Please note that portions of this note were completed with a voice recognition program.  Efforts were made to edit the dictations but occasionally words are mis-transcribed.)     Linda Serrano MD  04/10/22 4060

## 2022-04-11 ENCOUNTER — HOSPITAL ENCOUNTER (OUTPATIENT)
Dept: PHYSICAL THERAPY | Age: 42
Setting detail: THERAPIES SERIES
Discharge: HOME | End: 2022-04-11
Payer: COMMERCIAL

## 2022-04-11 NOTE — PROGRESS NOTES
Phone: [phone]      Fax: [phone]                            Outpatient Physical Therapy                                                                            Note    Date: 2022  Patient Name: Sunitha Jacob        MRN: [id number]   ACCT#:  [de-identified]  : 1980  (38 y.o.)      Pt arrives for dry needling treatment however on 22 pt was started on blood thinner rivaroxaban (Ashley Jones). Will place pt on hold as she follow up with PCP Wed 22 to determine if blood thinner needs to be continued. If this med is D/C'd will resume dry needling as scheduled as long as pt >24hour without med.       Lena Hanley, PT     Date: 2022

## 2022-04-12 ENCOUNTER — HOSPITAL ENCOUNTER (OUTPATIENT)
Dept: MRI IMAGING | Age: 42
Discharge: HOME | End: 2022-04-14
Payer: COMMERCIAL

## 2022-04-12 PROCEDURE — 72148 MRI LUMBAR SPINE W/O DYE: CPT

## 2022-04-13 ENCOUNTER — OFFICE VISIT (OUTPATIENT)
Dept: FAMILY MEDICINE CLINIC | Age: 42
End: 2022-04-13
Payer: COMMERCIAL

## 2022-04-13 ENCOUNTER — HOSPITAL ENCOUNTER (OUTPATIENT)
Age: 42
Discharge: HOME | End: 2022-04-13
Payer: COMMERCIAL

## 2022-04-13 VITALS
SYSTOLIC BLOOD PRESSURE: 98 MMHG | WEIGHT: 218 LBS | HEART RATE: 80 BPM | OXYGEN SATURATION: 97 % | BODY MASS INDEX: 35.03 KG/M2 | HEIGHT: 66 IN | DIASTOLIC BLOOD PRESSURE: 56 MMHG

## 2022-04-13 DIAGNOSIS — M54.50 CHRONIC BILATERAL LOW BACK PAIN WITHOUT SCIATICA: ICD-10-CM

## 2022-04-13 DIAGNOSIS — M79.89 LEG SWELLING: ICD-10-CM

## 2022-04-13 DIAGNOSIS — R63.5 WEIGHT GAIN: ICD-10-CM

## 2022-04-13 DIAGNOSIS — G89.29 CHRONIC BILATERAL LOW BACK PAIN WITHOUT SCIATICA: ICD-10-CM

## 2022-04-13 DIAGNOSIS — D64.9 LOW HEMOGLOBIN: ICD-10-CM

## 2022-04-13 DIAGNOSIS — R60.1 GENERALIZED EDEMA: Primary | ICD-10-CM

## 2022-04-13 LAB
ANION GAP SERPL CALCULATED.3IONS-SCNC: 6 MMOL/L (ref 9–17)
BUN BLDV-MCNC: 9 MG/DL (ref 6–20)
BUN/CREAT BLD: 12 (ref 9–20)
CALCIUM SERPL-MCNC: 8.5 MG/DL (ref 8.6–10.4)
CHLORIDE BLD-SCNC: 106 MMOL/L (ref 98–107)
CO2: 27 MMOL/L (ref 20–31)
CREAT SERPL-MCNC: 0.77 MG/DL (ref 0.5–0.9)
GFR AFRICAN AMERICAN: >60 ML/MIN
GFR NON-AFRICAN AMERICAN: >60 ML/MIN
GFR SERPL CREATININE-BSD FRML MDRD: ABNORMAL ML/MIN/{1.73_M2}
GLUCOSE BLD-MCNC: 74 MG/DL (ref 70–99)
HCT VFR BLD CALC: 32.7 % (ref 36–46)
HEMOGLOBIN: 11 G/DL (ref 12–16)
MCH RBC QN AUTO: 28.8 PG (ref 26–34)
MCHC RBC AUTO-ENTMCNC: 33.7 G/DL (ref 31–37)
MCV RBC AUTO: 85.3 FL (ref 80–100)
PDW BLD-RTO: 13.3 % (ref 12.1–15.2)
PLATELET # BLD: 293 K/UL (ref 140–450)
POTASSIUM SERPL-SCNC: 3.8 MMOL/L (ref 3.7–5.3)
RBC # BLD: 3.83 M/UL (ref 4–5.2)
SODIUM BLD-SCNC: 139 MMOL/L (ref 135–144)
TSH SERPL DL<=0.05 MIU/L-ACNC: 4.54 UIU/ML (ref 0.3–5)
WBC # BLD: 17.9 K/UL (ref 3.5–11)

## 2022-04-13 PROCEDURE — G8427 DOCREV CUR MEDS BY ELIG CLIN: HCPCS | Performed by: FAMILY MEDICINE

## 2022-04-13 PROCEDURE — G8417 CALC BMI ABV UP PARAM F/U: HCPCS | Performed by: FAMILY MEDICINE

## 2022-04-13 PROCEDURE — 1036F TOBACCO NON-USER: CPT | Performed by: FAMILY MEDICINE

## 2022-04-13 PROCEDURE — 84443 ASSAY THYROID STIM HORMONE: CPT

## 2022-04-13 PROCEDURE — 36415 COLL VENOUS BLD VENIPUNCTURE: CPT

## 2022-04-13 PROCEDURE — 80048 BASIC METABOLIC PNL TOTAL CA: CPT

## 2022-04-13 PROCEDURE — 85027 COMPLETE CBC AUTOMATED: CPT

## 2022-04-13 PROCEDURE — 99214 OFFICE O/P EST MOD 30 MIN: CPT | Performed by: FAMILY MEDICINE

## 2022-04-13 NOTE — PROGRESS NOTES
HPI Notes    Name: Abdon Mojica  : 1980        Chief Complaint:     Chief Complaint   Patient presents with    ED Follow-up     [facility] ED 4/10 BL leg edema. Started on xarelto    Edema     leg  swelling persist, hands, abdomen. Taking lasix 20 mg BID    Results     MRI, for lower back pain. History of Present Illness:     Abdon Mojica is a 38 y.o.  female who presents with ED Follow-up ([address] ED 4/10 BL leg edema. Started on xarelto), Edema (leg  swelling persist, hands, abdomen. Taking lasix 20 mg BID), and Results (MRI, for lower back pain. )      HPI  Edema -  Pt complains of increased swelling in her legs, hands and even in her abdomen. Pt was on lasix 10mg BID which she was started on about 1mos ago from the DR who started her on Suboxone. Pt seen in ER on 22 at [facility] and had NEGATIV LE doppler, Normal CXR and CTA negative for PE. Pt also had NORMAL BNP, normal UA and labs. The only abnormal labs was low hgb. Since ER she has increased up to lasix 20mg BID. She has been wearing compression hose during the daytime. Pt CAN lay flat at night, NO cough. Pt has been laying on the couch and elevating her legs on pillows to sleep. SOB ONLY when walk up a lot of steps, NOT at rest. Pt has gained about 20lbs in past 1mos. Pt states she feels all this started --- swelling in legs, hands etc when she was started on the suboxone. Pt is on suboxone from a Dr at [facility] in [facility]. Pt takes zofran once a day for nausea too. Weight gain - pt has gained about 20lbs of what seems to be \"water weight\" in past 1mos since starting the suboxone. When talking to pt she does admit that when she used to be addicted to pain meds she would swell then too? Pt has not otherwise changed her diet. Low hgb - Pt Denies blood in stool or black stools and pt states her menses only last about 2d. So heavy bleeding for years now.  Pt admits occ she had a streak of blood when she wiped after a BM but that may be since the suboxone can make her more constipated. Pt was given Linze    Back pain - pt's back pain is chronic and she had a recent Low back MRI ordered but her PCP Dr Rico Gonzalez. Pt knows Dr Rico Gonzalez out of the office this week but would like results. No pain in legs. Past Medical History:     Past Medical History:   Diagnosis Date    Anxiety     Bulging of lumbar intervertebral disc without myelopathy     L1    Chronic back pain     Depression     GERD (gastroesophageal reflux disease)     Substance abuse (HCC)     alcohol and opiates      Reviewed all health maintenance requirements and ordered appropriate tests  Health Maintenance Due   Topic Date Due    Varicella vaccine (1 of 2 - 2-dose childhood series) Never done    COVID-19 Vaccine (1) Never done    Pneumococcal 0-64 years Vaccine (1 - PCV) Never done    DTaP/Tdap/Td vaccine (1 - Tdap) Never done    Lipid screen  03/26/2022       Past Surgical History:     Past Surgical History:   Procedure Laterality Date    COLONOSCOPY  05/26/2020    PILONIDAL CYST EXCISION  03/2018    PLANTAR FASCIA SURGERY Left 06/11/2020        Medications:       Prior to Admission medications    Medication Sig Start Date End Date Taking? Authorizing Provider   carisoprodol (SOMA) 350 MG tablet Take 1 tablet by mouth 3 times daily as needed for Muscle spasms for up to 30 days.  4/14/22 5/14/22 Yes Fabio David MD   methocarbamol (ROBAXIN) 500 MG tablet Take 500 mg by mouth 2 times daily   Yes Historical Provider, MD deweyna (SENOKOT) 8.6 MG tablet Take 1 tablet by mouth daily   Yes Historical Provider, MD   cephALEXin (KEFLEX) 500 MG capsule Take 1 capsule by mouth 3 times daily 4/9/22  Yes Layo Prado MD   furosemide (LASIX) 20 MG tablet Take 1 tablet by mouth 2 times daily 4/9/22  Yes Layo Prado MD   busPIRone (BUSPAR) 15 MG tablet  4/4/22  Yes Historical Provider, MD Olga Montgomery 72 MCG CAPS capsule  3/31/22  Yes Historical Provider, MD buprenorphine-naloxone (SUBOXONE) 8-2 MG SUBL SL tablet DISSOLVE 1 TABLET UNDER THE TONGUE TWICE A DAY 3/23/22  Yes Historical Provider, MD   escitalopram (LEXAPRO) 20 MG tablet  3/22/22  Yes Historical Provider, MD   indomethacin (INDOCIN SR) 75 MG extended release capsule Take 1 capsule by mouth 2 times daily (with meals) 3/7/22  Yes Ciro Patel DO   traZODone (DESYREL) 50 MG tablet TAKE 1 TO 2 TABLETS NIGHTLY AS NEEDED 2/28/22  Yes Toney Winter DO   topiramate (TOPAMAX) 25 MG tablet Take 1 tablet by mouth nightly 2/1/22  Yes Toney Winter DO   hydrOXYzine (ATARAX) 25 MG tablet Take 1 tablet by mouth 2 times daily as needed for Anxiety 11/11/21  Yes Toney Winter DO   gabapentin (NEURONTIN) 600 MG tablet  10/27/21  Yes Historical Provider, MD   omeprazole (PRILOSEC) 40 MG delayed release capsule TAKE 1 CAPSULE BY MOUTH EVERY DAY 10/29/21  Yes Toney Winter DO   ondansetron (ZOFRAN) 4 MG tablet  3/30/22   Historical Provider, MD   triamcinolone (KENALOG) 0.1 % ointment Apply topically 2 times daily  Patient not taking: Reported on 4/9/2022 2/1/22   Toney Winter DO   ibuprofen (ADVIL;MOTRIN) 800 MG tablet Take 1 tablet by mouth every 6 hours as needed for Pain (for back pain) 4/21/21   Toney Winter DO   albuterol sulfate  (90 Base) MCG/ACT inhaler Inhale 2 puffs into the lungs 4 times daily  Patient not taking: Reported on 4/9/2022 10/21/20   Jane Tamez MD        Allergies:       Patient has no known allergies. Social History:     Tobacco:    reports that she has quit smoking. Her smoking use included cigarettes. She has a 2.00 pack-year smoking history. She has never used smokeless tobacco.  Alcohol:      reports previous alcohol use. Drug Use:  reports previous drug use.     Family History:     Family History   Problem Relation Age of Onset    Diabetes Mother        Review of Systems:       Review of Systems   Constitutional: Positive for unexpected weight normal.         Vitals:  BP (!) 98/56 (Site: Right Upper Arm, Position: Sitting, Cuff Size: Large Adult)   Pulse 80   Ht 5' 6\" (1.676 m)   Wt 218 lb (98.9 kg)   LMP 03/28/2022   SpO2 97%   BMI 35.19 kg/m²       Data:     Lab Results   Component Value Date     04/13/2022    K 3.8 04/13/2022     04/13/2022    CO2 27 04/13/2022    BUN 9 04/13/2022    CREATININE 0.77 04/13/2022    GLUCOSE 74 04/13/2022    PROT 5.8 04/09/2022    LABALBU 3.3 04/09/2022    BILITOT <0.10 04/09/2022    ALKPHOS 61 04/09/2022    AST 21 04/09/2022    ALT 15 04/09/2022     Lab Results   Component Value Date    WBC 9.6 04/14/2022    RBC 3.65 04/14/2022    HGB 10.5 04/14/2022    HCT 31.2 04/14/2022    MCV 85.4 04/14/2022    MCH 28.6 04/14/2022    MCHC 33.5 04/14/2022    RDW 13.6 04/14/2022     04/14/2022    MPV NOT REPORTED 09/19/2021     Lab Results   Component Value Date    TSH 4.54 04/13/2022     Lab Results   Component Value Date    LABA1C 5.3 09/19/2018          Assessment/Plan:        1. Generalized edema  After reviewing all scans and labs from 4/9/22 ER visit and her history pt has NO DVT and so stop xarelto, no CHF and the edema seems secondary to her suboxone. Pt will have BMP today to make sure Potassium and CRE ok on increased lasix to 20mg BID and keep elevated legs, low salt and drink plenty of water and pt sees her suboxone Dr on 4/15 to see what other options she may have    2. Weight gain  See #1 and ck TSH today too to make she not Hypothyroid  - TSH; Future  - Basic Metabolic Panel; Future    3. Low hemoglobin  Decreased and no heavy menses but occ blood when she wipes. No other signs of blood in her stools. So ely CBC today. - CBC; Future    4. Chronic bilateral low back pain without sciatica  Reviewed MRI results with pt and suggested pt could see neurosurgeon or pain mgn. Pt has decided to wait until Dr Rico Gonzalez is back to go over her options  - carisoprodol (SOMA) 350 MG tablet;  Take 1 tablet by mouth 3 times daily as needed for Muscle spasms for up to 30 days. Dispense: 90 tablet; Refill: 0        Return if symptoms worsen or fail to improve.       Electronically signed by Lilly Love MD on 4/15/2022 at 9:29 AM

## 2022-04-13 NOTE — PATIENT INSTRUCTIONS
Survey: You may be receiving a survey from [employer] regarding your visit today. You may get this in the mail, through your MyChart or in your email. Please complete the survey to enable us to provide the highest quality of care to you and your family. Please also, mention our names. If you cannot score us as very good (5 Stars) on any question, please feel free to call the office to discuss how we could have made your experience exceptional.      Thank You!         MD Cam Silveira LPN

## 2022-04-14 ENCOUNTER — TELEPHONE (OUTPATIENT)
Dept: FAMILY MEDICINE CLINIC | Age: 42
End: 2022-04-14
Payer: COMMERCIAL

## 2022-04-14 ENCOUNTER — HOSPITAL ENCOUNTER (OUTPATIENT)
Age: 42
Discharge: HOME | End: 2022-04-14
Payer: COMMERCIAL

## 2022-04-14 DIAGNOSIS — D64.9 LOW HEMOGLOBIN: Primary | ICD-10-CM

## 2022-04-14 DIAGNOSIS — D72.829 LEUKOCYTOSIS, UNSPECIFIED TYPE: ICD-10-CM

## 2022-04-14 DIAGNOSIS — D64.9 LOW HEMOGLOBIN: ICD-10-CM

## 2022-04-14 LAB
ABSOLUTE EOS #: 0.2 K/UL (ref 0–0.4)
ABSOLUTE LYMPH #: 2.2 K/UL (ref 1–4.8)
ABSOLUTE MONO #: 0.7 K/UL (ref 0–1)
BASOPHILS # BLD: 0 % (ref 0–2)
BASOPHILS ABSOLUTE: 0 K/UL (ref 0–0.2)
DIFFERENTIAL TYPE: YES
EOSINOPHILS RELATIVE PERCENT: 2 % (ref 0–5)
HCT VFR BLD CALC: 31.2 % (ref 36–46)
HEMOGLOBIN: 10.5 G/DL (ref 12–16)
LYMPHOCYTES # BLD: 23 % (ref 15–40)
MCH RBC QN AUTO: 28.6 PG (ref 26–34)
MCHC RBC AUTO-ENTMCNC: 33.5 G/DL (ref 31–37)
MCV RBC AUTO: 85.4 FL (ref 80–100)
MONOCYTES # BLD: 7 % (ref 4–8)
PDW BLD-RTO: 13.6 % (ref 12.1–15.2)
PLATELET # BLD: 279 K/UL (ref 140–450)
RBC # BLD: 3.65 M/UL (ref 4–5.2)
SEG NEUTROPHILS: 68 % (ref 47–75)
SEGMENTED NEUTROPHILS ABSOLUTE COUNT: 6.6 K/UL (ref 2.5–7)
WBC # BLD: 9.6 K/UL (ref 3.5–11)

## 2022-04-14 PROCEDURE — 36415 COLL VENOUS BLD VENIPUNCTURE: CPT

## 2022-04-14 PROCEDURE — 82270 OCCULT BLOOD FECES: CPT | Performed by: FAMILY MEDICINE

## 2022-04-14 PROCEDURE — 85025 COMPLETE CBC W/AUTO DIFF WBC: CPT

## 2022-04-14 RX ORDER — CARISOPRODOL 350 MG/1
350 TABLET ORAL 3 TIMES DAILY PRN
Qty: 90 TABLET | Refills: 0 | Status: SHIPPED | OUTPATIENT
Start: 2022-04-14 | End: 2022-05-16 | Stop reason: SDUPTHER

## 2022-04-14 NOTE — TELEPHONE ENCOUNTER
----- Message from Maninder Kimble MD sent at 4/14/2022 11:26 AM EDT -----  Tell pt her sugars and kidneys and electrolytes all ok. Her hgb actually up from 10.5 to 11.0 but her WBC count up but she had not had a fever or recent illness? So would like her to ely CBC today. Also needs to come  hemoccult stool card.

## 2022-04-15 ASSESSMENT — ENCOUNTER SYMPTOMS
ABDOMINAL PAIN: 0
EYE DISCHARGE: 0
EYE REDNESS: 0
COUGH: 0
SHORTNESS OF BREATH: 0
VOMITING: 0
NAUSEA: 0
BACK PAIN: 1
DIARRHEA: 0

## 2022-04-18 ENCOUNTER — HOSPITAL ENCOUNTER (OUTPATIENT)
Dept: PHYSICAL THERAPY | Age: 42
Setting detail: THERAPIES SERIES
Discharge: HOME | End: 2022-04-18
Payer: COMMERCIAL

## 2022-04-18 NOTE — PROGRESS NOTES
[facility]    Date: 2022  Patient Name: Rose Izquierdo        : 1980       Pt Cancelled Appt due to Illness      Leata Carbon Mahl Date: 2022

## 2022-04-29 ENCOUNTER — HOSPITAL ENCOUNTER (OUTPATIENT)
Dept: PHYSICAL THERAPY | Age: 42
Setting detail: THERAPIES SERIES
Discharge: HOME | End: 2022-04-29
Payer: COMMERCIAL

## 2022-04-29 PROCEDURE — 20561 NDL INSJ W/O NJX 3+ MUSC: CPT

## 2022-04-29 PROCEDURE — 9900000107 HC NEEDLE INSERTION W/O INJECTION 3 OR MORE MUSCLES (SELF-PAY)

## 2022-04-29 NOTE — PROGRESS NOTES
Phone: [phone]      Fax: [phone]                            Outpatient Physical Therapy                                                                            Daily Note    Date: 2022  Patient Name: Shaun Stover        MRN: [id number]   ACCT#:  [de-identified]  : 1980  (38 y.o.)    Referring Practitioner: Dr. Som Dangelo    Referral Date : 21    Diagnosis: Chronic bilateral low back pain without sciatica  Treatment Diagnosis: Low Back Pain    Onset Date: 21  PT Insurance Information: Retail   Total # of Visits Approved: 10 Per Physician Order  Total # of Visits to Date: 4  No Show: 0  Canceled Appointment: 0    Pre-Treatment Pain:  1/10     Assessment  Assessment: Pt reports she only took 3 blood thinner meds and then they discharged them. Pt on short term disability, not working full time currently. Pt reports pain has been consistantly 1-2/10. Pt would like to follow up in 1month to see if she needs it. Pt has had no LE radicular pain. Chart Reviewed: Yes    Plan  Continue with current plan of care    Exercises/Modalities/Manual:  See DocFlow Sheet    Education: Will see pt in 4wks if not needed pt may cancel and will D/C     Barriers to Learning: none    Goals  (Total # of Visits to Date: 4)   Short Term Goals -         Long Term Goals - Time Frame for Long term goals : 10 visits  Long Term Goals  Time Frame for Long term goals : 10 visits  Long term goal 1: Pt to report 75% improvement with regard to low back pain. Long term goal 2: Pt to report worst pain following work day 2/10.-MET  Long term goal 3: Pt to report no radicular LE pain x3 consecutive days. -MET    Post Treatment Pain:  1/10      Time In: 1508  Time Out: 1545  Timed Code Treatment Minutes: 0 Minutes  Total Treatment Time: Kevin Joseph PT     Date: 2022

## 2022-05-02 ENCOUNTER — OFFICE VISIT (OUTPATIENT)
Dept: FAMILY MEDICINE CLINIC | Age: 42
End: 2022-05-02
Payer: COMMERCIAL

## 2022-05-02 ENCOUNTER — HOSPITAL ENCOUNTER (OUTPATIENT)
Age: 42
Discharge: HOME | End: 2022-05-02
Payer: COMMERCIAL

## 2022-05-02 VITALS
SYSTOLIC BLOOD PRESSURE: 92 MMHG | HEIGHT: 66 IN | HEART RATE: 72 BPM | OXYGEN SATURATION: 97 % | WEIGHT: 210 LBS | BODY MASS INDEX: 33.75 KG/M2 | DIASTOLIC BLOOD PRESSURE: 58 MMHG

## 2022-05-02 DIAGNOSIS — K59.09 CHRONIC CONSTIPATION: ICD-10-CM

## 2022-05-02 DIAGNOSIS — R42 LIGHTHEADEDNESS: ICD-10-CM

## 2022-05-02 DIAGNOSIS — I95.9 HYPOTENSION, UNSPECIFIED HYPOTENSION TYPE: ICD-10-CM

## 2022-05-02 DIAGNOSIS — D64.9 NORMOCYTIC ANEMIA: ICD-10-CM

## 2022-05-02 DIAGNOSIS — F11.11 NARCOTIC ABUSE IN REMISSION (HCC): ICD-10-CM

## 2022-05-02 DIAGNOSIS — R33.9 URINARY RETENTION: Primary | ICD-10-CM

## 2022-05-02 DIAGNOSIS — R33.9 URINARY RETENTION: ICD-10-CM

## 2022-05-02 LAB
ABSOLUTE EOS #: 0.2 K/UL (ref 0–0.4)
ABSOLUTE LYMPH #: 2.1 K/UL (ref 1–4.8)
ABSOLUTE MONO #: 0.7 K/UL (ref 0–1)
ALBUMIN SERPL-MCNC: 4 G/DL (ref 3.5–5.2)
ALP BLD-CCNC: 50 U/L (ref 35–104)
ALT SERPL-CCNC: 9 U/L (ref 5–33)
ANION GAP SERPL CALCULATED.3IONS-SCNC: 8 MMOL/L (ref 9–17)
AST SERPL-CCNC: 15 U/L
BASOPHILS # BLD: 1 % (ref 0–2)
BASOPHILS ABSOLUTE: 0 K/UL (ref 0–0.2)
BILIRUB SERPL-MCNC: 0.12 MG/DL (ref 0.3–1.2)
BUN BLDV-MCNC: 8 MG/DL (ref 6–20)
BUN/CREAT BLD: 10 (ref 9–20)
CALCIUM SERPL-MCNC: 9 MG/DL (ref 8.6–10.4)
CHLORIDE BLD-SCNC: 105 MMOL/L (ref 98–107)
CO2: 27 MMOL/L (ref 20–31)
CREAT SERPL-MCNC: 0.8 MG/DL (ref 0.5–0.9)
DIFFERENTIAL TYPE: YES
EOSINOPHILS RELATIVE PERCENT: 3 % (ref 0–5)
FOLATE: 8.2 NG/ML
GFR AFRICAN AMERICAN: >60 ML/MIN
GFR NON-AFRICAN AMERICAN: >60 ML/MIN
GFR SERPL CREATININE-BSD FRML MDRD: ABNORMAL ML/MIN/{1.73_M2}
GLUCOSE BLD-MCNC: 114 MG/DL (ref 70–99)
HCT VFR BLD CALC: 36.5 % (ref 36–46)
HEMOGLOBIN: 12 G/DL (ref 12–16)
IRON SATURATION: 22 % (ref 20–55)
IRON: 50 UG/DL (ref 37–145)
LYMPHOCYTES # BLD: 35 % (ref 15–40)
MCH RBC QN AUTO: 28 PG (ref 26–34)
MCHC RBC AUTO-ENTMCNC: 33 G/DL (ref 31–37)
MCV RBC AUTO: 85 FL (ref 80–100)
MONOCYTES # BLD: 11 % (ref 4–8)
PDW BLD-RTO: 13.1 % (ref 12.1–15.2)
PLATELET # BLD: 301 K/UL (ref 140–450)
POTASSIUM SERPL-SCNC: 4.5 MMOL/L (ref 3.7–5.3)
RBC # BLD: 4.3 M/UL (ref 4–5.2)
SEG NEUTROPHILS: 50 % (ref 47–75)
SEGMENTED NEUTROPHILS ABSOLUTE COUNT: 3 K/UL (ref 2.5–7)
SODIUM BLD-SCNC: 140 MMOL/L (ref 135–144)
TOTAL IRON BINDING CAPACITY: 231 UG/DL (ref 250–450)
TOTAL PROTEIN: 6.3 G/DL (ref 6.4–8.3)
TSH SERPL DL<=0.05 MIU/L-ACNC: 1.18 UIU/ML (ref 0.3–5)
UNSATURATED IRON BINDING CAPACITY: 181 UG/DL (ref 112–347)
VITAMIN B-12: 647 PG/ML (ref 232–1245)
WBC # BLD: 6 K/UL (ref 3.5–11)

## 2022-05-02 PROCEDURE — 84443 ASSAY THYROID STIM HORMONE: CPT

## 2022-05-02 PROCEDURE — 83540 ASSAY OF IRON: CPT

## 2022-05-02 PROCEDURE — 85025 COMPLETE CBC W/AUTO DIFF WBC: CPT

## 2022-05-02 PROCEDURE — 82607 VITAMIN B-12: CPT

## 2022-05-02 PROCEDURE — G8427 DOCREV CUR MEDS BY ELIG CLIN: HCPCS | Performed by: FAMILY MEDICINE

## 2022-05-02 PROCEDURE — 83550 IRON BINDING TEST: CPT

## 2022-05-02 PROCEDURE — G8417 CALC BMI ABV UP PARAM F/U: HCPCS | Performed by: FAMILY MEDICINE

## 2022-05-02 PROCEDURE — 82746 ASSAY OF FOLIC ACID SERUM: CPT

## 2022-05-02 PROCEDURE — 36415 COLL VENOUS BLD VENIPUNCTURE: CPT

## 2022-05-02 PROCEDURE — 99214 OFFICE O/P EST MOD 30 MIN: CPT | Performed by: FAMILY MEDICINE

## 2022-05-02 PROCEDURE — 1036F TOBACCO NON-USER: CPT | Performed by: FAMILY MEDICINE

## 2022-05-02 PROCEDURE — 80053 COMPREHEN METABOLIC PANEL: CPT

## 2022-05-02 RX ORDER — BUSPIRONE HYDROCHLORIDE 15 MG/1
15 TABLET ORAL 2 TIMES DAILY
Qty: 60 TABLET | Refills: 0
Start: 2022-05-02 | End: 2022-06-24 | Stop reason: SDUPTHER

## 2022-05-02 RX ORDER — TRAZODONE HYDROCHLORIDE 50 MG/1
TABLET ORAL
Qty: 60 TABLET | Refills: 5
Start: 2022-05-02 | End: 2022-06-06

## 2022-05-02 RX ORDER — GABAPENTIN 600 MG/1
600 TABLET ORAL 3 TIMES DAILY
Qty: 90 TABLET | Refills: 0
Start: 2022-05-02 | End: 2022-09-12 | Stop reason: SDUPTHER

## 2022-05-02 RX ORDER — BUPRENORPHINE HYDROCHLORIDE AND NALOXONE HYDROCHLORIDE DIHYDRATE 8; 2 MG/1; MG/1
0.5 TABLET SUBLINGUAL 2 TIMES DAILY
Qty: 30 TABLET | Refills: 0
Start: 2022-05-02 | End: 2022-06-01

## 2022-05-02 NOTE — PROGRESS NOTES
Name: Juju Ritchie  : 1980         Chief Complaint:     Chief Complaint   Patient presents with    Swelling     last day she worked was .  Depression    Anemia       History of Present Illness:      Juju Ritchie is a 38 y.o.  female who presents with Swelling (last day she worked was .), Depression, and Anemia      HPI    Patient presents for follow-up of fatigue and lower extremity swelling. Week of the  had her period and felt terrible, extremely weak and tired. Has felt \"off\" ever since then. Hasn't been able to go to work because of feeling so weak. Drinks 6 bottles of water per day, constantly drinking because of dry mouth from suboxone. Glass of juice with miralax. 1 pop per day or none. Some coffee. Bad heartburn and gets very nauseous, usually gavin after dinner in the evening. At that time usually has to take zofran. Still struggling with constipation. Unable to take linzess recently d/t insurance coverage. Using miralax, 1 capful prn. benefiber QOD. Colace 3 pills per day. Senna hasn't helped in the past.     Soma usually TID. Trazodone 150 mg nightly. hydroxyzyine every AM usually within about 30 min of waking up, had gotten into the habit d/t anxiety about going in to work. Period was very light, have always been light. Had discontinued norethindrone approx January and menses have been fairly regular. Bleeds 2-5 days. Banana and maybe eggs or a poptart in the morning. Snacks through the day. Dinner is main meal of the day. Trying to eat high-iron foods, broccoli but then frozen bag says no iron in it. Swelling in legs comes and goes. Had been given lasix ot take BID for a little bit, took that and hasn't been on anything for over a week. Not putting out as much urine as she was. Has been taking diurex otc which helps a little, but she's still having trouble initiating urine stream sometimes.  Better urine flow overnight, doesn't have to wait so long for the urine stream to start. Swelling all the time but it gets worse in hands as the day goes on, has to take off rings by 4pm or can't get them off. Feet stay about the same (L>R, h/o L foot surgery) but do get worse if she's up a lot. Sleeps with them elevated. Used vivitrol in the past which did not help her maintain sobriety. She does feel that Suboxone helps the best with that, so despite side effects, she wishes to stay on the medication. Patient has not been going to work, unsure when she will return. Medical History:     Patient Active Problem List   Diagnosis    Chronic neck pain    Onychomycosis    Chronic low back pain without sciatica    Narcotic abuse in remission ([facility] [address]    Chronic constipation       Medications:       Prior to Admission medications    Medication Sig Start Date End Date Taking? Authorizing Provider   busPIRone (BUSPAR) 15 MG tablet Take 15 mg by mouth 2 times daily 5/2/22  Yes Chi Sy, DO   buprenorphine-naloxone (SUBOXONE) 8-2 MG SUBL SL tablet Place 0.5 tablets under the tongue 2 times daily for 30 days. 5/2/22 6/1/22 Yes Chi Sy DO   traZODone (DESYREL) 50 MG tablet 150 mg po qhs 5/2/22  Yes Chi Sy DO   gabapentin (NEURONTIN) 600 MG tablet Take 1 tablet by mouth 3 times daily for 30 days. 5/2/22 6/1/22 Yes Chi Sy DO   carisoprodol (SOMA) 350 MG tablet Take 1 tablet by mouth 3 times daily as needed for Muscle spasms for up to 30 days.  4/14/22 5/14/22 Yes Heloise Peabody, MD   ondansetron [facility]) 4 MG tablet  3/30/22  Yes Historical Provider, MD   escitalopram (LEXAPRO) 20 MG tablet  3/22/22  Yes Historical Provider, MD   indomethacin (INDOCIN SR) 75 MG extended release capsule Take 1 capsule by mouth 2 times daily (with meals) 3/7/22  Yes Daniel Easley, DO   triamcinolone (KENALOG) 0.1 % ointment Apply topically 2 times daily 2/1/22  Yes Chi Sy, DO   topiramate (TOPAMAX) 25 MG tablet Take 1 tablet by mouth nightly 2/1/22  Yes Ken Son, DO   hydrOXYzine (ATARAX) 25 MG tablet Take 1 tablet by mouth 2 times daily as needed for Anxiety 11/11/21  Yes Ken Mendez, DO   omeprazole (PRILOSEC) 40 MG delayed release capsule TAKE 1 CAPSULE BY MOUTH EVERY DAY 10/29/21  Yes Ken Mendez, DO   albuterol sulfate  (90 Base) MCG/ACT inhaler Inhale 2 puffs into the lungs 4 times daily 10/21/20  Yes Dalia Dunne MD   linaCLOtide (LINZESS) 72 MCG CAPS capsule Take 1 capsule by mouth every morning (before breakfast) 5/3/22   Ken Son, DO        Allergies:       Patient has no known allergies. Physical Exam:     Vitals:  BP (!) 92/58   Pulse 72   Ht 5' 6\" (1.676 m)   Wt 210 lb (95.3 kg)   SpO2 97%   BMI 33.89 kg/m²   Physical Exam  Vitals and nursing note reviewed. Constitutional:       General: She is not in acute distress. Appearance: Normal appearance. She is well-developed. She is not ill-appearing. Cardiovascular:      Rate and Rhythm: Normal rate and regular rhythm. Heart sounds: Normal heart sounds. Comments: No pitting edema  Pulmonary:      Effort: Pulmonary effort is normal.      Breath sounds: Normal breath sounds. Neurological:      Mental Status: She is alert and oriented to person, place, and time.    Psychiatric:         Mood and Affect: Mood normal.         Behavior: Behavior normal.         Data:     Lab Results   Component Value Date     05/02/2022    K 4.5 05/02/2022     05/02/2022    CO2 27 05/02/2022    BUN 8 05/02/2022    CREATININE 0.80 05/02/2022    GLUCOSE 114 05/02/2022    PROT 6.3 05/02/2022    LABALBU 4.0 05/02/2022    BILITOT 0.12 05/02/2022    ALKPHOS 50 05/02/2022    AST 15 05/02/2022    ALT 9 05/02/2022     Lab Results   Component Value Date    WBC 6.0 05/02/2022    RBC 4.30 05/02/2022    HGB 12.0 05/02/2022    HCT 36.5 05/02/2022    MCV 85.0 05/02/2022    MCH 28.0 05/02/2022    MCHC 33.0 05/02/2022    RDW 13.1 05/02/2022     2022    MPV NOT REPORTED 2021     Lab Results   Component Value Date    TSH 1.18 2022     Lab Results   Component Value Date    LABA1C 5.3 2018         Assessment & Plan:        Diagnosis Orders   1. Urinary retention  Comprehensive Metabolic Panel    US RETROPERITONEAL COMPLETE   2. Chronic constipation     3. Normocytic anemia  CBC with Auto Differential    Iron and TIBC    Vitamin B12 & Folate   4. Lightheadedness  TSH with Reflex   5. Narcotic abuse in remission (HCC)  buprenorphine-naloxone (SUBOXONE) 8-2 MG SUBL SL tablet   6. Hypotension, unspecified hypotension type       Subjective urine retention, feels she is not making much urine and has some difficulty passing urine. Had been better with Lasix. She does not have any peripheral swelling at this time but did have some evidently. Uncertain etiology of this. Suspect much of the urinary retention is due to constipation. Advised to increase MiraLAX to daily and she is working on getting Linzess from GI specialist.    Lightheadedness, hypotension today, uncertain etiology. Labs ordered. Potentially she did have significant blood loss from menstrual cycle though would not expect that. On no antihypertensive meds. She reports very large water intake. Advised she can add some sports drinks. Continue to monitor. History of narcotic abuse, alcoholism, feels that Suboxone is helping to control cravings though she does have multiple side effects. Continue following with addiction specialist.        Requested Prescriptions     Signed Prescriptions Disp Refills    busPIRone (BUSPAR) 15 MG tablet 60 tablet 0     Sig: Take 15 mg by mouth 2 times daily    buprenorphine-naloxone (SUBOXONE) 8-2 MG SUBL SL tablet 30 tablet 0     Sig: Place 0.5 tablets under the tongue 2 times daily for 30 days.     traZODone (DESYREL) 50 MG tablet 60 tablet 5     Si mg po qhs    gabapentin (NEURONTIN) 600 MG tablet 90 tablet 0     Sig: Take 1 tablet by mouth 3 times daily for 30 days. Patient Instructions   SURVEY:    You may be receiving a survey from [employer] regarding your visit today. You may get this in the mail, through your MyChart or in your email. Please complete the survey to enable us to provide the highest quality of care to you and your family. If you cannot score us as very good ( 5 Stars) on any question, please feel free to call the office to discuss how we could have made your experience exceptional.     Thank you.     Clinical Care Team:  DO Maximiliano Bassetts, [address] [place] [address] Team:  Vale Barahona          signed by Stiven Lance DO on 5/6/2022 at 4:31 PM  [address]  Dept: [phone]

## 2022-05-03 ENCOUNTER — TELEPHONE (OUTPATIENT)
Dept: FAMILY MEDICINE CLINIC | Age: 42
End: 2022-05-03

## 2022-05-03 NOTE — TELEPHONE ENCOUNTER
Finally took linzess and gone 3 times so far. They only gave her a prescription of #14 and suggested that PCP prescribe, per patient.

## 2022-05-03 NOTE — TELEPHONE ENCOUNTER
----- Message from Adam Real DO sent at 5/3/2022  2:02 PM EDT -----  Labs really looking ok, anemia resolved, white blood cell count still normal. No explanation for feeling poorly or the swelling. Definitely needs to get the stools under control - I think that will make a big difference.

## 2022-05-06 PROBLEM — F11.11 NARCOTIC ABUSE IN REMISSION (HCC): Status: ACTIVE | Noted: 2022-05-06

## 2022-05-06 PROBLEM — K59.09 CHRONIC CONSTIPATION: Status: ACTIVE | Noted: 2022-05-06

## 2022-05-06 PROBLEM — E66.9 OBESITY WITH BODY MASS INDEX 30 OR GREATER: Status: RESOLVED | Noted: 2021-03-24 | Resolved: 2022-05-06

## 2022-05-09 ENCOUNTER — PATIENT MESSAGE (OUTPATIENT)
Dept: FAMILY MEDICINE CLINIC | Age: 42
End: 2022-05-09

## 2022-05-09 NOTE — TELEPHONE ENCOUNTER
From: Abel Jeter  To: Dr. Mueller Fair: 5/9/2022 1:56 PM EDT  Subject: Short term disability     Hi, just wanted to see if you'll have received the paper work from [facility] for my current claim? I haven't received a payment yet so I was wondering if I need to have them send it to you? Please let me know at your earliest convenience. Thank you!    Sven Mahoney

## 2022-05-11 ENCOUNTER — HOSPITAL ENCOUNTER (OUTPATIENT)
Dept: ULTRASOUND IMAGING | Age: 42
Discharge: HOME | End: 2022-05-13
Payer: COMMERCIAL

## 2022-05-11 DIAGNOSIS — R33.9 URINARY RETENTION: ICD-10-CM

## 2022-05-11 PROCEDURE — 76770 US EXAM ABDO BACK WALL COMP: CPT

## 2022-05-12 ENCOUNTER — HOSPITAL ENCOUNTER (OUTPATIENT)
Age: 42
Discharge: HOME | End: 2022-05-12
Payer: COMMERCIAL

## 2022-05-12 ENCOUNTER — OFFICE VISIT (OUTPATIENT)
Dept: FAMILY MEDICINE CLINIC | Age: 42
End: 2022-05-12
Payer: COMMERCIAL

## 2022-05-12 VITALS
WEIGHT: 215 LBS | HEART RATE: 72 BPM | SYSTOLIC BLOOD PRESSURE: 94 MMHG | BODY MASS INDEX: 34.55 KG/M2 | DIASTOLIC BLOOD PRESSURE: 60 MMHG | OXYGEN SATURATION: 99 % | HEIGHT: 66 IN

## 2022-05-12 DIAGNOSIS — F11.11 NARCOTIC ABUSE IN REMISSION (HCC): ICD-10-CM

## 2022-05-12 DIAGNOSIS — G89.29 CHRONIC BILATERAL LOW BACK PAIN WITHOUT SCIATICA: ICD-10-CM

## 2022-05-12 DIAGNOSIS — F10.11 ALCOHOL ABUSE, IN REMISSION: ICD-10-CM

## 2022-05-12 DIAGNOSIS — Z83.49 FAMILY HISTORY OF ALPHA 1 ANTITRYPSIN DEFICIENCY: ICD-10-CM

## 2022-05-12 DIAGNOSIS — K59.09 CHRONIC CONSTIPATION: ICD-10-CM

## 2022-05-12 DIAGNOSIS — R60.0 MILD PERIPHERAL EDEMA: ICD-10-CM

## 2022-05-12 DIAGNOSIS — I95.2 HYPOTENSION DUE TO DRUGS: Primary | ICD-10-CM

## 2022-05-12 DIAGNOSIS — M54.50 CHRONIC BILATERAL LOW BACK PAIN WITHOUT SCIATICA: ICD-10-CM

## 2022-05-12 LAB — ALPHA-1 ANTITRYPSIN: 79 MG/DL (ref 90–200)

## 2022-05-12 PROCEDURE — G8417 CALC BMI ABV UP PARAM F/U: HCPCS | Performed by: FAMILY MEDICINE

## 2022-05-12 PROCEDURE — 36415 COLL VENOUS BLD VENIPUNCTURE: CPT

## 2022-05-12 PROCEDURE — 82103 ALPHA-1-ANTITRYPSIN TOTAL: CPT

## 2022-05-12 PROCEDURE — 99214 OFFICE O/P EST MOD 30 MIN: CPT | Performed by: FAMILY MEDICINE

## 2022-05-12 PROCEDURE — G8427 DOCREV CUR MEDS BY ELIG CLIN: HCPCS | Performed by: FAMILY MEDICINE

## 2022-05-12 PROCEDURE — 1036F TOBACCO NON-USER: CPT | Performed by: FAMILY MEDICINE

## 2022-05-12 RX ORDER — MIDODRINE HYDROCHLORIDE 5 MG/1
5 TABLET ORAL 2 TIMES DAILY
Qty: 60 TABLET | Refills: 3 | Status: SHIPPED | OUTPATIENT
Start: 2022-05-12 | End: 2022-06-06

## 2022-05-12 NOTE — PROGRESS NOTES
Name: Abel Jeter  : 1980         Chief Complaint:     Chief Complaint   Patient presents with    Depression    Back Pain    Swelling       History of Present Illness:      Abel Jeter is a 38 y.o.  female who presents with Depression, Back Pain, and Swelling      HPI    Constipation has improved some with use of linzess. Passes some formed stool followed by liquid, goes 5-6x to pass it all, occurring every 1-3 days. Taking Colace also. Eating fairly well and drinks tons of water. Urination still abnl during the day, feels like she can't pass urine. Some days easier than others. Getting a little better. Urinates 2-3x overnight with no difficulty passing urine. Mom has a1at deficiency and both parents had cirrhosis. Mom has COPD also and was on oxygen but is/was a smoker. Shortly after waking in the morning she's already struggling to stay awake and gets very dizzy with standing, feels weak, ends up having to lay back down. Gets back up after a while and can do some housework but has to take frequent breaks d/t low back pain. Stays dizzy and weak throughout the day. Some nights sleeps very poorly. Back pain acting up also. Has backed off on dry needling but plans to get back into it. Using soma which helps with her back. Lower extremity swelling had improved but it's pretty bad again. suboxone has helped her a great deal with substance abuse, has had absolutely no cravings to drink or use narcotics. Dry mouth r/t suboxone and also nicotine lozenges. Medical History:     Patient Active Problem List   Diagnosis    Chronic neck pain    Onychomycosis    Chronic low back pain without sciatica    Narcotic abuse in remission ([facility] [address]    Chronic constipation    Alcohol abuse, in remission    Family history of alpha 1 antitrypsin deficiency       Medications:       Prior to Admission medications    Medication Sig Start Date End Date Taking?  Authorizing Provider linaclotide (LINZESS) 145 MCG capsule Take 1 capsule by mouth every morning (before breakfast) 5/12/22  Yes Adam Real DO   midodrine (PROAMATINE) 5 MG tablet Take 1 tablet by mouth 2 times daily 5/12/22  Yes Adam Real DO   busPIRone (BUSPAR) 15 MG tablet Take 15 mg by mouth 2 times daily 5/2/22  Yes Adam Real DO   buprenorphine-naloxone (SUBOXONE) 8-2 MG SUBL SL tablet Place 0.5 tablets under the tongue 2 times daily for 30 days. 5/2/22 6/1/22 Yes Adam Real DO   traZODone (DESYREL) 50 MG tablet 150 mg po qhs 5/2/22  Yes Adam Real DO   gabapentin (NEURONTIN) 600 MG tablet Take 1 tablet by mouth 3 times daily for 30 days. 5/2/22 6/1/22 Yes Adam Real DO   carisoprodol (SOMA) 350 MG tablet Take 1 tablet by mouth 3 times daily as needed for Muscle spasms for up to 30 days. 4/14/22 5/14/22 Yes Monisha Devi MD   [facility]ron [facility]) 4 MG tablet  3/30/22  Yes Historical Provider, MD   escitalopram (LEXAPRO) 20 MG tablet  3/22/22  Yes Historical Provider, MD   indomethacin (INDOCIN SR) 75 MG extended release capsule Take 1 capsule by mouth 2 times daily (with meals) 3/7/22  Yes Ernesto Easley,    triamcinolone (KENALOG) 0.1 % ointment Apply topically 2 times daily 2/1/22  Yes Adam Real DO   topiramate (TOPAMAX) 25 MG tablet Take 1 tablet by mouth nightly 2/1/22  Yes Adam Real DO   hydrOXYzine (ATARAX) 25 MG tablet Take 1 tablet by mouth 2 times daily as needed for Anxiety 11/11/21  Yes Adam Real DO   omeprazole (PRILOSEC) 40 MG delayed release capsule TAKE 1 CAPSULE BY MOUTH EVERY DAY 10/29/21  Yes Adam Real DO   albuterol sulfate  (90 Base) MCG/ACT inhaler Inhale 2 puffs into the lungs 4 times daily 10/21/20  Yes Sebastián Venkat, MD        Allergies:       Patient has no known allergies.     Physical Exam:     Vitals:  BP 94/60   Pulse 72   Ht 5' 6\" (1.676 m)   Wt 215 lb (97.5 kg)   SpO2 99%   BMI 34.70 kg/m²   Physical Exam  Vitals and nursing note reviewed. Constitutional:       General: She is not in acute distress. Appearance: She is well-developed. Cardiovascular:      Comments: Trace pitting edema cuca pretibial  Pulmonary:      Effort: Pulmonary effort is normal.   Skin:     General: Skin is warm and dry. Neurological:      Mental Status: She is alert and oriented to person, place, and time. Psychiatric:         Judgment: Judgment normal.         Data:     Lab Results   Component Value Date     05/02/2022    K 4.5 05/02/2022     05/02/2022    CO2 27 05/02/2022    BUN 8 05/02/2022    CREATININE 0.80 05/02/2022    GLUCOSE 114 05/02/2022    PROT 6.3 05/02/2022    LABALBU 4.0 05/02/2022    BILITOT 0.12 05/02/2022    ALKPHOS 50 05/02/2022    AST 15 05/02/2022    ALT 9 05/02/2022     Lab Results   Component Value Date    WBC 6.0 05/02/2022    RBC 4.30 05/02/2022    HGB 12.0 05/02/2022    HCT 36.5 05/02/2022    MCV 85.0 05/02/2022    MCH 28.0 05/02/2022    MCHC 33.0 05/02/2022    RDW 13.1 05/02/2022     05/02/2022    MPV NOT REPORTED 09/19/2021     Lab Results   Component Value Date    TSH 1.18 05/02/2022     Lab Results   Component Value Date    LABA1C 5.3 09/19/2018         Assessment & Plan:        Diagnosis Orders   1. Hypotension due to drugs     2. Mild peripheral edema     3. Chronic bilateral low back pain without sciatica  Devorah Norton MD, Pain Management, [facility]   4. Family history of alpha 1 antitrypsin deficiency  Alpha-1-Antitrypsin   5. Narcotic abuse in remission ([facility] [address]     6. Alcohol abuse, in remission     7. Chronic constipation       Hypotension and associated feelings of weakness, lightheadedness, fatigue. Seemingly a side effect of Suboxone. Discussed that Ramya Jesenia may be contributing also. Patient does have very good fluid intake and does not purposely limit sodium in her diet. Trial of midodrine 5 mg twice a day. Off work 3 weeks.   Follow-up shortly before returning to work. 2.  Mild swelling in the legs, advised compression stockings, elevation when possible  3. Chronic low back pain continues. Agree with returning to dry needling, also referred to pain management. Reviewed MRI results again. 4.  Testing for alpha-1 antitrypsin deficiency due to family history. Patient has had normal liver enzymes and does not recall ever having elevation.  5-6. Narcotic and alcohol abuse both in remission, feels that Suboxone is helping greatly and is willing to accept adverse effects in exchange for sobriety. Continue counseling. 7.  Chronic constipation, increase Linzess dose and stop Colace.           Requested Prescriptions     Signed Prescriptions Disp Refills    linaclotide (LINZESS) 145 MCG capsule 30 capsule 2     Sig: Take 1 capsule by mouth every morning (before breakfast)    midodrine (PROAMATINE) 5 MG tablet 60 tablet 3     Sig: Take 1 tablet by mouth 2 times daily         There are no Patient Instructions on file for this visit.      signed by Jarred Muñoz DO on 5/12/2022 at 5:45 PM  [address]  Dept: [phone]

## 2022-05-13 ENCOUNTER — TELEPHONE (OUTPATIENT)
Dept: FAMILY MEDICINE CLINIC | Age: 42
End: 2022-05-13

## 2022-05-13 DIAGNOSIS — E88.01 ALPHA-1-ANTITRYPSIN DEFICIENCY (HCC): Primary | ICD-10-CM

## 2022-05-13 NOTE — TELEPHONE ENCOUNTER
----- Message from Ramona Gutierrez DO sent at 5/13/2022 12:24 PM EDT -----  Alpha-1 antitrypsin level is low. This does mean that she will be at higher risk of developing emphysema and possibly liver trouble. Needs PFTs and right upper quadrant ultrasound, diagnosis alpha-1 antitrypsin deficiency.

## 2022-05-16 ENCOUNTER — HOSPITAL ENCOUNTER (OUTPATIENT)
Dept: PHYSICAL THERAPY | Age: 42
Setting detail: THERAPIES SERIES
Discharge: HOME | End: 2022-05-16
Payer: COMMERCIAL

## 2022-05-16 DIAGNOSIS — M54.50 CHRONIC BILATERAL LOW BACK PAIN WITHOUT SCIATICA: ICD-10-CM

## 2022-05-16 DIAGNOSIS — G89.29 CHRONIC BILATERAL LOW BACK PAIN WITHOUT SCIATICA: ICD-10-CM

## 2022-05-16 PROCEDURE — 9900000107 HC NEEDLE INSERTION W/O INJECTION 3 OR MORE MUSCLES (SELF-PAY)

## 2022-05-16 RX ORDER — CARISOPRODOL 250 MG/1
250 TABLET ORAL 3 TIMES DAILY PRN
Qty: 90 TABLET | Refills: 0 | Status: SHIPPED | OUTPATIENT
Start: 2022-05-16 | End: 2022-06-17 | Stop reason: SDUPTHER

## 2022-05-16 NOTE — PROGRESS NOTES
Phone: [address]      Fax: [phone]                            Outpatient Physical Therapy                                                                            Daily Note    Date: 2022  Patient Name: Tania Ivan        MRN: [id number]   ACCT#:  [de-identified]  : 1980  (38 y.o.)    Referring Practitioner: Dr. Neeraj Hu    Referral Date : 21    Diagnosis: Chronic bilateral low back pain without sciatica  Treatment Diagnosis: Low Back Pain    Onset Date: 21  PT Insurance Information: Retail   Total # of Visits Approved: 10 Per Physician Order  Total # of Visits to Date: 5  No Show: 0  Canceled Appointment: 0    Pre-Treatment Pain:  2/10     Assessment  Assessment: Pt reports pain still increases to 5/10 with household ADLs such as dishes and vaccuming. Pt reports pain is not as severe as prior to PT. Pt reports no L LE pain since last episode of it. Introduced stim this date with good fco. Chart Reviewed: Yes    Plan  Continue with current plan of care    Exercises/Modalities/Manual:  See DocFlow Sheet    Education: Stim     Barriers to Learning: none    Goals  (Total # of Visits to Date: 5)   Short Term Goals -         Long Term Goals - Time Frame for Long term goals : 10 visits  Long Term Goals  Time Frame for Long term goals : 10 visits  Long term goal 1: Pt to report 75% improvement with regard to low back pain. Long term goal 2: Pt to report worst pain following work day 2/10.-MET  Long term goal 3: Pt to report no radicular LE pain x3 consecutive days. -MET    Post Treatment Pain:  2/10    Time In:1120  Time Out : 1200  Timed Code Treatment Minutes: 0 Minutes  Total Treatment Time: Kevin Joseph PT     Date: 2022

## 2022-05-16 NOTE — TELEPHONE ENCOUNTER
Last visit:  5/12/2022  Next Visit Date:    Future Appointments   Date Time Provider Felipe Mancini   6/1/2022  1:20 PM Jaymie Blackburn DO [facility]   6/6/2022 11:15 AM Lasha Alonzo, PT [facility] [place]         Medication List:  Prior to Admission medications    Medication Sig Start Date End Date Taking? Authorizing Provider   linaclotide [facility]) 145 MCG capsule Take 1 capsule by mouth every morning (before breakfast) 5/12/22   Jaymie Blackburn DO   midodrine (PROAMATINE) 5 MG tablet Take 1 tablet by mouth 2 times daily 5/12/22   Jaymie Blackubrn DO   busPIRone (BUSPAR) 15 MG tablet Take 15 mg by mouth 2 times daily 5/2/22   Jaymie Blackburn DO   buprenorphine-naloxone (SUBOXONE) 8-2 MG SUBL SL tablet Place 0.5 tablets under the tongue 2 times daily for 30 days. 5/2/22 6/1/22  Jaymie Blackburn DO   traZODone (DESYREL) 50 MG tablet 150 mg po qhs 5/2/22   Jaymie Blackburn DO   gabapentin (NEURONTIN) 600 MG tablet Take 1 tablet by mouth 3 times daily for 30 days.  5/2/22 6/1/22  Jaymie Blackburn DO   ondansetron (ZOFRAN) 4 MG tablet  3/30/22   Historical Provider, MD   escitalopram (LEXAPRO) 20 MG tablet  3/22/22   Historical Provider, MD   indomethacin (INDOCIN SR) 75 MG extended release capsule Take 1 capsule by mouth 2 times daily (with meals) 3/7/22   Chucky Cortes DO   triamcinolone (KENALOG) 0.1 % ointment Apply topically 2 times daily 2/1/22   Jaymie Blackburn DO   topiramate (TOPAMAX) 25 MG tablet Take 1 tablet by mouth nightly 2/1/22   Jaymie Blackburn DO   hydrOXYzine (ATARAX) 25 MG tablet Take 1 tablet by mouth 2 times daily as needed for Anxiety 11/11/21   Jaymie Blackburn DO   omeprazole (PRILOSEC) 40 MG delayed release capsule TAKE 1 CAPSULE BY MOUTH EVERY DAY 10/29/21   Jaymie Blackburn DO   albuterol sulfate  (90 Base) MCG/ACT inhaler Inhale 2 puffs into the lungs 4 times daily 10/21/20   Romero Espinal MD

## 2022-05-17 ENCOUNTER — TELEPHONE (OUTPATIENT)
Dept: PAIN MANAGEMENT | Age: 42
End: 2022-05-17

## 2022-05-17 ENCOUNTER — TELEPHONE (OUTPATIENT)
Dept: FAMILY MEDICINE CLINIC | Age: 42
End: 2022-05-17

## 2022-05-17 NOTE — LETTER
[facility] JOSHUA Mcfadden [address] [place] [id number]  Phone: [phone]  Fax: Fior 106, DO        May 17, 2022     Patient: Arnold Martin   YOB: 1980   Date of Visit: 5/17/2022       To Whom It May Concern: It is my medical opinion that Maria M James has been unable to work since 4/19/22. Currently she is expected to return to work 6/3/22. If you have any questions or concerns, please don't hesitate to call.     Sincerely,        Evelyn Solomon, DO

## 2022-05-17 NOTE — TELEPHONE ENCOUNTER
April from [employer] is calling requesting an excuse for [facility]. She said that her short term is not approved yet and she needs a note stating that she has been off work since 4/19 and until further notice.  Please fax to [phone]    Health Maintenance   Topic Date Due    Varicella vaccine (1 of 2 - 2-dose childhood series) Never done    COVID-19 Vaccine (1) Never done    Pneumococcal 0-64 years Vaccine (1 - PCV) Never done    DTaP/Tdap/Td vaccine (1 - Tdap) Never done    Diabetes screen  09/19/2021    Lipids  Never done    Flu vaccine (Season Ended) 09/01/2022    Depression Monitoring  01/12/2023    Cervical cancer screen  04/07/2025    Hepatitis C screen  Completed    HIV screen  Completed    Hepatitis A vaccine  Aged Out    Hepatitis B vaccine  Aged Out    Hib vaccine  Aged Out    Meningococcal (ACWY) vaccine  Aged Out             (applicable per patient's age: Cancer Screenings, Depression Screening, Fall Risk Screening, Immunizations)    Hemoglobin A1C (%)   Date Value   09/19/2018 5.3     AST (U/L)   Date Value   05/02/2022 15     ALT (U/L)   Date Value   05/02/2022 9     BUN (mg/dL)   Date Value   05/02/2022 8      (goal A1C is < 7)   (goal LDL is <100) need 30-50% reduction from baseline     BP Readings from Last 3 Encounters:   05/12/22 94/60   05/02/22 (!) 92/58   04/13/22 (!) 98/56    (goal /80)      All Future Testing planned in [facility]:  Lab Frequency Next Occurrence   Full PFT Study With Bronchodilator Once 05/13/2022   US GALLBLADDER RUQ Once 05/13/2022       Next Visit Date:  Future Appointments   Date Time Provider Felipe Mancini   6/1/2022  1:20 PM DO [place] [place]   6/6/2022 11:15 AM Ariel Chandler, PT [facility]            Patient Active Problem List:     Chronic neck pain     Onychomycosis     Chronic low back pain without sciatica     Narcotic abuse in remission (HCC)     Chronic constipation     Alcohol abuse, in remission     Family history of alpha 1 antitrypsin deficiency

## 2022-05-20 ENCOUNTER — OFFICE VISIT (OUTPATIENT)
Dept: PAIN MANAGEMENT | Age: 42
End: 2022-05-20
Payer: COMMERCIAL

## 2022-05-20 VITALS — WEIGHT: 212 LBS | HEIGHT: 66 IN | RESPIRATION RATE: 18 BRPM | BODY MASS INDEX: 34.07 KG/M2

## 2022-05-20 DIAGNOSIS — M54.50 CHRONIC BILATERAL LOW BACK PAIN WITHOUT SCIATICA: Primary | ICD-10-CM

## 2022-05-20 DIAGNOSIS — G89.29 CHRONIC BILATERAL LOW BACK PAIN WITHOUT SCIATICA: Primary | ICD-10-CM

## 2022-05-20 PROCEDURE — G8417 CALC BMI ABV UP PARAM F/U: HCPCS | Performed by: PHYSICAL MEDICINE & REHABILITATION

## 2022-05-20 PROCEDURE — G8427 DOCREV CUR MEDS BY ELIG CLIN: HCPCS | Performed by: PHYSICAL MEDICINE & REHABILITATION

## 2022-05-20 PROCEDURE — 1036F TOBACCO NON-USER: CPT | Performed by: PHYSICAL MEDICINE & REHABILITATION

## 2022-05-20 PROCEDURE — 99204 OFFICE O/P NEW MOD 45 MIN: CPT | Performed by: PHYSICAL MEDICINE & REHABILITATION

## 2022-05-20 NOTE — PROGRESS NOTES
Spine Medicine Consultation    [address] PHYSICAL MEDICINE & REHABILITATION  [facility] [place] [id number]  Dept: 199 [facility], DO  128 [facility] 601 [facility],  [address] [place] [address]       Beckie Cockayne is a 38 y.o. female, who came today due to  back pain    Cause of the symptom(s): degenerative (arthritis)    Onset: chronic pain for at least 16 years. Quality of Symptoms: sharp and aching. Occasional burning sensation. Occasional leg pain. Aggravating factors: standing and walking    Relieving factors: heating pad, sitting     Red Flags: none     Treatment done: physical therapy, injection, anti-inflammatory medication and muscle relaxant      Current pain level: 4 on the scale of 0-10 ( 10 being worst)       No Known Allergies    Social History     Socioeconomic History    Marital status:      Spouse name: Not on file    Number of children: Not on file    Years of education: Not on file    Highest education level: Not on file   Occupational History    Not on file   Tobacco Use    Smoking status: Former Smoker     Packs/day: 1.00     Years: 2.00     Pack years: 2.00     Types: Cigarettes    Smokeless tobacco: Never Used   Vaping Use    Vaping Use: Every day   Substance and Sexual Activity    Alcohol use: Not Currently     Comment: in the past    Drug use: Not Currently     Comment: Pt has been sober for 30 days    Sexual activity: Not Currently   Other Topics Concern    Not on file   Social History Narrative    Not on file     Social Determinants of Health     Financial Resource Strain: Low Risk     Difficulty of Paying Living Expenses: Not hard at all   Food Insecurity: No Food Insecurity    Worried About Running Out of Food in the Last Year: Never true    Yadiel of Food in the Last Year: Never true   Transportation Needs:     Lack of Transportation (Medical):  Not on file    Lack of Transportation (Non-Medical): Not on file   Physical Activity:     Days of Exercise per Week: Not on file    Minutes of Exercise per Session: Not on file   Stress:     Feeling of Stress : Not on file   Social Connections:     Frequency of Communication with Friends and Family: Not on file    Frequency of Social Gatherings with Friends and Family: Not on file    Attends [employer] Services: Not on file    Active Member of [address] [place] [address] or Organizations: Not on file    Attends Club or Organization Meetings: Not on file    Marital Status: Not on file   Intimate Partner Violence:     Fear of Current or Ex-Partner: Not on file    Emotionally Abused: Not on file    Physically Abused: Not on file    Sexually Abused: Not on file   Housing Stability:     Unable to Pay for Housing in the Last Year: Not on file    Number of [place] in the Last Year: Not on file    Unstable Housing in the Last Year: Not on file       Past Medical History:   Diagnosis Date    Anxiety     Bulging of lumbar intervertebral disc without myelopathy     L1    Chronic back pain     Depression     GERD (gastroesophageal reflux disease)     Substance abuse ([facility] [address]     alcohol and opiates       Past Surgical History:   Procedure Laterality Date    COLONOSCOPY  05/26/2020    PILONIDAL CYST EXCISION  03/2018    PLANTAR FASCIA SURGERY Left 06/11/2020       Prior to Visit Medications    Medication Sig Taking? Authorizing Provider   carisoprodol (SOMA) 250 MG tablet Take 1 tablet by mouth 3 times daily as needed for Muscle spasms for up to 30 days.   Jackie Rubi DO   linaclotide (LINZESS) 145 MCG capsule Take 1 capsule by mouth every morning (before breakfast)  Jackie Rubi DO   midodrine (PROAMATINE) 5 MG tablet Take 1 tablet by mouth 2 times daily  Jackie Rubi DO   busPIRone (BUSPAR) 15 MG tablet Take 15 mg by mouth 2 times daily  Jackie Rubi DO   buprenorphine-naloxone (SUBOXONE) 8-2 MG SUBL SL tablet Place 0.5 tablets under the tongue 2 times daily for 30 days. Joanna iSngh DO   traZODone (DESYREL) 50 MG tablet 150 mg po qhs  Joanna Singh DO   gabapentin (NEURONTIN) 600 MG tablet Take 1 tablet by mouth 3 times daily for 30 days. Joanna Singh DO   ondansetron (ZOFRAN) 4 MG tablet   Historical Provider, MD   escitalopram (LEXAPRO) 20 MG tablet   Historical Provider, MD   indomethacin (INDOCIN SR) 75 MG extended release capsule Take 1 capsule by mouth 2 times daily (with meals)  Che Easley, DO   triamcinolone (KENALOG) 0.1 % ointment Apply topically 2 times daily  Joannamaria luisa Singh DO   topiramate (TOPAMAX) 25 MG tablet Take 1 tablet by mouth nightly  Joanna Singh DO   hydrOXYzine (ATARAX) 25 MG tablet Take 1 tablet by mouth 2 times daily as needed for Anxiety  Joanna Singh DO   omeprazole (PRILOSEC) 40 MG delayed release capsule TAKE 1 CAPSULE BY MOUTH EVERY DAY  Jennifer Camacho, DO   albuterol sulfate  (90 Base) MCG/ACT inhaler Inhale 2 puffs into the lungs 4 times daily  Abhay Rudd MD       Family History   Problem Relation Age of Onset    Diabetes Mother        Review of Systems : All systems reviewed, all unremarkable other than HPI. No fever, chills, shortness breath, gastric ulcer or acid reflux, anticoagulation, diabetes, MI/CAD, stroke, cancer. Resp 18   Ht 5' 6\" (1.676 m)   Wt 212 lb (96.2 kg)   BMI 34.22 kg/m²       Physical Exam  Constitutional:       General: She is not in acute distress. Appearance: Normal appearance. HENT:      Head: Atraumatic. Eyes:      Extraocular Movements: Extraocular movements intact. Cardiovascular:      Rate and Rhythm: Normal rate. Pulses: Normal pulses. Pulmonary:      Effort: Pulmonary effort is normal. No respiratory distress. Breath sounds: Normal breath sounds. Abdominal:      Palpations: Abdomen is soft. Musculoskeletal:         General: Tenderness present. Cervical back: Neck supple. Comments: Positive lumbar facet loading, bilateral . Negative SLRT. Negative ingrid's bilateral   Neurological:      General: No focal deficit present. Mental Status: She is alert and oriented to person, place, and time. Psychiatric:         Mood and Affect: Mood normal.         Behavior: Behavior normal.           Assessment and Plan:      Diagnosis Orders   1. Chronic bilateral low back pain without sciatica       MRI of the LS spine reviewed. Noted with L5-S1 disc. No radicular signs and symptoms. No focal weakness. Continue conservative care for now. Trial of lumbar medial branch block bilateral L3-4 medial branch and L5 dorsal rami injection. If she responds well on 2 occasions, we can offer lumbar RFA. Schedule injection in 2 weeks     Schedule ff up post injection virtually 2 weeks post op     I have reviewed the chief complaint and HPI including the [facility] and Vital documentation by my staff and I agree with their documentation and have added where applicable. Time spent with patient was  45  minutes. More than 50% was spent counseling/coordinating the patient's care.        Maite Wisdom MD   Spine Medicine/PM&R

## 2022-05-23 RX ORDER — OMEPRAZOLE 40 MG/1
CAPSULE, DELAYED RELEASE ORAL
Qty: 90 CAPSULE | Refills: 1 | Status: SHIPPED | OUTPATIENT
Start: 2022-05-23

## 2022-05-23 NOTE — TELEPHONE ENCOUNTER
Last visit:  5/12/2022  Next Visit Date:    Future Appointments   Date Time Provider Felipe Mancini   5/24/2022  3:00  [facility]   6/1/2022  1:20 PM DO Nemesio Blackmon MED [facility]   6/6/2022 11:15 AM Melita Stapleton, PT [facility] PT Eri Necessary   6/24/2022  9:00 AM [facility]iggins [facility]         Medication List:  Prior to Admission medications    Medication Sig Start Date End Date Taking? Authorizing Provider   carisoprodol (SOMA) 250 MG tablet Take 1 tablet by mouth 3 times daily as needed for Muscle spasms for up to 30 days. 5/16/22 6/15/22  Kizzy Valenzuela, DO   linaclotide Daisy Ramal) 145 MCG capsule Take 1 capsule by mouth every morning (before breakfast) 5/12/22   Kizzy Valenzuela, DO   midodrine (PROAMATINE) 5 MG tablet Take 1 tablet by mouth 2 times daily 5/12/22   Kizzy Valenzuela, DO   busPIRone (BUSPAR) 15 MG tablet Take 15 mg by mouth 2 times daily 5/2/22   Kizzy Valenzuela, DO   buprenorphine-naloxone (SUBOXONE) 8-2 MG SUBL SL tablet Place 0.5 tablets under the tongue 2 times daily for 30 days. 5/2/22 6/1/22  Kizzy Valenzuela, DO   traZODone (DESYREL) 50 MG tablet 150 mg po qhs 5/2/22   Kizzy Valenzuela, DO   gabapentin (NEURONTIN) 600 MG tablet Take 1 tablet by mouth 3 times daily for 30 days.  5/2/22 6/1/22  Kizzy Valenzuela, DO   ondansetron (ZOFRAN) 4 MG tablet  3/30/22   Historical Provider, MD   escitalopram (LEXAPRO) 20 MG tablet  3/22/22   Historical Provider, MD   indomethacin (INDOCIN SR) 75 MG extended release capsule Take 1 capsule by mouth 2 times daily (with meals) 3/7/22   Rachell Platt,    triamcinolone (KENALOG) 0.1 % ointment Apply topically 2 times daily 2/1/22   Kizzy Valenzuela, DO   topiramate (TOPAMAX) 25 MG tablet Take 1 tablet by mouth nightly 2/1/22   Kizzy Valenzuela DO   hydrOXYzine (ATARAX) 25 MG tablet Take 1 tablet by mouth 2 times daily as needed for Anxiety 11/11/21   Jennifer GILMAN

## 2022-05-26 ENCOUNTER — APPOINTMENT (OUTPATIENT)
Dept: PHYSICAL THERAPY | Age: 42
End: 2022-05-26
Payer: COMMERCIAL

## 2022-05-26 ENCOUNTER — TELEPHONE (OUTPATIENT)
Dept: FAMILY MEDICINE CLINIC | Age: 42
End: 2022-05-26

## 2022-06-01 ENCOUNTER — OFFICE VISIT (OUTPATIENT)
Dept: FAMILY MEDICINE CLINIC | Age: 42
End: 2022-06-01
Payer: COMMERCIAL

## 2022-06-01 VITALS
OXYGEN SATURATION: 99 % | DIASTOLIC BLOOD PRESSURE: 60 MMHG | SYSTOLIC BLOOD PRESSURE: 110 MMHG | WEIGHT: 211 LBS | HEART RATE: 69 BPM | HEIGHT: 66 IN | BODY MASS INDEX: 33.91 KG/M2

## 2022-06-01 DIAGNOSIS — R93.89 ABNORMAL CHEST X-RAY: ICD-10-CM

## 2022-06-01 DIAGNOSIS — R60.9 PERIPHERAL EDEMA: ICD-10-CM

## 2022-06-01 DIAGNOSIS — I95.2 HYPOTENSION DUE TO DRUGS: ICD-10-CM

## 2022-06-01 DIAGNOSIS — G89.29 CHRONIC BILATERAL LOW BACK PAIN WITHOUT SCIATICA: Primary | ICD-10-CM

## 2022-06-01 DIAGNOSIS — E88.01 ALPHA-1-ANTITRYPSIN DEFICIENCY (HCC): ICD-10-CM

## 2022-06-01 DIAGNOSIS — M54.50 CHRONIC BILATERAL LOW BACK PAIN WITHOUT SCIATICA: Primary | ICD-10-CM

## 2022-06-01 PROCEDURE — G8427 DOCREV CUR MEDS BY ELIG CLIN: HCPCS | Performed by: FAMILY MEDICINE

## 2022-06-01 PROCEDURE — 99214 OFFICE O/P EST MOD 30 MIN: CPT | Performed by: FAMILY MEDICINE

## 2022-06-01 PROCEDURE — 1036F TOBACCO NON-USER: CPT | Performed by: FAMILY MEDICINE

## 2022-06-01 PROCEDURE — G8417 CALC BMI ABV UP PARAM F/U: HCPCS | Performed by: FAMILY MEDICINE

## 2022-06-01 NOTE — PROGRESS NOTES
[facility]   Preadmission Testing    Name: Cody Del Rosario  : 1980  Patient Phone: [phone] (home) [phone] (work)    Procedure: LUMBAR MEDIAL BRANCH BLOCK BILATERAL L3-4 MEDIAL BRANCH AND L5 DORSAL RAMI INJECTION - Bilateral      Date of Procedure: 6/3/22  Surgeon: Rebecca Rae MD    Ht:  5' 6\" (167.6 cm)  Wt: 212 lb (96.2 kg)  Wt method: Allergies: No Known Allergies             There were no vitals filed for this visit. No LMP recorded. Do you take blood thinners? [] Yes    [x] No         Instructed to stop blood thinners prior to procedure? [] Yes    [] No      [x] N/A   Do you have sleep apnea? [] Yes    [x] No     Do you have acid reflux ? [x] Yes    [] No     Have you had a respiratory infection or sore throat in last 4 weeks before surgery? [] Yes    [x] No     Do you have poorly controlled asthma or COPD? [] Yes    [x] No         Have you had an EKG in last 12 months? [x] Yes    [] No          Do you smoke? [x] Yes    [] No      Please refrain from smoking on the day of surgery. Patient instructed on: [x] NPO Status   [x] Meds to Take  [x] Ride Home  [x]No Jewelry/Contact Lenses/Nail [place]     DOS Patient Needs [x] HCG   [] Blood Sugar  [] PT/INR         COVID Vaccinated? [] Yes    [x] No                     Patient instructed on the pre-operative, intra-operative, and post-operative process? Yes  Medication instructions reviewed with patient?   Yes

## 2022-06-01 NOTE — PATIENT INSTRUCTIONS
Please call [phone] to schedule your echocardiogram, pulmonary function tests, and liver ultrasound. SURVEY:    You may be receiving a survey from [employer] regarding your visit today. You may get this in the mail, through your MyChart or in your email. Please complete the survey to enable us to provide the highest quality of care to you and your family. If you cannot score us as very good ( 5 Stars) on any question, please feel free to call the office to discuss how we could have made your experience exceptional.     Thank you.     Clinical Care Team:  Dr. Pilar Talley DO                                           [facility], [address] [place] [address] Team:  [facility]

## 2022-06-01 NOTE — PROGRESS NOTES
Name: Cody Del Rosario  : 1980         Chief Complaint:     Chief Complaint   Patient presents with    Back Pain     new FMLA for back pain only, backdate to 22       History of Present Illness:      Cody Del Rosario is a 38 y.o.  female who presents with Back Pain (new FMLA for back pain only, backdate to 22)      HPI    Patient's low back pain continues and she considers it the primary reason that has kept her unable to work since . She did see pain management and is scheduled for an injection on 6/3. At this time she feels unable to return to work and is unsure when she will be able to go back, has not yet regained much function. Dizziness a little better on midodrine, seems to last for a few hours. Does still get dizzy with standing up fast, hard to remember to take her time. Has only been taking once a day. cuca hand pain bothering her, saw Gopi Jackson few mos ago, then saw CHINO also and was told carpal tunnel, no other abnormality in upper extremities, thought maybe trigger finger. Pt wearing braces at night which do help a little. Past few mos a lot of pain in hands and medial R elbow. Psoriasis on knuckles which is helped eventually by topical steroids. Occasional SOB while lying down, finds self catching breath. Has been vaping for about a yr and also uses nicorette lozenges, is almost done vaping - not buying any more. Concerned about finding of vascular congestion on CXR. Persistent nausea and lightheadedness which limits activity. Helped by Serina Paz. Medical History:     Patient Active Problem List   Diagnosis    Chronic neck pain    Onychomycosis    Chronic low back pain without sciatica    Narcotic abuse in remission ([facility] [address]    Chronic constipation    Alcohol abuse, in remission    Alpha-1-antitrypsin deficiency (HCC)       Medications:       Prior to Admission medications    Medication Sig Start Date End Date Taking?  Authorizing Provider   omeprazole (PRILOSEC) 40 MG delayed release capsule TAKE 1 CAPSULE BY MOUTH EVERY DAY 5/23/22  Yes Bryan Shipley DO   carisoprodol (SOMA) 250 MG tablet Take 1 tablet by mouth 3 times daily as needed for Muscle spasms for up to 30 days. 5/16/22 6/15/22 Yes Bryan Shipley DO   linaclotide (LINZESS) 145 MCG capsule Take 1 capsule by mouth every morning (before breakfast) 5/12/22  Yes Bryan Shipley DO   midodrine (PROAMATINE) 5 MG tablet Take 1 tablet by mouth 2 times daily 5/12/22  Yes Bryan Shipley DO   busPIRone (BUSPAR) 15 MG tablet Take 15 mg by mouth 2 times daily 5/2/22  Yes Bryan Shipley DO   traZODone (DESYREL) 50 MG tablet 150 mg po qhs 5/2/22  Yes Bryan Shipley DO   gabapentin (NEURONTIN) 600 MG tablet Take 1 tablet by mouth 3 times daily for 30 days. 5/2/22 6/1/22 Yes Bryan Shipley DO   ondansetron (ZOFRAN) 4 MG tablet  3/30/22  Yes Historical Provider, MD   escitalopram (LEXAPRO) 20 MG tablet  3/22/22  Yes Historical Provider, MD   indomethacin (INDOCIN SR) 75 MG extended release capsule Take 1 capsule by mouth 2 times daily (with meals) 3/7/22  Yes Temple Scheuermann Olewiler, DO   triamcinolone (KENALOG) 0.1 % ointment Apply topically 2 times daily 2/1/22  Yes Bryan Shipley DO   topiramate (TOPAMAX) 25 MG tablet Take 1 tablet by mouth nightly 2/1/22  Yes Bryan Shipley DO   hydrOXYzine (ATARAX) 25 MG tablet Take 1 tablet by mouth 2 times daily as needed for Anxiety 11/11/21  Yes Bryan Shipley DO   albuterol sulfate  (90 Base) MCG/ACT inhaler Inhale 2 puffs into the lungs 4 times daily 10/21/20  Yes Priya Jha MD        Allergies:       Patient has no known allergies. Physical Exam:     Vitals:  /60   Pulse 69   Ht 5' 6\" (1.676 m)   Wt 211 lb (95.7 kg)   LMP 05/23/2022   SpO2 99%   BMI 34.06 kg/m²   Physical Exam  Vitals and nursing note reviewed. Constitutional:       General: She is not in acute distress. Appearance: Normal appearance.  She is well-developed. She is not ill-appearing. Cardiovascular:      Rate and Rhythm: Normal rate and regular rhythm. Heart sounds: Normal heart sounds. No murmur heard. Pulmonary:      Effort: Pulmonary effort is normal.      Breath sounds: Normal breath sounds. Neurological:      Mental Status: She is alert and oriented to person, place, and time. Psychiatric:         Mood and Affect: Mood normal.         Behavior: Behavior normal.         Data:     Lab Results   Component Value Date     05/02/2022    K 4.5 05/02/2022     05/02/2022    CO2 27 05/02/2022    BUN 8 05/02/2022    CREATININE 0.80 05/02/2022    GLUCOSE 114 05/02/2022    PROT 6.3 05/02/2022    LABALBU 4.0 05/02/2022    BILITOT 0.12 05/02/2022    ALKPHOS 50 05/02/2022    AST 15 05/02/2022    ALT 9 05/02/2022     Lab Results   Component Value Date    WBC 6.0 05/02/2022    RBC 4.30 05/02/2022    HGB 12.0 05/02/2022    HCT 36.5 05/02/2022    MCV 85.0 05/02/2022    MCH 28.0 05/02/2022    MCHC 33.0 05/02/2022    RDW 13.1 05/02/2022     05/02/2022    MPV NOT REPORTED 09/19/2021     Lab Results   Component Value Date    TSH 1.18 05/02/2022     Lab Results   Component Value Date    LABA1C 5.3 09/19/2018         Assessment & Plan:        Diagnosis Orders   1. Chronic bilateral low back pain without sciatica     2. Abnormal chest x-ray  Echocardiogram complete   3. Alpha-1-antitrypsin deficiency ([facility] [address]     4. Peripheral edema  Echocardiogram complete   5. Hypotension due to drugs       Low back pain continues and patient is unable to return to work at this time. Uncertain at this point how much longer she will need to be off. She is getting an injection later this week and we will monitor her response to it. Recent finding of alpha-1 antitrypsin deficiency, which one of her parents did have also. Further evaluation was ordered but has not been done yet. Advised again to schedule testing as below.   With her ongoing shortness of breath and slight peripheral edema, echo was also ordered  Hypotension and associated symptoms improved on midodrine but she is having recurrence of symptoms later in the day. However, she has only been taking med once a day instead of twice. Advised to increase dosing to BID as directed - AM and afternoon. Patient Instructions   Please call [phone] to schedule your echocardiogram, pulmonary function tests, and liver ultrasound. SURVEY:    You may be receiving a survey from [employer] regarding your visit today. You may get this in the mail, through your MyChart or in your email. Please complete the survey to enable us to provide the highest quality of care to you and your family. If you cannot score us as very good ( 5 Stars) on any question, please feel free to call the office to discuss how we could have made your experience exceptional.     Thank you.     Clinical Care Team:  DO Shahla Blunt [facility], [address] [place] [address] Team:  Holly Lee          signed by Jackie Rubi DO on 6/3/2022 at 4:16 PM  [address]  Dept: [phone]

## 2022-06-03 ENCOUNTER — HOSPITAL ENCOUNTER (OUTPATIENT)
Dept: PREADMISSION TESTING | Age: 42
Setting detail: SPECIMEN
Discharge: HOME | End: 2022-06-03
Payer: COMMERCIAL

## 2022-06-03 ENCOUNTER — APPOINTMENT (OUTPATIENT)
Dept: GENERAL RADIOLOGY | Age: 42
End: 2022-06-03
Attending: PHYSICAL MEDICINE & REHABILITATION
Payer: COMMERCIAL

## 2022-06-03 ENCOUNTER — HOSPITAL ENCOUNTER (OUTPATIENT)
Age: 42
Setting detail: OUTPATIENT SURGERY
Discharge: HOME | End: 2022-06-03
Attending: PHYSICAL MEDICINE & REHABILITATION | Admitting: PHYSICAL MEDICINE & REHABILITATION
Payer: COMMERCIAL

## 2022-06-03 VITALS
BODY MASS INDEX: 33.75 KG/M2 | RESPIRATION RATE: 20 BRPM | SYSTOLIC BLOOD PRESSURE: 100 MMHG | WEIGHT: 210 LBS | DIASTOLIC BLOOD PRESSURE: 64 MMHG | TEMPERATURE: 97.5 F | OXYGEN SATURATION: 97 % | HEART RATE: 68 BPM | HEIGHT: 66 IN

## 2022-06-03 PROBLEM — E88.01 ALPHA-1-ANTITRYPSIN DEFICIENCY (HCC): Status: ACTIVE | Noted: 2022-06-03

## 2022-06-03 PROBLEM — Z83.49 FAMILY HISTORY OF ALPHA 1 ANTITRYPSIN DEFICIENCY: Status: RESOLVED | Noted: 2022-05-12 | Resolved: 2022-06-03

## 2022-06-03 LAB
HCG QUALITATIVE: NEGATIVE
SARS-COV-2, RAPID: NOT DETECTED
SPECIMEN DESCRIPTION: NORMAL

## 2022-06-03 PROCEDURE — 2709999900 HC NON-CHARGEABLE SUPPLY: Performed by: PHYSICAL MEDICINE & REHABILITATION

## 2022-06-03 PROCEDURE — 3600000056 HC PAIN LEVEL 4 BASE: Performed by: PHYSICAL MEDICINE & REHABILITATION

## 2022-06-03 PROCEDURE — 7100000010 HC PHASE II RECOVERY - FIRST 15 MIN: Performed by: PHYSICAL MEDICINE & REHABILITATION

## 2022-06-03 PROCEDURE — 2500000003 HC RX 250 WO HCPCS: Performed by: PHYSICAL MEDICINE & REHABILITATION

## 2022-06-03 PROCEDURE — 7100000011 HC PHASE II RECOVERY - ADDTL 15 MIN: Performed by: PHYSICAL MEDICINE & REHABILITATION

## 2022-06-03 PROCEDURE — 99152 MOD SED SAME PHYS/QHP 5/>YRS: CPT | Performed by: PHYSICAL MEDICINE & REHABILITATION

## 2022-06-03 PROCEDURE — 84703 CHORIONIC GONADOTROPIN ASSAY: CPT

## 2022-06-03 PROCEDURE — 6360000002 HC RX W HCPCS: Performed by: PHYSICAL MEDICINE & REHABILITATION

## 2022-06-03 PROCEDURE — 3600000057 HC PAIN LEVEL 4 ADDL 15 MIN: Performed by: PHYSICAL MEDICINE & REHABILITATION

## 2022-06-03 PROCEDURE — 76000 FLUOROSCOPY <1 HR PHYS/QHP: CPT

## 2022-06-03 PROCEDURE — C9803 HOPD COVID-19 SPEC COLLECT: HCPCS

## 2022-06-03 PROCEDURE — 64494 INJ PARAVERT F JNT L/S 2 LEV: CPT | Performed by: PHYSICAL MEDICINE & REHABILITATION

## 2022-06-03 PROCEDURE — 64493 INJ PARAVERT F JNT L/S 1 LEV: CPT | Performed by: PHYSICAL MEDICINE & REHABILITATION

## 2022-06-03 PROCEDURE — 2580000003 HC RX 258: Performed by: PHYSICAL MEDICINE & REHABILITATION

## 2022-06-03 PROCEDURE — 87635 SARS-COV-2 COVID-19 AMP PRB: CPT

## 2022-06-03 RX ORDER — LIDOCAINE HYDROCHLORIDE 10 MG/ML
INJECTION, SOLUTION EPIDURAL; INFILTRATION; INTRACAUDAL; PERINEURAL PRN
Status: DISCONTINUED | OUTPATIENT
Start: 2022-06-03 | End: 2022-06-03 | Stop reason: ALTCHOICE

## 2022-06-03 RX ORDER — TRIAMCINOLONE ACETONIDE 40 MG/ML
INJECTION, SUSPENSION INTRA-ARTICULAR; INTRAMUSCULAR PRN
Status: DISCONTINUED | OUTPATIENT
Start: 2022-06-03 | End: 2022-06-03 | Stop reason: ALTCHOICE

## 2022-06-03 RX ORDER — SODIUM CHLORIDE, SODIUM LACTATE, POTASSIUM CHLORIDE, CALCIUM CHLORIDE 600; 310; 30; 20 MG/100ML; MG/100ML; MG/100ML; MG/100ML
INJECTION, SOLUTION INTRAVENOUS CONTINUOUS
Status: DISCONTINUED | OUTPATIENT
Start: 2022-06-03 | End: 2022-06-03 | Stop reason: HOSPADM

## 2022-06-03 RX ORDER — MIDAZOLAM HYDROCHLORIDE 1 MG/ML
INJECTION INTRAMUSCULAR; INTRAVENOUS PRN
Status: DISCONTINUED | OUTPATIENT
Start: 2022-06-03 | End: 2022-06-03 | Stop reason: ALTCHOICE

## 2022-06-03 RX ORDER — FENTANYL CITRATE 50 UG/ML
INJECTION, SOLUTION INTRAMUSCULAR; INTRAVENOUS PRN
Status: DISCONTINUED | OUTPATIENT
Start: 2022-06-03 | End: 2022-06-03 | Stop reason: ALTCHOICE

## 2022-06-03 RX ADMIN — SODIUM CHLORIDE, POTASSIUM CHLORIDE, SODIUM LACTATE AND CALCIUM CHLORIDE: 600; 310; 30; 20 INJECTION, SOLUTION INTRAVENOUS at 08:00

## 2022-06-03 ASSESSMENT — PAIN - FUNCTIONAL ASSESSMENT: PAIN_FUNCTIONAL_ASSESSMENT: 0-10

## 2022-06-03 NOTE — OP NOTE
Operative Note      Patient: Valerio Perez  YOB: 1980  MRN: [id number]    Date of Procedure: 6/3/2022    Pre-Op Diagnosis: CHRONIC BILATERAL LOW BACK PAIN WITHOU SCIATICA    Post-Op Diagnosis: Same       Procedure(s):  LUMBAR MEDIAL BRANCH BLOCK BILATERAL L3-4 MEDIAL BRANCH AND L5 DORSAL RAMI INJECTION    Surgeon(s):  Thiago Lange MD    Assistant:   * No surgical staff found *    Anesthesia: IV Sedation    Estimated Blood Loss (mL): Minimal    Complications: None    Preoperative Diagnosis: Lumbar spondylosis/Facet Arthropathy    Post Operative Diagnosis: Same     Anesthesia: Moderate sedation using 3 mg IV versed & 100 mcg IV fentanyl. CONSENT:     The risks, benefits, alternatives, plan, complications, and personnel were discussed prior to the procedure. The patient understood and agreed to proceed. The patient was positioned prone. Sign-in and time-out was performed. Identifying the   site, in this case, both sides L3 L4 medial branches and L5 dorsal ramus/rami  to address the L4-5 and L5-S1 joints     Sterile technique was done in the usual manner using chlorhexidine. This was followed by infiltration of a 14 ml of 1% preservative-free lidocaine. A 5 inch  22-gauge spinal needle(s)  were positioned under fluoroscopic guidance. A total of  6 needles were positioned. Upon confirmation that the needle was properly positioned, a solution of 80 mg of triamcinolone and  7 ml of 1% preservative-free lidocaine was injected without difficulty. The patient tolerated the procedure well and went to the recovery room with stable vital signs. Moderate Sedation Time: > 15 minutes with a nurse administering the medication(s) under my supervision.      The patient was independently monitored by a Registered Nurse assigned to the Procedure Room ( automated blood pressure, continuous EKG, Capnography and continuous pulse oximetry)    Start time: 0936  End Time:  0956    PLAN:     Home instructions were provided. The patient will follow up in 4 to 6 weeks or earlier if needed.       Electronically signed by Thao Moscoso MD on 6/3/2022 at 9:34 AM

## 2022-06-03 NOTE — H&P
Unique Mckee MD      Rose Izquierdo is a 38 y.o. female, who came in for a procedure,  LUMBAR MEDIAL BRANCH BLOCK BILATERAL L3-4 MEDIAL BRANCH AND L5 DORSAL RAMI INJECTION     No change since last visit. Treatment done: physical therapy, anti-inflammatory medication and muscle relaxant    No Known Allergies    Social History     Socioeconomic History    Marital status:      Spouse name: Not on file    Number of children: Not on file    Years of education: Not on file    Highest education level: Not on file   Occupational History    Not on file   Tobacco Use    Smoking status: Former Smoker     Packs/day: 1.00     Years: 2.00     Pack years: 2.00     Types: Cigarettes    Smokeless tobacco: Never Used   Vaping Use    Vaping Use: Every day   Substance and Sexual Activity    Alcohol use: Not Currently     Comment: in the past    Drug use: Not Currently     Comment: Pt has been sober for 30 days    Sexual activity: Not Currently   Other Topics Concern    Not on file   Social History Narrative    Not on file     Social Determinants of Health     Financial Resource Strain: Low Risk     Difficulty of Paying Living Expenses: Not hard at all   Food Insecurity: No Food Insecurity    Worried About Running Out of Food in the Last Year: Never true    Yadiel of Food in the Last Year: Never true   Transportation Needs:     Lack of Transportation (Medical): Not on file    Lack of Transportation (Non-Medical):  Not on file   Physical Activity:     Days of Exercise per Week: Not on file    Minutes of Exercise per Session: Not on file   Stress:     Feeling of Stress : Not on file   Social Connections:     Frequency of Communication with Friends and Family: Not on file    Frequency of Social Gatherings with Friends and Family: Not on file    Attends Hindu Services: Not on file    Active Member of Clubs or Organizations: Not on file    Attends Club or Organization Meetings: Not on file    Marital Status: Not on file   Intimate Partner Violence:     Fear of Current or Ex-Partner: Not on file    Emotionally Abused: Not on file    Physically Abused: Not on file    Sexually Abused: Not on file   Housing Stability:     Unable to Pay for Housing in the Last Year: Not on file    Number of [place] in the Last Year: Not on file    Unstable Housing in the Last Year: Not on file       Past Medical History:   Diagnosis Date    Anxiety     Bulging of lumbar intervertebral disc without myelopathy     L1    Chronic back pain     Depression     GERD (gastroesophageal reflux disease)     Substance abuse ([facility] [address]     alcohol and opiates       Past Surgical History:   Procedure Laterality Date    COLONOSCOPY  05/26/2020    PILONIDAL CYST EXCISION  03/2018    PLANTAR FASCIA SURGERY Left 06/11/2020       Prior to Visit Medications    Medication Sig Taking? Authorizing Provider   omeprazole (PRILOSEC) 40 MG delayed release capsule TAKE 1 CAPSULE BY MOUTH EVERY DAY  Karen Suggs DO   carisoprodol (SOMA) 250 MG tablet Take 1 tablet by mouth 3 times daily as needed for Muscle spasms for up to 30 days. Karen Suggs DO   linaclotide (LINZESS) 145 MCG capsule Take 1 capsule by mouth every morning (before breakfast)  Karen Suggs DO   midodrine (PROAMATINE) 5 MG tablet Take 1 tablet by mouth 2 times daily  Karen Suggs DO   busPIRone (BUSPAR) 15 MG tablet Take 15 mg by mouth 2 times daily  Karen Suggs DO   traZODone (DESYREL) 50 MG tablet 150 mg po qhs  Karen Suggs DO   gabapentin (NEURONTIN) 600 MG tablet Take 1 tablet by mouth 3 times daily for 30 days.   Karen Suggs DO   ondansetron (ZOFRAN) 4 MG tablet   Historical Provider, MD   escitalopram (LEXAPRO) 20 MG tablet   Historical Provider, MD   indomethacin (INDOCIN SR) 75 MG extended release capsule Take 1 capsule by mouth 2 times daily (with meals)  Tash Easley DO   triamcinolone (KENALOG) 0.1 % ointment Apply topically 2 times daily  Karen Suggs DO   topiramate (TOPAMAX) 25 MG tablet Take 1 tablet by mouth nightly  Karen Suggs DO   hydrOXYzine (ATARAX) 25 MG tablet Take 1 tablet by mouth 2 times daily as needed for Anxiety  Karen Suggs DO   albuterol sulfate  (90 Base) MCG/ACT inhaler Inhale 2 puffs into the lungs 4 times daily  Jennifer Maciel MD       Family History   Problem Relation Age of Onset    Diabetes Mother          Review of Systems : All systems reviewed, all unremarkable other than HPI. No change since last visit. Physical Exam  Constitutional:       General: She is not in acute distress. Appearance: Normal appearance. HENT:      Head: Atraumatic. Cardiovascular:      Rate and Rhythm: Normal rate and regular rhythm. Pulmonary:      Effort: Pulmonary effort is normal.      Breath sounds: Normal breath sounds. Neurological:      Mental Status: She is alert and oriented to person, place, and time.     :    Cervical Spine Exam: Full ROM, without limitation     Access: Adequate mouth opening       Assessment:   Lumbar stenosis    PLAN:     As advertised. Planned duration of treatment: 4 weeks. Further assessment and followup in  4 weeks for follow up post injection.        Electronically signed by Melissa Brown MD on 6/3/2022 at 8:04 AM

## 2022-06-06 ENCOUNTER — HOSPITAL ENCOUNTER (OUTPATIENT)
Dept: PHYSICAL THERAPY | Age: 42
Setting detail: THERAPIES SERIES
Discharge: HOME | End: 2022-06-06
Payer: COMMERCIAL

## 2022-06-06 RX ORDER — ESCITALOPRAM OXALATE 10 MG/1
TABLET ORAL
Qty: 135 TABLET | Refills: 1 | Status: SHIPPED | OUTPATIENT
Start: 2022-06-06 | End: 2022-06-24 | Stop reason: ALTCHOICE

## 2022-06-06 RX ORDER — MIDODRINE HYDROCHLORIDE 5 MG/1
TABLET ORAL
Qty: 60 TABLET | Refills: 3 | Status: SHIPPED | OUTPATIENT
Start: 2022-06-06 | End: 2022-09-05

## 2022-06-06 RX ORDER — TRAZODONE HYDROCHLORIDE 50 MG/1
TABLET ORAL
Qty: 180 TABLET | Refills: 1 | Status: SHIPPED | OUTPATIENT
Start: 2022-06-06 | End: 2022-06-24 | Stop reason: SDUPTHER

## 2022-06-06 RX ORDER — TOPIRAMATE 25 MG/1
25 TABLET ORAL NIGHTLY
Qty: 90 TABLET | Refills: 1 | Status: SHIPPED | OUTPATIENT
Start: 2022-06-06

## 2022-06-06 NOTE — PROGRESS NOTES
Physical Therapy  [facility]    Date: 2022  Patient Name: Susu Morrissey        : 1980       Patient needed to reschedule due to computer issue.     Maribel Parks Shock Date: 2022

## 2022-06-06 NOTE — TELEPHONE ENCOUNTER
Last visit:  6/1/2022  Next Visit Date:    Future Appointments   Date Time Provider Felipe Mancini   6/6/2022 11:15 AM Kisha Antonio PT [facility] PT [place]   6/9/2022  9:30 AM [facility] ECHO ROOM [facility] ECHO [facility]   6/9/2022 12:30 PM [facility] PULMONARY FUNCTION RM [address] PFT [facility]   6/15/2022  9:30  S Ryan [facility] DIVISION Rad   6/15/2022  1:00 PM Sallie Michel DO [facility]   6/24/2022  9:00 AM Beltran Manuel MD Sigmund Shellie PAIN [facility]         Medication List:  Prior to Admission medications    Medication Sig Start Date End Date Taking? Authorizing Provider   omeprazole (PRILOSEC) 40 MG delayed release capsule TAKE 1 CAPSULE BY MOUTH EVERY DAY 5/23/22   Sallie Michel DO   carisoprodol (SOMA) 250 MG tablet Take 1 tablet by mouth 3 times daily as needed for Muscle spasms for up to 30 days. 5/16/22 6/15/22  Sallie Michel DO   linaclotide [facility] Lacks) 145 MCG capsule Take 1 capsule by mouth every morning (before breakfast) 5/12/22   Sallie Michel DO   midodrine (PROAMATINE) 5 MG tablet Take 1 tablet by mouth 2 times daily 5/12/22   Sallie Michel DO   busPIRone (BUSPAR) 15 MG tablet Take 15 mg by mouth 2 times daily 5/2/22   Sallie Michel DO   traZODone (DESYREL) 50 MG tablet 150 mg po qhs 5/2/22   Sallie Michel DO   gabapentin (NEURONTIN) 600 MG tablet Take 1 tablet by mouth 3 times daily for 30 days.  5/2/22 6/1/22  Sallie Michel DO   ondansetron (ZOFRAN) 4 MG tablet  3/30/22   Historical Provider, MD   escitalopram (LEXAPRO) 20 MG tablet  3/22/22   Historical Provider, MD   indomethacin (INDOCIN SR) 75 MG extended release capsule Take 1 capsule by mouth 2 times daily (with meals) 3/7/22   Gerardo Jaramillo DO   triamcinolone (KENALOG) 0.1 % ointment Apply topically 2 times daily 2/1/22   Sallie Michel DO   topiramate (TOPAMAX) 25 MG tablet Take 1 tablet by mouth nightly 2/1/22   Sallie Michel DO   hydrOXYzine (ATARAX) 25 MG tablet Take 1 tablet by mouth 2 times daily as needed for Anxiety 11/11/21   Joanna Singh DO   albuterol sulfate  (90 Base) MCG/ACT inhaler Inhale 2 puffs into the lungs 4 times daily 10/21/20   Abhay Rudd MD

## 2022-06-06 NOTE — TELEPHONE ENCOUNTER
Last visit:  6/1/2022  Next Visit Date:    Future Appointments   Date Time Provider Felipe Mancini   6/6/2022 11:15 AM Melita Stapleton PT [facility] PT [place]   6/9/2022  9:30 AM [facility] ECHO ROOM [facility] ECHO [facility]   6/9/2022 12:30 PM [facility] PULMONARY FUNCTION RM [address] PFT [facility]   6/15/2022  9:30  S Rendon [facility] DIVISION Rad   6/15/2022  1:00 PM DO JOSHUA Blackmon MED [facility]   6/24/2022  9:00 AM MD Shyam Amaro Jewetts PAIN [facility]         Medication List:  Prior to Admission medications    Medication Sig Start Date End Date Taking? Authorizing Provider   omeprazole (PRILOSEC) 40 MG delayed release capsule TAKE 1 CAPSULE BY MOUTH EVERY DAY 5/23/22   Kizzy Valenzuela, DO   carisoprodol (SOMA) 250 MG tablet Take 1 tablet by mouth 3 times daily as needed for Muscle spasms for up to 30 days. 5/16/22 6/15/22  Kizzy Valenzuela, DO   linaclotide [facility]) 145 MCG capsule Take 1 capsule by mouth every morning (before breakfast) 5/12/22   Kizzy Valenzuela, DO   midodrine (PROAMATINE) 5 MG tablet Take 1 tablet by mouth 2 times daily 5/12/22   Kizzy Valenzuela, DO   busPIRone (BUSPAR) 15 MG tablet Take 15 mg by mouth 2 times daily 5/2/22   Kizzy Valenzuela, DO   traZODone (DESYREL) 50 MG tablet 150 mg po qhs 5/2/22   Kizzy Valenzuela, DO   gabapentin (NEURONTIN) 600 MG tablet Take 1 tablet by mouth 3 times daily for 30 days.  5/2/22 6/1/22  Kizzy Valenzuela, DO   ondansetron (ZOFRAN) 4 MG tablet  3/30/22   Historical Provider, MD   escitalopram (LEXAPRO) 20 MG tablet  3/22/22   Historical Provider, MD   indomethacin (INDOCIN SR) 75 MG extended release capsule Take 1 capsule by mouth 2 times daily (with meals) 3/7/22   Mennie Giulia, DO   triamcinolone (KENALOG) 0.1 % ointment Apply topically 2 times daily 2/1/22   Lynneia Abo, DO   topiramate (TOPAMAX) 25 MG tablet Take 1 tablet by mouth nightly 2/1/22   Kizzy Abo, DO   hydrOXYzine (ATARAX) 25 MG tablet Take 1 tablet by mouth 2 times daily as needed for Anxiety 11/11/21   Jackie Rubi DO   albuterol sulfate  (90 Base) MCG/ACT inhaler Inhale 2 puffs into the lungs 4 times daily 10/21/20   Xiang Archer MD

## 2022-06-09 ENCOUNTER — HOSPITAL ENCOUNTER (OUTPATIENT)
Dept: PULMONOLOGY | Age: 42
Discharge: HOME | End: 2022-06-09
Payer: COMMERCIAL

## 2022-06-09 ENCOUNTER — HOSPITAL ENCOUNTER (OUTPATIENT)
Dept: PHYSICAL THERAPY | Age: 42
Setting detail: THERAPIES SERIES
Discharge: HOME | End: 2022-06-09
Payer: COMMERCIAL

## 2022-06-09 ENCOUNTER — HOSPITAL ENCOUNTER (OUTPATIENT)
Dept: NON INVASIVE DIAGNOSTICS | Age: 42
Discharge: HOME | End: 2022-06-09
Payer: COMMERCIAL

## 2022-06-09 VITALS — HEART RATE: 60 BPM | OXYGEN SATURATION: 98 % | RESPIRATION RATE: 18 BRPM

## 2022-06-09 DIAGNOSIS — E88.01 ALPHA-1-ANTITRYPSIN DEFICIENCY (HCC): ICD-10-CM

## 2022-06-09 DIAGNOSIS — R93.89 ABNORMAL CHEST X-RAY: ICD-10-CM

## 2022-06-09 DIAGNOSIS — R60.9 PERIPHERAL EDEMA: ICD-10-CM

## 2022-06-09 LAB
LV EF: 60 %
LVEF MODALITY: NORMAL

## 2022-06-09 PROCEDURE — 93306 TTE W/DOPPLER COMPLETE: CPT

## 2022-06-09 PROCEDURE — 94729 DIFFUSING CAPACITY: CPT

## 2022-06-09 PROCEDURE — 94060 EVALUATION OF WHEEZING: CPT

## 2022-06-09 PROCEDURE — 9900000107 HC NEEDLE INSERTION W/O INJECTION 3 OR MORE MUSCLES (SELF-PAY)

## 2022-06-09 PROCEDURE — 6360000002 HC RX W HCPCS: Performed by: INTERNAL MEDICINE

## 2022-06-09 PROCEDURE — 94726 PLETHYSMOGRAPHY LUNG VOLUMES: CPT

## 2022-06-09 RX ORDER — ALBUTEROL SULFATE 2.5 MG/3ML
2.5 SOLUTION RESPIRATORY (INHALATION) ONCE
Status: COMPLETED | OUTPATIENT
Start: 2022-06-09 | End: 2022-06-09

## 2022-06-09 RX ADMIN — ALBUTEROL SULFATE 2.5 MG: 2.5 SOLUTION RESPIRATORY (INHALATION) at 11:25

## 2022-06-09 NOTE — PROGRESS NOTES
Phone: Geno Antoine      Fax: [phone]                            Outpatient Physical Therapy                                                                            Daily Note    Date: 2022  Patient Name: Abel Jeter        MRN: [id number]   ACCT#:  [de-identified]  : 1980  (38 y.o.)    Referring Practitioner: Dr. Kenya Lewis    Referral Date : 21    Diagnosis: Chronic bilateral low back pain without sciatica  Treatment Diagnosis: Low Back Pain    Onset Date: 21  PT Insurance Information: Retail   Total # of Visits Approved: 10 Per Physician Order  Total # of Visits to Date: 6  No Show: 0  Canceled Appointment: 0    Pre-Treatment Pain:  3-4/10     Assessment  Assessment: Pt received steroid injection in B/L lowback. Pt reports yesterday she was cleaning her car and felt a \"tweak\" in the L Low back. Pain was 4/10 on L low back, today 3/10 on L low back. Pt reports upper back pain 3/10 this date. Pt with good fco and noted increased L lumbar paraspinal tension. Pt reports post session pain is down. Pt with blotch-like coloration below L5 -S1. Pt states it may be from sleeping on heating pad on accident. No Blisters or open areas of concern. Almost a bruised coloration noted, needling avoided in these areas. Chart Reviewed: Yes    Plan  Continue with current plan of care    Exercises/Modalities/Manual:  See DocFlow Sheet    Education: area of discoloration     Barriers to Learning: none    Goals  (Total # of Visits to Date: 6)        Long Term Goals  Time Frame for Long term goals : 10 visits  Long term goal 1: Pt to report 75% improvement with regard to low back pain. Long term goal 2: Pt to report worst pain following work day 2/10.-MET  Long term goal 3: Pt to report no radicular LE pain x3 consecutive days. -MET    Post Treatment Pain: 2-3 /10      Time In: 1630  Time Out: 1705  Timed Code Treatment Minutes: 0 Minutes  Total Treatment Time: 40 53 Mercedes Ko, PT     Date: 6/9/2022

## 2022-06-10 PROCEDURE — 94010 BREATHING CAPACITY TEST: CPT | Performed by: INTERNAL MEDICINE

## 2022-06-10 PROCEDURE — 94729 DIFFUSING CAPACITY: CPT | Performed by: INTERNAL MEDICINE

## 2022-06-10 PROCEDURE — 94726 PLETHYSMOGRAPHY LUNG VOLUMES: CPT | Performed by: INTERNAL MEDICINE

## 2022-06-10 NOTE — PROCEDURES
[address]                               PULMONARY FUNCTION    PATIENT NAME: Lina Burnette                   :        1980  MED REC NO:   [id number]                              ROOM:  ACCOUNT NO:   [de-identified]                           ADMIT DATE: 2022  PROVIDER:     Marco A Ibarra    DATE OF PROCEDURE:  2022    PULMONARY FUNCTION TESTS WITH BRONCHODILATOR AND DLCO    ATTENDING PHYSICIAN:  Cassidy Little. DO Janice    REASON FOR TEST:  Alpha-1 antitrypsin deficiency. SUMMARY:  1. Respiratory therapist reports the patient's effort as being good. 2.  The forced vital capacity is normal at 101% of predicted. 3.  The forced exhaled volume in 1 second is normal at 92% of predicted. 4.  The forced exhaled volume in 1 second/forced vital capacity is at  90% of predicted. 5.  The forced exhaled flow at 25-75% is decreased at 71% of predicted. 6.  A significant improvement was seen only in the forced exhaled flow  at 25-75% after one-time dose of bronchodilator. 7.  Total lung capacity is normal at 111% of predicted. 8.  The DLCO is at 105% of predicted, the DLCO/VA is at 92% of  predicted. IMPRESSION:  1. Relatively normal prebronchodilator pulmonary function test.  2.  A significant improvement was seen only in the forced exhaled flow  at 25-75% after one-time dose of bronchodilator to suggest a possible  small airway disease (asthma), please correlate clinically.   3.  The DLCO and DLCO/VA are normal.        MADELYN IBARRA    D: 06/10/2022 6:26:28       T: 06/10/2022 9:10:36     [email or web address]  Job#: [id number]     Doc#: [id number]    CC:  José Ramos DO

## 2022-06-14 ENCOUNTER — APPOINTMENT (OUTPATIENT)
Dept: PHYSICAL THERAPY | Age: 42
End: 2022-06-14
Payer: COMMERCIAL

## 2022-06-15 ENCOUNTER — HOSPITAL ENCOUNTER (OUTPATIENT)
Dept: ULTRASOUND IMAGING | Age: 42
Discharge: HOME | End: 2022-06-17
Payer: COMMERCIAL

## 2022-06-15 DIAGNOSIS — E88.01 ALPHA-1-ANTITRYPSIN DEFICIENCY (HCC): ICD-10-CM

## 2022-06-15 PROCEDURE — 76705 ECHO EXAM OF ABDOMEN: CPT

## 2022-06-17 DIAGNOSIS — G89.29 CHRONIC BILATERAL LOW BACK PAIN WITHOUT SCIATICA: ICD-10-CM

## 2022-06-17 DIAGNOSIS — M54.50 CHRONIC BILATERAL LOW BACK PAIN WITHOUT SCIATICA: ICD-10-CM

## 2022-06-17 RX ORDER — INDOMETHACIN 75 MG/1
75 CAPSULE, EXTENDED RELEASE ORAL 2 TIMES DAILY WITH MEALS
Qty: 60 CAPSULE | Refills: 3 | Status: SHIPPED | OUTPATIENT
Start: 2022-06-17 | End: 2022-09-12

## 2022-06-17 RX ORDER — CARISOPRODOL 250 MG/1
250 TABLET ORAL 3 TIMES DAILY PRN
Qty: 90 TABLET | Refills: 0 | Status: SHIPPED | OUTPATIENT
Start: 2022-06-17 | End: 2022-07-18 | Stop reason: SDUPTHER

## 2022-06-17 NOTE — TELEPHONE ENCOUNTER
Last OV: 6/1/2022 back pain  Last RX:    Next scheduled apt: 6/24/2022 2 wk         surescript requesting a refill

## 2022-06-20 ENCOUNTER — APPOINTMENT (OUTPATIENT)
Dept: PHYSICAL THERAPY | Age: 42
End: 2022-06-20
Payer: COMMERCIAL

## 2022-06-23 ENCOUNTER — APPOINTMENT (OUTPATIENT)
Dept: PHYSICAL THERAPY | Age: 42
End: 2022-06-23
Payer: COMMERCIAL

## 2022-06-24 ENCOUNTER — OFFICE VISIT (OUTPATIENT)
Dept: FAMILY MEDICINE CLINIC | Age: 42
End: 2022-06-24
Payer: COMMERCIAL

## 2022-06-24 ENCOUNTER — SCHEDULED TELEPHONE ENCOUNTER (OUTPATIENT)
Dept: PAIN MANAGEMENT | Age: 42
End: 2022-06-24
Payer: COMMERCIAL

## 2022-06-24 VITALS
HEART RATE: 72 BPM | WEIGHT: 214 LBS | OXYGEN SATURATION: 99 % | DIASTOLIC BLOOD PRESSURE: 70 MMHG | SYSTOLIC BLOOD PRESSURE: 108 MMHG | BODY MASS INDEX: 34.39 KG/M2 | HEIGHT: 66 IN

## 2022-06-24 DIAGNOSIS — G89.29 CHRONIC BILATERAL LOW BACK PAIN WITHOUT SCIATICA: Primary | ICD-10-CM

## 2022-06-24 DIAGNOSIS — M54.50 CHRONIC BILATERAL LOW BACK PAIN WITHOUT SCIATICA: Primary | ICD-10-CM

## 2022-06-24 DIAGNOSIS — L73.2 HIDRADENITIS SUPPURATIVA: ICD-10-CM

## 2022-06-24 DIAGNOSIS — F33.1 MODERATE EPISODE OF RECURRENT MAJOR DEPRESSIVE DISORDER (HCC): ICD-10-CM

## 2022-06-24 DIAGNOSIS — K59.09 CHRONIC CONSTIPATION: ICD-10-CM

## 2022-06-24 PROCEDURE — 99214 OFFICE O/P EST MOD 30 MIN: CPT | Performed by: FAMILY MEDICINE

## 2022-06-24 PROCEDURE — G8417 CALC BMI ABV UP PARAM F/U: HCPCS | Performed by: FAMILY MEDICINE

## 2022-06-24 PROCEDURE — G8427 DOCREV CUR MEDS BY ELIG CLIN: HCPCS | Performed by: FAMILY MEDICINE

## 2022-06-24 PROCEDURE — 1036F TOBACCO NON-USER: CPT | Performed by: FAMILY MEDICINE

## 2022-06-24 PROCEDURE — 99443 PR PHYS/QHP TELEPHONE EVALUATION 21-30 MIN: CPT | Performed by: PHYSICAL MEDICINE & REHABILITATION

## 2022-06-24 RX ORDER — CLINDAMYCIN PHOSPHATE 10 MG/G
GEL TOPICAL
Qty: 60 G | Refills: 2 | Status: SHIPPED | OUTPATIENT
Start: 2022-06-24 | End: 2022-07-01

## 2022-06-24 RX ORDER — BUSPIRONE HYDROCHLORIDE 15 MG/1
15 TABLET ORAL 2 TIMES DAILY
Qty: 60 TABLET | Refills: 5 | Status: SHIPPED | OUTPATIENT
Start: 2022-06-24 | End: 2022-07-25 | Stop reason: ALTCHOICE

## 2022-06-24 RX ORDER — BUPRENORPHINE HYDROCHLORIDE AND NALOXONE HYDROCHLORIDE DIHYDRATE 8; 2 MG/1; MG/1
TABLET SUBLINGUAL
COMMUNITY
Start: 2022-06-20 | End: 2022-09-12 | Stop reason: ALTCHOICE

## 2022-06-24 RX ORDER — TRAZODONE HYDROCHLORIDE 50 MG/1
TABLET ORAL
Qty: 90 TABLET | Refills: 5 | Status: SHIPPED | OUTPATIENT
Start: 2022-06-24 | End: 2022-07-21

## 2022-06-24 RX ORDER — ONDANSETRON 4 MG/1
4 TABLET, FILM COATED ORAL EVERY 8 HOURS PRN
Qty: 60 TABLET | Refills: 1 | Status: SHIPPED | OUTPATIENT
Start: 2022-06-24 | End: 2022-09-12 | Stop reason: ALTCHOICE

## 2022-06-24 SDOH — ECONOMIC STABILITY: FOOD INSECURITY: WITHIN THE PAST 12 MONTHS, THE FOOD YOU BOUGHT JUST DIDN'T LAST AND YOU DIDN'T HAVE MONEY TO GET MORE.: NEVER TRUE

## 2022-06-24 SDOH — ECONOMIC STABILITY: FOOD INSECURITY: WITHIN THE PAST 12 MONTHS, YOU WORRIED THAT YOUR FOOD WOULD RUN OUT BEFORE YOU GOT MONEY TO BUY MORE.: NEVER TRUE

## 2022-06-24 ASSESSMENT — SOCIAL DETERMINANTS OF HEALTH (SDOH): HOW HARD IS IT FOR YOU TO PAY FOR THE VERY BASICS LIKE FOOD, HOUSING, MEDICAL CARE, AND HEATING?: NOT HARD AT ALL

## 2022-06-24 NOTE — PROGRESS NOTES
FOLLOW UP APPOINTMENT:         Yvette Kelly MD    6/24/2022       Juju Ritchie is a 38 y.o. female, who came for a  Follow up appt post injection  - bilateral L3-4 medial branch and L5 dorsal rami injection. Had almost 100% relief for 1 week. Denies side effects or complications. Pain level is 4/10. Cramping pain, irritating pain in the low back. Onset: Chronic     Prior reatment done: Had PT, oral NSAIDs, SOMA, on Subaxone. No Known Allergies    Social History     Socioeconomic History    Marital status:      Spouse name: Not on file    Number of children: Not on file    Years of education: Not on file    Highest education level: Not on file   Occupational History    Not on file   Tobacco Use    Smoking status: Former Smoker     Packs/day: 1.00     Years: 2.00     Pack years: 2.00     Types: Cigarettes    Smokeless tobacco: Never Used   Vaping Use    Vaping Use: Every day   Substance and Sexual Activity    Alcohol use: Not Currently     Comment: in the past    Drug use: Not Currently     Comment: Pt has been sober for 30 days    Sexual activity: Not Currently   Other Topics Concern    Not on file   Social History Narrative    Not on file     Social Determinants of Health     Financial Resource Strain:     Difficulty of Paying Living Expenses: Not on file   Food Insecurity:     Worried About [address] in the Last Year: Not on file    Yadiel of Food in the Last Year: Not on file   Transportation Needs:     Lack of Transportation (Medical): Not on file    Lack of Transportation (Non-Medical):  Not on file   Physical Activity:     Days of Exercise per Week: Not on file    Minutes of Exercise per Session: Not on file   Stress:     Feeling of Stress : Not on file   Social Connections:     Frequency of Communication with Friends and Family: Not on file    Frequency of Social Gatherings with Friends and Family: Not on file    Attends Mandaeism Services: Not on file    Active Member of Clubs or Organizations: Not on file    Attends Club or Organization Meetings: Not on file    Marital Status: Not on file   Intimate Partner Violence:     Fear of Current or Ex-Partner: Not on file    Emotionally Abused: Not on file    Physically Abused: Not on file    Sexually Abused: Not on file   Housing Stability:     Unable to Pay for Housing in the Last Year: Not on file    Number of [place] in the Last Year: Not on file    Unstable Housing in the Last Year: Not on file       Past Medical History:   Diagnosis Date    Anxiety     Bulging of lumbar intervertebral disc without myelopathy     L1    Chronic back pain     Depression     GERD (gastroesophageal reflux disease)     Substance abuse ([facility] [address]     alcohol and opiates       Past Surgical History:   Procedure Laterality Date    COLONOSCOPY  05/26/2020    PAIN MANAGEMENT PROCEDURE Bilateral 6/3/2022    LUMBAR MEDIAL BRANCH BLOCK BILATERAL L3-4 MEDIAL BRANCH AND L5 DORSAL RAMI INJECTION performed by Maxim Yuan MD at [address] [place]  03/2018    PLANTAR FASCIA SURGERY Left 06/11/2020       Family History   Problem Relation Age of Onset    Diabetes Mother         Prior to Visit Medications    Medication Sig Taking? Authorizing Provider   traZODone (DESYREL) 50 MG tablet TAKE 1 TO 2 TABLETS NIGHTLY AS NEEDED  Evelyn Solomon, DO   topiramate (TOPAMAX) 25 MG tablet TAKE 1 TABLET BY MOUTH NIGHTLY  Jennifer Camacho, DO   escitalopram (LEXAPRO) 10 MG tablet TAKE 1 AND 1/2 TABLETS BY MOUTH EVERY DAY  Evelyn Solomon, DO   carisoprodol (SOMA) 250 MG tablet Take 1 tablet by mouth 3 times daily as needed (chronic low back pain) for up to 30 days. MICHAEL Velazquez - CNP   indomethacin (INDOCIN SR) 75 MG extended release capsule TAKE 1 CAPSULE BY MOUTH 2 TIMES DAILY (WITH MEALS).   Evelyn Solomon,    midodrine (PROAMATINE) 5 MG tablet TAKE 1 TABLET BY MOUTH TWICE A DAY  Twilla Mires, DO   omeprazole (PRILOSEC) 40 MG delayed release capsule TAKE 1 CAPSULE BY MOUTH EVERY DAY  Twilla Mires, DO   linaclotide (LINZESS) 145 MCG capsule Take 1 capsule by mouth every morning (before breakfast)  Twilla Mires, DO   busPIRone (BUSPAR) 15 MG tablet Take 15 mg by mouth 2 times daily  Twilla Mires, DO   gabapentin (NEURONTIN) 600 MG tablet Take 1 tablet by mouth 3 times daily for 30 days. Twilla Mires, DO   ondansetron (ZOFRAN) 4 MG tablet   Historical Provider, MD   escitalopram (LEXAPRO) 20 MG tablet   Historical Provider, MD   triamcinolone (KENALOG) 0.1 % ointment Apply topically 2 times daily  Twilla Mires, DO   hydrOXYzine (ATARAX) 25 MG tablet Take 1 tablet by mouth 2 times daily as needed for Anxiety  Twilla Mires, DO   albuterol sulfate  (90 Base) MCG/ACT inhaler Inhale 2 puffs into the lungs 4 times daily  Shantel Quezada MD         Review of Systems : All systems reviewed, all unremarkable other than HPI. No fever, chills, shortness breath, gastric ulcer or acid reflux, anticoagulation, diabetes, MI/CAD, stroke, cancer. .       Assessment and Plan:     Lumbar spondylosis         Mimi Gutierrez is a 38 y.o. female evaluated via telephone on 6/24/2022 for chronic low back pain . Schedule for second bilateral L3-4 medial branch and L5 dorsal rami injection. Schedule injection in 2 weeks     Schedule ff up post injection virtually 2 weeks post op       Total Time: 30 minutes     Mimi Gutierrez was evaluated through a synchronous (real-time) audio encounter. Patient identification was verified at the start of the visit. She (or guardian if applicable) is aware that this is a billable service, which includes applicable co-pays. This visit was conducted with the patient's (and/or legal guardian's) verbal consent. She has not had a related appointment within my department in the past 7 days or scheduled within the next 24 hours.    The patient was located at her home address.    The provider was located at the provider home address    Note: not billable if this call serves to triage the patient into an appointment for the relevant concern    Willis Whyte MD

## 2022-06-24 NOTE — PROGRESS NOTES
Name: Uma Nunez  : 1980         Chief Complaint:     Chief Complaint   Patient presents with    Back Pain    Anxiety    Cyst     groin       History of Present Illness:      Uma Nunez is a 38 y.o.  female who presents with Back Pain, Anxiety, and Cyst (groin)      HPI    Difficulty staying asleep at night, only sleeps 3-4h, gets up to urinate and then can't go back to sleep. Drinks water up til bedtime. Stopped coffee which has helped nausea. Does still use zofran some, less now but still sometimes every day. Other times does need it up to BID. Constipation continues. Taking linzess but skips it on  as she has to go to [facility] for counseling and doesn't want to have diarrhea while there. However, even with taking it, can go 7 days between stools. Was still having hard stools so she added colace daily. Texture liquid but more dense. Back injection gave full relief for about 6 days, then was vacuuming car and didn't have a sudden injury but developed pain and a twitch on L side near SI area. Had phone visit with pain mgmt today and will have another injection end of July. Using ice and heat. Having debilitating menstrual cramps right now, started bleeding  and cramps still terrible. Using heating pad. On indocin. Depression - decided to stop seeing psych. Feels overall stable on lexapro 20 mg daily. Still really dizzy at times including today. \"out of it\" some days. Good oral intake of both food and fluids. C/o skin lesions in the groin area, particularly one on the right that has been draining some fluid recently. History of same in the past.  No history of surgical I&D.     Medical History:     Patient Active Problem List   Diagnosis    Chronic neck pain    Onychomycosis    Chronic low back pain without sciatica    Narcotic abuse in remission ([facility] [address]    Chronic constipation    Alcohol abuse, in remission    Alpha-1-antitrypsin deficiency ([facility] [address] Medications:       Prior to Admission medications    Medication Sig Start Date End Date Taking? Authorizing Provider   buprenorphine-naloxone (SUBOXONE) 8-2 MG SUBL SL tablet PLACE 1 1/2 TABLET UNDER TONGUE ONCE A DAY 6/20/22  Yes Historical Provider, MD   ondansetron (ZOFRAN) 4 MG tablet Take 1 tablet by mouth every 8 hours as needed for Nausea or Vomiting 6/24/22  Yes Adam Real DO   traZODone (DESYREL) 50 MG tablet 3 tabs po nightly 6/24/22  Yes Adam Real DO   busPIRone (BUSPAR) 15 MG tablet Take 15 mg by mouth 2 times daily 6/24/22  Yes Adam Real DO   clindamycin (CLEOCIN-T) 1 % gel Apply topically 2 times daily. 6/24/22 7/1/22 Yes Adam Real DO   carisoprodol (SOMA) 250 MG tablet Take 1 tablet by mouth 3 times daily as needed (chronic low back pain) for up to 30 days. 6/17/22 7/17/22 Yes Murtaza Koehler, MICHAEL - CNP   indomethacin (INDOCIN SR) 75 MG extended release capsule TAKE 1 CAPSULE BY MOUTH 2 TIMES DAILY (WITH MEALS). 6/17/22  Yes Adam Real DO   midodrine (PROAMATINE) 5 MG tablet TAKE 1 TABLET BY MOUTH TWICE A DAY 6/6/22  Yes Adam Real DO   topiramate (TOPAMAX) 25 MG tablet TAKE 1 TABLET BY MOUTH NIGHTLY 6/6/22  Yes Adam Real DO   omeprazole (PRILOSEC) 40 MG delayed release capsule TAKE 1 CAPSULE BY MOUTH EVERY DAY 5/23/22  Yes Adam Real DO   linaclotide (LINZESS) 145 MCG capsule Take 1 capsule by mouth every morning (before breakfast) 5/12/22  Yes Adam Real DO   gabapentin (NEURONTIN) 600 MG tablet Take 1 tablet by mouth 3 times daily for 30 days.  5/2/22 6/24/22 Yes Adam Real DO   escitalopram (LEXAPRO) 20 MG tablet  3/22/22  Yes Historical Provider, MD   triamcinolone (KENALOG) 0.1 % ointment Apply topically 2 times daily 2/1/22  Yes Adam Real DO   hydrOXYzine (ATARAX) 25 MG tablet Take 1 tablet by mouth 2 times daily as needed for Anxiety 11/11/21  Yes Adam Real,    albuterol sulfate  (89 Base) MCG/ACT inhaler Inhale 2 puffs into the lungs 4 times daily 10/21/20  Yes Cami Willams MD        Allergies:       Patient has no known allergies. Physical Exam:     Vitals:  /70   Pulse 72   Ht 5' 6\" (1.676 m)   Wt 214 lb (97.1 kg)   SpO2 99%   BMI 34.54 kg/m²   Physical Exam  Vitals and nursing note reviewed. Constitutional:       General: She is not in acute distress. Appearance: Normal appearance. She is well-developed. She is not ill-appearing. Cardiovascular:      Rate and Rhythm: Normal rate and regular rhythm. Heart sounds: Normal heart sounds. Pulmonary:      Effort: Pulmonary effort is normal.      Breath sounds: Normal breath sounds. Abdominal:      General: Bowel sounds are normal.      Palpations: Abdomen is soft. Tenderness: There is no abdominal tenderness. Skin:     Comments: R groin: enlarged pore with fullness underlying, no exudate and no fluctuance or induration. Swollen lymph node present. Neurological:      Mental Status: She is alert and oriented to person, place, and time.    Psychiatric:         Mood and Affect: Mood normal.         Behavior: Behavior normal.         Data:     Lab Results   Component Value Date     05/02/2022    K 4.5 05/02/2022     05/02/2022    CO2 27 05/02/2022    BUN 8 05/02/2022    CREATININE 0.80 05/02/2022    GLUCOSE 114 05/02/2022    PROT 6.3 05/02/2022    LABALBU 4.0 05/02/2022    BILITOT 0.12 05/02/2022    ALKPHOS 50 05/02/2022    AST 15 05/02/2022    ALT 9 05/02/2022     Lab Results   Component Value Date    WBC 6.0 05/02/2022    RBC 4.30 05/02/2022    HGB 12.0 05/02/2022    HCT 36.5 05/02/2022    MCV 85.0 05/02/2022    MCH 28.0 05/02/2022    MCHC 33.0 05/02/2022    RDW 13.1 05/02/2022     05/02/2022    MPV NOT REPORTED 09/19/2021     Lab Results   Component Value Date    TSH 1.18 05/02/2022     Lab Results   Component Value Date    LABA1C 5.3 09/19/2018         Assessment & Plan:        Diagnosis Orders   1. Chronic bilateral low back pain without sciatica     2. Chronic constipation     3. Moderate episode of recurrent major depressive disorder ([facility] [address]     4. Hidradenitis suppurativa       1. Back pain continues though it was helped significantly by recent injection. Keep f/u with pain mgmt. 2. Chronic constipation - cont linzess, may need to inc dose. Advised to take daily. 3. Depression remains bothersome and also having some anxiety so restart buspar. 4. HS cuca groin R>L, topical atb prescribed. No cellulitis or abscess present. Requested Prescriptions     Signed Prescriptions Disp Refills    ondansetron (ZOFRAN) 4 MG tablet 60 tablet 1     Sig: Take 1 tablet by mouth every 8 hours as needed for Nausea or Vomiting    traZODone (DESYREL) 50 MG tablet 90 tablet 5     Sig: 3 tabs po nightly    busPIRone (BUSPAR) 15 MG tablet 60 tablet 5     Sig: Take 15 mg by mouth 2 times daily    clindamycin (CLEOCIN-T) 1 % gel 60 g 2     Sig: Apply topically 2 times daily.          There are no Patient Instructions on file for this visit.      signed by Kemi Song DO on 6/28/2022 at 10:50 PM  [address]  Dept: [phone]

## 2022-06-28 ENCOUNTER — APPOINTMENT (OUTPATIENT)
Dept: PHYSICAL THERAPY | Age: 42
End: 2022-06-28
Payer: COMMERCIAL

## 2022-06-30 ENCOUNTER — HOSPITAL ENCOUNTER (OUTPATIENT)
Dept: PHYSICAL THERAPY | Age: 42
Setting detail: THERAPIES SERIES
Discharge: HOME | End: 2022-06-30
Payer: COMMERCIAL

## 2022-06-30 PROCEDURE — 20561 NDL INSJ W/O NJX 3+ MUSC: CPT

## 2022-06-30 ASSESSMENT — PAIN DESCRIPTION - LOCATION: LOCATION: BACK

## 2022-06-30 ASSESSMENT — PAIN SCALES - GENERAL: PAINLEVEL_OUTOF10: 4

## 2022-06-30 ASSESSMENT — PAIN DESCRIPTION - ORIENTATION: ORIENTATION: LOWER

## 2022-06-30 NOTE — PROGRESS NOTES
Phone: [address]      Fax: [phone]                            Outpatient Physical Therapy                                                                            Daily Note    Date: 2022  Patient Name: Trav Yee        MRN: [id number]   ACCT#:  [de-identified]  : 1980  (38 y.o.)    Referring Practitioner: Dr. Ancel Nyhan    Referral Date : 21    Diagnosis: Chronic bilateral low back pain without sciatica  Treatment Diagnosis: Low Back Pain    Onset Date: 21  PT Insurance Information: Retail   Total # of Visits Approved: 10 Per Physician Order  Total # of Visits to Date: 7  No Show: 0  Canceled Appointment: 0    Pre-Treatment Pain:  4/10     Assessment  Assessment: Pt reports her low back was sig better after last session for 2wks then gradually returned. Pt plans to have Epidural end of July. Pt very pleased with maintenance of every 3-4wks. Pt not taking pain meds or having to seek chiropractic care. Chart Reviewed: Yes    Plan  Continue with current plan of care    Exercises/Modalities/Manual:  See DocFlow Sheet     Barriers to Learning: none    Goals  (Total # of Visits to Date: 7)        Long Term Goals  Time Frame for Long term goals : 10 visits  Long term goal 1: Pt to report 75% improvement with regard to low back pain. Long term goal 2: Pt to report worst pain following work day 2/10.-MET  Long term goal 3: Pt to report no radicular LE pain x3 consecutive days. -MET    Post Treatment Pain: 3/10    Time In: 1353  Time Out: 1436  Timed Code Treatment Minutes: 0 Minutes  Total Treatment Time: Evangelist Grullon PT     Date: 2022

## 2022-07-01 ENCOUNTER — OFFICE VISIT (OUTPATIENT)
Dept: FAMILY MEDICINE CLINIC | Age: 42
End: 2022-07-01
Payer: COMMERCIAL

## 2022-07-01 VITALS
BODY MASS INDEX: 34.27 KG/M2 | HEIGHT: 66 IN | OXYGEN SATURATION: 97 % | SYSTOLIC BLOOD PRESSURE: 98 MMHG | HEART RATE: 78 BPM | RESPIRATION RATE: 20 BRPM | WEIGHT: 213.2 LBS | DIASTOLIC BLOOD PRESSURE: 62 MMHG

## 2022-07-01 DIAGNOSIS — L53.8 MACULAR ERYTHEMATOUS RASH: Primary | ICD-10-CM

## 2022-07-01 DIAGNOSIS — T50.905A MEDICATION SIDE EFFECT, INITIAL ENCOUNTER: ICD-10-CM

## 2022-07-01 DIAGNOSIS — L73.2 HIDRADENITIS SUPPURATIVA: ICD-10-CM

## 2022-07-01 PROCEDURE — 1036F TOBACCO NON-USER: CPT | Performed by: STUDENT IN AN ORGANIZED HEALTH CARE EDUCATION/TRAINING PROGRAM

## 2022-07-01 PROCEDURE — 99214 OFFICE O/P EST MOD 30 MIN: CPT | Performed by: STUDENT IN AN ORGANIZED HEALTH CARE EDUCATION/TRAINING PROGRAM

## 2022-07-01 PROCEDURE — G8417 CALC BMI ABV UP PARAM F/U: HCPCS | Performed by: STUDENT IN AN ORGANIZED HEALTH CARE EDUCATION/TRAINING PROGRAM

## 2022-07-01 PROCEDURE — G8427 DOCREV CUR MEDS BY ELIG CLIN: HCPCS | Performed by: STUDENT IN AN ORGANIZED HEALTH CARE EDUCATION/TRAINING PROGRAM

## 2022-07-01 RX ORDER — TRIAMCINOLONE ACETONIDE 1 MG/G
CREAM TOPICAL
Qty: 453.6 G | Refills: 1 | Status: SHIPPED | OUTPATIENT
Start: 2022-07-01

## 2022-07-01 RX ORDER — DOXYCYCLINE HYCLATE 100 MG
100 TABLET ORAL 2 TIMES DAILY
Qty: 28 TABLET | Refills: 0 | Status: SHIPPED | OUTPATIENT
Start: 2022-07-01 | End: 2022-07-15

## 2022-07-01 ASSESSMENT — ENCOUNTER SYMPTOMS
WHEEZING: 0
NAUSEA: 0
RHINORRHEA: 0
COUGH: 0
VOMITING: 0
ABDOMINAL PAIN: 0
BACK PAIN: 0
SINUS PAIN: 0
DIARRHEA: 0

## 2022-07-01 NOTE — PROGRESS NOTES
HPI Notes    Name: Sandy Mehta  : 1980         Chief Complaint:     Chief Complaint   Patient presents with    Rash     rash on legs, arms, thighs states she was given clindamycin gel and  2022 doesnt remember when she noticed the rash but it started after the gel       History of Present Illness:        HPI    This is a 82-year-old woman presenting for evaluation of a rash on the arms, legs, thighs. This began shortly after utilizing clindamycin gel on 2022. I did review the note on that day written by Dr. Abelardo Mccabe, for skin lesions in the groin area. This appears to have been hidradenitis suppurativa without cellulitis or abscesses. This has been an ongoing problem for her, which has remained very bothersome for her. She is interested in seeing dermatology. She is also requesting a \"less greasy version\" of her topical kenalog.     Past Medical History:     Past Medical History:   Diagnosis Date    Anxiety     Bulging of lumbar intervertebral disc without myelopathy     L1    Chronic back pain     Depression     GERD (gastroesophageal reflux disease)     Substance abuse (HCC)     alcohol and opiates      Reviewed all health maintenance requirements and ordered appropriate tests  Health Maintenance Due   Topic Date Due    Varicella vaccine (1 of 2 - 2-dose childhood series) Never done    COVID-19 Vaccine (1) Never done    Pneumococcal 0-64 years Vaccine (1 - PCV) Never done    DTaP/Tdap/Td vaccine (1 - Tdap) Never done    Diabetes screen  2021    Lipids  Never done       Past Surgical History:     Past Surgical History:   Procedure Laterality Date    COLONOSCOPY  2020    PAIN MANAGEMENT PROCEDURE Bilateral 6/3/2022    LUMBAR MEDIAL BRANCH BLOCK BILATERAL L3-4 MEDIAL BRANCH AND L5 DORSAL RAMI INJECTION performed by Cara Hart MD at [address] [place]  2018    PLANTAR FASCIA SURGERY Left 2020        Medications:       Prior to Admission medications    Medication Sig Start Date End Date Taking? Authorizing Provider   doxycycline hyclate (VIBRA-TABS) 100 MG tablet Take 1 tablet by mouth 2 times daily for 14 days 7/1/22 7/15/22 Yes Catrachito Easley, DO   triamcinolone (KENALOG) 0.1 % cream Apply topically 2 times daily for 14 days 7/1/22  Yes Mando Monge, DO   buprenorphine-naloxone (SUBOXONE) 8-2 MG SUBL SL tablet PLACE 1 1/2 TABLET UNDER TONGUE ONCE A DAY 6/20/22   Historical Provider, MD   ondansetron (ZOFRAN) 4 MG tablet Take 1 tablet by mouth every 8 hours as needed for Nausea or Vomiting 6/24/22   Afshin Bennett, DO   traZODone (DESYREL) 50 MG tablet 3 tabs po nightly 6/24/22   Afshin Chowdaryum, DO   busPIRone (BUSPAR) 15 MG tablet Take 15 mg by mouth 2 times daily 6/24/22   Afshin Bennett, DO   clindamycin (CLEOCIN-T) 1 % gel Apply topically 2 times daily. 6/24/22 7/1/22  Afshin Bennett, DO   carisoprodol (SOMA) 250 MG tablet Take 1 tablet by mouth 3 times daily as needed (chronic low back pain) for up to 30 days. 6/17/22 7/17/22  MICHAEL Greenberg CNP   indomethacin (INDOCIN SR) 75 MG extended release capsule TAKE 1 CAPSULE BY MOUTH 2 TIMES DAILY (WITH MEALS). 6/17/22   Afshin Bennett, DO   midodrine (PROAMATINE) 5 MG tablet TAKE 1 TABLET BY MOUTH TWICE A DAY 6/6/22   Afshin Bennett, DO   topiramate (TOPAMAX) 25 MG tablet TAKE 1 TABLET BY MOUTH NIGHTLY 6/6/22   Afshin Bennett, DO   omeprazole (PRILOSEC) 40 MG delayed release capsule TAKE 1 CAPSULE BY MOUTH EVERY DAY 5/23/22   Afshin Bennett, DO   linaclotide [facility]) 145 MCG capsule Take 1 capsule by mouth every morning (before breakfast) 5/12/22   Afshin Bennett, DO   gabapentin (NEURONTIN) 600 MG tablet Take 1 tablet by mouth 3 times daily for 30 days.  5/2/22 6/24/22  Marlan Drum, DO   escitalopram (LEXAPRO) 20 MG tablet  3/22/22   Historical Provider, MD   hydrOXYzine (ATARAX) 25 MG tablet Take 1 tablet by mouth 2 times daily as needed for today.    I have also changed her triamcinolone topical formulation from an ointment to a cream as she was specifically requesting a less greasy form of this medication. Follow up as needed prior to next OV      Completed Refills   Requested Prescriptions     Signed Prescriptions Disp Refills    doxycycline hyclate (VIBRA-TABS) 100 MG tablet 28 tablet 0     Sig: Take 1 tablet by mouth 2 times daily for 14 days    triamcinolone (KENALOG) 0.1 % cream 453.6 g 1     Sig: Apply topically 2 times daily for 14 days     No follow-ups on file. Orders Placed This Encounter   Medications    doxycycline hyclate (VIBRA-TABS) 100 MG tablet     Sig: Take 1 tablet by mouth 2 times daily for 14 days     Dispense:  28 tablet     Refill:  0    triamcinolone (KENALOG) 0.1 % cream     Sig: Apply topically 2 times daily for 14 days     Dispense:  453.6 g     Refill:  1     Orders Placed This Encounter   Procedures    External Referral To Dermatology     Referral Priority:   Routine     Referral Type:   Eval and Treat     Referral Reason:   Specialty Services Required     Referred to Provider:   Sukh Ramírez MD     Requested Specialty:   Dermatology     Number of Visits Requested:   1         Patient Instructions       SURVEY:    You may be receiving a survey from [employer] regarding your visit today. Please complete the survey to enable us to provide the highest quality of care to you and your family. If you cannot score us a very good on any question, please call the office to discuss how we could of made your experience a very good one. Thank you.       Clinical Care Team:     Dr. Jose Patel, CRISTHIAN YoungricalTeam:     Drew Mendosa  Patient Education        Hidradenitis Suppurativa: Care Instructions  Overview     Hidradenitis suppurativa (say \"iyc-fixh-tn-NY-tus sup-yur-uh-TY-vuh\") is a skin condition that causes lumps on the skin that look like pimples or boils. The lumps are usually painful and can break open and drain blood and bad-smellingpus. The condition can come and go for many years. Treatment for this condition may include antibiotics and other medicines. You may need surgery to remove the lumps. Home care includes wearing loose-fitting clothes and washing the area gently. You can help prevent lumps from comingback by staying at a healthy weight and not smoking. Doctors don't know exactly how this condition starts. But they do know that something irritates and inflames the hair follicles, causing them to swell and form lumps. This skin condition can't be spread from person to person (isn'tcontagious). Follow-up care is a key part of your treatment and safety. Be sure to make and go to all appointments, and call your doctor if you are having problems. It's also a good idea to know your test results and keep alist of the medicines you take. How can you care for yourself at home? Skin care     Wash the area every day with mild soap. Use your hands rather than a washcloth or sponge when you wash that part of your body.      Leave the affected areas uncovered when you can. If you have lumps that are draining, you can cover them with a bandage or other dressing. Put petroleum jelly (such as Vaseline) on the dressing to help keep it from sticking.      Wear-loose fitting clothes that don't rub against the area. Avoid activities that cause skin to rub together.      If you have pain, try a warm compress. Soak a towel or washcloth in warm water, wring it out, and place it on the affected skin for about 10 minutes. Medicines     Be safe with medicines. Take your medicines exactly as prescribed. Call your doctor if you think you are having a problem with your medicine. You will get more details on the specific medicines your doctor prescribes.      If your doctor prescribed antibiotics, take them as directed.  Do not stop taking them just because you feel better. You need to take the full course of antibiotics. Lifestyle choices     If you smoke, think about quitting. Smoking can make the condition worse. If you need help quitting, talk to your doctor about stop-smoking programs and medicines. These can increase your chances of quitting for good.      Stay at a healthy weight, or lose weight, by eating healthy foods and being physically active. Being overweight could make this condition worse. When should you call for help? Call your doctor now or seek immediate medical care if:     You have symptoms of infection, such as:  ? Increased pain, swelling, warmth, or redness. ? Red streaks leading from the area. ? Pus draining from the area. ? A fever. Watch closely for changes in your health, and be sure to contact your doctor if:     You do not get better as expected. Where can you learn more? Go to https://Sonnedixpepiceweb.nanoRETE. org and sign in to your "[employer] account. Enter H657 in the [employer] box to learn more about \"Hidradenitis Suppurativa: Care Instructions. \"     If you do not have an account, please click on the \"Sign Up Now\" link. Current as of: November 15, 2021               Content Version: 13.3  © 2006-2022 [employer]. Care instructions adapted under license by [facility] ([facility]). If you have questions about a medical condition or this instruction, always ask your healthcare professional. [place] [address] any warranty or liability for your use of this information. Patient Education        Learning About Hidradenitis Suppurativa  What is hidradenitis suppurativa? Hidradenitis suppurativa (say \"pks-arxt-kk-NY-tus sup-yur-uh-TY-vuh\") is a skin condition that causes lumps on the skin. The lumps look like pimples or boils. They usually occur in areas where skin rubs against skin, such as the armpit,the buttocks, or the inner thighs.  The condition can come and go for many years.  What are the symptoms? Red lumps that may look like pimples, acne, or boils appear on the skin and areusually painful. The lumps:   Usually occur in areas where skin rubs against skin, such as in the armpit. They can also appear under the breasts, in the groin area, on the buttocks, around the anus, and on the inner thighs.  May go away on their own in a few weeks. But they often come back in the same area.  Can become infected and break open, draining blood and pus that usually smells bad. If the condition isn't treated and gets worse, hollow tunnels can form under the skin. Over time, the infection and tunnels will heal, but a thick scar mayform. These scars can keep skin from stretching naturally. How is hidradenitis suppurativa treated? The treatment for hidradenitis suppurativa depends on how serious it is. Sandy Dyeliborio may discuss options, such as:   Medicines. You may need to take pills, such as antibiotics, or rub a prescription ointment or cream on the affected skin.  Corticosteroid injections (shots) into the affected areas.  Hormone pills. Taking birth control pills or other medicines that affect hormones may help.  Removing infected tissue. How can you care for yourself at home? Skin care     Wash the area every day with mild soap. Use your hands rather than a washcloth or sponge when you wash that part of your body.      Leave the affected areas uncovered when you can. If you have lumps that are draining, you can cover them with a bandage or other dressing. Put petroleum jelly (such as Vaseline) on the dressing to help keep it from sticking.      Wear-loose fitting clothes that don't rub against the area. Avoid activities that cause skin to rub together.      If you have pain, try a warm compress. Soak a towel or washcloth in warm water, wring it out, and place it on the affected skin for about 10 minutes. Medicines     Be safe with medicines.  Take your medicines exactly as prescribed. Call your doctor if you think you are having a problem with your medicine. You will get more details on the specific medicines your doctor prescribes.      If your doctor prescribed antibiotics, take them as directed. Do not stop taking them just because you feel better. You need to take the full course of antibiotics. Lifestyle choices     If you smoke, think about quitting. Smoking can make the condition worse. If you need help quitting, talk to your doctor about stop-smoking programs and medicines. These can increase your chances of quitting for good.      Stay at a healthy weight, or lose weight, by eating healthy foods and being physically active. Being overweight could make this condition worse. Follow-up care is a key part of your treatment and safety. Be sure to make and go to all appointments, and call your doctor if you are having problems. It's also a good idea to know your test results and keep alist of the medicines you take. Where can you learn more? Go to https://[email or web address] org and sign in to your [employer] account. Enter 26 [id number] in the [employer] box to learn more about \"Learning About Hidradenitis Suppurativa. \"     If you do not have an account, please click on the \"Sign Up Now\" link. Current as of: November 15, 2021               Content Version: 13.3  © 2006-2022 [employer]. Care instructions adapted under license by [facility] ([facility]). If you have questions about a medical condition or this instruction, always ask your healthcare professional. [place] [address] any warranty or liability for your use of this information.              Electronically signed by Zena Nunn DO on 7/1/2022 at 3:23 PM           Completed Refills   Requested Prescriptions     Signed Prescriptions Disp Refills    doxycycline hyclate (VIBRA-TABS) 100 MG tablet 28 tablet 0     Sig: Take 1 tablet by mouth 2 times daily for 14 days    triamcinolone (KENALOG) 0.1 % cream 453.6 g 1     Sig: Apply topically 2 times daily for 14 days

## 2022-07-01 NOTE — PATIENT INSTRUCTIONS
SURVEY:    You may be receiving a survey from [employer] regarding your visit today. Please complete the survey to enable us to provide the highest quality of care to you and your family. If you cannot score us a very good on any question, please call the office to discuss how we could of made your experience a very good one. Thank you. Clinical Care Team:     Dr. Renee Platt, [facility]     [id number] [facility]  Patient Education        Hidradenitis Suppurativa: Care Instructions  Overview     Hidradenitis suppurativa (say \"aes-iqzo-lx-[place]-tus sup-yur-uh-TY-vuh\") is a skin condition that causes lumps on the skin that look like pimples or boils. The lumps are usually painful and can break open and drain blood and bad-smellingpus. The condition can come and go for many years. Treatment for this condition may include antibiotics and other medicines. You may need surgery to remove the lumps. Home care includes wearing loose-fitting clothes and washing the area gently. You can help prevent lumps from comingback by staying at a healthy weight and not smoking. Doctors don't know exactly how this condition starts. But they do know that something irritates and inflames the hair follicles, causing them to swell and form lumps. This skin condition can't be spread from person to person (isn'tcontagious). Follow-up care is a key part of your treatment and safety. Be sure to make and go to all appointments, and call your doctor if you are having problems. It's also a good idea to know your test results and keep alist of the medicines you take. How can you care for yourself at home? Skin care     Wash the area every day with mild soap. Use your hands rather than a washcloth or sponge when you wash that part of your body.      Leave the affected areas uncovered when you can.  If you have lumps that are draining, you can cover them with a bandage or other dressing. Put petroleum jelly (such as Vaseline) on the dressing to help keep it from sticking.      Wear-loose fitting clothes that don't rub against the area. Avoid activities that cause skin to rub together.      If you have pain, try a warm compress. Soak a towel or washcloth in warm water, wring it out, and place it on the affected skin for about 10 minutes. Medicines     Be safe with medicines. Take your medicines exactly as prescribed. Call your doctor if you think you are having a problem with your medicine. You will get more details on the specific medicines your doctor prescribes.      If your doctor prescribed antibiotics, take them as directed. Do not stop taking them just because you feel better. You need to take the full course of antibiotics. Lifestyle choices     If you smoke, think about quitting. Smoking can make the condition worse. If you need help quitting, talk to your doctor about stop-smoking programs and medicines. These can increase your chances of quitting for good.      Stay at a healthy weight, or lose weight, by eating healthy foods and being physically active. Being overweight could make this condition worse. When should you call for help? Call your doctor now or seek immediate medical care if:     You have symptoms of infection, such as:  ? Increased pain, swelling, warmth, or redness. ? Red streaks leading from the area. ? Pus draining from the area. ? A fever. Watch closely for changes in your health, and be sure to contact your doctor if:     You do not get better as expected. Where can you learn more? Go to https://ImmuneticsmitziSuper Ele&Tec.Milestone Software. org and sign in to your "[employer] account. Enter J935 in the [facility] box to learn more about \"Hidradenitis Suppurativa: Care Instructions. \"     If you do not have an account, please click on the \"Sign Up Now\" link.   Current as of: November 15, 2021               Content Version: 13.3  © 2612-0711 Healthwise, Incorporated. Care instructions adapted under license by [facility] [place] ([facility]). If you have questions about a medical condition or this instruction, always ask your healthcare professional. [address] any warranty or liability for your use of this information. Patient Education        Learning About Hidradenitis Suppurativa  What is hidradenitis suppurativa? Hidradenitis suppurativa (say \"qcr-mnyj-hi-[place]-tus sup-yur-uh-TY-vuh\") is a skin condition that causes lumps on the skin. The lumps look like pimples or boils. They usually occur in areas where skin rubs against skin, such as the armpit,the buttocks, or the inner thighs. The condition can come and go for many years. What are the symptoms? Red lumps that may look like pimples, acne, or boils appear on the skin and areusually painful. The lumps:   Usually occur in areas where skin rubs against skin, such as in the armpit. They can also appear under the breasts, in the groin area, on the buttocks, around the anus, and on the inner thighs.  May go away on their own in a few weeks. But they often come back in the same area.  Can become infected and break open, draining blood and pus that usually smells bad. If the condition isn't treated and gets worse, hollow tunnels can form under the skin. Over time, the infection and tunnels will heal, but a thick scar mayform. These scars can keep skin from stretching naturally. How is hidradenitis suppurativa treated? The treatment for hidradenitis suppurativa depends on how serious it is. Stella Ortiz may discuss options, such as:   Medicines. You may need to take pills, such as antibiotics, or rub a prescription ointment or cream on the affected skin.  Corticosteroid injections (shots) into the affected areas.  Hormone pills. Taking birth control pills or other medicines that affect hormones may help.  Removing infected tissue.   How can you care for yourself at home?  Skin care     Wash the area every day with mild soap. Use your hands rather than a washcloth or sponge when you wash that part of your body.      Leave the affected areas uncovered when you can. If you have lumps that are draining, you can cover them with a bandage or other dressing. Put petroleum jelly (such as Vaseline) on the dressing to help keep it from sticking.      Wear-loose fitting clothes that don't rub against the area. Avoid activities that cause skin to rub together.      If you have pain, try a warm compress. Soak a towel or washcloth in warm water, wring it out, and place it on the affected skin for about 10 minutes. Medicines     Be safe with medicines. Take your medicines exactly as prescribed. Call your doctor if you think you are having a problem with your medicine. You will get more details on the specific medicines your doctor prescribes.      If your doctor prescribed antibiotics, take them as directed. Do not stop taking them just because you feel better. You need to take the full course of antibiotics. Lifestyle choices     If you smoke, think about quitting. Smoking can make the condition worse. If you need help quitting, talk to your doctor about stop-smoking programs and medicines. These can increase your chances of quitting for good.      Stay at a healthy weight, or lose weight, by eating healthy foods and being physically active. Being overweight could make this condition worse. Follow-up care is a key part of your treatment and safety. Be sure to make and go to all appointments, and call your doctor if you are having problems. It's also a good idea to know your test results and keep alist of the medicines you take. Where can you learn more? Go to https://obdulia.Strobe. org and sign in to your [employer] account. Enter [id number] in the [employer] box to learn more about \"Learning About Hidradenitis Suppurativa. \"     If you do not have an account, please click on the \"Sign Up Now\" link. Current as of: November 15, 2021               Content Version: 13.3  © 2355-7693 Healthwise, Incorporated. Care instructions adapted under license by [facility] [place] ([facility]). If you have questions about a medical condition or this instruction, always ask your healthcare professional. [address] any warranty or liability for your use of this information.

## 2022-07-18 DIAGNOSIS — G89.29 CHRONIC BILATERAL LOW BACK PAIN WITHOUT SCIATICA: ICD-10-CM

## 2022-07-18 DIAGNOSIS — M54.50 CHRONIC BILATERAL LOW BACK PAIN WITHOUT SCIATICA: ICD-10-CM

## 2022-07-18 RX ORDER — CARISOPRODOL 250 MG/1
250 TABLET ORAL 3 TIMES DAILY PRN
Qty: 90 TABLET | Refills: 0 | Status: SHIPPED | OUTPATIENT
Start: 2022-07-18 | End: 2022-08-17 | Stop reason: SDUPTHER

## 2022-07-21 RX ORDER — TRAZODONE HYDROCHLORIDE 50 MG/1
TABLET ORAL
Qty: 90 TABLET | Refills: 5 | Status: SHIPPED | OUTPATIENT
Start: 2022-07-21

## 2022-07-21 NOTE — TELEPHONE ENCOUNTER
Last visit:  7/1/2022  Next Visit Date:    Future Appointments   Date Time Provider Felipe Mancini   7/25/2022 10:00 AM Verna Joseph DO [facility]   7/26/2022 11:15 AM Sparkle Hay, PT [facility] PT Maru Das   7/29/2022  6:30 AM SCHEDULE, [address] PAT Maru Das         Medication List:  Prior to Admission medications    Medication Sig Start Date End Date Taking? Authorizing Provider   carisoprodol (SOMA) 250 MG tablet Take 1 tablet by mouth 3 times daily as needed (chronic low back pain) for up to 30 days. 7/18/22 8/17/22  Venra Joseph DO   triamcinolone (KENALOG) 0.1 % cream Apply topically 2 times daily for 14 days 7/1/22   Karey Easley DO   buprenorphine-naloxone (SUBOXONE) 8-2 MG SUBL SL tablet PLACE 1 1/2 TABLET UNDER TONGUE ONCE A DAY 6/20/22   Historical Provider, MD   ondansetron (ZOFRAN) 4 MG tablet Take 1 tablet by mouth every 8 hours as needed for Nausea or Vomiting 6/24/22   Verna Joseph DO   traZODone (DESYREL) 50 MG tablet 3 tabs po nightly 6/24/22   Verna Joseph DO   busPIRone (BUSPAR) 15 MG tablet Take 15 mg by mouth 2 times daily 6/24/22   Verna Joseph DO   indomethacin (INDOCIN SR) 75 MG extended release capsule TAKE 1 CAPSULE BY MOUTH 2 TIMES DAILY (WITH MEALS). 6/17/22   Verna Joseph DO   midodrine (PROAMATINE) 5 MG tablet TAKE 1 TABLET BY MOUTH TWICE A DAY 6/6/22   Verna Joseph DO   topiramate (TOPAMAX) 25 MG tablet TAKE 1 TABLET BY MOUTH NIGHTLY 6/6/22   Verna Joseph DO   omeprazole (PRILOSEC) 40 MG delayed release capsule TAKE 1 CAPSULE BY MOUTH EVERY DAY 5/23/22   Verna Joseph DO   linaclotide [facility]) 145 MCG capsule Take 1 capsule by mouth every morning (before breakfast) 5/12/22   Verna Joseph DO   gabapentin (NEURONTIN) 600 MG tablet Take 1 tablet by mouth 3 times daily for 30 days.  5/2/22 6/24/22  Verna Joseph DO   escitalopram (LEXAPRO) 20 MG tablet  3/22/22   Historical Provider, MD   hydrOXYzine (ATARAX) 25 MG tablet Take 1 tablet by mouth 2 times daily as needed for Anxiety 11/11/21   Minus DO Faheem   albuterol sulfate  (90 Base) MCG/ACT inhaler Inhale 2 puffs into the lungs 4 times daily 10/21/20   Livier Salas MD

## 2022-07-22 ENCOUNTER — APPOINTMENT (OUTPATIENT)
Dept: PHYSICAL THERAPY | Age: 42
End: 2022-07-22
Payer: COMMERCIAL

## 2022-07-25 ENCOUNTER — OFFICE VISIT (OUTPATIENT)
Dept: FAMILY MEDICINE CLINIC | Age: 42
End: 2022-07-25
Payer: COMMERCIAL

## 2022-07-25 ENCOUNTER — HOSPITAL ENCOUNTER (OUTPATIENT)
Dept: PHYSICAL THERAPY | Age: 42
Setting detail: THERAPIES SERIES
End: 2022-07-25
Payer: COMMERCIAL

## 2022-07-25 VITALS
WEIGHT: 213 LBS | HEART RATE: 70 BPM | BODY MASS INDEX: 34.23 KG/M2 | SYSTOLIC BLOOD PRESSURE: 96 MMHG | OXYGEN SATURATION: 98 % | HEIGHT: 66 IN | DIASTOLIC BLOOD PRESSURE: 64 MMHG

## 2022-07-25 DIAGNOSIS — L73.2 HIDRADENITIS SUPPURATIVA: ICD-10-CM

## 2022-07-25 DIAGNOSIS — M54.50 CHRONIC BILATERAL LOW BACK PAIN WITHOUT SCIATICA: Primary | ICD-10-CM

## 2022-07-25 DIAGNOSIS — L59.0 ERYTHEMA AB IGNE: ICD-10-CM

## 2022-07-25 DIAGNOSIS — F33.1 MODERATE EPISODE OF RECURRENT MAJOR DEPRESSIVE DISORDER (HCC): ICD-10-CM

## 2022-07-25 DIAGNOSIS — G89.29 CHRONIC BILATERAL LOW BACK PAIN WITHOUT SCIATICA: Primary | ICD-10-CM

## 2022-07-25 PROCEDURE — G8427 DOCREV CUR MEDS BY ELIG CLIN: HCPCS | Performed by: FAMILY MEDICINE

## 2022-07-25 PROCEDURE — 99214 OFFICE O/P EST MOD 30 MIN: CPT | Performed by: FAMILY MEDICINE

## 2022-07-25 PROCEDURE — G8417 CALC BMI ABV UP PARAM F/U: HCPCS | Performed by: FAMILY MEDICINE

## 2022-07-25 PROCEDURE — 1036F TOBACCO NON-USER: CPT | Performed by: FAMILY MEDICINE

## 2022-07-25 RX ORDER — ALBUTEROL SULFATE 90 UG/1
2 AEROSOL, METERED RESPIRATORY (INHALATION) 4 TIMES DAILY
Qty: 1 EACH | Refills: 3 | Status: SHIPPED | OUTPATIENT
Start: 2022-07-25

## 2022-07-25 RX ORDER — ESCITALOPRAM OXALATE 20 MG/1
20 TABLET ORAL DAILY
Qty: 30 TABLET | Refills: 5 | Status: SHIPPED | OUTPATIENT
Start: 2022-07-25

## 2022-07-25 NOTE — PATIENT INSTRUCTIONS
SURVEY:    You may be receiving a survey from [employer] regarding your visit today. You may get this in the mail, through your MyChart or in your email. Please complete the survey to enable us to provide the highest quality of care to you and your family. If you cannot score us as very good ( 5 Stars) on any question, please feel free to call the office to discuss how we could have made your experience exceptional.     Thank you.     Clinical Care Team:  Dr. Micheline Jiménez, DO Leobardo Johnson, [address] [place] [address] Team:  Paticia Fraction

## 2022-07-25 NOTE — PROGRESS NOTES
Name: Amanda Farias  : 1980         Chief Complaint:     Chief Complaint   Patient presents with    Rash     Hidradenitis suppurativa recheck, groin       History of Present Illness:      Amanda Farias is a 38 y.o.  female who presents with Rash (Hidradenitis suppurativa recheck, groin)      HPI    F/u HS. R groin feels better and she thinks it looks better. Finished oral doxy. Using some kenalog cream.     Low back pain which had been helped by previous injection but then pain returned after vacuuming her car. Does have repeat injections this wk. Stopped work at [employer] and now at [employer] , also does some stocking of shelves and cooler, cleaning. Discoloration on low back from falling asleep on heating pad, gradually reducing. Using midodrine just prn. Medical History:     Patient Active Problem List   Diagnosis    Chronic neck pain    Onychomycosis    Chronic low back pain without sciatica    Narcotic abuse in remission (HCC)    Chronic constipation    Alcohol abuse, in remission    Alpha-1-antitrypsin deficiency (HCC)    Hidradenitis suppurativa       Medications:       Prior to Admission medications    Medication Sig Start Date End Date Taking? Authorizing Provider   albuterol sulfate HFA (PROVENTIL;VENTOLIN;PROAIR) 108 (90 Base) MCG/ACT inhaler Inhale 2 puffs into the lungs 4 times daily 22  Yes Afshin Bennett, DO   escitalopram (LEXAPRO) 20 MG tablet Take 1 tablet by mouth in the morning. 22  Yes Afshin Bennett DO   traZODone (DESYREL) 50 MG tablet TAKE 3 TABLETS BY MOUTH EVERY NIGHT 22  Yes Afshin Bennett, DO   carisoprodol (SOMA) 250 MG tablet Take 1 tablet by mouth 3 times daily as needed (chronic low back pain) for up to 30 days.  22 Yes Afshin Bennett, DO   triamcinolone (KENALOG) 0.1 % cream Apply topically 2 times daily for 14 days 22  Yes Catrachito Easley, DO   buprenorphine-naloxone (SUBOXONE) 8-2 MG SUBL SL tablet PLACE 1 1/2 TABLET UNDER TONGUE ONCE A DAY 6/20/22  Yes Historical Provider, MD   ondansetron (ZOFRAN) 4 MG tablet Take 1 tablet by mouth every 8 hours as needed for Nausea or Vomiting 6/24/22  Yes Telma Hernadez DO   indomethacin (INDOCIN SR) 75 MG extended release capsule TAKE 1 CAPSULE BY MOUTH 2 TIMES DAILY (WITH MEALS). 6/17/22  Yes Telma Hernadez DO   midodrine (PROAMATINE) 5 MG tablet TAKE 1 TABLET BY MOUTH TWICE A DAY 6/6/22  Yes Telma Hernadez DO   topiramate (TOPAMAX) 25 MG tablet TAKE 1 TABLET BY MOUTH NIGHTLY 6/6/22  Yes Telma Hernadez DO   omeprazole (PRILOSEC) 40 MG delayed release capsule TAKE 1 CAPSULE BY MOUTH EVERY DAY 5/23/22  Yes Telma Hernadez DO   linaclotide (LINZESS) 145 MCG capsule Take 1 capsule by mouth every morning (before breakfast) 5/12/22  Yes Telma Hernadez DO   gabapentin (NEURONTIN) 600 MG tablet Take 1 tablet by mouth 3 times daily for 30 days. 5/2/22 7/25/22 Yes Telma Hernadez DO   hydrOXYzine (ATARAX) 25 MG tablet Take 1 tablet by mouth 2 times daily as needed for Anxiety 11/11/21  Yes Telma Hernadez DO        Allergies:       Clindamycin/lincomycin    Physical Exam:     Vitals:  BP 96/64   Pulse 70   Ht 5' 6\" (1.676 m)   Wt 213 lb (96.6 kg)   LMP 07/11/2022   SpO2 98%   BMI 34.38 kg/m²   Physical Exam  Vitals and nursing note reviewed. Constitutional:       General: She is not in acute distress. Appearance: She is well-developed. Pulmonary:      Effort: Pulmonary effort is normal.   Skin:     General: Skin is warm and dry. Comments: Small pits cuca groin, no fluctuant areas currently. Post-inflamm hyperpigmentation and LAD R groin  Erythema ab igne low back and upper buttocks  Very faint pink macules R calf   Neurological:      Mental Status: She is alert and oriented to person, place, and time.    Psychiatric:         Mood and Affect: Mood normal.         Behavior: Behavior normal.       Data:     Lab Results   Component Value Date/Time  05/02/2022 10:59 AM    K 4.5 05/02/2022 10:59 AM     05/02/2022 10:59 AM    CO2 27 05/02/2022 10:59 AM    BUN 8 05/02/2022 10:59 AM    CREATININE 0.80 05/02/2022 10:59 AM    GLUCOSE 114 05/02/2022 10:59 AM    PROT 6.3 05/02/2022 10:59 AM    LABALBU 4.0 05/02/2022 10:59 AM    BILITOT 0.12 05/02/2022 10:59 AM    ALKPHOS 50 05/02/2022 10:59 AM    AST 15 05/02/2022 10:59 AM    ALT 9 05/02/2022 10:59 AM     Lab Results   Component Value Date/Time    WBC 6.0 05/02/2022 10:59 AM    RBC 4.30 05/02/2022 10:59 AM    HGB 12.0 05/02/2022 10:59 AM    HCT 36.5 05/02/2022 10:59 AM    MCV 85.0 05/02/2022 10:59 AM    MCH 28.0 05/02/2022 10:59 AM    MCHC 33.0 05/02/2022 10:59 AM    RDW 13.1 05/02/2022 10:59 AM     05/02/2022 10:59 AM    MPV NOT REPORTED 09/19/2021 03:44 PM     Lab Results   Component Value Date/Time    TSH 1.18 05/02/2022 10:59 AM     Lab Results   Component Value Date/Time    LABA1C 5.3 09/19/2018 12:24 PM         Assessment & Plan:        Diagnosis Orders   1. Chronic bilateral low back pain without sciatica  External Referral To Physical Therapy      2. Erythema ab igne        3. Hidradenitis suppurativa        4. Moderate episode of recurrent major depressive disorder (HCC)           Low back pain continues, had been helped by injections and will have repeat soon. Cont same rx. 2. Erythema ab igne, improving. Reassured. 3. HS improved, ok to cont steroid for now, keep upcoming f/u with derm. Developed rash after use of topical clinda. 4. Depression controlled, cont lexapro. Alcoholism in remission, cont counseling. Requested Prescriptions     Signed Prescriptions Disp Refills    albuterol sulfate HFA (PROVENTIL;VENTOLIN;PROAIR) 108 (90 Base) MCG/ACT inhaler 1 each 3     Sig: Inhale 2 puffs into the lungs 4 times daily    escitalopram (LEXAPRO) 20 MG tablet 30 tablet 5     Sig: Take 1 tablet by mouth in the morning.          Patient Instructions   SURVEY:    You may be receiving a survey from [employer] regarding your visit today. You may get this in the mail, through your MyChart or in your email. Please complete the survey to enable us to provide the highest quality of care to you and your family. If you cannot score us as very good ( 5 Stars) on any question, please feel free to call the office to discuss how we could have made your experience exceptional.     Thank you.     Clinical Care Team:  DO Shelby Ridley, [address] [place] [address] Team:  Deonte Rey                           signed by Emeterio Franco DO on 7/26/2022 at 11:18 PM  [address]  [address]  Dept: [phone]

## 2022-07-25 NOTE — PROGRESS NOTES
[facility]   Preadmission Testing    Name: Manley Lombard  : 1980  Patient Phone: [phone] (home) [phone] (work)    Procedure:BILATERAL L3-4 MEDIAL BRANCH AND L5 DORSAL RAMI INJECTION - Bilateral    Date of Procedure: 22  Surgeon: Thomas Clark MD    Ht:  5' 6\" (167.6 cm)  Wt: 213 lb (96.6 kg)  Wt method: Allergies: No Known Allergies             There were no vitals filed for this visit. Patient's last menstrual period was 2022. Do you take blood thinners? [] Yes    [x] No         Instructed to stop blood thinners prior to procedure? [] Yes    [] No      [x] N/A   Do you have sleep apnea? [] Yes    [x] No     Do you have acid reflux ? [] Yes    [x] No     Have you had a respiratory infection or sore throat in last 4 weeks before surgery? [] Yes    [x] No     Do you have poorly controlled asthma or COPD? [] Yes    [x] No         Have you had an EKG in last 12 months? [] Yes    [x] No          Do you smoke? [x] Yes    [] No      Please refrain from smoking on the day of surgery. Patient instructed on: [x] NPO Status   [x] Meds to Take  [x] Ride Home  [x]No Jewelry/Contact Lenses/Nail [place]     DOS Patient Needs [x] HCG   [] Blood Sugar  [] PT/INR         COVID Vaccinated? [] Yes    [x] No                     Patient instructed on the pre-operative, intra-operative, and post-operative process? Yes  Medication instructions reviewed with patient?   Yes

## 2022-07-26 ENCOUNTER — HOSPITAL ENCOUNTER (OUTPATIENT)
Dept: PHYSICAL THERAPY | Age: 42
Setting detail: THERAPIES SERIES
Discharge: HOME | End: 2022-07-26
Payer: COMMERCIAL

## 2022-07-26 PROCEDURE — 9900000107 HC NEEDLE INSERTION W/O INJECTION 3 OR MORE MUSCLES (SELF-PAY)

## 2022-07-26 ASSESSMENT — PAIN DESCRIPTION - ORIENTATION: ORIENTATION: LOWER

## 2022-07-26 ASSESSMENT — PAIN SCALES - GENERAL: PAINLEVEL_OUTOF10: 5

## 2022-07-26 ASSESSMENT — PAIN DESCRIPTION - LOCATION: LOCATION: BACK

## 2022-07-26 NOTE — PROGRESS NOTES
Phone: Geno Antoine      Fax: [phone]                            Outpatient Physical Therapy                                                                            Daily Note    Date: 2022  Patient Name: Amanda Farias        MRN: [id number]   ACCT#:  [de-identified]  : 1980  (38 y.o.)    Referring Practitioner: Dr. Chadwick Brooks    Referral Date : 21    Diagnosis: Chronic bilateral low back pain without sciatica  Treatment Diagnosis: Low Back Pain    Onset Date: 21  PT Insurance Information: Retail   Total # of Visits Approved: 10 Per Physician Order  Total # of Visits to Date: 8  No Show: 0  Canceled Appointment: 0    Pre-Treatment Pain:  5/10     Assessment  Assessment: Pt reports that her pain is 5-6/10 with no activity. Pain relief lasts 2wks then progresses. Pt reports taking pain meds on schedule now. Pt to recieve injection. Pt started at [employer] part time which she notes does increase her LBP. Chart Reviewed: Yes    Plan  Continue with current plan of care    Exercises/Modalities/Manual:  See DocFlow Sheet    Goals  (Total # of Visits to Date: 8)      Long Term Goals  Time Frame for Long term goals : 10 visits  Long term goal 1: Pt to report 75% improvement with regard to low back pain. Long term goal 2: Pt to report worst pain following work day 2/10.-MET  Long term goal 3: Pt to report no radicular LE pain x3 consecutive days. -MET    Post Treatment Pain:  4/10    Time In: 1120    Time Out : 1203  Timed Code Treatment Minutes: 43 Minutes  Total Treatment Time: Evangelist Grullon PT     Date: 2022

## 2022-07-29 ENCOUNTER — APPOINTMENT (OUTPATIENT)
Dept: GENERAL RADIOLOGY | Age: 42
End: 2022-07-29
Attending: PHYSICAL MEDICINE & REHABILITATION
Payer: COMMERCIAL

## 2022-07-29 ENCOUNTER — HOSPITAL ENCOUNTER (OUTPATIENT)
Age: 42
Setting detail: OUTPATIENT SURGERY
Discharge: HOME | End: 2022-07-29
Attending: PHYSICAL MEDICINE & REHABILITATION | Admitting: PHYSICAL MEDICINE & REHABILITATION
Payer: COMMERCIAL

## 2022-07-29 ENCOUNTER — HOSPITAL ENCOUNTER (OUTPATIENT)
Dept: PREADMISSION TESTING | Age: 42
Setting detail: SPECIMEN
Discharge: HOME | End: 2022-07-29
Payer: COMMERCIAL

## 2022-07-29 VITALS
HEIGHT: 66 IN | OXYGEN SATURATION: 96 % | BODY MASS INDEX: 34.23 KG/M2 | HEART RATE: 60 BPM | SYSTOLIC BLOOD PRESSURE: 132 MMHG | DIASTOLIC BLOOD PRESSURE: 84 MMHG | RESPIRATION RATE: 14 BRPM | WEIGHT: 213 LBS | TEMPERATURE: 97.5 F

## 2022-07-29 LAB
HCG(URINE) PREGNANCY TEST: NEGATIVE
SARS-COV-2, RAPID: NOT DETECTED
SPECIMEN DESCRIPTION: NORMAL

## 2022-07-29 PROCEDURE — C9803 HOPD COVID-19 SPEC COLLECT: HCPCS

## 2022-07-29 PROCEDURE — 2709999900 HC NON-CHARGEABLE SUPPLY: Performed by: PHYSICAL MEDICINE & REHABILITATION

## 2022-07-29 PROCEDURE — 7100000011 HC PHASE II RECOVERY - ADDTL 15 MIN: Performed by: PHYSICAL MEDICINE & REHABILITATION

## 2022-07-29 PROCEDURE — 87635 SARS-COV-2 COVID-19 AMP PRB: CPT

## 2022-07-29 PROCEDURE — 99152 MOD SED SAME PHYS/QHP 5/>YRS: CPT | Performed by: PHYSICAL MEDICINE & REHABILITATION

## 2022-07-29 PROCEDURE — 64493 INJ PARAVERT F JNT L/S 1 LEV: CPT | Performed by: PHYSICAL MEDICINE & REHABILITATION

## 2022-07-29 PROCEDURE — 64494 INJ PARAVERT F JNT L/S 2 LEV: CPT | Performed by: PHYSICAL MEDICINE & REHABILITATION

## 2022-07-29 PROCEDURE — 6360000002 HC RX W HCPCS: Performed by: PHYSICAL MEDICINE & REHABILITATION

## 2022-07-29 PROCEDURE — 3600000054 HC PAIN LEVEL 3 BASE: Performed by: PHYSICAL MEDICINE & REHABILITATION

## 2022-07-29 PROCEDURE — 81025 URINE PREGNANCY TEST: CPT

## 2022-07-29 PROCEDURE — 3600000055 HC PAIN LEVEL 3 ADDL 15 MIN: Performed by: PHYSICAL MEDICINE & REHABILITATION

## 2022-07-29 PROCEDURE — 2580000003 HC RX 258: Performed by: PHYSICAL MEDICINE & REHABILITATION

## 2022-07-29 PROCEDURE — 7100000010 HC PHASE II RECOVERY - FIRST 15 MIN: Performed by: PHYSICAL MEDICINE & REHABILITATION

## 2022-07-29 PROCEDURE — 76000 FLUOROSCOPY <1 HR PHYS/QHP: CPT

## 2022-07-29 PROCEDURE — 2500000003 HC RX 250 WO HCPCS: Performed by: PHYSICAL MEDICINE & REHABILITATION

## 2022-07-29 RX ORDER — TRIAMCINOLONE ACETONIDE 40 MG/ML
INJECTION, SUSPENSION INTRA-ARTICULAR; INTRAMUSCULAR PRN
Status: DISCONTINUED | OUTPATIENT
Start: 2022-07-29 | End: 2022-07-29 | Stop reason: ALTCHOICE

## 2022-07-29 RX ORDER — LIDOCAINE HYDROCHLORIDE 10 MG/ML
INJECTION, SOLUTION EPIDURAL; INFILTRATION; INTRACAUDAL; PERINEURAL PRN
Status: DISCONTINUED | OUTPATIENT
Start: 2022-07-29 | End: 2022-07-29 | Stop reason: ALTCHOICE

## 2022-07-29 RX ORDER — FENTANYL CITRATE 50 UG/ML
INJECTION, SOLUTION INTRAMUSCULAR; INTRAVENOUS PRN
Status: DISCONTINUED | OUTPATIENT
Start: 2022-07-29 | End: 2022-07-29 | Stop reason: ALTCHOICE

## 2022-07-29 RX ORDER — MIDAZOLAM HYDROCHLORIDE 1 MG/ML
INJECTION INTRAMUSCULAR; INTRAVENOUS PRN
Status: DISCONTINUED | OUTPATIENT
Start: 2022-07-29 | End: 2022-07-29 | Stop reason: ALTCHOICE

## 2022-07-29 RX ORDER — SODIUM CHLORIDE, SODIUM LACTATE, POTASSIUM CHLORIDE, CALCIUM CHLORIDE 600; 310; 30; 20 MG/100ML; MG/100ML; MG/100ML; MG/100ML
INJECTION, SOLUTION INTRAVENOUS CONTINUOUS
Status: DISCONTINUED | OUTPATIENT
Start: 2022-07-29 | End: 2022-07-29 | Stop reason: HOSPADM

## 2022-07-29 RX ADMIN — SODIUM CHLORIDE, POTASSIUM CHLORIDE, SODIUM LACTATE AND CALCIUM CHLORIDE: 600; 310; 30; 20 INJECTION, SOLUTION INTRAVENOUS at 09:14

## 2022-07-29 ASSESSMENT — PAIN - FUNCTIONAL ASSESSMENT: PAIN_FUNCTIONAL_ASSESSMENT: 0-10

## 2022-07-29 ASSESSMENT — PAIN DESCRIPTION - DESCRIPTORS: DESCRIPTORS: ACHING

## 2022-07-29 NOTE — DISCHARGE INSTRUCTIONS
You have received an injection of local anesthetic and corticosteroids. The local anesthetic effect typically last 4-6 hours. It may take 3-7 days for the corticosteroids to reach optimal effect. Please pay close attention to the following information/instructions: You may not drive for 12 hours after your injection. It is common to experience mild soreness at the injection site(s) for 24-48 hours. Ice is the best remedy. You may apply ice for 20 minutes at a time several times a day as needed. Avoid heat to the injection area for 72 hours. No hot packs, saunas, or steam rooms during this time. A regular shower is OK. You may immediately restart your regular medication regimen, including pain medications, anti-inflammatory, and blood thinners. Please remove the sterile dressing/band-aid tonight or tomorrow morning. Do not leave it on after you have taken a shower or gotten it wet. Corticosteroid side effects can occur after this injection, but they usually resolve after several days. These side effects can include flushing, hot flashes, mild palpitations, insomnia, water retention, feeling anxious/restless, or headaches. While it is extremely rare to get an infection after a spinal procedure, please call the office if you develop any signs of infection. These signs include fevers/chills, severely increased pain, redness at the injections site, or any drainage from the injection site. Remember that the corticosteroid benefit (long-term pain relief) from an injection can take as long as 10 days to occur. You may have a period of slightly increased pain after your injection before the cortisone takes effect. You may resume all of your normal daily activities 24 hours after your injection. It is OK to restart your exercise or physical therapy program as soon as you feel comfortable doing so. Please complete the pain diary given to you after your injection.  The information you provide on this diary is used to guide your treatment plan. You should schedule a follow-up visit in 2-3 weeks to review your response to this injection. Please bring your pain diary with you to this appointment. Education Materials Received: yes  Belongings Returned: yes    Resume all current outpatient medication(s). I understand and acknowledge receipt of the above instructions. Patient or Guardian Signature                                                         Date/Time                                                                                                                                            Physician's or R.N.'s Signature                                                                  Date/Time      The discharge instructions have been reviewed with the patient and/or Guardian. Patient and/or Guardian signed and retained a printed copy.         TO REACH DR JOYA FOR EMERGENCY,OR PAIN IS SEVERE POST INJECTION- PLEASE CALL [phone] ; CALL [phone] FOR EMERGENCY

## 2022-07-29 NOTE — H&P
Laron Sullivan MD      Corrinne Banker is a 38 y.o. female, who came in for a procedure,  bilateral L3-4 medial branch and L5 dorsal rami injection. No change since last visit.        Treatment done: physical therapy, anti-inflammatory medication and muscle relaxant    Allergies   Allergen Reactions    Clindamycin/Lincomycin Rash       Social History     Socioeconomic History    Marital status:      Spouse name: Not on file    Number of children: Not on file    Years of education: Not on file    Highest education level: Not on file   Occupational History    Not on file   Tobacco Use    Smoking status: Former     Packs/day: 1.00     Years: 2.00     Pack years: 2.00     Types: Cigarettes    Smokeless tobacco: Never   Vaping Use    Vaping Use: Every day   Substance and Sexual Activity    Alcohol use: Not Currently     Comment: in the past    Drug use: Not Currently     Comment: Pt has been sober for 30 days    Sexual activity: Not Currently   Other Topics Concern    Not on file   Social History Narrative    Not on file     Social Determinants of Health     Financial Resource Strain: Low Risk     Difficulty of Paying Living Expenses: Not hard at all   Food Insecurity: No Food Insecurity    Worried About Running Out of Food in the Last Year: Never true    Ran Out of Food in the Last Year: Never true   Transportation Needs: Not on file   Physical Activity: Not on file   Stress: Not on file   Social Connections: Not on file   Intimate Partner Violence: Not on file   Housing Stability: Not on file       Past Medical History:   Diagnosis Date    Anxiety     Bulging of lumbar intervertebral disc without myelopathy     L1    Chronic back pain     Depression     GERD (gastroesophageal reflux disease)     Substance abuse ([facility] [address]     alcohol and opiates       Past Surgical History:   Procedure Laterality Date    COLONOSCOPY  05/26/2020    PAIN MANAGEMENT PROCEDURE Bilateral 6/3/2022    LUMBAR MEDIAL BRANCH BLOCK BILATERAL L3-4 MEDIAL BRANCH AND L5 DORSAL RAMI INJECTION performed by Sergio Moss MD at [address]  03/2018    PLANTAR FASCIA SURGERY Left 06/11/2020       Prior to Visit Medications    Medication Sig Taking? Authorizing Provider   albuterol sulfate HFA (PROVENTIL;VENTOLIN;PROAIR) 108 (90 Base) MCG/ACT inhaler Inhale 2 puffs into the lungs 4 times daily  Comfort Sellers DO   escitalopram (LEXAPRO) 20 MG tablet Take 1 tablet by mouth in the morning. Comfort Sellers DO   traZODone (DESYREL) 50 MG tablet TAKE 3 TABLETS BY MOUTH EVERY NIGHT  Comfort Sellers DO   carisoprodol (SOMA) 250 MG tablet Take 1 tablet by mouth 3 times daily as needed (chronic low back pain) for up to 30 days. Comfort Sellers DO   triamcinolone (KENALOG) 0.1 % cream Apply topically 2 times daily for 14 days  Miguel Gibbons DO   buprenorphine-naloxone (SUBOXONE) 8-2 MG SUBL SL tablet PLACE 1 1/2 TABLET UNDER TONGUE ONCE A DAY  Historical Provider, MD   ondansetron (ZOFRAN) 4 MG tablet Take 1 tablet by mouth every 8 hours as needed for Nausea or Vomiting  Comfort Sellers DO   indomethacin (INDOCIN SR) 75 MG extended release capsule TAKE 1 CAPSULE BY MOUTH 2 TIMES DAILY (WITH MEALS). Comfort Sellers DO   midodrine (PROAMATINE) 5 MG tablet TAKE 1 TABLET BY MOUTH TWICE A DAY  Jennifer Camacho DO   topiramate (TOPAMAX) 25 MG tablet TAKE 1 TABLET BY MOUTH NIGHTLY  Comfort Sellers DO   omeprazole (PRILOSEC) 40 MG delayed release capsule TAKE 1 CAPSULE BY MOUTH EVERY DAY  Comfort Sellers DO   linaclotide (LINZESS) 145 MCG capsule Take 1 capsule by mouth every morning (before breakfast)  Comfort Sellers DO   gabapentin (NEURONTIN) 600 MG tablet Take 1 tablet by mouth 3 times daily for 30 days.   Comfort Sellers DO   hydrOXYzine (ATARAX) 25 MG tablet Take 1 tablet by mouth 2 times daily as needed for Anxiety  Comfort Sellers DO       Family History   Problem Relation Age of Onset Diabetes Mother          Review of Systems : All systems reviewed, all unremarkable other than HPI. No change since last visit. Physical Exam  Constitutional:       Appearance: Normal appearance. HENT:      Head: Normocephalic and atraumatic. Cardiovascular:      Rate and Rhythm: Normal rate and regular rhythm. Pulmonary:      Effort: Pulmonary effort is normal. No respiratory distress. Breath sounds: Normal breath sounds. Neurological:      General: No focal deficit present. Mental Status: She is alert and oriented to person, place, and time.   :    Cervical Spine Exam: Full ROM, without limitation     Access: Adequate mouth opening       Assessment:   Lumbar spondylosis     PLAN:     As advertised. Planned duration of treatment: 4 weeks. Further assessment and followup in  4 weeks for follow up post injection.        Electronically signed by Sergio Moss MD on 7/29/2022 at 9:04 AM

## 2022-07-29 NOTE — SEDATION DOCUMENTATION
Conscious Sedation Pre and Post Procedure Note    Indication: procedural pain management    Consent: I have discussed with the patient and/or the patient representative the indication, alternatives, and the possible risks and/or complications of the planned procedure and the anesthesia methods. The patient and/or patient representative appear to understand and agree to proceed. Physician Involvement: The attending physician was present and supervising this procedure. Pre-Sedation Documentation and Exam: I have personally completed a history, physical exam & review of systems for this patient (see notes). Airway Assessment: Mallampati Class II - (soft palate, fauces & uvula are visible)    Prior History of Anesthesia Complications: none    ASA Classification: Class 1 - A normal healthy patient    Sedation/ Anesthesia Plan: intravenous sedation    Medications Used: midazolam (Versed) intravenously and fentanyl intravenously    Monitoring and Safety: The patient was placed on a cardiac monitor and vital signs, pulse oximetry and level of consciousness were continuously evaluated throughout the procedure. The patient was closely monitored until recovery from the medications was complete and the patient had returned to baseline status. Respiratory therapy was on standby at all times during the procedure. (The following sections must be completed)    Post-Sedation Vital Signs: Vital signs were reviewed and were stable after the procedure (see flow sheet for vitals)            Post-Sedation Exam: Neurologically intact. No cardiovascular compromise post injection.               Complications: none

## 2022-07-29 NOTE — LETTER
[address]  [place] [address]  Phone: [phone]    No name on file. July 29, 2022     Patient: Manley Lombard   YOB: 1980   Date of Visit: 6/24/2022       To Whom It May Concern: It is my medical opinion that Mahesh Garcia may return to work on 8/2/22. If you have any questions or concerns, please don't hesitate to call. Sincerely,        ERAN Evans RN

## 2022-08-08 RX ORDER — HYDROXYZINE HYDROCHLORIDE 25 MG/1
TABLET, FILM COATED ORAL
Qty: 180 TABLET | Refills: 1 | Status: SHIPPED | OUTPATIENT
Start: 2022-08-08

## 2022-08-17 DIAGNOSIS — G89.29 CHRONIC BILATERAL LOW BACK PAIN WITHOUT SCIATICA: ICD-10-CM

## 2022-08-17 DIAGNOSIS — M54.50 CHRONIC BILATERAL LOW BACK PAIN WITHOUT SCIATICA: ICD-10-CM

## 2022-08-17 RX ORDER — CARISOPRODOL 250 MG/1
250 TABLET ORAL 3 TIMES DAILY PRN
Qty: 90 TABLET | Refills: 0 | Status: SHIPPED | OUTPATIENT
Start: 2022-08-17 | End: 2022-09-12

## 2022-08-30 ENCOUNTER — HOSPITAL ENCOUNTER (EMERGENCY)
Age: 42
Discharge: HOME | End: 2022-08-30
Attending: FAMILY MEDICINE
Payer: COMMERCIAL

## 2022-08-30 ENCOUNTER — HOSPITAL ENCOUNTER (OUTPATIENT)
Dept: PHYSICAL THERAPY | Age: 42
Setting detail: THERAPIES SERIES
Discharge: HOME | End: 2022-08-30

## 2022-08-30 VITALS
OXYGEN SATURATION: 97 % | HEART RATE: 60 BPM | SYSTOLIC BLOOD PRESSURE: 122 MMHG | RESPIRATION RATE: 16 BRPM | DIASTOLIC BLOOD PRESSURE: 72 MMHG | HEIGHT: 66 IN | WEIGHT: 200 LBS | BODY MASS INDEX: 32.14 KG/M2 | TEMPERATURE: 97.8 F

## 2022-08-30 DIAGNOSIS — R11.2 NON-INTRACTABLE VOMITING WITH NAUSEA, UNSPECIFIED VOMITING TYPE: ICD-10-CM

## 2022-08-30 DIAGNOSIS — F19.939 ACUTE DRUG WITHDRAWAL SYNDROME WITH COMPLICATION (HCC): Primary | ICD-10-CM

## 2022-08-30 LAB
ABSOLUTE EOS #: 0 K/UL (ref 0–0.4)
ABSOLUTE LYMPH #: 1.6 K/UL (ref 1–4.8)
ABSOLUTE MONO #: 0.5 K/UL (ref 0–1)
ALBUMIN SERPL-MCNC: 3.8 G/DL (ref 3.5–5.2)
ALP BLD-CCNC: 98 U/L (ref 35–104)
ALT SERPL-CCNC: 11 U/L (ref 5–33)
ANION GAP SERPL CALCULATED.3IONS-SCNC: 10 MMOL/L (ref 9–17)
AST SERPL-CCNC: 13 U/L
BASOPHILS # BLD: 0 % (ref 0–2)
BASOPHILS ABSOLUTE: 0 K/UL (ref 0–0.2)
BILIRUB SERPL-MCNC: 0.27 MG/DL (ref 0.3–1.2)
BUN BLDV-MCNC: 12 MG/DL (ref 6–20)
BUN/CREAT BLD: 15 (ref 9–20)
CALCIUM SERPL-MCNC: 9.2 MG/DL (ref 8.6–10.4)
CHLORIDE BLD-SCNC: 102 MMOL/L (ref 98–107)
CO2: 24 MMOL/L (ref 20–31)
CREAT SERPL-MCNC: 0.79 MG/DL (ref 0.5–0.9)
DIFFERENTIAL TYPE: YES
EOSINOPHILS RELATIVE PERCENT: 0 % (ref 0–5)
GFR AFRICAN AMERICAN: >60 ML/MIN
GFR NON-AFRICAN AMERICAN: >60 ML/MIN
GFR SERPL CREATININE-BSD FRML MDRD: ABNORMAL ML/MIN/{1.73_M2}
GLUCOSE BLD-MCNC: 112 MG/DL (ref 70–99)
HCG QUALITATIVE: NEGATIVE
HCT VFR BLD CALC: 41.5 % (ref 36–46)
HEMOGLOBIN: 14 G/DL (ref 12–16)
LYMPHOCYTES # BLD: 15 % (ref 15–40)
MCH RBC QN AUTO: 28.1 PG (ref 26–34)
MCHC RBC AUTO-ENTMCNC: 33.7 G/DL (ref 31–37)
MCV RBC AUTO: 83.4 FL (ref 80–100)
MONOCYTES # BLD: 5 % (ref 4–8)
PDW BLD-RTO: 14.4 % (ref 12.1–15.2)
PLATELET # BLD: 395 K/UL (ref 140–450)
POTASSIUM SERPL-SCNC: 3.8 MMOL/L (ref 3.7–5.3)
RBC # BLD: 4.97 M/UL (ref 4–5.2)
SEG NEUTROPHILS: 80 % (ref 47–75)
SEGMENTED NEUTROPHILS ABSOLUTE COUNT: 8.5 K/UL (ref 2.5–7)
SODIUM BLD-SCNC: 136 MMOL/L (ref 135–144)
TOTAL PROTEIN: 7.3 G/DL (ref 6.4–8.3)
WBC # BLD: 10.7 K/UL (ref 3.5–11)

## 2022-08-30 PROCEDURE — 84703 CHORIONIC GONADOTROPIN ASSAY: CPT

## 2022-08-30 PROCEDURE — 2580000003 HC RX 258: Performed by: FAMILY MEDICINE

## 2022-08-30 PROCEDURE — 85025 COMPLETE CBC W/AUTO DIFF WBC: CPT

## 2022-08-30 PROCEDURE — 80053 COMPREHEN METABOLIC PANEL: CPT

## 2022-08-30 PROCEDURE — 96375 TX/PRO/DX INJ NEW DRUG ADDON: CPT

## 2022-08-30 PROCEDURE — 96374 THER/PROPH/DIAG INJ IV PUSH: CPT

## 2022-08-30 PROCEDURE — 6360000002 HC RX W HCPCS: Performed by: FAMILY MEDICINE

## 2022-08-30 PROCEDURE — 36415 COLL VENOUS BLD VENIPUNCTURE: CPT

## 2022-08-30 PROCEDURE — 6370000000 HC RX 637 (ALT 250 FOR IP): Performed by: FAMILY MEDICINE

## 2022-08-30 PROCEDURE — 99284 EMERGENCY DEPT VISIT MOD MDM: CPT

## 2022-08-30 RX ORDER — PROMETHAZINE HYDROCHLORIDE 25 MG/1
25 TABLET ORAL EVERY 6 HOURS PRN
Qty: 20 TABLET | Refills: 0 | Status: SHIPPED | OUTPATIENT
Start: 2022-08-30 | End: 2022-09-06

## 2022-08-30 RX ORDER — 0.9 % SODIUM CHLORIDE 0.9 %
1000 INTRAVENOUS SOLUTION INTRAVENOUS ONCE
Status: COMPLETED | OUTPATIENT
Start: 2022-08-30 | End: 2022-08-30

## 2022-08-30 RX ORDER — CLONIDINE HYDROCHLORIDE 0.1 MG/1
0.1 TABLET ORAL 2 TIMES DAILY
Qty: 10 TABLET | Refills: 0 | Status: SHIPPED | OUTPATIENT
Start: 2022-08-30 | End: 2022-09-12 | Stop reason: ALTCHOICE

## 2022-08-30 RX ORDER — CLONIDINE HYDROCHLORIDE 0.1 MG/1
0.1 TABLET ORAL ONCE
Status: COMPLETED | OUTPATIENT
Start: 2022-08-30 | End: 2022-08-30

## 2022-08-30 RX ORDER — ONDANSETRON 2 MG/ML
4 INJECTION INTRAMUSCULAR; INTRAVENOUS ONCE
Status: COMPLETED | OUTPATIENT
Start: 2022-08-30 | End: 2022-08-30

## 2022-08-30 RX ORDER — ONDANSETRON 4 MG/1
4 TABLET, ORALLY DISINTEGRATING ORAL EVERY 4 HOURS PRN
Qty: 15 TABLET | Refills: 0 | Status: SHIPPED | OUTPATIENT
Start: 2022-08-30 | End: 2022-09-12 | Stop reason: ALTCHOICE

## 2022-08-30 RX ORDER — LORAZEPAM 2 MG/ML
2 INJECTION INTRAMUSCULAR ONCE
Status: COMPLETED | OUTPATIENT
Start: 2022-08-30 | End: 2022-08-30

## 2022-08-30 RX ORDER — PROCHLORPERAZINE EDISYLATE 5 MG/ML
5 INJECTION INTRAMUSCULAR; INTRAVENOUS EVERY 6 HOURS PRN
Status: DISCONTINUED | OUTPATIENT
Start: 2022-08-30 | End: 2022-08-30 | Stop reason: HOSPADM

## 2022-08-30 RX ORDER — LORAZEPAM 1 MG/1
1 TABLET ORAL EVERY 8 HOURS PRN
Qty: 9 TABLET | Refills: 0 | Status: SHIPPED | OUTPATIENT
Start: 2022-08-30 | End: 2022-09-02

## 2022-08-30 RX ADMIN — CLONIDINE HYDROCHLORIDE 0.1 MG: 0.1 TABLET ORAL at 13:24

## 2022-08-30 RX ADMIN — ONDANSETRON 4 MG: 2 INJECTION INTRAMUSCULAR; INTRAVENOUS at 12:49

## 2022-08-30 RX ADMIN — SODIUM CHLORIDE 1000 ML: 9 INJECTION, SOLUTION INTRAVENOUS at 12:49

## 2022-08-30 RX ADMIN — PROCHLORPERAZINE EDISYLATE 5 MG: 5 INJECTION INTRAMUSCULAR; INTRAVENOUS at 13:23

## 2022-08-30 RX ADMIN — LORAZEPAM 2 MG: 2 INJECTION, SOLUTION INTRAMUSCULAR; INTRAVENOUS at 14:40

## 2022-08-30 ASSESSMENT — PAIN - FUNCTIONAL ASSESSMENT: PAIN_FUNCTIONAL_ASSESSMENT: NONE - DENIES PAIN

## 2022-08-30 NOTE — LETTER
[facility] ED  [address], [address]  Phone: [phone]               August 30, 2022    Patient: Светлана Call   YOB: 1980   Date of Visit: 8/30/2022       To Whom It May Concern:    Douglas Hines was seen and treated in our emergency department on 8/30/2022.  She may return to work 9/1/22      Sincerely,       Reymundo Villalobos RN         Signature:__________________________________

## 2022-08-30 NOTE — PROGRESS NOTES
[facility]    Date: 2022  Patient Name: Kaden Shah        : 1980       Pt No Showed Appt - Appt was changed from 22 to today.   Left messsage to follow up with pt re: current status and if she would like to cont with [employer]      Gerald Castro, PT Date: 2022

## 2022-08-31 NOTE — ED PROVIDER NOTES
[facility]  eMERGENCY dEPARTMENT eNCOUnter          [address] [facility]       Chief Complaint   Patient presents with    Emesis     N/V FOR 4 DAYS SINCE SHE STOPPED TAKING SUBOXONE       Nurses Notes reviewed and I agree except as noted in the HPI. HISTORY OF PRESENT ILLNESS    Paul Guzman is a 38 y.o. female who presents to the emergency room via vehicle, patient complaining of nausea vomiting for the past 4 days, states been approximately 5 days since she got her self off Suboxone, with sudden withdrawal without taper. Patient concerned she got deep any of her medication due to the vomiting. Denies other injury or drug use. REVIEW OF SYSTEMS     Review of Systems   All other systems reviewed and are negative. PAST MEDICAL HISTORY    has a past medical history of Anxiety, Bulging of lumbar intervertebral disc without myelopathy, Chronic back pain, Depression, GERD (gastroesophageal reflux disease), and Substance abuse ([facility] [address] SURGICAL HISTORY      has a past surgical history that includes Pilonidal cyst excision (03/2018); Colonoscopy (05/26/2020); Plantar fascia surgery (Left, 06/11/2020); Pain management procedure (Bilateral, 6/3/2022); and Pain management procedure (Bilateral, 7/29/2022). CURRENT MEDICATIONS       Discharge Medication List as of 8/30/2022  2:56 PM        CONTINUE these medications which have NOT CHANGED    Details   carisoprodol (SOMA) 250 MG tablet Take 1 tablet by mouth 3 times daily as needed (chronic low back pain) for up to 30 days. , Disp-90 tablet, R-0Normal      hydrOXYzine HCl (ATARAX) 25 MG tablet TAKE 1 TABLET BY MOUTH TWICE A DAY AS NEEDED FOR ANXIETY, Disp-180 tablet, R-1Normal      albuterol sulfate HFA (PROVENTIL;VENTOLIN;PROAIR) 108 (90 Base) MCG/ACT inhaler Inhale 2 puffs into the lungs 4 times daily, Disp-1 each, R-3Normal      escitalopram (LEXAPRO) 20 MG tablet Take 1 tablet by mouth in the morning., Disp-30 tablet, R-5Normal traZODone (DESYREL) 50 MG tablet TAKE 3 TABLETS BY MOUTH EVERY NIGHT, Disp-90 tablet, R-5Normal      triamcinolone (KENALOG) 0.1 % cream Apply topically 2 times daily for 14 days, Disp-453.6 g, R-1, Normal      buprenorphine-naloxone (SUBOXONE) 8-2 MG SUBL SL tablet PLACE 1 1/2 TABLET UNDER TONGUE ONCE A DAYHistorical Med      ondansetron (ZOFRAN) 4 MG tablet Take 1 tablet by mouth every 8 hours as needed for Nausea or Vomiting, Disp-60 tablet, R-1Normal      indomethacin (INDOCIN SR) 75 MG extended release capsule TAKE 1 CAPSULE BY MOUTH 2 TIMES DAILY (WITH MEALS). , Disp-60 capsule, R-3Normal      midodrine (PROAMATINE) 5 MG tablet TAKE 1 TABLET BY MOUTH TWICE A DAY, Disp-60 tablet, R-3Normal      topiramate (TOPAMAX) 25 MG tablet TAKE 1 TABLET BY MOUTH NIGHTLY, Disp-90 tablet, R-1Normal      omeprazole (PRILOSEC) 40 MG delayed release capsule TAKE 1 CAPSULE BY MOUTH EVERY DAY, Disp-90 capsule, R-1Normal      linaclotide (LINZESS) 145 MCG capsule Take 1 capsule by mouth every morning (before breakfast), Disp-30 capsule, R-2Normal      gabapentin (NEURONTIN) 600 MG tablet Take 1 tablet by mouth 3 times daily for 30 days. , Disp-90 tablet, R-0NO PRINT             ALLERGIES     is allergic to clindamycin/lincomycin. FAMILY HISTORY     She indicated that her mother is alive. family history includes Diabetes in her mother. SOCIAL HISTORY      reports that she has quit smoking. Her smoking use included cigarettes. She has a 2.00 pack-year smoking history. She has never used smokeless tobacco. She reports that she does not currently use alcohol. She reports that she does not currently use drugs. PHYSICAL EXAM     INITIAL VITALS:  height is 5' 6\" (1.676 m) and weight is 200 lb (90.7 kg). Her oral temperature is 97.8 °F (36.6 °C). Her blood pressure is 122/72 and her pulse is 60. Her respiration is 16 and oxygen saturation is 97%.     Physical Exam   Constitutional: Patient is oriented to person, place, and time. Patient appears well-developed and well-nourished. Patient is active and cooperative. HENT:   Head: Normocephalic and atraumatic. Head is without contusion. Right Ear: Hearing and external ear normal. No drainage. Left Ear: Hearing and external ear normal. No drainage. Nose: Nose normal. No nasal deformity. No epistaxis. Mouth/Throat: Mucous membranes are not dry. Eyes: EOMI. Conjunctivae, sclera, and lids are normal. Right eye exhibits no discharge. Left eye exhibits no discharge. Neck: Full passive range of motion without pain and phonation normal.   Cardiovascular:  Normal rate, regular rhythm and intact distal pulses. Pulses: Right radial pulse  2+   Pulmonary/Chest: Effort normal. No tachypnea and no bradypnea. No wheezes, rhonchi, or rales. Abdominal: BMI 32.2, soft. Patient without distension or tenderness  Musculoskeletal:   Negative acute trauma or deformity,  apparent full range of motion and normal strength all extremities appropriate to age. Neurological: Patient is alert and oriented to person, place, and time. patient displays no tremor. Patient displays no seizure activity. .    Skin: Skin is warm and dry. Patient is not diaphoretic. Psychiatric: Patient has a normal mood and very anxious affect.  Patient speech is normal and behavior is normal. Cognition and memory are normal.     DIFFERENTIAL DIAGNOSIS:   Drug withdrawal, anxiety    DIAGNOSTIC RESULTS           RADIOLOGY: non-plain film images(s) such as CT, Ultrasound and MRI are read by the radiologist.  No orders to display       LABS:   Labs Reviewed   CBC WITH AUTO DIFFERENTIAL - Abnormal; Notable for the following components:       Result Value    Seg Neutrophils 80 (*)     Segs Absolute 8.50 (*)     All other components within normal limits   COMPREHENSIVE METABOLIC PANEL - Abnormal; Notable for the following components:    Glucose 112 (*)     Total Bilirubin 0.27 (*)     All other components within normal limits HCG, SERUM, QUALITATIVE       EMERGENCY DEPARTMENT COURSE:   Vitals:    Vitals:    08/30/22 1233   BP: 122/72   Pulse: 60   Resp: 16   Temp: 97.8 °F (36.6 °C)   TempSrc: Oral   SpO2: 97%   Weight: 200 lb (90.7 kg)   Height: 5' 6\" (1.676 m)     Patient history and physical exam taken at bedside, discussed patient symptoms and exam findings, discussed getting IV access, work and possible urine studies, in the interim we will give patient IV Zofran, IV fluid bolus, and will reevaluate, acknowledged    Lab work-up reviewed    Follow-up with patient, still not having much relief, patient concerned that her symptoms are potentially worsening, as patient pressure is good we will give oral clonidine as an adjunct to her regimen medication reevaluate    Although patient describes some mild discomfort but relief, she is on a lot of discomfort and feels it, tends to get worse, I discussed patient possible inpatient rehab such as at [facility] in [facility], the patient is answered at this time, we cannot admit patient here as this facility is not equipped for this type of care, I had further discussion with patient, including patient's  now at bedside, as patient is not suicidal and is more anxious than truly showing other symptoms, will prescribe Ativan 1 mg p.o. for breakthrough anxiety, will give lorazepam 2 mg IV prior to discharge to give patient time to get home with , close outpatient follow-up with psychiatry, otherwise follow-up with PCP, return to ER if symptoms change or further concerns come acknowledged    FINAL IMPRESSION      1. Acute drug withdrawal syndrome with complication ([facility] [address]    2.  Non-intractable vomiting with nausea, unspecified vomiting type          DISPOSITION/PLAN   D/c    PATIENT REFERRED TO:  John Telles DO  [address]  [phone]    Call       [facility]  [phone]    As needed    [facility] ([place], [place]) - womens only acute withdrawal facility  [phone]    As needed    [facility]  [address] [facility]  [phone]    As needed, If symptoms worsen      DISCHARGE MEDICATIONS:  Discharge Medication List as of 8/30/2022  2:56 PM        START taking these medications    Details   ondansetron (ZOFRAN ODT) 4 MG disintegrating tablet Take 1 tablet by mouth every 4 hours as needed for Nausea or Vomiting, Disp-15 tablet, R-0Normal      promethazine (PHENERGAN) 25 MG tablet Take 1 tablet by mouth every 6 hours as needed for Nausea, Disp-20 tablet, R-0Normal      cloNIDine (CATAPRES) 0.1 MG tablet Take 1 tablet by mouth 2 times daily for 5 days, Disp-10 tablet, R-0Normal      LORazepam (ATIVAN) 1 MG tablet Take 1 tablet by mouth every 8 hours as needed for Anxiety for up to 3 days. , Disp-9 tablet, R-0Normal                 Summation      Patient Course:  d/c    ED Medications administered this visit:    Medications   0.9 % sodium chloride bolus (0 mLs IntraVENous Stopped 8/30/22 1500)   ondansetron (ZOFRAN) injection 4 mg (4 mg IntraVENous Given 8/30/22 1249)   cloNIDine (CATAPRES) tablet 0.1 mg (0.1 mg Oral Given 8/30/22 1324)   LORazepam (ATIVAN) injection 2 mg (2 mg IntraVENous Given 8/30/22 1440)       New Prescriptions from this visit:    Discharge Medication List as of 8/30/2022  2:56 PM        START taking these medications    Details   ondansetron (ZOFRAN ODT) 4 MG disintegrating tablet Take 1 tablet by mouth every 4 hours as needed for Nausea or Vomiting, Disp-15 tablet, R-0Normal      promethazine (PHENERGAN) 25 MG tablet Take 1 tablet by mouth every 6 hours as needed for Nausea, Disp-20 tablet, R-0Normal      cloNIDine (CATAPRES) 0.1 MG tablet Take 1 tablet by mouth 2 times daily for 5 days, Disp-10 tablet, R-0Normal      LORazepam (ATIVAN) 1 MG tablet Take 1 tablet by mouth every 8 hours as needed for Anxiety for up to 3 days. , Disp-9 tablet, R-0Normal             [place]  [address] [facility]  [phone]    Call       [facility]  [phone]    As needed    [facility] [place], [place]) - womens only acute withdrawal facility  [phone]    As needed    [facility]  [address] [facility] [address]  [phone]    As needed, If symptoms worsen        Final Impression:   1. Acute drug withdrawal syndrome with complication ([address]    2.  Non-intractable vomiting with nausea, unspecified vomiting type               (Please note that portions of this note were completed with a voice recognition program.  Efforts were made to edit the dictations but occasionally words are mis-transcribed.)    MD Brown Dasilva Sa, Sa, MD  08/31/22 6328

## 2022-09-05 RX ORDER — MIDODRINE HYDROCHLORIDE 5 MG/1
TABLET ORAL
Qty: 180 TABLET | Refills: 1 | Status: SHIPPED | OUTPATIENT
Start: 2022-09-05 | End: 2022-09-12 | Stop reason: ALTCHOICE

## 2022-09-12 ENCOUNTER — HOSPITAL ENCOUNTER (OUTPATIENT)
Dept: PHYSICAL THERAPY | Age: 42
Setting detail: THERAPIES SERIES
Discharge: HOME | End: 2022-09-12
Payer: COMMERCIAL

## 2022-09-12 ENCOUNTER — OFFICE VISIT (OUTPATIENT)
Dept: FAMILY MEDICINE CLINIC | Age: 42
End: 2022-09-12
Payer: COMMERCIAL

## 2022-09-12 VITALS
OXYGEN SATURATION: 99 % | DIASTOLIC BLOOD PRESSURE: 74 MMHG | WEIGHT: 204 LBS | SYSTOLIC BLOOD PRESSURE: 120 MMHG | HEIGHT: 66 IN | BODY MASS INDEX: 32.78 KG/M2 | HEART RATE: 92 BPM

## 2022-09-12 DIAGNOSIS — G89.29 CHRONIC BILATERAL LOW BACK PAIN WITHOUT SCIATICA: ICD-10-CM

## 2022-09-12 DIAGNOSIS — G25.81 RESTLESS LEG: ICD-10-CM

## 2022-09-12 DIAGNOSIS — M62.81 SUBJECTIVE MUSCLE WEAKNESS: Primary | ICD-10-CM

## 2022-09-12 DIAGNOSIS — F11.11 NARCOTIC ABUSE IN REMISSION (HCC): ICD-10-CM

## 2022-09-12 DIAGNOSIS — M54.50 CHRONIC BILATERAL LOW BACK PAIN WITHOUT SCIATICA: ICD-10-CM

## 2022-09-12 DIAGNOSIS — F10.11 ALCOHOL ABUSE, IN REMISSION: ICD-10-CM

## 2022-09-12 PROCEDURE — 99214 OFFICE O/P EST MOD 30 MIN: CPT | Performed by: FAMILY MEDICINE

## 2022-09-12 PROCEDURE — G8427 DOCREV CUR MEDS BY ELIG CLIN: HCPCS | Performed by: FAMILY MEDICINE

## 2022-09-12 PROCEDURE — 1036F TOBACCO NON-USER: CPT | Performed by: FAMILY MEDICINE

## 2022-09-12 PROCEDURE — G8417 CALC BMI ABV UP PARAM F/U: HCPCS | Performed by: FAMILY MEDICINE

## 2022-09-12 PROCEDURE — 9900000107 HC NEEDLE INSERTION W/O INJECTION 3 OR MORE MUSCLES (SELF-PAY)

## 2022-09-12 RX ORDER — ROPINIROLE 0.25 MG/1
0.25 TABLET, FILM COATED ORAL 2 TIMES DAILY
Qty: 60 TABLET | Refills: 0 | Status: SHIPPED | OUTPATIENT
Start: 2022-09-12 | End: 2022-10-12

## 2022-09-12 RX ORDER — DICLOFENAC SODIUM 75 MG/1
75 TABLET, DELAYED RELEASE ORAL 2 TIMES DAILY
Qty: 60 TABLET | Refills: 5 | Status: SHIPPED | OUTPATIENT
Start: 2022-09-12

## 2022-09-12 RX ORDER — GABAPENTIN 600 MG/1
TABLET ORAL
Qty: 90 TABLET | Refills: 0
Start: 2022-09-12 | End: 2022-10-12

## 2022-09-12 RX ORDER — MINOCYCLINE HYDROCHLORIDE 100 MG/1
CAPSULE ORAL
COMMUNITY
Start: 2022-08-15

## 2022-09-12 NOTE — PATIENT INSTRUCTIONS
SURVEY:    You may be receiving a survey from [employer] regarding your visit today. You may get this in the mail, through your MyChart or in your email. Please complete the survey to enable us to provide the highest quality of care to you and your family. If you cannot score us as very good ( 5 Stars) on any question, please feel free to call the office to discuss how we could have made your experience exceptional.     Thank you.     Clinical Care Team:  Dr. Flakito Vaca, [facility], [address] [place] [address] Team:  Margarita Richey

## 2022-09-12 NOTE — PROGRESS NOTES
Name: Jessica Rivera  : 1980         Chief Complaint:     Chief Complaint   Patient presents with    Back Pain    Other     Detoxed herself from [id number] Now having regular bowel movements, feeling better. Constant fatigue is slowly wearing off. History of Present Illness:      Jessica Rivera is a 38 y.o.  female who presents with Back Pain and Other (Detoxed herself from [address]. Now having regular bowel movements, feeling better. Constant fatigue is slowly wearing off. )      HPI    Went off suboxone, 2 days in bed, vomiting, sweating. Had skipped 2 days and then took a pill and woke up the next morning withdrawing. After a couple days  brought her to ER and she was given meds which helped greatly. Through the worst of it but still struggling, gen muscle weakness, feels like nerves are shaking inside her extremities. Present all extremities but worst in legs. Comes in waves. Was off gabapentin during bad withdrawals, restarted in case it would help muscle symptoms, and found that it didn't. Interested in discontinuing med. Had been started by podiatry and she's no longer having foot problems. Takes it 600 TID and doesn't feel any high from it. Vani James is a problem, admits that she misuses it, doesn't find it helpful for pain and does get somewhat of a high from it, very drowsy. Back pain does continue. Last injection seemed to help more than the first one she had. Still doing PT which helps a lot with neck and upper back, maybe not as much with low back. Stopped suboxone, midodrine, zofran, and linzess. Daily BM again which has been nice. For about a wk was only eating yogurt, crackers, banana. Back to good diet now.      Medical History:     Patient Active Problem List   Diagnosis    Chronic neck pain    Onychomycosis    Chronic low back pain without sciatica    Narcotic abuse in remission (HCC)    Chronic constipation    Alcohol abuse, in remission    Alpha-1-antitrypsin and nursing note reviewed. Constitutional:       General: She is not in acute distress. Appearance: She is well-developed. Pulmonary:      Effort: Pulmonary effort is normal.   Skin:     General: Skin is warm and dry. Neurological:      Mental Status: She is alert and oriented to person, place, and time. Psychiatric:         Mood and Affect: Mood normal.         Behavior: Behavior normal.       Data:     Lab Results   Component Value Date/Time     08/30/2022 12:54 PM    K 3.8 08/30/2022 12:54 PM     08/30/2022 12:54 PM    CO2 24 08/30/2022 12:54 PM    BUN 12 08/30/2022 12:54 PM    CREATININE 0.79 08/30/2022 12:54 PM    GLUCOSE 112 08/30/2022 12:54 PM    PROT 7.3 08/30/2022 12:54 PM    LABALBU 3.8 08/30/2022 12:54 PM    BILITOT 0.27 08/30/2022 12:54 PM    ALKPHOS 98 08/30/2022 12:54 PM    AST 13 08/30/2022 12:54 PM    ALT 11 08/30/2022 12:54 PM     Lab Results   Component Value Date/Time    WBC 10.7 08/30/2022 12:54 PM    RBC 4.97 08/30/2022 12:54 PM    HGB 14.0 08/30/2022 12:54 PM    HCT 41.5 08/30/2022 12:54 PM    MCV 83.4 08/30/2022 12:54 PM    MCH 28.1 08/30/2022 12:54 PM    MCHC 33.7 08/30/2022 12:54 PM    RDW 14.4 08/30/2022 12:54 PM     08/30/2022 12:54 PM    MPV NOT REPORTED 09/19/2021 03:44 PM     Lab Results   Component Value Date/Time    TSH 1.18 05/02/2022 10:59 AM     Lab Results   Component Value Date/Time    LABA1C 5.3 09/19/2018 12:24 PM         Assessment & Plan:        Diagnosis Orders   1. Subjective muscle weakness        2. Restless leg        3. Chronic bilateral low back pain without sciatica        4. Alcohol abuse, in remission        5. Narcotic abuse in remission ([facility] [address]          1-2. Feelings of restless muscles, gavin in legs, subjective weakness, suspect still r/t suboxone withdrawal. Very bothersome and does persist throughout the day so we'll start requip. 3. Chronic low back pain, some improvement from pain mgmt procedures.  Soma and gabapentin don't seem to help and pt admits she's been misusing soma, hasn't been taking it (uses 30d supply within a couple wks of getting it) so we won't refill. Diclofenac, PT, pain mgmt.  4-5. Pt has been able to maintain sobriety without suboxone. Since she stopped med she's no longer attending program through which it was being prescribed. She will go to meetings with local friends she knows from [place]. I'm very proud of pt for admitting to trouble with soma and congratulated her on her progress. Requested Prescriptions     Signed Prescriptions Disp Refills    diclofenac (VOLTAREN) 75 MG EC tablet 60 tablet 5     Sig: Take 1 tablet by mouth 2 times daily    gabapentin (NEURONTIN) 600 MG tablet 90 tablet 0     Sig: For 3 days take 300 AM, 600 afternoon, 600 nighttime. Then for 3 days take 300 AM, 300 afternoon, 600 nighttime. Then start taking 300 mg 3 times a day. After about a week, cut out the afternoon dose. After another week, stop the morning dose. After another week, stop med altogether. rOPINIRole (REQUIP) 0.25 MG tablet 60 tablet 0     Sig: Take 1 tablet by mouth 2 times daily         Patient Instructions   SURVEY:    You may be receiving a survey from [employer] regarding your visit today. You may get this in the mail, through your MyChart or in your email. Please complete the survey to enable us to provide the highest quality of care to you and your family. If you cannot score us as very good ( 5 Stars) on any question, please feel free to call the office to discuss how we could have made your experience exceptional.     Thank you.     Clinical Care Team:  DO Suhas Ghotra, [address] [place] [address] Team:  [employer]                           signed by Telma Hernadez DO on 9/15/2022 at 10:04 PM  [facility] PHYSICIANS  [facility] [place] [phone]  Dept: [phone]

## 2022-10-05 NOTE — TELEPHONE ENCOUNTER
Last OV: 9/12/2022 chronic   Last RX:    Next scheduled apt: 10/12/2022 1 month f/u           Surescript requesting a refill

## 2022-10-07 ENCOUNTER — SCHEDULED TELEPHONE ENCOUNTER (OUTPATIENT)
Dept: PAIN MANAGEMENT | Age: 42
End: 2022-10-07
Payer: COMMERCIAL

## 2022-10-07 DIAGNOSIS — M54.16 LUMBAR RADICULOPATHY: Primary | ICD-10-CM

## 2022-10-07 PROCEDURE — 99441 PR PHYS/QHP TELEPHONE EVALUATION 5-10 MIN: CPT | Performed by: PHYSICAL MEDICINE & REHABILITATION

## 2022-10-07 RX ORDER — ROPINIROLE 0.25 MG/1
TABLET, FILM COATED ORAL
Qty: 60 TABLET | Refills: 0 | OUTPATIENT
Start: 2022-10-07

## 2022-10-07 NOTE — TELEPHONE ENCOUNTER
Will refill at visit 10/12 - want to see how pt has responded to this dose and adjust as appropriate

## 2022-10-07 NOTE — PROGRESS NOTES
Michel Soares is a 38 y.o. female evaluated via telephone on 10/7/2022 for chronic pain. She wants to be referred to [address] [place] [address] cognitive behavioral therapy. Documentation:        Total Time: minutes: 5-10 minutes    Michel Soares was evaluated through a synchronous (real-time) audio encounter. Patient identification was verified at the start of the visit. She (or guardian if applicable) is aware that this is a billable service, which includes applicable co-pays. This visit was conducted with the patient's (and/or legal guardian's) verbal consent. She has not had a related appointment within my department in the past 7 days or scheduled within the next 24 hours. The patient was located at Home: [place] [address]. The provider was located at Home ([address]: [place].     Note: not billable if this call serves to triage the patient into an appointment for the relevant concern    Fernando Kunz MD

## 2022-10-10 NOTE — TELEPHONE ENCOUNTER
Last visit:  9/12/2022  Next Visit Date:    Future Appointments   Date Time Provider Felipe Addis   10/12/2022 10:20 AM DO Sunitha Saucedo [facility]         Medication List:  Prior to Admission medications    Medication Sig Start Date End Date Taking? Authorizing Provider   minocycline (MINOCIN;DYNACIN) 100 MG capsule TAKE 1 CAPSULE BY MOUTH TWICE A DAY FOR 30 DAYS 8/15/22   Historical Provider, MD   diclofenac (VOLTAREN) 75 MG EC tablet Take 1 tablet by mouth 2 times daily 9/12/22   Anthony Mejia DO   gabapentin (NEURONTIN) 600 MG tablet For 3 days take 300 AM, 600 afternoon, 600 nighttime. Then for 3 days take 300 AM, 300 afternoon, 600 nighttime. Then start taking 300 mg 3 times a day. After about a week, cut out the afternoon dose. After another week, stop the morning dose. After another week, stop med altogether.  9/12/22 10/12/22  Anthony Mejia DO   rOPINIRole (REQUIP) 0.25 MG tablet Take 1 tablet by mouth 2 times daily 9/12/22   Anthony Mejia DO   hydrOXYzine HCl (ATARAX) 25 MG tablet TAKE 1 TABLET BY MOUTH TWICE A DAY AS NEEDED FOR ANXIETY 8/8/22   Anthony Mejia DO   albuterol sulfate HFA (PROVENTIL;VENTOLIN;PROAIR) 108 (90 Base) MCG/ACT inhaler Inhale 2 puffs into the lungs 4 times daily 7/25/22   Anthony Mejia DO   escitalopram (LEXAPRO) 20 MG tablet Take 1 tablet by mouth in the morning. 7/25/22   Anthony Mejia DO   traZODone (DESYREL) 50 MG tablet TAKE 3 TABLETS BY MOUTH EVERY NIGHT 7/21/22   Anthony Mejia DO   triamcinolone (KENALOG) 0.1 % cream Apply topically 2 times daily for 14 days 7/1/22   Tao Easley,    topiramate (TOPAMAX) 25 MG tablet TAKE 1 TABLET BY MOUTH NIGHTLY 6/6/22   Anthony Mejia DO   omeprazole (PRILOSEC) 40 MG delayed release capsule TAKE 1 CAPSULE BY MOUTH EVERY DAY 5/23/22   Anthony Mejia DO

## 2022-10-12 ENCOUNTER — OFFICE VISIT (OUTPATIENT)
Dept: FAMILY MEDICINE CLINIC | Age: 42
End: 2022-10-12
Payer: COMMERCIAL

## 2022-10-12 VITALS
SYSTOLIC BLOOD PRESSURE: 110 MMHG | OXYGEN SATURATION: 99 % | HEART RATE: 82 BPM | WEIGHT: 206 LBS | BODY MASS INDEX: 33.11 KG/M2 | DIASTOLIC BLOOD PRESSURE: 70 MMHG | HEIGHT: 66 IN

## 2022-10-12 DIAGNOSIS — G25.81 RESTLESS LEG: ICD-10-CM

## 2022-10-12 DIAGNOSIS — F11.11 NARCOTIC ABUSE IN REMISSION (HCC): ICD-10-CM

## 2022-10-12 DIAGNOSIS — G89.29 CHRONIC BILATERAL LOW BACK PAIN WITHOUT SCIATICA: Primary | ICD-10-CM

## 2022-10-12 DIAGNOSIS — F33.1 MODERATE EPISODE OF RECURRENT MAJOR DEPRESSIVE DISORDER (HCC): ICD-10-CM

## 2022-10-12 DIAGNOSIS — M54.50 CHRONIC BILATERAL LOW BACK PAIN WITHOUT SCIATICA: Primary | ICD-10-CM

## 2022-10-12 PROCEDURE — G8427 DOCREV CUR MEDS BY ELIG CLIN: HCPCS | Performed by: FAMILY MEDICINE

## 2022-10-12 PROCEDURE — 99214 OFFICE O/P EST MOD 30 MIN: CPT | Performed by: FAMILY MEDICINE

## 2022-10-12 PROCEDURE — G8417 CALC BMI ABV UP PARAM F/U: HCPCS | Performed by: FAMILY MEDICINE

## 2022-10-12 PROCEDURE — 1036F TOBACCO NON-USER: CPT | Performed by: FAMILY MEDICINE

## 2022-10-12 PROCEDURE — G8484 FLU IMMUNIZE NO ADMIN: HCPCS | Performed by: FAMILY MEDICINE

## 2022-10-12 RX ORDER — BUPROPION HYDROCHLORIDE 150 MG/1
150 TABLET ORAL EVERY MORNING
Qty: 30 TABLET | Refills: 3 | Status: SHIPPED | OUTPATIENT
Start: 2022-10-12

## 2022-10-12 RX ORDER — ROPINIROLE 0.25 MG/1
TABLET, FILM COATED ORAL
Qty: 60 TABLET | Refills: 0 | OUTPATIENT
Start: 2022-10-12

## 2022-10-12 NOTE — PROGRESS NOTES
Name: Melina Sparks  : 1980         Chief Complaint:     Chief Complaint   Patient presents with    Depression     Still feels very down       History of Present Illness:      Melina Sparks is a 38 y.o.  female who presents with Depression (Still feels very down)      HPI    C/o uncontrolled depression. In the past had taken seroquel and it helped her sleep but couldn't see any other difference. Had also taken abilify but was in such bad shape at that point that she is not sure what her response was to it. Overwhelmed by finances with car payment and insurance, feels she needs to work but has not been able to because of mental health struggles as well as chronic back pain. Patient and  are still getting along well and he does support her financially but she has these bills of her own including the car that she just bought a year ago. Ongoing back pain. Had phone visit with Dr Lauren Gilliland 10/7 and he referred her to a pain psychologist at [facility] which pt had read about in an article. Breathing ok and no swelling since getting off suboxone. Bm's ok. Off gabapentin as of 2-3 nights ago. RLS seems fine now so she doesn't think she needs to continue requip. Sometims shooting sharp pains in feet but not a whole lot. No increase in other pains with stopping gabapentin. Medical History:     Patient Active Problem List   Diagnosis    Chronic neck pain    Onychomycosis    Chronic low back pain without sciatica    Narcotic abuse in remission (HCC)    Chronic constipation    Alcohol abuse, in remission    Alpha-1-antitrypsin deficiency (HCC)    Hidradenitis suppurativa       Medications:       Prior to Admission medications    Medication Sig Start Date End Date Taking?  Authorizing Provider   buPROPion (WELLBUTRIN XL) 150 MG extended release tablet Take 1 tablet by mouth every morning 10/12/22  Yes Anabela Rebolledo DO   minocycline (MINOCIN;DYNACIN) 100 MG capsule TAKE 1 CAPSULE BY MOUTH TWICE A DAY FOR 30 DAYS 8/15/22   Historical Provider, MD   diclofenac (VOLTAREN) 75 MG EC tablet Take 1 tablet by mouth 2 times daily 9/12/22   Ivonne Pitts DO   hydrOXYzine HCl (ATARAX) 25 MG tablet TAKE 1 TABLET BY MOUTH TWICE A DAY AS NEEDED FOR ANXIETY 8/8/22   Ivonne Pitts DO   albuterol sulfate HFA (PROVENTIL;VENTOLIN;PROAIR) 108 (90 Base) MCG/ACT inhaler Inhale 2 puffs into the lungs 4 times daily 7/25/22   Ivonne Pitts DO   escitalopram (LEXAPRO) 20 MG tablet Take 1 tablet by mouth in the morning. 7/25/22   Ivonne Pitts DO   traZODone (DESYREL) 50 MG tablet TAKE 3 TABLETS BY MOUTH EVERY NIGHT 7/21/22   Ivonne Pitts DO   triamcinolone (KENALOG) 0.1 % cream Apply topically 2 times daily for 14 days 7/1/22   Uriel Easley DO   topiramate (TOPAMAX) 25 MG tablet TAKE 1 TABLET BY MOUTH NIGHTLY 6/6/22   Ivonne Pitts DO   omeprazole (PRILOSEC) 40 MG delayed release capsule TAKE 1 CAPSULE BY MOUTH EVERY DAY 5/23/22   Ivonne Pitts DO        Allergies:       Clindamycin/lincomycin    Physical Exam:     Vitals:  /70   Pulse 82   Ht 5' 6\" (1.676 m)   Wt 206 lb (93.4 kg)   SpO2 99%   BMI 33.25 kg/m²   Physical Exam  Vitals and nursing note reviewed. Constitutional:       General: She is not in acute distress. Appearance: She is well-developed. Pulmonary:      Effort: Pulmonary effort is normal.   Skin:     General: Skin is warm and dry. Neurological:      Mental Status: She is alert and oriented to person, place, and time.    Psychiatric:         Mood and Affect: Mood normal.         Behavior: Behavior normal.      Comments: Tearful through much of visit       Data:     Lab Results   Component Value Date/Time     08/30/2022 12:54 PM    K 3.8 08/30/2022 12:54 PM     08/30/2022 12:54 PM    CO2 24 08/30/2022 12:54 PM    BUN 12 08/30/2022 12:54 PM    CREATININE 0.79 08/30/2022 12:54 PM    GLUCOSE 112 08/30/2022 12:54 PM    PROT 7.3 08/30/2022 12:54 PM    LABALBU 3.8 08/30/2022 12:54 PM    BILITOT 0.27 08/30/2022 12:54 PM    ALKPHOS 98 08/30/2022 12:54 PM    AST 13 08/30/2022 12:54 PM    ALT 11 08/30/2022 12:54 PM     Lab Results   Component Value Date/Time    WBC 10.7 08/30/2022 12:54 PM    RBC 4.97 08/30/2022 12:54 PM    HGB 14.0 08/30/2022 12:54 PM    HCT 41.5 08/30/2022 12:54 PM    MCV 83.4 08/30/2022 12:54 PM    MCH 28.1 08/30/2022 12:54 PM    MCHC 33.7 08/30/2022 12:54 PM    RDW 14.4 08/30/2022 12:54 PM     08/30/2022 12:54 PM    MPV NOT REPORTED 09/19/2021 03:44 PM     Lab Results   Component Value Date/Time    TSH 1.18 05/02/2022 10:59 AM     Lab Results   Component Value Date/Time    LABA1C 5.3 09/19/2018 12:24 PM         Assessment & Plan:        Diagnosis Orders   1. Chronic bilateral low back pain without sciatica        2. Moderate episode of recurrent major depressive disorder [facility])  External Referral To Psychiatry      3. Restless leg        4. Narcotic abuse in remission [facility])          Ongoing low back pain, also with neck and upper back pain. Things not much worse since stopping gabapentin and Soma. Does seem that stopping Suboxone worsened her pain. However, she overall feels much better without the Suboxone, does appear that it had been causing multiple side effects. Advised to restart stretching and strengthening activities and demonstrated some particular maneuvers. She will be seeing new pain management doctor soon and is also looking into pain psychologist through the [facility]. She was told referral was made but I see no referral in her chart. I actually do not even see note from recent video visit with Dr. Namita Gabriel. 2.  Depression uncontrolled. Add Wellbutrin. Continue Lexapro. Referred to psychiatry. 3.  Restless leg resolved with more time off Suboxone, Soma, gabapentin. Okay to stop Requip. Call if symptoms recur and would restart, adjust dosing as appropriate.   4.  History of narcotic abuse, has done better physically off of Suboxone so continue staying off any type of narcotic.           Requested Prescriptions     Signed Prescriptions Disp Refills    buPROPion (WELLBUTRIN XL) 150 MG extended release tablet 30 tablet 3     Sig: Take 1 tablet by mouth every morning         There are no Patient Instructions on file for this visit.      signed by David Lara DO on 10/14/2022 at 12:35 PM  [place] [phone]  Dept: [phone]

## 2022-10-20 ENCOUNTER — OFFICE VISIT (OUTPATIENT)
Dept: PAIN MANAGEMENT | Age: 42
End: 2022-10-20
Payer: COMMERCIAL

## 2022-10-20 VITALS
HEIGHT: 66 IN | DIASTOLIC BLOOD PRESSURE: 76 MMHG | RESPIRATION RATE: 18 BRPM | BODY MASS INDEX: 33.11 KG/M2 | SYSTOLIC BLOOD PRESSURE: 107 MMHG | WEIGHT: 206 LBS | HEART RATE: 86 BPM | OXYGEN SATURATION: 99 %

## 2022-10-20 DIAGNOSIS — G89.29 CHRONIC BILATERAL LOW BACK PAIN WITHOUT SCIATICA: ICD-10-CM

## 2022-10-20 DIAGNOSIS — M54.50 CHRONIC BILATERAL LOW BACK PAIN WITHOUT SCIATICA: ICD-10-CM

## 2022-10-20 DIAGNOSIS — M51.36 LUMBAR DEGENERATIVE DISC DISEASE: ICD-10-CM

## 2022-10-20 DIAGNOSIS — M47.816 LUMBAR SPONDYLOSIS: Primary | ICD-10-CM

## 2022-10-20 PROCEDURE — 99214 OFFICE O/P EST MOD 30 MIN: CPT | Performed by: STUDENT IN AN ORGANIZED HEALTH CARE EDUCATION/TRAINING PROGRAM

## 2022-10-20 PROCEDURE — 1036F TOBACCO NON-USER: CPT | Performed by: STUDENT IN AN ORGANIZED HEALTH CARE EDUCATION/TRAINING PROGRAM

## 2022-10-20 PROCEDURE — G8484 FLU IMMUNIZE NO ADMIN: HCPCS | Performed by: STUDENT IN AN ORGANIZED HEALTH CARE EDUCATION/TRAINING PROGRAM

## 2022-10-20 PROCEDURE — G8417 CALC BMI ABV UP PARAM F/U: HCPCS | Performed by: STUDENT IN AN ORGANIZED HEALTH CARE EDUCATION/TRAINING PROGRAM

## 2022-10-20 PROCEDURE — G8427 DOCREV CUR MEDS BY ELIG CLIN: HCPCS | Performed by: STUDENT IN AN ORGANIZED HEALTH CARE EDUCATION/TRAINING PROGRAM

## 2022-10-20 NOTE — PATIENT INSTRUCTIONS
SURVEY:    You may be receiving a survey from [employer] regarding your visit today. Please complete the survey to enable us to provide the highest quality of care to you and your family. If you cannot score us a very good on any question, please call the office to discuss how we could have made your experience a very good one. Thank you.   MD Mark Zepeda MD Chesley Hind, MD Maryjean Dad, DO  Bella Enamorado, TREVOR Malone, [facility], [address] [place] [address], [facility]
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
## 2022-11-07 RX ORDER — BUPROPION HYDROCHLORIDE 150 MG/1
TABLET ORAL
Qty: 30 TABLET | Refills: 5 | Status: SHIPPED | OUTPATIENT
Start: 2022-11-07 | End: 2022-11-18 | Stop reason: SDUPTHER

## 2022-11-07 NOTE — TELEPHONE ENCOUNTER
Last visit:  10/12/2022  Next Visit Date:    Future Appointments   Date Time Provider Felipe Mancini   11/15/2022  9:15 AM MD jesus alberto Preston [facility]   11/18/2022 12:40 PM DO JESUS ALBERTO Gonzalez [facility]   12/8/2022 11:15 AM SCHEDULE, [facility] COVID19 PAT SCREENING [facility]   12/22/2022 11:30 AM SCHEDULE, Cody Olvera PAT SCREENING [facility]         Medication List:  Prior to Admission medications    Medication Sig Start Date End Date Taking?  Authorizing Provider   buPROPion (WELLBUTRIN XL) 150 MG extended release tablet Take 1 tablet by mouth every morning 10/12/22   Aide Carr DO   minocycline (MINOCIN;DYNACIN) 100 MG capsule TAKE 1 CAPSULE BY MOUTH TWICE A DAY FOR 30 DAYS 8/15/22   Historical Provider, MD   diclofenac (VOLTAREN) 75 MG EC tablet Take 1 tablet by mouth 2 times daily 9/12/22   Aide Carr DO   hydrOXYzine HCl (ATARAX) 25 MG tablet TAKE 1 TABLET BY MOUTH TWICE A DAY AS NEEDED FOR ANXIETY 8/8/22   Aide Carr DO   albuterol sulfate HFA (PROVENTIL;VENTOLIN;PROAIR) 108 (90 Base) MCG/ACT inhaler Inhale 2 puffs into the lungs 4 times daily 7/25/22   Aide Carr DO   escitalopram (LEXAPRO) 20 MG tablet Take 1 tablet by mouth in the morning. 7/25/22   Aide Carr DO   traZODone (DESYREL) 50 MG tablet TAKE 3 TABLETS BY MOUTH EVERY NIGHT 7/21/22   iAde Carr DO   triamcinolone (KENALOG) 0.1 % cream Apply topically 2 times daily for 14 days 7/1/22   Anayeli Easley,    topiramate (TOPAMAX) 25 MG tablet TAKE 1 TABLET BY MOUTH NIGHTLY 6/6/22   Aide Carr DO   omeprazole (PRILOSEC) 40 MG delayed release capsule TAKE 1 CAPSULE BY MOUTH EVERY DAY 5/23/22   Aide Carr DO

## 2022-11-14 RX ORDER — OMEPRAZOLE 40 MG/1
CAPSULE, DELAYED RELEASE ORAL
Qty: 90 CAPSULE | Refills: 1 | Status: SHIPPED | OUTPATIENT
Start: 2022-11-14

## 2022-11-14 RX ORDER — TOPIRAMATE 25 MG/1
25 TABLET ORAL NIGHTLY
Qty: 90 TABLET | Refills: 1 | Status: SHIPPED | OUTPATIENT
Start: 2022-11-14 | End: 2022-11-18 | Stop reason: SDUPTHER

## 2022-11-14 NOTE — TELEPHONE ENCOUNTER
Last OV: 10/12/2022  chronic   Last RX:    Next scheduled apt: 11/18/2022  6 wk f/u        Surescript requesting a refill

## 2022-11-15 ENCOUNTER — HOSPITAL ENCOUNTER (OUTPATIENT)
Age: 42
Setting detail: SPECIMEN
Discharge: HOME | End: 2022-11-15
Payer: COMMERCIAL

## 2022-11-15 ENCOUNTER — OFFICE VISIT (OUTPATIENT)
Dept: OBGYN CLINIC | Age: 42
End: 2022-11-15
Payer: COMMERCIAL

## 2022-11-15 VITALS
SYSTOLIC BLOOD PRESSURE: 112 MMHG | WEIGHT: 207.8 LBS | RESPIRATION RATE: 19 BRPM | DIASTOLIC BLOOD PRESSURE: 64 MMHG | HEIGHT: 66 IN | OXYGEN SATURATION: 98 % | BODY MASS INDEX: 33.4 KG/M2

## 2022-11-15 DIAGNOSIS — Z12.31 SCREENING MAMMOGRAM, ENCOUNTER FOR: Primary | ICD-10-CM

## 2022-11-15 DIAGNOSIS — Z01.419 WOMEN'S ANNUAL ROUTINE GYNECOLOGICAL EXAMINATION: ICD-10-CM

## 2022-11-15 PROCEDURE — 99396 PREV VISIT EST AGE 40-64: CPT | Performed by: OBSTETRICS & GYNECOLOGY

## 2022-11-15 PROCEDURE — G0145 SCR C/V CYTO,THINLAYER,RESCR: HCPCS

## 2022-11-15 PROCEDURE — G8484 FLU IMMUNIZE NO ADMIN: HCPCS | Performed by: OBSTETRICS & GYNECOLOGY

## 2022-11-15 NOTE — PATIENT INSTRUCTIONS
SURVEY:    You may be receiving a survey from [employer] regarding your visit today. Please complete the survey to enable us to provide the highest quality of care to you and your family. If you cannot score us a very good on any question, please call the office to discuss how we could have made your experience a very good one. Thank you.   MD Pavithra Zepeda MD Georgia Asp, MD Al Silos, DO Bella Enamorado, PM  TREVOR Smith, [address], [place]  [facility]

## 2022-11-15 NOTE — PROGRESS NOTES
YEARLY PHYSICAL    Date of service: 11/15/2022    Lee Shah  Is a 38 y.o.  , female    PT's PCP is: Abdias Garcia DO     : 1980                                             Subjective:       Patient's last menstrual period was 10/14/2022 (exact date). Are your menses regular: yes, usually.     OB History    Para Term  AB Living   0 0 0 0 0 0   SAB IAB Ectopic Molar Multiple Live Births   0 0 0 0 0 0        Social History     Tobacco Use   Smoking Status Former    Packs/day: 1.00    Years: 2.00    Pack years: 2.00    Types: Cigarettes   Smokeless Tobacco Never        Social History     Substance and Sexual Activity   Alcohol Use Not Currently    Comment: in the past       Family History   Problem Relation Age of Onset    Diabetes Mother        Any family history of breast or ovarian cancer: No    Any family history of blood clots: No      Allergies: Clindamycin/lincomycin      Current Outpatient Medications:     topiramate (TOPAMAX) 25 MG tablet, TAKE 1 TABLET BY MOUTH NIGHTLY, Disp: 90 tablet, Rfl: 1    omeprazole (PRILOSEC) 40 MG delayed release capsule, TAKE 1 CAPSULE BY MOUTH EVERY DAY, Disp: 90 capsule, Rfl: 1    buPROPion (WELLBUTRIN XL) 150 MG extended release tablet, TAKE 1 TABLET BY MOUTH EVERY DAY IN THE MORNING, Disp: 30 tablet, Rfl: 5    minocycline (MINOCIN;DYNACIN) 100 MG capsule, TAKE 1 CAPSULE BY MOUTH TWICE A DAY FOR 30 DAYS, Disp: , Rfl:     diclofenac (VOLTAREN) 75 MG EC tablet, Take 1 tablet by mouth 2 times daily, Disp: 60 tablet, Rfl: 5    hydrOXYzine HCl (ATARAX) 25 MG tablet, TAKE 1 TABLET BY MOUTH TWICE A DAY AS NEEDED FOR ANXIETY, Disp: 180 tablet, Rfl: 1    albuterol sulfate HFA (PROVENTIL;VENTOLIN;PROAIR) 108 (90 Base) MCG/ACT inhaler, Inhale 2 puffs into the lungs 4 times daily, Disp: 1 each, Rfl: 3    escitalopram (LEXAPRO) 20 MG tablet, Take 1 tablet by mouth in the morning., Disp: 30 tablet, Rfl: 5    traZODone (DESYREL) 50 MG tablet, TAKE 3 TABLETS BY MOUTH EVERY NIGHT, Disp: 90 tablet, Rfl: 5    triamcinolone (KENALOG) 0.1 % cream, Apply topically 2 times daily for 14 days, Disp: 453.6 g, Rfl: 1    Social History     Substance and Sexual Activity   Sexual Activity Not Currently       Any bleeding or pain with intercourse: No    Last Yearly:  1/5/21    Last pap: 1/5/21    Last HPV: n/a    Last Mammogram: 6/8/21    Last Dexascan n/a    Last colorectal screen- type date  n/a    Do you do self breast exams: Yes    Past Medical History:   Diagnosis Date    Anxiety     Bulging of lumbar intervertebral disc without myelopathy     L1    Chronic back pain     Depression     GERD (gastroesophageal reflux disease)     Substance abuse (HCC)     alcohol and opiates       Past Surgical History:   Procedure Laterality Date    COLONOSCOPY  05/26/2020    PAIN MANAGEMENT PROCEDURE Bilateral 6/3/2022    LUMBAR MEDIAL BRANCH BLOCK BILATERAL L3-4 MEDIAL BRANCH AND L5 DORSAL RAMI INJECTION performed by Damian Awan MD at [address] [facility] Bilateral 7/29/2022    BILATERAL L3-4 MEDIAL BRANCH AND L5 DORSAL RAMI INJECTION performed by Damian Awan MD at [address] [facility]  03/2018    PLANTAR FASCIA SURGERY Left 06/11/2020       Family History   Problem Relation Age of Onset    Diabetes Mother        Chief Complaint   Patient presents with    Gynecologic Exam     Patient in office for Gyn exam. She also has concerns about cycles. She states that her last cycle lasted 3 weeks and was very painful. Denies it being particularly heavy. PE:  Vital Signs  Blood pressure 112/64, resp. rate 19, height 5' 6\" (1.676 m), weight 207 lb 12.8 oz (94.3 kg), last menstrual period 10/14/2022, SpO2 98 %, not currently breastfeeding. Estimated body mass index is 33.54 kg/m² as calculated from the following:    Height as of this encounter: 5' 6\" (1.676 m).     Weight as of this encounter: 207 lb 12.8 oz (94.3 kg). Labs:    No results found for this visit on 11/15/22. No data recorded    NURSE: YOLANDA   PE:  Vital Signs  Blood pressure 112/64, resp. rate 19, height 5' 6\" (1.676 m), weight 207 lb 12.8 oz (94.3 kg), last menstrual period 10/14/2022, SpO2 98 %, not currently breastfeeding. Labs:    No results found for this visit on 11/15/22. HPI: The patient is here today for a yearly exam.  She does have history of ASCUS with high risk HPV    YesPT denies fever, chills, nausea and vomiting       Objective  Lymphatic:   no lymphadenopathy  Heent:   negative   Cor: regular rate and rhythm, no murmurs              Pul:clear to auscultation bilaterally- no wheezes, rales or rhonchi, normal air movement, no respiratory distress      GI: Abdomen soft, non-tender. BS normal. No masses,  No organomegaly           Extremities: normal strength, tone, and muscle mass   Breasts: Breast:normal appearance, no masses or tenderness, Inspection negative, No nipple retraction or dimpling, No nipple discharge or bleeding, No axillary or supraclavicular adenopathy, Normal to palpation without dominant masses   Pelvic Exam: GENITAL/URINARY:  External Genitalia:  General appearance; normal, Hair distribution; normal, Lesions absent  Vagina:  General appearance normal, Estrogen effect normal, Discharge absent, Lesions absent, Pelvic support normal  Cervix:  General appearance normal, Lesions absent, Discharge absent, Tenderness absent, Enlargement absent, Nodularity absent  Uterus:  Size normal, Contour normal, Position normal, Masses absent, Consistency; normal, Support normal, Tenderness absent  Adenexa:   Masses absent, Tenderness absent, Enlargement absent, Nodularity absent                                      Vaginal discharge: no vaginal discharge      Uterus: normal size, anteverted, mobile, non-tender, normal shape and consistency     Ovaries: Nonenlarged nontender           Over 50% of time spent on counseling and care coordination on: see assessment and plan                        Assessment and Plan: At this time the patient does not wish to take anything for cycle control as she would like to conceive, we will have her continue to monitor her periods for length and heaviness. Diagnosis Orders   1. Screening mammogram, encounter for  SARAH DIGITAL SCREEN W OR WO CAD BILATERAL          I am having Dala Spillers. aTlat maintain her triamcinolone, traZODone, albuterol sulfate HFA, escitalopram, hydrOXYzine HCl, minocycline, diclofenac, buPROPion, topiramate, and omeprazole.

## 2022-11-18 ENCOUNTER — OFFICE VISIT (OUTPATIENT)
Dept: FAMILY MEDICINE CLINIC | Age: 42
End: 2022-11-18
Payer: COMMERCIAL

## 2022-11-18 VITALS
HEART RATE: 81 BPM | BODY MASS INDEX: 33.75 KG/M2 | HEIGHT: 66 IN | WEIGHT: 210 LBS | DIASTOLIC BLOOD PRESSURE: 60 MMHG | SYSTOLIC BLOOD PRESSURE: 108 MMHG | OXYGEN SATURATION: 99 %

## 2022-11-18 DIAGNOSIS — M54.50 CHRONIC BILATERAL LOW BACK PAIN WITHOUT SCIATICA: Primary | ICD-10-CM

## 2022-11-18 DIAGNOSIS — U07.1 COVID-19: ICD-10-CM

## 2022-11-18 DIAGNOSIS — G89.29 CHRONIC BILATERAL LOW BACK PAIN WITHOUT SCIATICA: Primary | ICD-10-CM

## 2022-11-18 DIAGNOSIS — R51.9 PERSISTENT HEADACHES: ICD-10-CM

## 2022-11-18 DIAGNOSIS — F33.1 MODERATE EPISODE OF RECURRENT MAJOR DEPRESSIVE DISORDER (HCC): ICD-10-CM

## 2022-11-18 PROCEDURE — G8417 CALC BMI ABV UP PARAM F/U: HCPCS | Performed by: FAMILY MEDICINE

## 2022-11-18 PROCEDURE — 99214 OFFICE O/P EST MOD 30 MIN: CPT | Performed by: FAMILY MEDICINE

## 2022-11-18 PROCEDURE — 1036F TOBACCO NON-USER: CPT | Performed by: FAMILY MEDICINE

## 2022-11-18 PROCEDURE — G8427 DOCREV CUR MEDS BY ELIG CLIN: HCPCS | Performed by: FAMILY MEDICINE

## 2022-11-18 PROCEDURE — G8484 FLU IMMUNIZE NO ADMIN: HCPCS | Performed by: FAMILY MEDICINE

## 2022-11-18 RX ORDER — TOPIRAMATE 50 MG/1
50 TABLET, FILM COATED ORAL NIGHTLY
Qty: 90 TABLET | Refills: 1 | Status: SHIPPED | OUTPATIENT
Start: 2022-11-18

## 2022-11-18 RX ORDER — BUPROPION HYDROCHLORIDE 300 MG/1
300 TABLET ORAL EVERY MORNING
Qty: 90 TABLET | Refills: 1 | Status: SHIPPED | OUTPATIENT
Start: 2022-11-18

## 2022-11-18 NOTE — PROGRESS NOTES
Name: Lee Shah  : 1980         Chief Complaint:     Chief Complaint   Patient presents with    URI     Home test covid negative. Body aches, cough, sore throat, fever x 1 day, headache x3 days    Depression     Wellbutrin not helping and eating all of the time. Back Pain     See pain management, Dr. Todd Wells, working on getting prior auth for her ablations. History of Present Illness:      Lee Shah is a 38 y.o.  female who presents with URI (Home test covid negative. Body aches, cough, sore throat, fever x 1 day, headache x3 days), Depression (Wellbutrin not helping and eating all of the time. ), and Back Pain (See pain management, Dr. Todd Wells, working on getting prior auth for her ablations. )      HPI    Back pain worse than ever. Saw Dr Todd Wells who recommended ablation but there's question of whether insurance will cover it, had initially run through with both medicaid and her previous commercial which she doesn't have anymore. Changed from indocin to voltaren in Sept and it hasn't helped. Feeling helpless and unsure what to do. Headaches are worse since stopping gabapentin but doesn't feel her back is any worse or different. Leroy Galea had helped but was having trouble with addictive behavior with it. Other muscle relaxers haven't helped. Depression remains problematic also. A little puzzled as to why but she had 7 of her wellbutrin pills missing. Pharm gave her 3 extra but by the time she picked them up she had missed 4-5 days. Didn't feel any different. Big appetite, eating all the time and aware she's gained weight and feels like stomach is really large. Continues lexapro also. Headaches are bad, doesn't feel topamax is helping. Bothering all the time since end of August, dull ache across forehead and anterior scalp all the time. Patient is also acutely ill, on day 3 of cough, sore throat, body aches. She had fever on the first day.   She took a home COVID test that day and it was negative. She has already had COVID twice. Medical History:     Patient Active Problem List   Diagnosis    Chronic neck pain    Onychomycosis    Chronic low back pain without sciatica    Narcotic abuse in remission ([facility])    Chronic constipation    Alcohol abuse, in remission    Alpha-1-antitrypsin deficiency ([facility])    Hidradenitis suppurativa       Medications:       Prior to Admission medications    Medication Sig Start Date End Date Taking? Authorizing Provider   buPROPion (WELLBUTRIN XL) 300 MG extended release tablet Take 1 tablet by mouth every morning 11/18/22  Yes Forest Todd, DO   topiramate (TOPAMAX) 50 MG tablet Take 1 tablet by mouth nightly 11/18/22  Yes Forest Todd, DO   omeprazole (PRILOSEC) 40 MG delayed release capsule TAKE 1 CAPSULE BY MOUTH EVERY DAY 11/14/22  Yes Forest Todd, DO   minocycline (MINOCIN;DYNACIN) 100 MG capsule TAKE 1 CAPSULE BY MOUTH TWICE A DAY FOR 30 DAYS 8/15/22  Yes Historical Provider, MD   diclofenac (VOLTAREN) 75 MG EC tablet Take 1 tablet by mouth 2 times daily 9/12/22  Yes Forest Todd, DO   hydrOXYzine HCl (ATARAX) 25 MG tablet TAKE 1 TABLET BY MOUTH TWICE A DAY AS NEEDED FOR ANXIETY 8/8/22  Yes Forest Todd, DO   albuterol sulfate HFA (PROVENTIL;VENTOLIN;PROAIR) 108 (90 Base) MCG/ACT inhaler Inhale 2 puffs into the lungs 4 times daily 7/25/22  Yes Forest Todd, DO   escitalopram (LEXAPRO) 20 MG tablet Take 1 tablet by mouth in the morning. 7/25/22  Yes Forest Todd, DO   traZODone (DESYREL) 50 MG tablet TAKE 3 TABLETS BY MOUTH EVERY NIGHT 7/21/22  Yes Forest Todd, DO   triamcinolone (KENALOG) 0.1 % cream Apply topically 2 times daily for 14 days 7/1/22  Yes Gita Easley DO        Allergies:       Clindamycin/lincomycin    Physical Exam:     Vitals:  /60   Pulse 81   Ht 5' 6\" (1.676 m)   Wt 210 lb (95.3 kg)   SpO2 99%   BMI 33.89 kg/m²   Physical Exam  Vitals and nursing note reviewed.    Constitutional: General: She is not in acute distress. Appearance: She is well-developed. HENT:      Head: Normocephalic and atraumatic. Right Ear: Tympanic membrane and ear canal normal.      Left Ear: Tympanic membrane and ear canal normal.      Nose: Congestion and rhinorrhea (clear) present. Mouth/Throat:      Mouth: Mucous membranes are moist.      Pharynx: Oropharynx is clear. Posterior oropharyngeal erythema present. Eyes:      Conjunctiva/sclera: Conjunctivae normal.   Cardiovascular:      Rate and Rhythm: Normal rate and regular rhythm. Heart sounds: Normal heart sounds. Pulmonary:      Effort: Pulmonary effort is normal.      Breath sounds: Normal breath sounds. Musculoskeletal:      Cervical back: Neck supple. Lymphadenopathy:      Cervical: No cervical adenopathy. Skin:     General: Skin is warm and dry. Neurological:      Mental Status: She is alert and oriented to person, place, and time.    Psychiatric:         Mood and Affect: Mood normal.         Behavior: Behavior normal.       Data:     Lab Results   Component Value Date/Time     08/30/2022 12:54 PM    K 3.8 08/30/2022 12:54 PM     08/30/2022 12:54 PM    CO2 24 08/30/2022 12:54 PM    BUN 12 08/30/2022 12:54 PM    CREATININE 0.79 08/30/2022 12:54 PM    GLUCOSE 112 08/30/2022 12:54 PM    PROT 7.3 08/30/2022 12:54 PM    LABALBU 3.8 08/30/2022 12:54 PM    BILITOT 0.27 08/30/2022 12:54 PM    ALKPHOS 98 08/30/2022 12:54 PM    AST 13 08/30/2022 12:54 PM    ALT 11 08/30/2022 12:54 PM     Lab Results   Component Value Date/Time    WBC 10.7 08/30/2022 12:54 PM    RBC 4.97 08/30/2022 12:54 PM    HGB 14.0 08/30/2022 12:54 PM    HCT 41.5 08/30/2022 12:54 PM    MCV 83.4 08/30/2022 12:54 PM    MCH 28.1 08/30/2022 12:54 PM    MCHC 33.7 08/30/2022 12:54 PM    RDW 14.4 08/30/2022 12:54 PM     08/30/2022 12:54 PM    MPV NOT REPORTED 09/19/2021 03:44 PM     Lab Results   Component Value Date/Time    TSH 1.18 05/02/2022 10:59 AM     Lab Results   Component Value Date/Time    LABA1C 5.3 09/19/2018 12:24 PM     Rapid covid positive    Assessment & Plan:        Diagnosis Orders   1. Chronic bilateral low back pain without sciatica        2. Moderate episode of recurrent major depressive disorder ([facility] [address]        3. COVID-19        4. Persistent headaches          Chronic pain continues. Continue following with pain management. Discussed other options but elected no new meds at this time. Depression uncontrolled. Increase Wellbutrin dose. COVID, patient's third case. Supportive care. Persistent headaches, which initially were helped by Topamax. Increase dose to 50 mg nightly.         Requested Prescriptions     Signed Prescriptions Disp Refills    buPROPion (WELLBUTRIN XL) 300 MG extended release tablet 90 tablet 1     Sig: Take 1 tablet by mouth every morning    topiramate (TOPAMAX) 50 MG tablet 90 tablet 1     Sig: Take 1 tablet by mouth nightly         There are no Patient Instructions on file for this visit.      signed by Fritz Alexandra DO on 11/21/2022 at 8:02 AM  [address] [address]  Dept: [phone]

## 2022-11-18 NOTE — LETTER
[facility] PRIMARY [facility] JOSHUA Mcfadden [address] [place] [id number]  Phone: [phone]  Fax: [phone], [phone]        November 18, 2022     Patient: Myla La   YOB: 1980   Date of Visit: 11/18/2022       To Whom It May Concern:    Mrs. Yumiko Pressley has been diagnosed with COVID-19 and needs to be in isolation until 11/25/22. Please allow her to transfer her reservation without penalty. If you have any questions or concerns, please don't hesitate to call.     Sincerely,        Fritz Prudent, DO

## 2022-11-23 ENCOUNTER — NURSE ONLY (OUTPATIENT)
Dept: FAMILY MEDICINE CLINIC | Age: 42
End: 2022-11-23
Payer: COMMERCIAL

## 2022-11-23 ENCOUNTER — TELEPHONE (OUTPATIENT)
Dept: PAIN MANAGEMENT | Age: 42
End: 2022-11-23

## 2022-11-23 DIAGNOSIS — G89.29 CHRONIC BILATERAL LOW BACK PAIN WITHOUT SCIATICA: Primary | ICD-10-CM

## 2022-11-23 DIAGNOSIS — M54.50 CHRONIC BILATERAL LOW BACK PAIN WITHOUT SCIATICA: Primary | ICD-10-CM

## 2022-11-23 PROCEDURE — 96372 THER/PROPH/DIAG INJ SC/IM: CPT | Performed by: FAMILY MEDICINE

## 2022-11-23 RX ORDER — TRIAMCINOLONE ACETONIDE 40 MG/ML
40 INJECTION, SUSPENSION INTRA-ARTICULAR; INTRAMUSCULAR ONCE
Status: COMPLETED | OUTPATIENT
Start: 2022-11-23 | End: 2022-11-23

## 2022-11-23 RX ADMIN — TRIAMCINOLONE ACETONIDE 40 MG: 40 INJECTION, SUSPENSION INTRA-ARTICULAR; INTRAMUSCULAR at 10:57

## 2022-11-23 NOTE — PROGRESS NOTES
After obtaining consent, and per orders of Dr. Bentley Duong, injection of Kenalog given in Right upper quad. gluteus by Cassy Sagastume MA. Patient instructed to remain in clinic for 20 minutes afterwards, and to report any adverse reaction to me immediately.

## 2022-12-07 ENCOUNTER — TELEPHONE (OUTPATIENT)
Dept: PAIN MANAGEMENT | Age: 42
End: 2022-12-07

## 2022-12-07 NOTE — TELEPHONE ENCOUNTER
Patient's [facility] is not back yet for her injection. She will not be able to get her procedure tomorrow. Discrepancy with Moody Pelaez saying she has secondary insurance. Patient states that she does not. We have submitted claim twice.

## 2022-12-20 ENCOUNTER — TELEPHONE (OUTPATIENT)
Dept: PAIN MANAGEMENT | Age: 42
End: 2022-12-20

## 2022-12-20 NOTE — TELEPHONE ENCOUNTER
[employer] myself, they still had her listed as a dual member (having 2 insurances). I again advised that she has only medicaid. Also advised that we have submitted this claim 3 times now with no definitive approval or denials, only \"pending\" statuses. Since 11/9/22. Auth # [id number]  No way to push it through faster per representative. I asked why it has been pending for so long, representative advised that she will send to . - upper management. She states that she recommended approval. They will fax determination within 72h but it is generally before that. Ref# [id number].

## 2022-12-20 NOTE — TELEPHONE ENCOUNTER
Patient calling to check status of her pre-cert for injections/ RFA. I did sent Raad Link in pre-cert Dept a 'Teams' message last week asking the status of the patient's pre-cert and never heard back. Also sent email today. Patient is very frustrated. We have submitted this twice already. This is the 3rd attempt. Patient asks to speak to my supervisor, advised that I will have supervisor call her this afternoon.

## 2022-12-28 NOTE — TELEPHONE ENCOUNTER
Tried to call to advise patient of approval and to schedule, however no answer and her VM box is full.

## 2022-12-28 NOTE — TELEPHONE ENCOUNTER
Called Melissa to recheck auth status, as I have not gotten any determinations on this. Again, they state that the patient is a dual member (has [place] [employer] primary and [place] secondary). I again stated that she only has [address] and does not have [place] [employer]. Representative stated that they have to email supervisor to update COB (coverage of benefits) and then send her authorization to  (upper management) after that due to auth being pending for longer than standard turn around time. (This was all supposed to have happened when I called 12/20)    Representative did all of this with me on the phone. Nilton Scott is approved!  Ref# [id number]

## 2023-01-03 RX ORDER — CETIRIZINE HYDROCHLORIDE 10 MG/1
10 TABLET ORAL DAILY
COMMUNITY

## 2023-01-03 NOTE — PROGRESS NOTES
[facility]   Preadmission Testing    Name: John De Paz  : 1980  Patient Phone: [phone] (home) [phone] (work)    Procedure: RIGHT LUMBAR L3, L4, L5 MEDIAL BRANCH RADIOFREQUENCY ABLATION - Right    Date of Procedure: 23  Surgeon: Kirk Caruso DO    Ht:  5'6  Wt: 205 lb   Wt method: Allergies: Allergies   Allergen Reactions    Clindamycin/Lincomycin Rash                There were no vitals filed for this visit. No LMP recorded. Do you take blood thinners? [] Yes    [x] No         Instructed to stop blood thinners prior to procedure? [] Yes    [] No      [x] N/A    []Eliquis - 3 days  []Xarelto - 3 days  []Pradaxa - 5 days  []Coumadin - 5 days   []Plavix - 7 days  []ASA 325mg - 7 days  []Brillinta - 5 days  []Pletal - 2 days   Have you had a respiratory infection or sore throat in last 4 weeks before surgery?     [] Yes    [x] No     Patient instructed on: [x] NPO Status - if receiving sedation  [x] Meds to Take  [x] Ride Home - sedation/ epidurals  [x]No Jewelry/Contact Lenses/Nail [place]     DOS Patient Needs [x] HCG   [] Blood Sugar  [] PT/INR

## 2023-01-05 ENCOUNTER — OFFICE VISIT (OUTPATIENT)
Dept: FAMILY MEDICINE CLINIC | Age: 43
End: 2023-01-05
Payer: COMMERCIAL

## 2023-01-05 VITALS
BODY MASS INDEX: 33.59 KG/M2 | OXYGEN SATURATION: 98 % | HEIGHT: 66 IN | DIASTOLIC BLOOD PRESSURE: 70 MMHG | HEART RATE: 80 BPM | WEIGHT: 209 LBS | SYSTOLIC BLOOD PRESSURE: 108 MMHG

## 2023-01-05 DIAGNOSIS — F51.01 PRIMARY INSOMNIA: ICD-10-CM

## 2023-01-05 DIAGNOSIS — R51.9 FREQUENT HEADACHES: ICD-10-CM

## 2023-01-05 DIAGNOSIS — F33.1 MODERATE EPISODE OF RECURRENT MAJOR DEPRESSIVE DISORDER (HCC): ICD-10-CM

## 2023-01-05 DIAGNOSIS — G89.29 CHRONIC BILATERAL LOW BACK PAIN WITH LEFT-SIDED SCIATICA: Primary | ICD-10-CM

## 2023-01-05 DIAGNOSIS — M54.42 CHRONIC BILATERAL LOW BACK PAIN WITH LEFT-SIDED SCIATICA: Primary | ICD-10-CM

## 2023-01-05 PROCEDURE — G8427 DOCREV CUR MEDS BY ELIG CLIN: HCPCS | Performed by: FAMILY MEDICINE

## 2023-01-05 PROCEDURE — 1036F TOBACCO NON-USER: CPT | Performed by: FAMILY MEDICINE

## 2023-01-05 PROCEDURE — G8484 FLU IMMUNIZE NO ADMIN: HCPCS | Performed by: FAMILY MEDICINE

## 2023-01-05 PROCEDURE — G8417 CALC BMI ABV UP PARAM F/U: HCPCS | Performed by: FAMILY MEDICINE

## 2023-01-05 PROCEDURE — 99214 OFFICE O/P EST MOD 30 MIN: CPT | Performed by: FAMILY MEDICINE

## 2023-01-05 RX ORDER — CARISOPRODOL 250 MG/1
250 TABLET ORAL 3 TIMES DAILY PRN
Qty: 90 TABLET | Refills: 0 | Status: SHIPPED | OUTPATIENT
Start: 2023-01-05 | End: 2023-02-04

## 2023-01-05 RX ORDER — VENLAFAXINE HYDROCHLORIDE 150 MG/1
150 CAPSULE, EXTENDED RELEASE ORAL DAILY
Qty: 30 CAPSULE | Refills: 3 | Status: SHIPPED | OUTPATIENT
Start: 2023-01-05

## 2023-01-05 RX ORDER — ACETAMINOPHEN 500 MG
1000 TABLET ORAL 2 TIMES DAILY
Qty: 120 TABLET | Refills: 2 | Status: SHIPPED | OUTPATIENT
Start: 2023-01-05

## 2023-01-05 RX ORDER — IBUPROFEN 800 MG/1
800 TABLET ORAL
Qty: 90 TABLET | Refills: 1 | Status: SHIPPED | OUTPATIENT
Start: 2023-01-05

## 2023-01-05 ASSESSMENT — PATIENT HEALTH QUESTIONNAIRE - PHQ9
SUM OF ALL RESPONSES TO PHQ QUESTIONS 1-9: 5
5. POOR APPETITE OR OVEREATING: 0
10. IF YOU CHECKED OFF ANY PROBLEMS, HOW DIFFICULT HAVE THESE PROBLEMS MADE IT FOR YOU TO DO YOUR WORK, TAKE CARE OF THINGS AT HOME, OR GET ALONG WITH OTHER PEOPLE: 0
8. MOVING OR SPEAKING SO SLOWLY THAT OTHER PEOPLE COULD HAVE NOTICED. OR THE OPPOSITE, BEING SO FIGETY OR RESTLESS THAT YOU HAVE BEEN MOVING AROUND A LOT MORE THAN USUAL: 0
6. FEELING BAD ABOUT YOURSELF - OR THAT YOU ARE A FAILURE OR HAVE LET YOURSELF OR YOUR FAMILY DOWN: 0
SUM OF ALL RESPONSES TO PHQ QUESTIONS 1-9: 5
4. FEELING TIRED OR HAVING LITTLE ENERGY: 2
9. THOUGHTS THAT YOU WOULD BE BETTER OFF DEAD, OR OF HURTING YOURSELF: 0
1. LITTLE INTEREST OR PLEASURE IN DOING THINGS: 1
2. FEELING DOWN, DEPRESSED OR HOPELESS: 2
7. TROUBLE CONCENTRATING ON THINGS, SUCH AS READING THE NEWSPAPER OR WATCHING TELEVISION: 0
SUM OF ALL RESPONSES TO PHQ QUESTIONS 1-9: 5
3. TROUBLE FALLING OR STAYING ASLEEP: 0
SUM OF ALL RESPONSES TO PHQ QUESTIONS 1-9: 5
SUM OF ALL RESPONSES TO PHQ9 QUESTIONS 1 & 2: 3

## 2023-01-05 NOTE — PROGRESS NOTES
Name: Grey Pastor  : 1980         Chief Complaint:     Chief Complaint   Patient presents with    Back Pain       History of Present Illness:      Grey Pastor is a 38 y.o.  female who presents with Back Pain      HPI    C/o ongoing severe low back pain, center of low lumbar and sacrum, some radiation down through L buttock and posterior thigh but no numbness or tingling. Pain is at an 8 every day and while on her period it's a 9. Only thing that helps is heating pad so then she has to sit or lay to use the heat. Decreased activity because of this which is depressing. Was scheduled for procedure recently and then it was denied by insurance despite her big efforts to get it approved, spent 2h at hospital, caused depression to worsen, was devastated. It has now been approved for 1 wk from today. Did meet with pain psychologist from [facility] and was told about program which is 4-6 wks, 3 days a wk 8-noon all online, then in [place] every day for a few days straight. Then after that could potentially be put on a list for ketamine treatment but couldn't get the ketamine for a year. Currently taking diclofenac BID. Feels walking is off, like pelvis is swaying. Depression problematic, feels meds help some but not enough. Poor concentration. Frustrated at not working and is wondering about disability. Does have vocational rehab counselor who is helping her apply for jobs, clerical or administrative. Sees counselor and started EMDR and CBT. Sleep has been off lately, trazodone not helping as much as it used to with falling asleep and is tossing and turning more also. Can't get comfortable. Headaches improved with topamax and also restarting zyrtec.      Medical History:     Patient Active Problem List   Diagnosis    Chronic neck pain    Onychomycosis    Chronic low back pain without sciatica    Narcotic abuse in remission (HCC)    Chronic constipation    Alcohol abuse, in remission Alpha-1-antitrypsin deficiency ([facility] [address]    Hidradenitis suppurativa       Medications:       Prior to Admission medications    Medication Sig Start Date End Date Taking? Authorizing Provider   venlafaxine (EFFEXOR XR) 150 MG extended release capsule Take 1 capsule by mouth daily 1/5/23  Yes Comfort Sellers DO   ibuprofen (ADVIL;MOTRIN) 800 MG tablet Take 1 tablet by mouth 3 times daily (with meals) 1/5/23  Yes Comfort Sellers DO   acetaminophen (TYLENOL) 500 MG tablet Take 2 tablets by mouth 2 times daily 1/5/23  Yes Comfort Sellers DO   carisoprodol (SOMA) 250 MG tablet Take 1 tablet by mouth 3 times daily as needed (chronic low back pain) for up to 30 days.  1/5/23 2/4/23 Yes Comfort Sellers DO   cetirizine (ZYRTEC) 10 MG tablet Take 10 mg by mouth daily   Yes Historical Provider, MD   buPROPion (WELLBUTRIN XL) 300 MG extended release tablet Take 1 tablet by mouth every morning 11/18/22  Yes Comfort Sellers DO   topiramate (TOPAMAX) 50 MG tablet Take 1 tablet by mouth nightly 11/18/22  Yes Comfort Sellers DO   omeprazole (PRILOSEC) 40 MG delayed release capsule TAKE 1 CAPSULE BY MOUTH EVERY DAY 11/14/22  Yes Comfort Sellers DO   minocycline (MINOCIN;DYNACIN) 100 MG capsule TAKE 1 CAPSULE BY MOUTH TWICE A DAY FOR 30 DAYS 8/15/22  Yes Historical Provider, MD   hydrOXYzine HCl (ATARAX) 25 MG tablet TAKE 1 TABLET BY MOUTH TWICE A DAY AS NEEDED FOR ANXIETY 8/8/22  Yes Comfort Sellers DO   albuterol sulfate HFA (PROVENTIL;VENTOLIN;PROAIR) 108 (90 Base) MCG/ACT inhaler Inhale 2 puffs into the lungs 4 times daily 7/25/22  Yes Comfort Sellers DO   traZODone (DESYREL) 50 MG tablet TAKE 3 TABLETS BY MOUTH EVERY NIGHT 7/21/22  Yes Comfort Sellers DO   triamcinolone (KENALOG) 0.1 % cream Apply topically 2 times daily for 14 days 7/1/22  Yes Sohail Easley DO        Allergies:       Clindamycin/lincomycin    Physical Exam:     Vitals:  /70   Pulse 80   Ht 5' 6\" (1.676 m)   Wt 209 lb (94.8 kg)   LMP 01/01/2023 (Exact Date)   SpO2 98%   BMI 33.73 kg/m²   Physical Exam  Vitals and nursing note reviewed. Constitutional:       General: She is not in acute distress. Appearance: She is well-developed. Pulmonary:      Effort: Pulmonary effort is normal.   Skin:     General: Skin is warm and dry. Neurological:      Mental Status: She is alert and oriented to person, place, and time. Psychiatric:         Mood and Affect: Mood normal.         Behavior: Behavior normal.       Data:     Lab Results   Component Value Date/Time     08/30/2022 12:54 PM    K 3.8 08/30/2022 12:54 PM     08/30/2022 12:54 PM    CO2 24 08/30/2022 12:54 PM    BUN 12 08/30/2022 12:54 PM    CREATININE 0.79 08/30/2022 12:54 PM    GLUCOSE 112 08/30/2022 12:54 PM    PROT 7.3 08/30/2022 12:54 PM    LABALBU 3.8 08/30/2022 12:54 PM    BILITOT 0.27 08/30/2022 12:54 PM    ALKPHOS 98 08/30/2022 12:54 PM    AST 13 08/30/2022 12:54 PM    ALT 11 08/30/2022 12:54 PM     Lab Results   Component Value Date/Time    WBC 10.7 08/30/2022 12:54 PM    RBC 4.97 08/30/2022 12:54 PM    HGB 14.0 08/30/2022 12:54 PM    HCT 41.5 08/30/2022 12:54 PM    MCV 83.4 08/30/2022 12:54 PM    MCH 28.1 08/30/2022 12:54 PM    MCHC 33.7 08/30/2022 12:54 PM    RDW 14.4 08/30/2022 12:54 PM     08/30/2022 12:54 PM    MPV NOT REPORTED 09/19/2021 03:44 PM     Lab Results   Component Value Date/Time    TSH 1.18 05/02/2022 10:59 AM     Lab Results   Component Value Date/Time    LABA1C 5.3 09/19/2018 12:24 PM         Assessment & Plan:        Diagnosis Orders   1. Chronic bilateral low back pain with left-sided sciatica  carisoprodol (SOMA) 250 MG tablet      2. Moderate episode of recurrent major depressive disorder ([facility] [address]        3. Primary insomnia        4. Frequent headaches          1. Chronic low back pain which has been present for a long time, severely affecting patient's quality of life. Has had some recent left-sided radicular pain.   Ablation was approved so she will have that done next week. She was requesting to restart muscle relaxer or other pain med that could help till she gets through the series of procedures. I am quite reluctant to do this given her history, had admitted to misuse of muscle relaxers. She has demonstrated commitment to her sobriety and plans to have  keep the [place] and distribute to her as directed. Advised to switch back to ibuprofen and add Tylenol. 2.  Depression uncontrolled. Change Lexapro to Effexor which may also help with headaches and chronic pain. 3.  Insomnia, continue trazodone  4. Headaches much improved, continue same Rx. Requested Prescriptions     Signed Prescriptions Disp Refills    venlafaxine (EFFEXOR XR) 150 MG extended release capsule 30 capsule 3     Sig: Take 1 capsule by mouth daily    ibuprofen (ADVIL;MOTRIN) 800 MG tablet 90 tablet 1     Sig: Take 1 tablet by mouth 3 times daily (with meals)    acetaminophen (TYLENOL) 500 MG tablet 120 tablet 2     Sig: Take 2 tablets by mouth 2 times daily    carisoprodol (SOMA) 250 MG tablet 90 tablet 0     Sig: Take 1 tablet by mouth 3 times daily as needed (chronic low back pain) for up to 30 days.          signed by Maya Daley DO on 1/8/2023 at 9:32 PM  [address]  Dept: [phone]

## 2023-01-05 NOTE — PATIENT INSTRUCTIONS
SURVEY:    You may be receiving a survey from [employer] regarding your visit today. You may get this in the mail, through your MyChart or in your email. Please complete the survey to enable us to provide the highest quality of care to you and your family. If you cannot score us as very good ( 5 Stars) on any question, please feel free to call the office to discuss how we could have made your experience exceptional.     Thank you.     Clinical Care Team:  Dr. Tom Moore, DO Michael Hurley, [address] [place] [address] Team:  [facility]

## 2023-01-12 ENCOUNTER — HOSPITAL ENCOUNTER (OUTPATIENT)
Age: 43
Setting detail: OUTPATIENT SURGERY
Discharge: HOME | End: 2023-01-12
Attending: STUDENT IN AN ORGANIZED HEALTH CARE EDUCATION/TRAINING PROGRAM | Admitting: STUDENT IN AN ORGANIZED HEALTH CARE EDUCATION/TRAINING PROGRAM
Payer: COMMERCIAL

## 2023-01-12 ENCOUNTER — APPOINTMENT (OUTPATIENT)
Dept: GENERAL RADIOLOGY | Age: 43
End: 2023-01-12
Attending: STUDENT IN AN ORGANIZED HEALTH CARE EDUCATION/TRAINING PROGRAM
Payer: COMMERCIAL

## 2023-01-12 VITALS
HEIGHT: 66 IN | OXYGEN SATURATION: 97 % | HEART RATE: 66 BPM | SYSTOLIC BLOOD PRESSURE: 108 MMHG | WEIGHT: 209.8 LBS | DIASTOLIC BLOOD PRESSURE: 69 MMHG | RESPIRATION RATE: 18 BRPM | BODY MASS INDEX: 33.72 KG/M2 | TEMPERATURE: 97 F

## 2023-01-12 LAB — HCG(URINE) PREGNANCY TEST: NEGATIVE

## 2023-01-12 PROCEDURE — 99152 MOD SED SAME PHYS/QHP 5/>YRS: CPT | Performed by: STUDENT IN AN ORGANIZED HEALTH CARE EDUCATION/TRAINING PROGRAM

## 2023-01-12 PROCEDURE — 3600000054 HC PAIN LEVEL 3 BASE: Performed by: STUDENT IN AN ORGANIZED HEALTH CARE EDUCATION/TRAINING PROGRAM

## 2023-01-12 PROCEDURE — 2580000003 HC RX 258: Performed by: STUDENT IN AN ORGANIZED HEALTH CARE EDUCATION/TRAINING PROGRAM

## 2023-01-12 PROCEDURE — 64636 DESTROY L/S FACET JNT ADDL: CPT | Performed by: STUDENT IN AN ORGANIZED HEALTH CARE EDUCATION/TRAINING PROGRAM

## 2023-01-12 PROCEDURE — 81025 URINE PREGNANCY TEST: CPT

## 2023-01-12 PROCEDURE — 2500000003 HC RX 250 WO HCPCS: Performed by: STUDENT IN AN ORGANIZED HEALTH CARE EDUCATION/TRAINING PROGRAM

## 2023-01-12 PROCEDURE — 7100000010 HC PHASE II RECOVERY - FIRST 15 MIN: Performed by: STUDENT IN AN ORGANIZED HEALTH CARE EDUCATION/TRAINING PROGRAM

## 2023-01-12 PROCEDURE — 7100000011 HC PHASE II RECOVERY - ADDTL 15 MIN: Performed by: STUDENT IN AN ORGANIZED HEALTH CARE EDUCATION/TRAINING PROGRAM

## 2023-01-12 PROCEDURE — 3600000055 HC PAIN LEVEL 3 ADDL 15 MIN: Performed by: STUDENT IN AN ORGANIZED HEALTH CARE EDUCATION/TRAINING PROGRAM

## 2023-01-12 PROCEDURE — 6360000002 HC RX W HCPCS: Performed by: STUDENT IN AN ORGANIZED HEALTH CARE EDUCATION/TRAINING PROGRAM

## 2023-01-12 PROCEDURE — 2709999900 HC NON-CHARGEABLE SUPPLY: Performed by: STUDENT IN AN ORGANIZED HEALTH CARE EDUCATION/TRAINING PROGRAM

## 2023-01-12 PROCEDURE — 64635 DESTROY LUMB/SAC FACET JNT: CPT | Performed by: STUDENT IN AN ORGANIZED HEALTH CARE EDUCATION/TRAINING PROGRAM

## 2023-01-12 PROCEDURE — 76000 FLUOROSCOPY <1 HR PHYS/QHP: CPT

## 2023-01-12 RX ORDER — MIDAZOLAM HYDROCHLORIDE 1 MG/ML
INJECTION INTRAMUSCULAR; INTRAVENOUS PRN
Status: DISCONTINUED | OUTPATIENT
Start: 2023-01-12 | End: 2023-01-12 | Stop reason: ALTCHOICE

## 2023-01-12 RX ORDER — SODIUM CHLORIDE, SODIUM LACTATE, POTASSIUM CHLORIDE, CALCIUM CHLORIDE 600; 310; 30; 20 MG/100ML; MG/100ML; MG/100ML; MG/100ML
INJECTION, SOLUTION INTRAVENOUS CONTINUOUS
Status: DISCONTINUED | OUTPATIENT
Start: 2023-01-12 | End: 2023-01-12 | Stop reason: HOSPADM

## 2023-01-12 RX ORDER — LIDOCAINE HYDROCHLORIDE 20 MG/ML
INJECTION, SOLUTION EPIDURAL; INFILTRATION; INTRACAUDAL; PERINEURAL PRN
Status: DISCONTINUED | OUTPATIENT
Start: 2023-01-12 | End: 2023-01-12 | Stop reason: ALTCHOICE

## 2023-01-12 RX ORDER — LIDOCAINE HYDROCHLORIDE 10 MG/ML
INJECTION, SOLUTION EPIDURAL; INFILTRATION; INTRACAUDAL; PERINEURAL PRN
Status: DISCONTINUED | OUTPATIENT
Start: 2023-01-12 | End: 2023-01-12 | Stop reason: ALTCHOICE

## 2023-01-12 RX ORDER — TRIAMCINOLONE ACETONIDE 40 MG/ML
INJECTION, SUSPENSION INTRA-ARTICULAR; INTRAMUSCULAR PRN
Status: DISCONTINUED | OUTPATIENT
Start: 2023-01-12 | End: 2023-01-12 | Stop reason: ALTCHOICE

## 2023-01-12 RX ADMIN — SODIUM CHLORIDE, POTASSIUM CHLORIDE, SODIUM LACTATE AND CALCIUM CHLORIDE: 600; 310; 30; 20 INJECTION, SOLUTION INTRAVENOUS at 13:42

## 2023-01-12 ASSESSMENT — PAIN - FUNCTIONAL ASSESSMENT: PAIN_FUNCTIONAL_ASSESSMENT: 0-10

## 2023-01-12 NOTE — OP NOTE
PROCEDURE PERFORMED: Right Lumbar Medial Branch Radiofrequency Ablation using Fluoroscopy    PREOPERATIVE DIAGNOSIS: Lumbar spondylosis    INDICATIONS: Chronic low back pain    The patient's history and physical exam were reviewed. The risk, benefits, and alternatives of the procedure were discussed and all questions were answered to the patient's satisfaction. The patient agreed to proceed and written informed consent was obtained. POSTOPERATIVE DIAGNOSIS: Lumbar spondylosis    PHYSICIAN:  Dr. Gabriel Guevara DO    ANESTHESIA:  LOCAL    ASSISTANT:  NONE    PATHOLOGY:  NONE    ESTIMATED BLOOD LOSS:  N/A    IMPLANTS:  NONE    PROCEDURE DESCRIPTION: Right lumbar medial branch radiofrequency ablation using fluoroscopy    The patient was placed on the operative bed in prone position. The area was prepped with  Chlorhexidine. The area was then draped in a sterile fashion. Targeted levels: Right lumbar L3, L4, L5 medial branch radiofrequency ablation    An AP  fluoroscopy image was used to identify and sukhdeep Valenzuela's point at the L3, L4 levels on the targeted side. Additionally, the junction of the SAP and sacral ala was also marked on the same side. The skin and subcutaneous tissues were then anesthetized with 1% lidocaine. At each lumbar medial branch, a 20-gauge, 100 mm curved with 10 mm active tip probe was advanced under AP fluoroscopic projections until bone was contacted along the medial aspect of the transverse process. Aspiration of each needle was negative for blood, CSF and paresthesia prior to injection. Motor stimulation at 2 Hz and 1.2 V was negative. Then, after negative aspiration, 1 mL lidocaine 2% was injected at each level prior to lesioning which was performed at 80°C for 90 seconds. Once the lesion was complete, injectate solution containing Kenalog 40 mg and 2 mL lidocaine 1% was injected at each site with a total of 1 mL. The probes were then removed.   The needle sites were dressed appropriately. The patient did not experience any hemodynamic or neurologic sequelae. The patient was transferred to the postoperative care unit in stable condition. Written discharge instructions were given to the patient. COMPLICATIONS:  There were no apparent complications. The patient tolerated the procedure well. SEDATION NOTE:     ASA CLASSIFICATION  2  MP   CLASSIFICATION  2     Moderate intravenous conscious sedation was supervised by Dr. Cale Pascal  The patient was independently monitored by a Registered Nurse assigned to the Procedure Room  Monitoring included automated blood pressure, continuous EKG, Capnography and continuous pulse oximetry. The detailed Conscious Record is permanently stored in the [place] [id number].       The following is the conscious sedation record;  Start Time:  1400  End times:  1415  Duration:  15 minutes  MEDS GIVEN 2 mg Versed
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
## 2023-01-12 NOTE — DISCHARGE INSTRUCTIONS
You have received an injection of local anesthetic and corticosteroids. The local anesthetic effect typically last 4-6 hours. It may take 3-7 days for the corticosteroids to reach optimal effect. Please pay close attention to the following information/instructions: You may not drive for 24 hours after your injection. It is common to experience mild soreness at the injection site(s) for 24-48 hours. Ice is the best remedy. You may apply ice for 20 minutes at a time several times a day as needed. Avoid heat to the injection area for 72 hours. No hot packs, saunas, or steam rooms during this time. A regular shower is OK. You may immediately restart your regular medication regimen, including pain medications, anti-inflammatory, and blood thinners. Please remove the sterile dressing/band-aid tonight or tomorrow morning. Do not leave it on after you have taken a shower or gotten it wet. Corticosteroid side effects can occur after this injection, but they usually resolve after several days. These side effects can include flushing, hot flashes, mild palpitations, insomnia, water retention, feeling anxious/restless, or headaches. While it is extremely rare to get an infection after a spinal procedure, please call the office if you develop any signs of infection. These signs include fevers/chills, severely increased pain, redness at the injections site, or any drainage from the injection site. Remember that the corticosteroid benefit (long-term pain relief) from an injection can take as long as 10 days to occur. You may have a period of slightly increased pain after your injection before the cortisone takes effect. You may resume all of your normal daily activities 24 hours after your injection. It is OK to restart your exercise or physical therapy program as soon as you feel comfortable doing so. Please complete the pain diary given to you after your injection.  The information you provide on this diary is used to guide your treatment plan. You should schedule a follow-up visit in 2-3 weeks to review your response to this injection. Please bring your pain diary with you to this appointment. Education Materials Received: yes  Belongings Returned: yes    Resume all current outpatient medication(s). The discharge instructions have been reviewed with the patient and/or Guardian. Patient and/or Guardian signed and retained a printed copy.

## 2023-01-16 ENCOUNTER — TELEPHONE (OUTPATIENT)
Dept: PAIN MANAGEMENT | Age: 43
End: 2023-01-16

## 2023-01-16 NOTE — TELEPHONE ENCOUNTER
Patient stopped in the office today, states that she has relief. She has been able to do activities not previously able to do prior to the right RFA. 80% improvement.

## 2023-01-20 NOTE — PROGRESS NOTES
[facility]   Preadmission Testing    Name: Lucas Skelton  : 1980  Patient Phone: [phone] (home) [phone] (work)    Procedure: LEFT LUMBAR L3, L4, L5 MEDIAL BRANCH RADIOFREQUENCY ABLATION WITH MODERATE SEDATION - Left    Date of Procedure: 2023    Surgeon: Shayne Alfredo DO    Ht:    Wt:   Wt method: Allergies: Allergies   Allergen Reactions    Clindamycin/Lincomycin Rash                There were no vitals filed for this visit. Patient's last menstrual period was 2023 (exact date). Do you take blood thinners? [] Yes    [x] No         Instructed to stop blood thinners prior to procedure? [] Yes    [x] No      [] N/A    []Eliquis - 3 days  []Xarelto - 3 days  []Pradaxa - 5 days  []Coumadin - 5 days   []Plavix - 7 days  []ASA 325mg - 7 days  []Brillinta - 5 days  []Pletal - 2 days   Have you had a respiratory infection or sore throat in last 4 weeks before surgery?     [] Yes    [x] No     Patient instructed on: [x] NPO Status - if receiving sedation  [x] Meds to Take  [x] Ride Home - sedation/ epidurals  [x]No Jewelry/Contact Lenses/Nail [place]     DOS Patient Needs [x] HCG   [] Blood Sugar  [] PT/INR

## 2023-01-26 ENCOUNTER — HOSPITAL ENCOUNTER (OUTPATIENT)
Age: 43
Setting detail: OUTPATIENT SURGERY
Discharge: HOME | End: 2023-01-26
Attending: STUDENT IN AN ORGANIZED HEALTH CARE EDUCATION/TRAINING PROGRAM | Admitting: STUDENT IN AN ORGANIZED HEALTH CARE EDUCATION/TRAINING PROGRAM
Payer: COMMERCIAL

## 2023-01-26 ENCOUNTER — APPOINTMENT (OUTPATIENT)
Dept: GENERAL RADIOLOGY | Age: 43
End: 2023-01-26
Attending: STUDENT IN AN ORGANIZED HEALTH CARE EDUCATION/TRAINING PROGRAM
Payer: COMMERCIAL

## 2023-01-26 VITALS
OXYGEN SATURATION: 97 % | HEART RATE: 77 BPM | SYSTOLIC BLOOD PRESSURE: 112 MMHG | WEIGHT: 209 LBS | HEIGHT: 66 IN | RESPIRATION RATE: 16 BRPM | DIASTOLIC BLOOD PRESSURE: 73 MMHG | TEMPERATURE: 97.7 F | BODY MASS INDEX: 33.59 KG/M2

## 2023-01-26 LAB — HCG(URINE) PREGNANCY TEST: NEGATIVE

## 2023-01-26 PROCEDURE — 76000 FLUOROSCOPY <1 HR PHYS/QHP: CPT

## 2023-01-26 PROCEDURE — 99152 MOD SED SAME PHYS/QHP 5/>YRS: CPT | Performed by: STUDENT IN AN ORGANIZED HEALTH CARE EDUCATION/TRAINING PROGRAM

## 2023-01-26 PROCEDURE — 2580000003 HC RX 258: Performed by: STUDENT IN AN ORGANIZED HEALTH CARE EDUCATION/TRAINING PROGRAM

## 2023-01-26 PROCEDURE — 3600000057 HC PAIN LEVEL 4 ADDL 15 MIN: Performed by: STUDENT IN AN ORGANIZED HEALTH CARE EDUCATION/TRAINING PROGRAM

## 2023-01-26 PROCEDURE — 81025 URINE PREGNANCY TEST: CPT

## 2023-01-26 PROCEDURE — 64636 DESTROY L/S FACET JNT ADDL: CPT | Performed by: STUDENT IN AN ORGANIZED HEALTH CARE EDUCATION/TRAINING PROGRAM

## 2023-01-26 PROCEDURE — 2709999900 HC NON-CHARGEABLE SUPPLY: Performed by: STUDENT IN AN ORGANIZED HEALTH CARE EDUCATION/TRAINING PROGRAM

## 2023-01-26 PROCEDURE — 7100000011 HC PHASE II RECOVERY - ADDTL 15 MIN: Performed by: STUDENT IN AN ORGANIZED HEALTH CARE EDUCATION/TRAINING PROGRAM

## 2023-01-26 PROCEDURE — 6360000002 HC RX W HCPCS: Performed by: STUDENT IN AN ORGANIZED HEALTH CARE EDUCATION/TRAINING PROGRAM

## 2023-01-26 PROCEDURE — 7100000010 HC PHASE II RECOVERY - FIRST 15 MIN: Performed by: STUDENT IN AN ORGANIZED HEALTH CARE EDUCATION/TRAINING PROGRAM

## 2023-01-26 PROCEDURE — 64635 DESTROY LUMB/SAC FACET JNT: CPT | Performed by: STUDENT IN AN ORGANIZED HEALTH CARE EDUCATION/TRAINING PROGRAM

## 2023-01-26 PROCEDURE — 2500000003 HC RX 250 WO HCPCS: Performed by: STUDENT IN AN ORGANIZED HEALTH CARE EDUCATION/TRAINING PROGRAM

## 2023-01-26 PROCEDURE — 3600000056 HC PAIN LEVEL 4 BASE: Performed by: STUDENT IN AN ORGANIZED HEALTH CARE EDUCATION/TRAINING PROGRAM

## 2023-01-26 RX ORDER — MIDAZOLAM HYDROCHLORIDE 1 MG/ML
INJECTION INTRAMUSCULAR; INTRAVENOUS PRN
Status: DISCONTINUED | OUTPATIENT
Start: 2023-01-26 | End: 2023-01-26 | Stop reason: ALTCHOICE

## 2023-01-26 RX ORDER — LIDOCAINE HYDROCHLORIDE 20 MG/ML
INJECTION, SOLUTION EPIDURAL; INFILTRATION; INTRACAUDAL; PERINEURAL PRN
Status: DISCONTINUED | OUTPATIENT
Start: 2023-01-26 | End: 2023-01-26 | Stop reason: ALTCHOICE

## 2023-01-26 RX ORDER — TRIAMCINOLONE ACETONIDE 40 MG/ML
INJECTION, SUSPENSION INTRA-ARTICULAR; INTRAMUSCULAR PRN
Status: DISCONTINUED | OUTPATIENT
Start: 2023-01-26 | End: 2023-01-26 | Stop reason: ALTCHOICE

## 2023-01-26 RX ORDER — SODIUM CHLORIDE, SODIUM LACTATE, POTASSIUM CHLORIDE, CALCIUM CHLORIDE 600; 310; 30; 20 MG/100ML; MG/100ML; MG/100ML; MG/100ML
INJECTION, SOLUTION INTRAVENOUS CONTINUOUS
Status: DISCONTINUED | OUTPATIENT
Start: 2023-01-26 | End: 2023-01-26 | Stop reason: HOSPADM

## 2023-01-26 RX ORDER — LIDOCAINE HYDROCHLORIDE 10 MG/ML
INJECTION, SOLUTION EPIDURAL; INFILTRATION; INTRACAUDAL; PERINEURAL PRN
Status: DISCONTINUED | OUTPATIENT
Start: 2023-01-26 | End: 2023-01-26 | Stop reason: ALTCHOICE

## 2023-01-26 RX ADMIN — SODIUM CHLORIDE, POTASSIUM CHLORIDE, SODIUM LACTATE AND CALCIUM CHLORIDE: 600; 310; 30; 20 INJECTION, SOLUTION INTRAVENOUS at 14:40

## 2023-01-26 ASSESSMENT — PAIN - FUNCTIONAL ASSESSMENT: PAIN_FUNCTIONAL_ASSESSMENT: NONE - DENIES PAIN
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
## 2023-01-26 NOTE — OP NOTE
PROCEDURE PERFORMED: Left Lumbar Medial Branch Radiofrequency Ablation using Fluoroscopy    PREOPERATIVE DIAGNOSIS: Lumbar spondylosis    INDICATIONS: Chronic low back pain    The patient's history and physical exam were reviewed. The risk, benefits, and alternatives of the procedure were discussed and all questions were answered to the patient's satisfaction. The patient agreed to proceed and written informed consent was obtained. POSTOPERATIVE DIAGNOSIS: Lumbar spondylosis    PHYSICIAN:  Dr. Ede Andres DO    ANESTHESIA:  LOCAL    ASSISTANT:  NONE    PATHOLOGY:  NONE    ESTIMATED BLOOD LOSS:  N/A    IMPLANTS:  NONE    PROCEDURE DESCRIPTION: Left lumbar medial branch radiofrequency ablation using fluoroscopy    The patient was placed on the operative bed in prone position. The area was prepped with  Chlorhexidine. The area was then draped in a sterile fashion. Targeted levels: Left lumbar L3, L4, L5 medial branch radiofrequency ablation    An AP  fluoroscopy image was used to identify and sukhdeep Valenzuela's point at the L3, L4 levels on the targeted side. Additionally, the junction of the SAP and sacral ala was also marked on the same side. The skin and subcutaneous tissues were then anesthetized with 1% lidocaine. At each lumbar medial branch, a 20-gauge, 100 mm curved with 10 mm active tip probe was advanced under AP fluoroscopic projections until bone was contacted along the medial aspect of the transverse process. Aspiration of each needle was negative for blood, CSF and paresthesia prior to injection. Motor stimulation at 2 Hz and 1.2 V was negative. Then, after negative aspiration, 1 mL lidocaine 2% was injected at each level prior to lesioning which was performed at 80°C for 90 seconds. Once the lesion was complete, injectate solution containing Kenalog 40 mg and 2 mL lidocaine 1% was injected at each site with a total of 1 mL. The probes were then removed.   The needle sites were dressed appropriately. The patient did not experience any hemodynamic or neurologic sequelae. The patient was transferred to the postoperative care unit in stable condition. Written discharge instructions were given to the patient. COMPLICATIONS:  There were no apparent complications. The patient tolerated the procedure well. SEDATION NOTE:     ASA CLASSIFICATION  1  MP   CLASSIFICATION  3     Moderate intravenous conscious sedation was supervised by Dr. Kate Romero  The patient was independently monitored by a Registered Nurse assigned to the Procedure Room  Monitoring included automated blood pressure, continuous EKG, Capnography and continuous pulse oximetry. The detailed Conscious Record is permanently stored in the [place] [address].       The following is the conscious sedation record;  Start Time:  1503  End times:  1516  Duration:  13 minutes  MEDS GIVEN Versed 2 mg

## 2023-01-26 NOTE — PROGRESS NOTES

## 2023-01-30 RX ORDER — VENLAFAXINE HYDROCHLORIDE 150 MG/1
CAPSULE, EXTENDED RELEASE ORAL
Qty: 30 CAPSULE | Refills: 3 | Status: SHIPPED | OUTPATIENT
Start: 2023-01-30

## 2023-01-30 NOTE — TELEPHONE ENCOUNTER
Last visit:  1/5/2023  Next Visit Date:  No future appointments. Medication List:  Prior to Admission medications    Medication Sig Start Date End Date Taking? Authorizing Provider   venlafaxine (EFFEXOR XR) 150 MG extended release capsule Take 1 capsule by mouth daily 1/5/23   [place] Setter, DO   ibuprofen (ADVIL;MOTRIN) 800 MG tablet Take 1 tablet by mouth 3 times daily (with meals) 1/5/23   [place] Setter, DO   acetaminophen (TYLENOL) 500 MG tablet Take 2 tablets by mouth 2 times daily 1/5/23   [facility]   carisoprodol (SOMA) 250 MG tablet Take 1 tablet by mouth 3 times daily as needed (chronic low back pain) for up to 30 days.  1/5/23 2/4/23  [place] Erick, DO   cetirizine (ZYRTEC) 10 MG tablet Take 10 mg by mouth daily    Historical Provider, MD   buPROPion (WELLBUTRIN XL) 300 MG extended release tablet Take 1 tablet by mouth every morning 11/18/22   [facility], DO   topiramate (TOPAMAX) 50 MG tablet Take 1 tablet by mouth nightly 11/18/22   [facility], DO   omeprazole (PRILOSEC) 40 MG delayed release capsule TAKE 1 CAPSULE BY MOUTH EVERY DAY 11/14/22   [facility]   minocycline (MINOCIN;DYNACIN) 100 MG capsule TAKE 1 CAPSULE BY MOUTH TWICE A DAY FOR 30 DAYS 8/15/22   Historical Provider, MD   hydrOXYzine HCl (ATARAX) 25 MG tablet TAKE 1 TABLET BY MOUTH TWICE A DAY AS NEEDED FOR ANXIETY 8/8/22   [place] Setter, DO   albuterol sulfate HFA (PROVENTIL;VENTOLIN;PROAIR) 108 (90 Base) MCG/ACT inhaler Inhale 2 puffs into the lungs 4 times daily 7/25/22   [facility] DO   traZODone (DESYREL) 50 MG tablet TAKE 3 TABLETS BY MOUTH EVERY NIGHT 7/21/22   [facility], DO   triamcinolone (KENALOG) 0.1 % cream Apply topically 2 times daily for 14 days 7/1/22   Miles Vital, DO

## 2023-02-02 RX ORDER — TRAZODONE HYDROCHLORIDE 50 MG/1
TABLET ORAL
Qty: 180 TABLET | Refills: 1 | Status: SHIPPED | OUTPATIENT
Start: 2023-02-02

## 2023-02-02 NOTE — TELEPHONE ENCOUNTER
Last OV: 1/5/2023 chronics    Next scheduled apt: Visit date not found      Surescripts requesting refill  Medication pending

## 2023-02-23 ENCOUNTER — OFFICE VISIT (OUTPATIENT)
Dept: PAIN MANAGEMENT | Age: 43
End: 2023-02-23
Payer: COMMERCIAL

## 2023-02-23 VITALS
BODY MASS INDEX: 33.73 KG/M2 | WEIGHT: 209 LBS | OXYGEN SATURATION: 98 % | RESPIRATION RATE: 19 BRPM | HEART RATE: 75 BPM

## 2023-02-23 DIAGNOSIS — M54.2 CERVICALGIA: Primary | ICD-10-CM

## 2023-02-23 DIAGNOSIS — M54.50 CHRONIC BILATERAL LOW BACK PAIN WITHOUT SCIATICA: ICD-10-CM

## 2023-02-23 DIAGNOSIS — G89.29 CHRONIC BILATERAL LOW BACK PAIN WITHOUT SCIATICA: ICD-10-CM

## 2023-02-23 DIAGNOSIS — M51.36 LUMBAR DEGENERATIVE DISC DISEASE: ICD-10-CM

## 2023-02-23 DIAGNOSIS — M47.816 LUMBAR SPONDYLOSIS: ICD-10-CM

## 2023-02-23 PROCEDURE — G8427 DOCREV CUR MEDS BY ELIG CLIN: HCPCS | Performed by: STUDENT IN AN ORGANIZED HEALTH CARE EDUCATION/TRAINING PROGRAM

## 2023-02-23 PROCEDURE — G8484 FLU IMMUNIZE NO ADMIN: HCPCS | Performed by: STUDENT IN AN ORGANIZED HEALTH CARE EDUCATION/TRAINING PROGRAM

## 2023-02-23 PROCEDURE — 1036F TOBACCO NON-USER: CPT | Performed by: STUDENT IN AN ORGANIZED HEALTH CARE EDUCATION/TRAINING PROGRAM

## 2023-02-23 PROCEDURE — G8417 CALC BMI ABV UP PARAM F/U: HCPCS | Performed by: STUDENT IN AN ORGANIZED HEALTH CARE EDUCATION/TRAINING PROGRAM

## 2023-02-23 PROCEDURE — 99214 OFFICE O/P EST MOD 30 MIN: CPT | Performed by: STUDENT IN AN ORGANIZED HEALTH CARE EDUCATION/TRAINING PROGRAM

## 2023-02-23 RX ORDER — BUSPIRONE HYDROCHLORIDE 15 MG/1
TABLET ORAL
COMMUNITY
Start: 2023-02-01
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
## 2023-02-23 NOTE — PATIENT INSTRUCTIONS
SURVEY:    You may be receiving a survey from [employer] regarding your visit today. Please complete the survey to enable us to provide the highest quality of care to you and your family. If you cannot score us a very good on any question, please call the office to discuss how we could have made your experience a very good one. Thank you.   MD Svetlana Zepeda MD Laurette Charon, MD Lorita Mcclintock, [address], PM  [place], [address] [place] [address]

## 2023-03-01 RX ORDER — IBUPROFEN 800 MG/1
TABLET ORAL
Qty: 90 TABLET | Refills: 1 | Status: SHIPPED | OUTPATIENT
Start: 2023-03-01

## 2023-03-01 NOTE — TELEPHONE ENCOUNTER
Last OV: 1/5/2023  chronic   Last RX:    Next scheduled apt: Visit date not found          Surescript requesting a refill

## 2023-03-31 RX ORDER — TRAZODONE HYDROCHLORIDE 50 MG/1
TABLET ORAL
Qty: 270 TABLET | Refills: 1 | Status: SHIPPED | OUTPATIENT
Start: 2023-03-31

## 2023-03-31 NOTE — TELEPHONE ENCOUNTER
Last visit:  1/5/2023  Next Visit Date:    Future Appointments   Date Time Provider Felipe Mancini   8/24/2023  9:30 AM Gerber Ayala,  [place] [facility]         Medication List:  Prior to Admission medications    Medication Sig Start Date End Date Taking?  Authorizing Provider   ibuprofen (ADVIL;MOTRIN) 800 MG tablet TAKE 1 TABLET BY MOUTH 3 TIMES DAILY WITH MEALS 3/1/23   Flakito Pipe, DO   busPIRone (BUSPAR) 15 MG tablet TAKE 1 TABLET BY MOUTH TWICE A DAY 2/1/23   Historical Provider, MD   traZODone (DESYREL) 50 MG tablet TAKE 1 TO 2 TABLETS BY MOUTH NIGHTLY AS NEEDED 2/2/23   Flakito Pipe, DO   venlafaxine (EFFEXOR XR) 150 MG extended release capsule TAKE 1 CAPSULE BY MOUTH EVERY DAY 1/30/23   Flakito Pipe, DO   acetaminophen (TYLENOL) 500 MG tablet Take 2 tablets by mouth 2 times daily 1/5/23   Flakito Pipe, DO   cetirizine (ZYRTEC) 10 MG tablet Take 10 mg by mouth daily    Historical Provider, MD   buPROPion (WELLBUTRIN XL) 300 MG extended release tablet Take 1 tablet by mouth every morning 11/18/22   Flakito Pipe, DO   topiramate (TOPAMAX) 50 MG tablet Take 1 tablet by mouth nightly 11/18/22   [place]akito Pipe, DO   omeprazole (PRILOSEC) 40 MG delayed release capsule TAKE 1 CAPSULE BY MOUTH EVERY DAY 11/14/22   Flakito Pipe, DO   minocycline (MINOCIN;DYNACIN) 100 MG capsule TAKE 1 CAPSULE BY MOUTH TWICE A DAY FOR 30 DAYS 8/15/22   Historical Provider, MD   hydrOXYzine HCl (ATARAX) 25 MG tablet TAKE 1 TABLET BY MOUTH TWICE A DAY AS NEEDED FOR ANXIETY 8/8/22   Flakito Pipe, DO   albuterol sulfate HFA (PROVENTIL;VENTOLIN;PROAIR) 108 (90 Base) MCG/ACT inhaler Inhale 2 puffs into the lungs 4 times daily 7/25/22   Flakito Pipe, DO   triamcinolone (KENALOG) 0.1 % cream Apply topically 2 times daily for 14 days 7/1/22   Rocio Schwartz, DO

## 2023-04-17 RX ORDER — TOPIRAMATE 50 MG/1
50 TABLET, FILM COATED ORAL NIGHTLY
Qty: 90 TABLET | Refills: 1 | Status: SHIPPED | OUTPATIENT
Start: 2023-04-17

## 2023-04-17 RX ORDER — IBUPROFEN 800 MG/1
TABLET ORAL
Qty: 90 TABLET | Refills: 1 | Status: SHIPPED | OUTPATIENT
Start: 2023-04-17

## 2023-04-17 RX ORDER — OMEPRAZOLE 40 MG/1
CAPSULE, DELAYED RELEASE ORAL
Qty: 90 CAPSULE | Refills: 1 | Status: SHIPPED | OUTPATIENT
Start: 2023-04-17

## 2023-04-17 NOTE — TELEPHONE ENCOUNTER
Last OV 1/23 for chronic back pain, depression and insomnia  Requesting refills on topamax, ibuprofen and omeprazole thru sure script  She is following with pain management

## 2023-04-26 ENCOUNTER — OFFICE VISIT (OUTPATIENT)
Dept: FAMILY MEDICINE CLINIC | Age: 43
End: 2023-04-26
Payer: COMMERCIAL

## 2023-04-26 VITALS
HEART RATE: 78 BPM | SYSTOLIC BLOOD PRESSURE: 120 MMHG | BODY MASS INDEX: 33.75 KG/M2 | DIASTOLIC BLOOD PRESSURE: 70 MMHG | OXYGEN SATURATION: 98 % | WEIGHT: 210 LBS | HEIGHT: 66 IN

## 2023-04-26 DIAGNOSIS — L03.317 ABSCESS AND CELLULITIS OF GLUTEAL REGION: Primary | ICD-10-CM

## 2023-04-26 DIAGNOSIS — F11.11 NARCOTIC ABUSE IN REMISSION (HCC): ICD-10-CM

## 2023-04-26 DIAGNOSIS — G47.09 OTHER INSOMNIA: ICD-10-CM

## 2023-04-26 DIAGNOSIS — F33.1 MODERATE EPISODE OF RECURRENT MAJOR DEPRESSIVE DISORDER (HCC): ICD-10-CM

## 2023-04-26 DIAGNOSIS — L02.31 ABSCESS AND CELLULITIS OF GLUTEAL REGION: Primary | ICD-10-CM

## 2023-04-26 PROBLEM — E88.01 ALPHA-1-ANTITRYPSIN DEFICIENCY (HCC): Status: RESOLVED | Noted: 2022-06-03 | Resolved: 2023-04-26

## 2023-04-26 PROCEDURE — G8427 DOCREV CUR MEDS BY ELIG CLIN: HCPCS | Performed by: FAMILY MEDICINE

## 2023-04-26 PROCEDURE — G8417 CALC BMI ABV UP PARAM F/U: HCPCS | Performed by: FAMILY MEDICINE

## 2023-04-26 PROCEDURE — 1036F TOBACCO NON-USER: CPT | Performed by: FAMILY MEDICINE

## 2023-04-26 PROCEDURE — 99214 OFFICE O/P EST MOD 30 MIN: CPT | Performed by: FAMILY MEDICINE

## 2023-04-26 RX ORDER — TRAZODONE HYDROCHLORIDE 100 MG/1
TABLET ORAL
COMMUNITY
Start: 2023-04-19

## 2023-04-26 RX ORDER — SULFAMETHOXAZOLE AND TRIMETHOPRIM 800; 160 MG/1; MG/1
1 TABLET ORAL 2 TIMES DAILY
Qty: 20 TABLET | Refills: 0 | Status: SHIPPED | OUTPATIENT
Start: 2023-04-26 | End: 2023-05-06

## 2023-04-26 RX ORDER — TOPIRAMATE 50 MG/1
75 TABLET, FILM COATED ORAL NIGHTLY
Qty: 45 TABLET | Refills: 5
Start: 2023-04-26

## 2023-04-26 RX ORDER — SUVOREXANT 10 MG/1
10 TABLET, FILM COATED ORAL NIGHTLY PRN
Qty: 30 TABLET | Refills: 2 | Status: SHIPPED | OUTPATIENT
Start: 2023-04-26 | End: 2023-07-25

## 2023-04-26 SDOH — ECONOMIC STABILITY: INCOME INSECURITY: HOW HARD IS IT FOR YOU TO PAY FOR THE VERY BASICS LIKE FOOD, HOUSING, MEDICAL CARE, AND HEATING?: NOT HARD AT ALL

## 2023-04-26 SDOH — ECONOMIC STABILITY: FOOD INSECURITY: WITHIN THE PAST 12 MONTHS, YOU WORRIED THAT YOUR FOOD WOULD RUN OUT BEFORE YOU GOT MONEY TO BUY MORE.: NEVER TRUE

## 2023-04-26 SDOH — ECONOMIC STABILITY: FOOD INSECURITY: WITHIN THE PAST 12 MONTHS, THE FOOD YOU BOUGHT JUST DIDN'T LAST AND YOU DIDN'T HAVE MONEY TO GET MORE.: NEVER TRUE

## 2023-04-26 SDOH — ECONOMIC STABILITY: HOUSING INSECURITY
IN THE LAST 12 MONTHS, WAS THERE A TIME WHEN YOU DID NOT HAVE A STEADY PLACE TO SLEEP OR SLEPT IN A SHELTER (INCLUDING NOW)?: NO

## 2023-05-26 DIAGNOSIS — G47.09 OTHER INSOMNIA: ICD-10-CM

## 2023-05-26 RX ORDER — SUVOREXANT 10 MG/1
10 TABLET, FILM COATED ORAL NIGHTLY PRN
Qty: 30 TABLET | Refills: 2 | Status: SHIPPED | OUTPATIENT
Start: 2023-05-26 | End: 2023-08-24
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
## 2023-06-01 ENCOUNTER — OFFICE VISIT (OUTPATIENT)
Dept: PAIN MANAGEMENT | Age: 43
End: 2023-06-01
Payer: COMMERCIAL

## 2023-06-01 VITALS
BODY MASS INDEX: 35.2 KG/M2 | HEIGHT: 66 IN | WEIGHT: 219 LBS | RESPIRATION RATE: 19 BRPM | HEART RATE: 79 BPM | OXYGEN SATURATION: 97 %

## 2023-06-01 DIAGNOSIS — M53.3 SACROILIAC JOINT PAIN: Primary | ICD-10-CM

## 2023-06-01 DIAGNOSIS — M47.817 LUMBOSACRAL SPONDYLOSIS WITHOUT MYELOPATHY: ICD-10-CM

## 2023-06-01 DIAGNOSIS — G89.29 CHRONIC BILATERAL LOW BACK PAIN WITHOUT SCIATICA: ICD-10-CM

## 2023-06-01 DIAGNOSIS — M54.50 CHRONIC BILATERAL LOW BACK PAIN WITHOUT SCIATICA: ICD-10-CM

## 2023-06-01 DIAGNOSIS — M51.36 LUMBAR DEGENERATIVE DISC DISEASE: ICD-10-CM

## 2023-06-01 PROBLEM — F33.1 MAJOR DEPRESSIVE DISORDER, RECURRENT, MODERATE (HCC): Status: ACTIVE | Noted: 2023-06-01

## 2023-06-01 PROBLEM — F33.9 MAJOR DEPRESSIVE DISORDER, RECURRENT, UNSPECIFIED (HCC): Status: ACTIVE | Noted: 2023-06-01

## 2023-06-01 PROBLEM — F33.0 MAJOR DEPRESSIVE DISORDER, RECURRENT, MILD (HCC): Status: ACTIVE | Noted: 2023-06-01

## 2023-06-01 PROCEDURE — G8427 DOCREV CUR MEDS BY ELIG CLIN: HCPCS | Performed by: STUDENT IN AN ORGANIZED HEALTH CARE EDUCATION/TRAINING PROGRAM

## 2023-06-01 PROCEDURE — 99214 OFFICE O/P EST MOD 30 MIN: CPT | Performed by: STUDENT IN AN ORGANIZED HEALTH CARE EDUCATION/TRAINING PROGRAM

## 2023-06-01 PROCEDURE — G8417 CALC BMI ABV UP PARAM F/U: HCPCS | Performed by: STUDENT IN AN ORGANIZED HEALTH CARE EDUCATION/TRAINING PROGRAM

## 2023-06-01 PROCEDURE — 1036F TOBACCO NON-USER: CPT | Performed by: STUDENT IN AN ORGANIZED HEALTH CARE EDUCATION/TRAINING PROGRAM

## 2023-06-01 NOTE — PATIENT INSTRUCTIONS
SURVEY:    You may be receiving a survey from [email or web address] regarding your visit today. Please complete the survey to enable us to provide the highest quality of care to you and your family. If you cannot score us a very good on any question, please call the office to discuss how we could have made your experience a very good one. Thank you.   January Aaron [address] [facility]  MD Pamela Lo MD Blossom Grana, MD Dorotha Mounts, DO  Alexis Castañeda, APRN-CN  Shahnaz Heller, APRN-CNP  [id number] [place] [address], PM  [address], [facility] [address], [address], [facility]
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
## 2023-06-20 ENCOUNTER — TELEPHONE (OUTPATIENT)
Dept: PAIN MANAGEMENT | Age: 43
End: 2023-06-20

## 2023-06-20 NOTE — TELEPHONE ENCOUNTER
Per Auth Dept the Auth for the SI joint injection was voided out and not needed due to unknown issues. Called [facility]ack. They do show her authorization was voided out but there is not reason. Direct Line to [employer] [phone] for injections. I did call this number and left a voicemail.  Waiting for return call

## 2023-06-21 NOTE — TELEPHONE ENCOUNTER
Patient states that she called Yogesh Gama herself and was advised that her injection is approved for tomorrow.

## 2023-06-21 NOTE — TELEPHONE ENCOUNTER
Request was voided due to Clinicals were sent with the wrong patient's clinical notes. They state that it was the wrong patient/ as well as wrong provider NPI. They sent a message to our authorization team 6/12/23. Her SI joint injection in fact, is NOT approved for tomorrow.

## 2023-06-23 ENCOUNTER — OFFICE VISIT (OUTPATIENT)
Dept: FAMILY MEDICINE CLINIC | Age: 43
End: 2023-06-23
Payer: COMMERCIAL

## 2023-06-23 VITALS
SYSTOLIC BLOOD PRESSURE: 118 MMHG | DIASTOLIC BLOOD PRESSURE: 74 MMHG | OXYGEN SATURATION: 100 % | WEIGHT: 208 LBS | HEART RATE: 70 BPM | HEIGHT: 66 IN | BODY MASS INDEX: 33.43 KG/M2

## 2023-06-23 DIAGNOSIS — M25.531 BILATERAL WRIST PAIN: Primary | ICD-10-CM

## 2023-06-23 DIAGNOSIS — I83.893 VARICOSE VEINS OF BILATERAL LOWER EXTREMITIES WITH OTHER COMPLICATIONS: ICD-10-CM

## 2023-06-23 DIAGNOSIS — F19.11 HISTORY OF SUBSTANCE ABUSE (HCC): ICD-10-CM

## 2023-06-23 DIAGNOSIS — G47.09 SECONDARY INSOMNIA: ICD-10-CM

## 2023-06-23 DIAGNOSIS — F51.01 PRIMARY INSOMNIA: ICD-10-CM

## 2023-06-23 DIAGNOSIS — M25.532 BILATERAL WRIST PAIN: Primary | ICD-10-CM

## 2023-06-23 PROCEDURE — 99214 OFFICE O/P EST MOD 30 MIN: CPT | Performed by: FAMILY MEDICINE

## 2023-06-23 PROCEDURE — G8417 CALC BMI ABV UP PARAM F/U: HCPCS | Performed by: FAMILY MEDICINE

## 2023-06-23 PROCEDURE — 1036F TOBACCO NON-USER: CPT | Performed by: FAMILY MEDICINE

## 2023-06-23 PROCEDURE — G8427 DOCREV CUR MEDS BY ELIG CLIN: HCPCS | Performed by: FAMILY MEDICINE

## 2023-06-26 DIAGNOSIS — G47.09 OTHER INSOMNIA: ICD-10-CM

## 2023-06-26 RX ORDER — GLYCERIN/MIN OIL/POLYCARBOPHIL
GEL WITH APPLICATOR (GRAM) VAGINAL
Qty: 120 TABLET | Refills: 2 | OUTPATIENT
Start: 2023-06-26

## 2023-06-26 RX ORDER — SUVOREXANT 20 MG/1
20 TABLET, FILM COATED ORAL NIGHTLY PRN
Qty: 30 TABLET | Refills: 0 | Status: SHIPPED | OUTPATIENT
Start: 2023-06-26 | End: 2023-06-26 | Stop reason: SDUPTHER

## 2023-06-26 RX ORDER — SUVOREXANT 20 MG/1
20 TABLET, FILM COATED ORAL NIGHTLY PRN
Qty: 30 TABLET | Refills: 0 | Status: SHIPPED | OUTPATIENT
Start: 2023-06-26 | End: 2023-07-26

## 2023-06-26 RX ORDER — GLYCERIN/MIN OIL/POLYCARBOPHIL
GEL WITH APPLICATOR (GRAM) VAGINAL
Qty: 120 TABLET | Refills: 2 | Status: SHIPPED | OUTPATIENT
Start: 2023-06-26 | End: 2023-08-11 | Stop reason: SDUPTHER

## 2023-06-26 NOTE — TELEPHONE ENCOUNTER
Last visit:  6/23/2023  Next Visit Date:    Future Appointments   Date Time Provider [address]   8/10/2023 11:00 AM Nimesh Nettedalila, DO [place] [facility]   8/24/2023  9:30 AM Gerber Buciomiki, DO [place] [facility]         Medication List:  Prior to Admission medications    Medication Sig Start Date End Date Taking? Authorizing Provider   Suvorexant (BELSOMRA) 10 MG TABS Take 10 mg by mouth nightly as needed (sleep) for up to 90 days. Max Daily Amount: 10 mg 5/26/23 8/24/23  Ana Laura Carson DO   traZODone (DESYREL) 100 MG tablet 2 AT NIGHT-  PSYCH FILLS THIS MED 4/19/23   Historical Provider, MD   topiramate (TOPAMAX) 50 MG tablet Take 1.5 tablets by mouth nightly 4/26/23   Ana Laura Carson DO   ibuprofen (ADVIL;MOTRIN) 800 MG tablet TAKE 1 TABLET BY MOUTH 3 TIMES DAILY WITH MEALS.  4/17/23   Ana Laura Carson DO   omeprazole (PRILOSEC) 40 MG delayed release capsule TAKE 1 CAPSULE BY MOUTH EVERY DAY 4/17/23   Ana Laura Carson DO   busPIRone (BUSPAR) 15 MG tablet TAKE 1 TABLET BY MOUTH TWICE A DAY 2/1/23   Historical Provider, MD   venlafaxine (EFFEXOR XR) 150 MG extended release capsule TAKE 1 CAPSULE BY MOUTH EVERY DAY 1/30/23   Ana Laura Carson DO   acetaminophen (TYLENOL) 500 MG tablet Take 2 tablets by mouth 2 times daily 1/5/23   Ana Laura Carson DO   cetirizine (ZYRTEC) 10 MG tablet Take 1 tablet by mouth daily    Historical Provider, MD   buPROPion (WELLBUTRIN XL) 300 MG extended release tablet Take 1 tablet by mouth every morning 11/18/22   Ana Laura Carson DO   minocycline (MINOCIN;DYNACIN) 100 MG capsule TAKE 1 CAPSULE BY MOUTH TWICE A DAY FOR 30 DAYS 8/15/22   Historical Provider, MD   albuterol sulfate HFA (PROVENTIL;VENTOLIN;PROAIR) 108 (90 Base) MCG/ACT inhaler Inhale 2 puffs into the lungs 4 times daily 7/25/22   Ana Laura Carson DO   triamcinolone (KENALOG) 0.1 % cream Apply topically 2 times daily for 14 days 7/1/22   Felicia Mathias DO

## 2023-06-27 ENCOUNTER — TELEPHONE (OUTPATIENT)
Dept: FAMILY MEDICINE CLINIC | Age: 43
End: 2023-06-27

## 2023-06-27 PROBLEM — F33.0 MAJOR DEPRESSIVE DISORDER, RECURRENT, MILD (HCC): Status: RESOLVED | Noted: 2023-06-01 | Resolved: 2023-06-27

## 2023-06-27 PROBLEM — F33.9 MAJOR DEPRESSIVE DISORDER, RECURRENT, UNSPECIFIED (HCC): Status: RESOLVED | Noted: 2023-06-01 | Resolved: 2023-06-27

## 2023-07-05 NOTE — PROGRESS NOTES
[place]   Preadmission Testing    Name: Symone Arreola  : 1980  Patient Phone: [phone] (home) [phone] (work)    Procedure: BILATERAL SACROILIAC JOINT INJECTION - Bilateral  Date of Procedure: 2023    Anastacio Shoemaker: Anthony Baldwin DO    Ht:    Wt:   Wt method: Allergies: Allergies   Allergen Reactions    Clindamycin/Lincomycin Rash                There were no vitals filed for this visit. No LMP recorded. Do you take blood thinners? [] Yes    [x] No         Instructed to stop blood thinners prior to procedure? [] Yes    [] No      [x] N/A    []Eliquis - 3 days  []Xarelto - 3 days  []Pradaxa - 5 days  []Coumadin - 5 days   []Plavix - 7 days  []ASA 325mg - 7 days  []Brillinta - 5 days  []Pletal - 2 days   Have you had a respiratory infection or sore throat in last 4 weeks before surgery?     [] Yes    [x] No     Patient instructed on: [x] NPO Status - if receiving sedation  [x] Meds to Take  [x] Ride Home - sedation/ epidurals  []No Jewelry/Contact Lenses/Nail Polish     DOS Patient Needs [] HCG   [] Blood Sugar  [] PT/INR

## 2023-07-06 ENCOUNTER — HOSPITAL ENCOUNTER (OUTPATIENT)
Age: 43
Setting detail: OUTPATIENT SURGERY
Discharge: HOME | End: 2023-07-06
Attending: STUDENT IN AN ORGANIZED HEALTH CARE EDUCATION/TRAINING PROGRAM | Admitting: STUDENT IN AN ORGANIZED HEALTH CARE EDUCATION/TRAINING PROGRAM
Payer: COMMERCIAL

## 2023-07-06 ENCOUNTER — APPOINTMENT (OUTPATIENT)
Dept: GENERAL RADIOLOGY | Age: 43
End: 2023-07-06
Attending: STUDENT IN AN ORGANIZED HEALTH CARE EDUCATION/TRAINING PROGRAM
Payer: COMMERCIAL

## 2023-07-06 VITALS
HEIGHT: 66 IN | WEIGHT: 209 LBS | RESPIRATION RATE: 16 BRPM | BODY MASS INDEX: 33.59 KG/M2 | HEART RATE: 84 BPM | TEMPERATURE: 96.8 F | OXYGEN SATURATION: 100 % | SYSTOLIC BLOOD PRESSURE: 125 MMHG | DIASTOLIC BLOOD PRESSURE: 78 MMHG

## 2023-07-06 LAB — HCG UR QL: NEGATIVE

## 2023-07-06 PROCEDURE — 6360000004 HC RX CONTRAST MEDICATION: Performed by: STUDENT IN AN ORGANIZED HEALTH CARE EDUCATION/TRAINING PROGRAM

## 2023-07-06 PROCEDURE — 6360000002 HC RX W HCPCS: Performed by: STUDENT IN AN ORGANIZED HEALTH CARE EDUCATION/TRAINING PROGRAM

## 2023-07-06 PROCEDURE — 2580000003 HC RX 258: Performed by: STUDENT IN AN ORGANIZED HEALTH CARE EDUCATION/TRAINING PROGRAM

## 2023-07-06 PROCEDURE — 3600000052 HC PAIN LEVEL 2 BASE: Performed by: STUDENT IN AN ORGANIZED HEALTH CARE EDUCATION/TRAINING PROGRAM

## 2023-07-06 PROCEDURE — 99152 MOD SED SAME PHYS/QHP 5/>YRS: CPT | Performed by: STUDENT IN AN ORGANIZED HEALTH CARE EDUCATION/TRAINING PROGRAM

## 2023-07-06 PROCEDURE — 27096 INJECT SACROILIAC JOINT: CPT | Performed by: STUDENT IN AN ORGANIZED HEALTH CARE EDUCATION/TRAINING PROGRAM

## 2023-07-06 PROCEDURE — 7100000010 HC PHASE II RECOVERY - FIRST 15 MIN: Performed by: STUDENT IN AN ORGANIZED HEALTH CARE EDUCATION/TRAINING PROGRAM

## 2023-07-06 PROCEDURE — 2709999900 HC NON-CHARGEABLE SUPPLY: Performed by: STUDENT IN AN ORGANIZED HEALTH CARE EDUCATION/TRAINING PROGRAM

## 2023-07-06 PROCEDURE — 7100000011 HC PHASE II RECOVERY - ADDTL 15 MIN: Performed by: STUDENT IN AN ORGANIZED HEALTH CARE EDUCATION/TRAINING PROGRAM

## 2023-07-06 PROCEDURE — 2500000003 HC RX 250 WO HCPCS: Performed by: STUDENT IN AN ORGANIZED HEALTH CARE EDUCATION/TRAINING PROGRAM

## 2023-07-06 PROCEDURE — 81025 URINE PREGNANCY TEST: CPT

## 2023-07-06 PROCEDURE — 76000 FLUOROSCOPY <1 HR PHYS/QHP: CPT

## 2023-07-06 RX ORDER — SODIUM CHLORIDE, SODIUM LACTATE, POTASSIUM CHLORIDE, CALCIUM CHLORIDE 600; 310; 30; 20 MG/100ML; MG/100ML; MG/100ML; MG/100ML
INJECTION, SOLUTION INTRAVENOUS CONTINUOUS
Status: DISCONTINUED | OUTPATIENT
Start: 2023-07-06 | End: 2023-07-06 | Stop reason: HOSPADM

## 2023-07-06 RX ORDER — LIDOCAINE HYDROCHLORIDE 10 MG/ML
INJECTION, SOLUTION EPIDURAL; INFILTRATION; INTRACAUDAL; PERINEURAL PRN
Status: DISCONTINUED | OUTPATIENT
Start: 2023-07-06 | End: 2023-07-06 | Stop reason: ALTCHOICE

## 2023-07-06 RX ORDER — MIDAZOLAM HYDROCHLORIDE 1 MG/ML
INJECTION INTRAMUSCULAR; INTRAVENOUS PRN
Status: DISCONTINUED | OUTPATIENT
Start: 2023-07-06 | End: 2023-07-06 | Stop reason: ALTCHOICE

## 2023-07-06 RX ORDER — TRIAMCINOLONE ACETONIDE 40 MG/ML
INJECTION, SUSPENSION INTRA-ARTICULAR; INTRAMUSCULAR PRN
Status: DISCONTINUED | OUTPATIENT
Start: 2023-07-06 | End: 2023-07-06 | Stop reason: ALTCHOICE

## 2023-07-06 RX ADMIN — SODIUM CHLORIDE, POTASSIUM CHLORIDE, SODIUM LACTATE AND CALCIUM CHLORIDE: 600; 310; 30; 20 INJECTION, SOLUTION INTRAVENOUS at 14:28

## 2023-07-06 ASSESSMENT — PAIN DESCRIPTION - DESCRIPTORS: DESCRIPTORS: ACHING

## 2023-07-06 ASSESSMENT — PAIN - FUNCTIONAL ASSESSMENT: PAIN_FUNCTIONAL_ASSESSMENT: 0-10

## 2023-07-06 NOTE — H&P
Pain Therapy: The patient was explicitly considered for multimodal and interdisciplinary therapy. Non-opioid and non-pharmacological opportunities to enhance analgesia and quality of life have been and will continue to be pursued.     Ernesto Muller DO  Interventional Pain Management/PM&R   [address]     Orders Placed This Encounter    FL LESS THAN 1 HOUR     Standing Status:   Standing     Number of Occurrences:   1     Order Specific Question:   Reason for exam:     Answer:   C-arm in OR    Pregnancy, Urine     Standing Status:   Standing     Number of Occurrences:   1

## 2023-07-06 NOTE — PROGRESS NOTES
Discharge Criteria  Outpatients must meet criteria 1 through 7. Up to restroom, void sufficient amount. Yes. Gait steady when up. 1.  Minimum 30 minutes after last dose of sedative medication, minimum 120 minutes after last dose of reversal agent. Yes    2. Systolic BP stable within 20 mmHg for 30 minutes & systolic BP between 90 & 981 or within 10 mmHg of baseline. Yes    3. Pulse between 60 and 100 or within 10 bpm of baseline. Yes    4. Spontaneous respiratory rate >/= 10 per minute. Yes    5. SaO2 >/= 95 or  >/= baseline. Yes    6. Able to cough and swallow or return to baseline function. Yes    7. Alert and oriented or return to baseline mental status. Yes    8. Demonstrates controlled, coordinated movements, ambulates with steady gait, or return to baseline activity function. Yes    9. Minimal or no pain or nausea, or at a level tolerable and acceptable to patient. Yes    10. Takes and retains oral fluids as allowed. Yes    11. Procedural / perioperative site stable. Minimal or no bleeding. Yes        12. If GI endoscopy procedure, minimal or no abdominal distention or passing flatus. Yes    13. Written discharge instructions and emergency telephone number provided. Yes    14. Accompanied by a responsible adult. Yes    Adult patient discharged from facility without responsible person meets above criteria plus the following:   a) remains awake without stimulus for 30 minutes     b) oriented appropriate for age      c) all vital signs stable   d) no significant risk of losing protective reflexes      e) able to maintain pre-procedure mobility without assistance   f) no nausea or dizziness      g) transportation arrangements that do not require patient to operate motor   Vehicle.   Yes

## 2023-07-06 NOTE — OP NOTE
PROCEDURE PERFORMED: Bilateral Sacroiliac joint injection    PREOPERATIVE DIAGNOSIS: Lumbago and sacroiliac joint pain    INDICATIONS: Chronic low back pain    The patient's history and physical exam were reviewed. The risk, benefits, and alternatives of the procedure were discussed and all questions were answered to the patient's satisfaction. The patient agreed to proceed and written informed consent was obtained. POSTOPERATIVE DIAGNOSIS: Same    PHYSICIAN:  Dr. Lisette Nj DO    ANESTHESIA:  LOCAL and Moderate Sedation    ASSISTANT:  NONE    PATHOLOGY:  NONE    ESTIMATED BLOOD LOSS:  N/A    IMPLANTS:  NONE    PROCEDURE DESCRIPTION: Bilateral sacroiliac joint injection using fluoroscopy    The patient was placed on the operative bed in prone position. The area was prepped with  Chlorhexidine. The area was then draped in a sterile fashion. The targeted side of the sacroiliac joint was identified and marked under AP fluoroscopy. The fluoroscopic beam was then obliqued until the anterior and posterior margins of the joint were aligned. The inferior margin of the joint was identified. A 25-gauge 3-1/2 inch Quincke needle was directed toward the identified point under fluoroscopic guidance. The joint space was then entered and negative aspiration was confirmed. Then, Omnipaque was injected. An appropriate arthrogram was noted. Then, after negative aspiration, the injectate was easily injected. The injectate consisted of 40 mg triamcinolone and 1 mL lidocaine 1%. The needle was then removed. The same procedure was performed on the opposite side. The needle site was dressed appropriately. The patient was transferred to the postoperative care unit in stable condition. Written discharge instructions were given to the patient. COMPLICATIONS:  There were no apparent complications. The patient tolerated the procedure well.       SEDATION NOTE:     ASA CLASSIFICATION  1  MP   CLASSIFICATION  3

## 2023-07-07 ENCOUNTER — TELEPHONE (OUTPATIENT)
Dept: FAMILY MEDICINE CLINIC | Age: 43
End: 2023-07-07

## 2023-07-12 ENCOUNTER — TELEPHONE (OUTPATIENT)
Dept: FAMILY MEDICINE CLINIC | Age: 43
End: 2023-07-12

## 2023-07-12 NOTE — TELEPHONE ENCOUNTER
Wisam Jamil calling in again about her belsomra. Call was put in on 6/27 and 7/7 for a PA. Spoke with [facility] Watson they said it looks like it still needs a PA. Can we please look into this on Cover mymeds and let Wisam Jamil know.     Health Maintenance   Topic Date Due    COVID-19 Vaccine (1) Never done    Varicella vaccine (1 of 2 - 2-dose childhood series) Never done    DTaP/Tdap/Td vaccine (1 - Tdap) Never done    Diabetes screen  09/19/2021    Lipids  Never done    Flu vaccine (1) 08/01/2023    Depression Monitoring  01/05/2024    Cervical cancer screen  11/15/2025    Hepatitis C screen  Completed    HIV screen  Completed    Hepatitis A vaccine  Aged Out    Hib vaccine  Aged Out    Meningococcal (ACWY) vaccine  Aged Out    Pneumococcal 0-64 years Vaccine  Aged Out             (applicable per patient's age: Cancer Screenings, Depression Screening, Fall Risk Screening, Immunizations)    Hemoglobin A1C (%)   Date Value   09/19/2018 5.3     AST (U/L)   Date Value   08/30/2022 13     ALT (U/L)   Date Value   08/30/2022 11     BUN (mg/dL)   Date Value   08/30/2022 12      (goal A1C is < 7)   (goal LDL is <100) need 30-50% reduction from baseline     BP Readings from Last 3 Encounters:   07/06/23 125/78   06/23/23 118/74   04/26/23 120/70    (goal /80)      All Future Testing planned in [facility]:  Lab Frequency Next Occurrence   FACET JOINT L/S SINGLE Once 10/20/2022   RADIOFREQUENCY L/S ADDITIONAL Once 10/20/2022   SARAH DIGITAL SCREEN W OR WO CAD BILATERAL Once 11/15/2022   XR CERVICAL SPINE (2-3 VIEWS) Once 02/23/2023   SI JOINT INJECTIONS / SI NERVE BLOCK Once 06/01/2023       Next Visit Date:  Future Appointments   Date Time Provider [facility] [address]   8/10/2023 11:00 AM Chiquita Palma, DO [facility]   8/24/2023  9:30 AM Gerber Rome, DO [place]            Patient Active Problem List:     Chronic neck pain     Onychomycosis     Chronic low back pain without sciatica     Narcotic abuse in

## 2023-07-17 ENCOUNTER — TELEPHONE (OUTPATIENT)
Dept: FAMILY MEDICINE CLINIC | Age: 43
End: 2023-07-17

## 2023-07-17 NOTE — TELEPHONE ENCOUNTER
Called marco a verbal was given to start a PA, there was no record of a PA on file.      #   [id number]

## 2023-07-17 NOTE — TELEPHONE ENCOUNTER
Patient asking for Tamar Castro to call her regarding prior auth for belsomra.     Health Maintenance   Topic Date Due    COVID-19 Vaccine (1) Never done    Varicella vaccine (1 of 2 - 2-dose childhood series) Never done    DTaP/Tdap/Td vaccine (1 - Tdap) Never done    Diabetes screen  09/19/2021    Lipids  Never done    Flu vaccine (1) 08/01/2023    Depression Monitoring  01/05/2024    Cervical cancer screen  11/15/2025    Hepatitis C screen  Completed    HIV screen  Completed    Hepatitis A vaccine  Aged Out    Hib vaccine  Aged Out    Meningococcal (ACWY) vaccine  Aged Out    Pneumococcal 0-64 years Vaccine  Aged Out             (applicable per patient's age: Cancer Screenings, Depression Screening, Fall Risk Screening, Immunizations)    Hemoglobin A1C (%)   Date Value   09/19/2018 5.3     AST (U/L)   Date Value   08/30/2022 13     ALT (U/L)   Date Value   08/30/2022 11     BUN (mg/dL)   Date Value   08/30/2022 12      (goal A1C is < 7)   (goal LDL is <100) need 30-50% reduction from baseline     BP Readings from Last 3 Encounters:   07/06/23 125/78   06/23/23 118/74   04/26/23 120/70    (goal /80)      All Future Testing planned in [facility]:  Lab Frequency Next Occurrence   FACET JOINT L/S SINGLE Once 10/20/2022   RADIOFREQUENCY L/S ADDITIONAL Once 10/20/2022   SARAH DIGITAL SCREEN W OR WO CAD BILATERAL Once 11/15/2022   XR CERVICAL SPINE (2-3 VIEWS) Once 02/23/2023   SI JOINT INJECTIONS / SI NERVE BLOCK Once 06/01/2023       Next Visit Date:  Future Appointments   Date Time Provider [address]   8/10/2023 11:00 AM Geeta Ch DO [facility] [place] [facility]   8/24/2023  9:30 AM Gerber Shaw DO [facility]            Patient Active Problem List:     Chronic neck pain     Onychomycosis     Chronic low back pain without sciatica     Narcotic abuse in remission ([address])     Chronic constipation     Alcohol abuse, in remission     Hidradenitis suppurativa     Major depressive disorder, recurrent,

## 2023-07-18 ENCOUNTER — TELEPHONE (OUTPATIENT)
Dept: FAMILY MEDICINE CLINIC | Age: 43
End: 2023-07-18

## 2023-07-22 ENCOUNTER — HOSPITAL ENCOUNTER (EMERGENCY)
Age: 43
Discharge: HOME | End: 2023-07-22
Attending: FAMILY MEDICINE
Payer: COMMERCIAL

## 2023-07-22 ENCOUNTER — APPOINTMENT (OUTPATIENT)
Dept: GENERAL RADIOLOGY | Age: 43
End: 2023-07-22
Payer: COMMERCIAL

## 2023-07-22 VITALS
DIASTOLIC BLOOD PRESSURE: 81 MMHG | TEMPERATURE: 98.2 F | SYSTOLIC BLOOD PRESSURE: 125 MMHG | HEART RATE: 86 BPM | WEIGHT: 204.2 LBS | RESPIRATION RATE: 17 BRPM | HEIGHT: 66 IN | OXYGEN SATURATION: 97 % | BODY MASS INDEX: 32.82 KG/M2

## 2023-07-22 DIAGNOSIS — F41.9 ACUTE ANXIETY: ICD-10-CM

## 2023-07-22 DIAGNOSIS — M79.642 LEFT HAND PAIN: Primary | ICD-10-CM

## 2023-07-22 LAB
ALBUMIN SERPL-MCNC: 4.2 G/DL (ref 3.5–5.2)
ALP SERPL-CCNC: 91 U/L (ref 35–104)
ALT SERPL-CCNC: 24 U/L (ref 5–33)
ANION GAP SERPL CALCULATED.3IONS-SCNC: 11 MMOL/L (ref 9–17)
AST SERPL-CCNC: 19 U/L
BASOPHILS # BLD: 0 K/UL (ref 0–0.2)
BASOPHILS NFR BLD: 0 % (ref 0–2)
BILIRUB DIRECT SERPL-MCNC: <0.1 MG/DL
BILIRUB INDIRECT SERPL-MCNC: ABNORMAL MG/DL (ref 0–1)
BILIRUB SERPL-MCNC: <0.1 MG/DL (ref 0.3–1.2)
BUN SERPL-MCNC: 16 MG/DL (ref 6–20)
CALCIUM SERPL-MCNC: 9.1 MG/DL (ref 8.6–10.4)
CHLORIDE SERPL-SCNC: 98 MMOL/L (ref 98–107)
CO2 SERPL-SCNC: 22 MMOL/L (ref 20–31)
CREAT SERPL-MCNC: 0.9 MG/DL (ref 0.5–0.9)
D DIMER PPP FEU-MCNC: <0.27 UG/ML FEU (ref 0–0.59)
DIFFERENTIAL TYPE: YES
EKG ATRIAL RATE: 71 BPM
EKG P AXIS: 32 DEGREES
EKG P-R INTERVAL: 166 MS
EKG Q-T INTERVAL: 382 MS
EKG QRS DURATION: 106 MS
EKG QTC CALCULATION (BAZETT): 415 MS
EKG R AXIS: -30 DEGREES
EKG T AXIS: 14 DEGREES
EKG VENTRICULAR RATE: 71 BPM
EOSINOPHIL # BLD: 0 K/UL (ref 0–0.4)
EOSINOPHILS RELATIVE PERCENT: 0 % (ref 0–5)
ERYTHROCYTE [DISTWIDTH] IN BLOOD BY AUTOMATED COUNT: 13.1 % (ref 12.1–15.2)
GFR SERPL CREATININE-BSD FRML MDRD: >60 ML/MIN/1.73M2
GLUCOSE SERPL-MCNC: 91 MG/DL (ref 70–99)
HCG UR QL: NEGATIVE
HCT VFR BLD AUTO: 38.8 % (ref 36–46)
HGB BLD-MCNC: 13.5 G/DL (ref 12–16)
INR PPP: 0.9
LYMPHOCYTES NFR BLD: 2.9 K/UL (ref 1–4.8)
LYMPHOCYTES RELATIVE PERCENT: 17 % (ref 15–40)
MCH RBC QN AUTO: 29.2 PG (ref 26–34)
MCHC RBC AUTO-ENTMCNC: 34.7 G/DL (ref 31–37)
MCV RBC AUTO: 84 FL (ref 80–100)
MONOCYTES NFR BLD: 1.1 K/UL (ref 0–1)
MONOCYTES NFR BLD: 6 % (ref 4–8)
NEUTROPHILS NFR BLD: 77 % (ref 47–75)
NEUTS SEG NFR BLD: 13.2 K/UL (ref 2.5–7)
PARTIAL THROMBOPLASTIN TIME: 25.1 SEC (ref 23.9–33.8)
PLATELET # BLD AUTO: 432 K/UL (ref 140–450)
POTASSIUM SERPL-SCNC: 3.8 MMOL/L (ref 3.7–5.3)
PROT SERPL-MCNC: 7.5 G/DL (ref 6.4–8.3)
PROTHROMBIN TIME: 12.4 SEC (ref 11.5–14.2)
RBC # BLD AUTO: 4.62 M/UL (ref 4–5.2)
SODIUM SERPL-SCNC: 131 MMOL/L (ref 135–144)
TROPONIN I SERPL HS-MCNC: <6 NG/L (ref 0–14)
WBC OTHER # BLD: 17.3 K/UL (ref 3.5–11)

## 2023-07-22 PROCEDURE — 85379 FIBRIN DEGRADATION QUANT: CPT

## 2023-07-22 PROCEDURE — 81025 URINE PREGNANCY TEST: CPT

## 2023-07-22 PROCEDURE — 96374 THER/PROPH/DIAG INJ IV PUSH: CPT

## 2023-07-22 PROCEDURE — 85610 PROTHROMBIN TIME: CPT

## 2023-07-22 PROCEDURE — 73130 X-RAY EXAM OF HAND: CPT

## 2023-07-22 PROCEDURE — 99285 EMERGENCY DEPT VISIT HI MDM: CPT

## 2023-07-22 PROCEDURE — 6360000002 HC RX W HCPCS: Performed by: FAMILY MEDICINE

## 2023-07-22 PROCEDURE — 80076 HEPATIC FUNCTION PANEL: CPT

## 2023-07-22 PROCEDURE — 84484 ASSAY OF TROPONIN QUANT: CPT

## 2023-07-22 PROCEDURE — 85730 THROMBOPLASTIN TIME PARTIAL: CPT

## 2023-07-22 PROCEDURE — 80048 BASIC METABOLIC PNL TOTAL CA: CPT

## 2023-07-22 PROCEDURE — 96372 THER/PROPH/DIAG INJ SC/IM: CPT

## 2023-07-22 PROCEDURE — 93005 ELECTROCARDIOGRAM TRACING: CPT | Performed by: FAMILY MEDICINE

## 2023-07-22 PROCEDURE — 85027 COMPLETE CBC AUTOMATED: CPT

## 2023-07-22 RX ORDER — DOXYCYCLINE HYCLATE 100 MG/1
100 CAPSULE ORAL ONCE
Status: DISCONTINUED | OUTPATIENT
Start: 2023-07-22 | End: 2023-07-22

## 2023-07-22 RX ORDER — KETOROLAC TROMETHAMINE 30 MG/ML
30 INJECTION, SOLUTION INTRAMUSCULAR; INTRAVENOUS ONCE
Status: COMPLETED | OUTPATIENT
Start: 2023-07-22 | End: 2023-07-22

## 2023-07-22 RX ORDER — LORAZEPAM 2 MG/ML
2 INJECTION INTRAMUSCULAR ONCE
Status: COMPLETED | OUTPATIENT
Start: 2023-07-22 | End: 2023-07-22

## 2023-07-22 RX ORDER — BUSPIRONE HYDROCHLORIDE 10 MG/1
20 TABLET ORAL 2 TIMES DAILY
COMMUNITY
Start: 2023-07-12

## 2023-07-22 RX ORDER — LORAZEPAM 1 MG/1
1 TABLET ORAL 3 TIMES DAILY PRN
Qty: 10 TABLET | Refills: 0 | Status: SHIPPED | OUTPATIENT
Start: 2023-07-22 | End: 2023-08-21

## 2023-07-22 RX ADMIN — LORAZEPAM 2 MG: 2 INJECTION, SOLUTION INTRAMUSCULAR; INTRAVENOUS at 16:42

## 2023-07-22 RX ADMIN — KETOROLAC TROMETHAMINE 30 MG: 30 INJECTION, SOLUTION INTRAMUSCULAR; INTRAVENOUS at 15:32

## 2023-07-22 ASSESSMENT — PAIN DESCRIPTION - DESCRIPTORS
DESCRIPTORS: SHARP

## 2023-07-22 ASSESSMENT — LIFESTYLE VARIABLES
HOW OFTEN DO YOU HAVE A DRINK CONTAINING ALCOHOL: NEVER
HOW MANY STANDARD DRINKS CONTAINING ALCOHOL DO YOU HAVE ON A TYPICAL DAY: PATIENT DOES NOT DRINK

## 2023-07-22 ASSESSMENT — PAIN DESCRIPTION - FREQUENCY
FREQUENCY: CONTINUOUS
FREQUENCY: CONTINUOUS

## 2023-07-22 ASSESSMENT — PAIN DESCRIPTION - LOCATION
LOCATION: HAND

## 2023-07-22 ASSESSMENT — PAIN DESCRIPTION - PAIN TYPE
TYPE: ACUTE PAIN
TYPE: ACUTE PAIN

## 2023-07-22 ASSESSMENT — PAIN SCALES - GENERAL
PAINLEVEL_OUTOF10: 8

## 2023-07-22 ASSESSMENT — PAIN DESCRIPTION - ORIENTATION
ORIENTATION: LEFT

## 2023-07-22 ASSESSMENT — PAIN - FUNCTIONAL ASSESSMENT
PAIN_FUNCTIONAL_ASSESSMENT: 0-10
PAIN_FUNCTIONAL_ASSESSMENT: ACTIVITIES ARE NOT PREVENTED
PAIN_FUNCTIONAL_ASSESSMENT: ACTIVITIES ARE NOT PREVENTED

## 2023-07-22 NOTE — PROGRESS NOTES
Patient stated she is starting to feel discomfort in her chest at this time.  Patient moved rooms and new orders placed

## 2023-07-24 ENCOUNTER — TELEPHONE (OUTPATIENT)
Dept: FAMILY MEDICINE CLINIC | Age: 43
End: 2023-07-24

## 2023-07-24 LAB
EKG ATRIAL RATE: 71 BPM
EKG P AXIS: 32 DEGREES
EKG P-R INTERVAL: 166 MS
EKG Q-T INTERVAL: 382 MS
EKG QRS DURATION: 106 MS
EKG QTC CALCULATION (BAZETT): 415 MS
EKG R AXIS: -30 DEGREES
EKG T AXIS: 14 DEGREES
EKG VENTRICULAR RATE: 71 BPM

## 2023-07-24 PROCEDURE — 93010 ELECTROCARDIOGRAM REPORT: CPT | Performed by: INTERNAL MEDICINE

## 2023-07-24 RX ORDER — IBUPROFEN 800 MG/1
TABLET ORAL
Qty: 90 TABLET | Refills: 1 | Status: SHIPPED | OUTPATIENT
Start: 2023-07-24

## 2023-07-24 NOTE — TELEPHONE ENCOUNTER
Last OV: 6/23/2023  chronic   Last RX:    Next scheduled apt: Visit date not found                Surescript requesting a refill

## 2023-07-24 NOTE — TELEPHONE ENCOUNTER
Please check in with patient to see how she is doing since ER visit. She went in for pain in both hands after some procedures she had had but then ER mentioned anxiety also, gave Rx for Ativan though per [place] she has not filled it. Can see her Thurs PM if needed.

## 2023-07-28 NOTE — TELEPHONE ENCOUNTER
Pt stated that she aver all she is feeling better, pt scheduled an appointment for 08/11/23 and she sis get her ativan filled and is taking

## 2023-08-10 ENCOUNTER — OFFICE VISIT (OUTPATIENT)
Dept: PAIN MANAGEMENT | Age: 43
End: 2023-08-10
Payer: COMMERCIAL

## 2023-08-10 VITALS — HEART RATE: 76 BPM | WEIGHT: 202 LBS | RESPIRATION RATE: 19 BRPM | OXYGEN SATURATION: 99 % | BODY MASS INDEX: 32.6 KG/M2

## 2023-08-10 DIAGNOSIS — M53.3 SACROILIAC JOINT PAIN: Primary | ICD-10-CM

## 2023-08-10 DIAGNOSIS — M47.817 LUMBOSACRAL SPONDYLOSIS WITHOUT MYELOPATHY: ICD-10-CM

## 2023-08-10 DIAGNOSIS — M54.50 CHRONIC BILATERAL LOW BACK PAIN WITHOUT SCIATICA: ICD-10-CM

## 2023-08-10 DIAGNOSIS — M51.36 LUMBAR DEGENERATIVE DISC DISEASE: ICD-10-CM

## 2023-08-10 DIAGNOSIS — G89.29 CHRONIC BILATERAL LOW BACK PAIN WITHOUT SCIATICA: ICD-10-CM

## 2023-08-10 PROCEDURE — 99213 OFFICE O/P EST LOW 20 MIN: CPT | Performed by: STUDENT IN AN ORGANIZED HEALTH CARE EDUCATION/TRAINING PROGRAM

## 2023-08-10 PROCEDURE — G8417 CALC BMI ABV UP PARAM F/U: HCPCS | Performed by: STUDENT IN AN ORGANIZED HEALTH CARE EDUCATION/TRAINING PROGRAM

## 2023-08-10 PROCEDURE — G8427 DOCREV CUR MEDS BY ELIG CLIN: HCPCS | Performed by: STUDENT IN AN ORGANIZED HEALTH CARE EDUCATION/TRAINING PROGRAM

## 2023-08-10 PROCEDURE — 1036F TOBACCO NON-USER: CPT | Performed by: STUDENT IN AN ORGANIZED HEALTH CARE EDUCATION/TRAINING PROGRAM
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
## 2023-08-10 NOTE — PATIENT INSTRUCTIONS
SURVEY:    You may be receiving a survey from [employer] regarding your visit today. Please complete the survey to enable us to provide the highest quality of care to you and your family. If you cannot score us a very good on any question, please call the office to discuss how we could have made your experience a very good one. Thank you.   Kori Pastrana [address]  MD Aníbal Do MD Dedrick Benjamin, MD Charm Overall, DO  Tianna Martin, APRN-CN  Jesse Garrett, APRN-CNP  [address], PM  SUND, 1 [facility] 270, [address] [facility], [address] [place] [address], [address] [place] [address], [address] [place] [address]

## 2023-08-11 ENCOUNTER — OFFICE VISIT (OUTPATIENT)
Dept: FAMILY MEDICINE CLINIC | Age: 43
End: 2023-08-11
Payer: COMMERCIAL

## 2023-08-11 VITALS
WEIGHT: 202 LBS | HEIGHT: 66 IN | SYSTOLIC BLOOD PRESSURE: 102 MMHG | BODY MASS INDEX: 32.47 KG/M2 | DIASTOLIC BLOOD PRESSURE: 64 MMHG

## 2023-08-11 DIAGNOSIS — E87.1 HYPONATREMIA: ICD-10-CM

## 2023-08-11 DIAGNOSIS — R51.9 PERSISTENT HEADACHES: Primary | ICD-10-CM

## 2023-08-11 DIAGNOSIS — F41.9 ANXIETY: ICD-10-CM

## 2023-08-11 DIAGNOSIS — D72.829 LEUKOCYTOSIS, UNSPECIFIED TYPE: ICD-10-CM

## 2023-08-11 DIAGNOSIS — G89.29 CHRONIC BILATERAL LOW BACK PAIN WITH LEFT-SIDED SCIATICA: ICD-10-CM

## 2023-08-11 DIAGNOSIS — M54.42 CHRONIC BILATERAL LOW BACK PAIN WITH LEFT-SIDED SCIATICA: ICD-10-CM

## 2023-08-11 DIAGNOSIS — Z13.6 SCREENING FOR CARDIOVASCULAR CONDITION: ICD-10-CM

## 2023-08-11 DIAGNOSIS — G47.09 OTHER INSOMNIA: ICD-10-CM

## 2023-08-11 PROCEDURE — 99214 OFFICE O/P EST MOD 30 MIN: CPT | Performed by: FAMILY MEDICINE

## 2023-08-11 PROCEDURE — 1036F TOBACCO NON-USER: CPT | Performed by: FAMILY MEDICINE

## 2023-08-11 PROCEDURE — G8427 DOCREV CUR MEDS BY ELIG CLIN: HCPCS | Performed by: FAMILY MEDICINE

## 2023-08-11 PROCEDURE — G8417 CALC BMI ABV UP PARAM F/U: HCPCS | Performed by: FAMILY MEDICINE

## 2023-08-11 RX ORDER — ACETAMINOPHEN 500 MG
1000 TABLET ORAL EVERY 8 HOURS PRN
Qty: 360 TABLET | Refills: 0 | Status: SHIPPED | OUTPATIENT
Start: 2023-08-11

## 2023-08-11 RX ORDER — TOPIRAMATE 50 MG/1
100 TABLET, FILM COATED ORAL NIGHTLY
Qty: 180 TABLET | Refills: 1 | Status: SHIPPED | OUTPATIENT
Start: 2023-08-11

## 2023-08-11 RX ORDER — LIDOCAINE 50 MG/G
1 PATCH TOPICAL DAILY
Qty: 90 PATCH | Refills: 0 | Status: SHIPPED | OUTPATIENT
Start: 2023-08-11 | End: 2023-11-09

## 2023-08-11 RX ORDER — OMEPRAZOLE 40 MG/1
CAPSULE, DELAYED RELEASE ORAL
Qty: 90 CAPSULE | Refills: 0 | Status: SHIPPED | OUTPATIENT
Start: 2023-08-11

## 2023-08-11 RX ORDER — SUVOREXANT 20 MG/1
20 TABLET, FILM COATED ORAL NIGHTLY PRN
Qty: 90 TABLET | Refills: 0 | Status: SHIPPED | OUTPATIENT
Start: 2023-08-11 | End: 2023-11-09

## 2023-08-11 RX ORDER — SUVOREXANT 20 MG/1
20 TABLET, FILM COATED ORAL NIGHTLY PRN
Qty: 30 TABLET | Refills: 0 | Status: CANCELLED | OUTPATIENT
Start: 2023-08-11 | End: 2023-09-10

## 2023-08-11 ASSESSMENT — PATIENT HEALTH QUESTIONNAIRE - PHQ9
SUM OF ALL RESPONSES TO PHQ QUESTIONS 1-9: 2
1. LITTLE INTEREST OR PLEASURE IN DOING THINGS: 0
9. THOUGHTS THAT YOU WOULD BE BETTER OFF DEAD, OR OF HURTING YOURSELF: 0
SUM OF ALL RESPONSES TO PHQ9 QUESTIONS 1 & 2: 0
4. FEELING TIRED OR HAVING LITTLE ENERGY: 1
SUM OF ALL RESPONSES TO PHQ QUESTIONS 1-9: 2
SUM OF ALL RESPONSES TO PHQ QUESTIONS 1-9: 2
2. FEELING DOWN, DEPRESSED OR HOPELESS: 0
8. MOVING OR SPEAKING SO SLOWLY THAT OTHER PEOPLE COULD HAVE NOTICED. OR THE OPPOSITE, BEING SO FIGETY OR RESTLESS THAT YOU HAVE BEEN MOVING AROUND A LOT MORE THAN USUAL: 0
7. TROUBLE CONCENTRATING ON THINGS, SUCH AS READING THE NEWSPAPER OR WATCHING TELEVISION: 0
5. POOR APPETITE OR OVEREATING: 0
6. FEELING BAD ABOUT YOURSELF - OR THAT YOU ARE A FAILURE OR HAVE LET YOURSELF OR YOUR FAMILY DOWN: 0
SUM OF ALL RESPONSES TO PHQ QUESTIONS 1-9: 2
3. TROUBLE FALLING OR STAYING ASLEEP: 1
10. IF YOU CHECKED OFF ANY PROBLEMS, HOW DIFFICULT HAVE THESE PROBLEMS MADE IT FOR YOU TO DO YOUR WORK, TAKE CARE OF THINGS AT HOME, OR GET ALONG WITH OTHER PEOPLE: 0

## 2023-08-11 NOTE — PROGRESS NOTES
the highest quality of care to you and your family. If you cannot score us as very good ( 5 Stars) on any question, please feel free to call the office to discuss how we could have made your experience exceptional.     Thank you.     Clinical Care Team:   Dr. Hannah Caicedo, [address] [facility], [address]                                     Triage: Gilda Hernandez, [address] [facility] Team:    Anuj Garrett        signed by Hannah Caicedo DO on 8/13/2023 at 11:15 PM  [address] [employer] [address]  Dept: [phone]

## 2023-08-11 NOTE — PATIENT INSTRUCTIONS
[employer] SURVEY:    You may be receiving a survey from [employer] regarding your visit today. You may get this in the mail, through your MyChart or in your email. Please complete the survey to enable us to provide the highest quality of care to you and your family. If you cannot score us as very good ( 5 Stars) on any question, please feel free to call the office to discuss how we could have made your experience exceptional.     Thank you.     Clinical Care Team:   Dr. Lo Ayala, [address] [facility], [address]                                     Triage: Yimi Valencia, [address] [facility] Team:    [address] [facility]                                      Erum Garcia

## 2023-08-25 ENCOUNTER — HOSPITAL ENCOUNTER (OUTPATIENT)
Dept: MRI IMAGING | Age: 43
End: 2023-08-25
Attending: ORTHOPAEDIC SURGERY
Payer: COMMERCIAL

## 2023-08-25 DIAGNOSIS — M79.641 RIGHT HAND PAIN: ICD-10-CM

## 2023-08-25 PROCEDURE — 73218 MRI UPPER EXTREMITY W/O DYE: CPT

## 2023-09-27 RX ORDER — IBUPROFEN 800 MG/1
TABLET ORAL
Qty: 90 TABLET | Refills: 1 | Status: SHIPPED | OUTPATIENT
Start: 2023-09-27

## 2023-09-27 NOTE — TELEPHONE ENCOUNTER
Last visit:  8/11/2023  Next Visit Date:    Future Appointments   Date Time Provider [address]   2/15/2024 11:00 AM Gerber Blackman DO [facility]         Medication List:  Prior to Admission medications    Medication Sig Start Date End Date Taking? Authorizing Provider   Suvorexant (BELSOMRA) 20 MG TABS Take 1 tablet by mouth nightly as needed (sleep) for up to 90 days.  Max Daily Amount: 20 mg 8/11/23 11/9/23  German Jarrett, DO   topiramate (TOPAMAX) 50 MG tablet Take 2 tablets by mouth nightly 8/11/23   Brand Qil, DO   acetaminophen ([facility] ACETAMINOPHEN EX ST) 500 MG tablet Take 2 tablets by mouth every 8 hours as needed for Pain 8/11/23   German Jarrett, DO   omeprazole (PRILOSEC) 40 MG delayed release capsule TAKE 1 CAPSULE BY MOUTH EVERY DAY 8/11/23   German Jarrett DO   lidocaine (LIDODERM) 5 % Place 1 patch onto the skin daily 12 hours on, 12 hours off. 8/11/23 11/9/23  German Jarrett DO   ibuprofen (ADVIL;MOTRIN) 800 MG tablet TAKE 1 TABLET BY MOUTH 3 TIMES DAILY WITH MEALS. 7/24/23   German Jarrett DO   busPIRone (BUSPAR) 10 MG tablet Take 2 tablets by mouth 2 times daily 7/12/23   Historical Provider, MD   traZODone (DESYREL) 100 MG tablet 2 AT NIGHT-  PSYCH FILLS THIS MED 4/19/23   Historical Provider, MD   venlafaxine (EFFEXOR XR) 150 MG extended release capsule TAKE 1 CAPSULE BY MOUTH EVERY DAY 1/30/23   German Jarrett DO   cetirizine (ZYRTEC) 10 MG tablet Take 1 tablet by mouth daily    Historical Provider, MD   buPROPion (WELLBUTRIN XL) 300 MG extended release tablet Take 1 tablet by mouth every morning 11/18/22   German Jarrett DO   minocycline (MINOCIN;DYNACIN) 100 MG capsule TAKE 1 CAPSULE BY MOUTH TWICE A DAY FOR 30 DAYS 8/15/22   Historical Provider, MD   albuterol sulfate HFA (PROVENTIL;VENTOLIN;PROAIR) 108 (90 Base) MCG/ACT inhaler Inhale 2 puffs into the lungs 4 times daily 7/25/22   German Jarrett DO

## 2023-11-08 ENCOUNTER — OFFICE VISIT (OUTPATIENT)
Dept: FAMILY MEDICINE CLINIC | Age: 43
End: 2023-11-08

## 2023-11-08 VITALS
SYSTOLIC BLOOD PRESSURE: 110 MMHG | HEART RATE: 80 BPM | OXYGEN SATURATION: 99 % | WEIGHT: 205 LBS | HEIGHT: 66 IN | DIASTOLIC BLOOD PRESSURE: 70 MMHG | BODY MASS INDEX: 32.95 KG/M2

## 2023-11-08 DIAGNOSIS — R51.9 PERSISTENT HEADACHES: ICD-10-CM

## 2023-11-08 DIAGNOSIS — F41.9 ANXIETY: ICD-10-CM

## 2023-11-08 DIAGNOSIS — G47.09 OTHER INSOMNIA: ICD-10-CM

## 2023-11-08 DIAGNOSIS — K59.09 CHRONIC CONSTIPATION: Primary | ICD-10-CM

## 2023-11-08 PROCEDURE — 99213 OFFICE O/P EST LOW 20 MIN: CPT | Performed by: FAMILY MEDICINE

## 2023-11-08 RX ORDER — TOPIRAMATE 50 MG/1
150 TABLET, FILM COATED ORAL NIGHTLY
Qty: 90 TABLET | Refills: 5 | Status: SHIPPED | OUTPATIENT
Start: 2023-11-08

## 2023-11-08 RX ORDER — PROPRANOLOL HYDROCHLORIDE 20 MG/1
20 TABLET ORAL 3 TIMES DAILY
Qty: 90 TABLET | Refills: 5 | Status: SHIPPED | OUTPATIENT
Start: 2023-11-08

## 2023-11-08 RX ORDER — DOXEPIN HYDROCHLORIDE 10 MG/1
10 CAPSULE ORAL NIGHTLY
Qty: 30 CAPSULE | Refills: 5 | Status: SHIPPED | OUTPATIENT
Start: 2023-11-08

## 2023-11-08 RX ORDER — VENLAFAXINE HYDROCHLORIDE 225 MG/1
1 TABLET, EXTENDED RELEASE ORAL DAILY
COMMUNITY
Start: 2023-09-17

## 2023-11-08 NOTE — PROGRESS NOTES
Name: Lucila Farooq  : 1980         Chief Complaint:     Chief Complaint   Patient presents with    Chest Pain     Getting a flutter in chest, thinks it's from anxiety, patient follows with psych for meds. Constipation       History of Present Illness:      Lucila Farooq is a 41 y.o.  female who presents with Chest Pain (Getting a flutter in chest, thinks it's from anxiety, patient follows with psych for meds. ) and Constipation      HPI    Pt c/o uncontrolled anxiety. For several mos her younger sister and sister's baby have been living with her and the environment has become intolerable, with Andreina drinking alcohol, behaving erratically, unwilling to help around the house, demanding a lot from pt. Pt has unfortunately relapsed into drinking again also. Does continue her usual meds as rx by psych. A lot of difficulty sleeping, doesn't feel trazodone helping adequately anymore. Palpitations assoc with stress, estimates she felt 100 brief flutters yesterday. Having more headaches also and requests dose inc of topamax. Also c/o uncontrolled constipation. Hasn't been taking any constipation meds recently as she didn't want to become dependent on any meds for it. Drinks lots of water and feels she does well with fiber intake. Did recently try MoM which always makes her feel unwell, 2 doses several hrs apart and finally passed some stool. Feels there's a plug of hard stool and sometimes passes liquid around it. Medical History:     Patient Active Problem List   Diagnosis    Chronic neck pain    Onychomycosis    Chronic low back pain without sciatica    Narcotic abuse in remission ([facility])    Chronic constipation    Alcohol abuse, in remission    Hidradenitis suppurativa    Major depressive disorder, recurrent, moderate       Medications:       Prior to Admission medications    Medication Sig Start Date End Date Taking?  Authorizing Provider   venlafaxine 225 MG extended release tablet Take 1 tablet by

## 2023-11-08 NOTE — PATIENT INSTRUCTIONS
Please start miralax 1 cap daily in 8 oz beverage and also milk of magnesia 30 mL once or twice a week. Don't be afraid to use enema or suppository as needed.

## 2023-11-10 RX ORDER — OMEPRAZOLE 40 MG/1
CAPSULE, DELAYED RELEASE ORAL
Qty: 90 CAPSULE | Refills: 0 | Status: SHIPPED | OUTPATIENT
Start: 2023-11-10

## 2023-11-10 NOTE — TELEPHONE ENCOUNTER
Last OV: 11/8/2023 08/11/232 chronic   Last RX:    Next scheduled apt: Visit date not found            Surescript requesting a refill

## 2023-11-16 ENCOUNTER — PATIENT MESSAGE (OUTPATIENT)
Dept: FAMILY MEDICINE CLINIC | Age: 43
End: 2023-11-16

## 2023-11-16 NOTE — TELEPHONE ENCOUNTER
Please advise, saw Dr. Villarreal Dus 11/9/23. Has upcoming appt with therapist. Has not contacted psych or therapist for any issues or concerns. I spoke with their office. They will see if therapist can contact patient today and see if they can help. Patient is currently at work.
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
## 2024-01-30 ENCOUNTER — OFFICE VISIT (OUTPATIENT)
Dept: FAMILY MEDICINE CLINIC | Age: 44
End: 2024-01-30
Payer: COMMERCIAL

## 2024-01-30 ENCOUNTER — HOSPITAL ENCOUNTER (OUTPATIENT)
Age: 44
Discharge: HOME | End: 2024-01-30
Payer: COMMERCIAL

## 2024-01-30 VITALS
OXYGEN SATURATION: 99 % | DIASTOLIC BLOOD PRESSURE: 80 MMHG | BODY MASS INDEX: 32.95 KG/M2 | SYSTOLIC BLOOD PRESSURE: 130 MMHG | HEART RATE: 66 BPM | WEIGHT: 205 LBS | HEIGHT: 66 IN

## 2024-01-30 DIAGNOSIS — K59.09 CHRONIC CONSTIPATION: ICD-10-CM

## 2024-01-30 DIAGNOSIS — R23.2 HOT FLASHES: Primary | ICD-10-CM

## 2024-01-30 DIAGNOSIS — F33.2 SEVERE EPISODE OF RECURRENT MAJOR DEPRESSIVE DISORDER, WITHOUT PSYCHOTIC FEATURES (HCC): ICD-10-CM

## 2024-01-30 DIAGNOSIS — R23.2 HOT FLASHES: ICD-10-CM

## 2024-01-30 LAB
ALBUMIN SERPL-MCNC: 3.7 G/DL (ref 3.5–5.2)
ALP SERPL-CCNC: 96 U/L (ref 35–104)
ALT SERPL-CCNC: 23 U/L (ref 5–33)
ANION GAP SERPL CALCULATED.3IONS-SCNC: 13 MMOL/L (ref 9–17)
AST SERPL-CCNC: 23 U/L
BASOPHILS # BLD: 0.02 K/UL (ref 0–0.2)
BASOPHILS NFR BLD: 0 % (ref 0–2)
BILIRUB SERPL-MCNC: 0.2 MG/DL (ref 0.3–1.2)
BUN SERPL-MCNC: 11 MG/DL (ref 6–20)
BUN/CREAT SERPL: 18 (ref 9–20)
CALCIUM SERPL-MCNC: 8.6 MG/DL (ref 8.6–10.4)
CHLORIDE SERPL-SCNC: 106 MMOL/L (ref 98–107)
CO2 SERPL-SCNC: 20 MMOL/L (ref 20–31)
CREAT SERPL-MCNC: 0.6 MG/DL (ref 0.5–0.9)
EOSINOPHIL # BLD: 0.1 K/UL (ref 0–0.4)
EOSINOPHILS RELATIVE PERCENT: 1 % (ref 0–5)
ERYTHROCYTE [DISTWIDTH] IN BLOOD BY AUTOMATED COUNT: 12.8 % (ref 12.1–15.2)
GFR SERPL CREATININE-BSD FRML MDRD: >60 ML/MIN/1.73M2
GLUCOSE SERPL-MCNC: 101 MG/DL (ref 70–99)
HCT VFR BLD AUTO: 38.1 % (ref 36–46)
HGB BLD-MCNC: 12.8 G/DL (ref 12–16)
IMM GRANULOCYTES # BLD AUTO: 0.03 K/UL (ref 0–0.3)
IMM GRANULOCYTES NFR BLD: 0 % (ref 0–5)
LYMPHOCYTES NFR BLD: 2.63 K/UL (ref 1–4.8)
LYMPHOCYTES RELATIVE PERCENT: 24 % (ref 15–40)
MCH RBC QN AUTO: 29.1 PG (ref 26–34)
MCHC RBC AUTO-ENTMCNC: 33.6 G/DL (ref 31–37)
MCV RBC AUTO: 86.6 FL (ref 80–100)
MONOCYTES NFR BLD: 0.97 K/UL (ref 0–1)
MONOCYTES NFR BLD: 9 % (ref 4–8)
NEUTROPHILS NFR BLD: 66 % (ref 47–75)
NEUTS SEG NFR BLD: 7.22 K/UL (ref 2.5–7)
PLATELET # BLD AUTO: 362 K/UL (ref 140–450)
PMV BLD AUTO: 10.5 FL (ref 6–12)
POTASSIUM SERPL-SCNC: 3.8 MMOL/L (ref 3.7–5.3)
PROT SERPL-MCNC: 6.8 G/DL (ref 6.4–8.3)
RBC # BLD AUTO: 4.4 M/UL (ref 4–5.2)
SODIUM SERPL-SCNC: 139 MMOL/L (ref 135–144)
TSH SERPL DL<=0.05 MIU/L-ACNC: 1.49 UIU/ML (ref 0.3–5)
WBC OTHER # BLD: 11 K/UL (ref 3.5–11)

## 2024-01-30 PROCEDURE — 85025 COMPLETE CBC W/AUTO DIFF WBC: CPT

## 2024-01-30 PROCEDURE — 80053 COMPREHEN METABOLIC PANEL: CPT

## 2024-01-30 PROCEDURE — 99214 OFFICE O/P EST MOD 30 MIN: CPT | Performed by: FAMILY MEDICINE

## 2024-01-30 PROCEDURE — 36415 COLL VENOUS BLD VENIPUNCTURE: CPT

## 2024-01-30 PROCEDURE — 84443 ASSAY THYROID STIM HORMONE: CPT

## 2024-01-30 PROCEDURE — 82670 ASSAY OF TOTAL ESTRADIOL: CPT

## 2024-01-30 PROCEDURE — 83001 ASSAY OF GONADOTROPIN (FSH): CPT

## 2024-01-30 ASSESSMENT — PATIENT HEALTH QUESTIONNAIRE - PHQ9
SUM OF ALL RESPONSES TO PHQ QUESTIONS 1-9: 4
3. TROUBLE FALLING OR STAYING ASLEEP: 1
SUM OF ALL RESPONSES TO PHQ QUESTIONS 1-9: 4
2. FEELING DOWN, DEPRESSED OR HOPELESS: 1
SUM OF ALL RESPONSES TO PHQ QUESTIONS 1-9: 4
7. TROUBLE CONCENTRATING ON THINGS, SUCH AS READING THE NEWSPAPER OR WATCHING TELEVISION: 0
6. FEELING BAD ABOUT YOURSELF - OR THAT YOU ARE A FAILURE OR HAVE LET YOURSELF OR YOUR FAMILY DOWN: 0
8. MOVING OR SPEAKING SO SLOWLY THAT OTHER PEOPLE COULD HAVE NOTICED. OR THE OPPOSITE, BEING SO FIGETY OR RESTLESS THAT YOU HAVE BEEN MOVING AROUND A LOT MORE THAN USUAL: 0
SUM OF ALL RESPONSES TO PHQ9 QUESTIONS 1 & 2: 2
5. POOR APPETITE OR OVEREATING: 0
SUM OF ALL RESPONSES TO PHQ QUESTIONS 1-9: 4
4. FEELING TIRED OR HAVING LITTLE ENERGY: 1
1. LITTLE INTEREST OR PLEASURE IN DOING THINGS: 1
10. IF YOU CHECKED OFF ANY PROBLEMS, HOW DIFFICULT HAVE THESE PROBLEMS MADE IT FOR YOU TO DO YOUR WORK, TAKE CARE OF THINGS AT HOME, OR GET ALONG WITH OTHER PEOPLE: 0
9. THOUGHTS THAT YOU WOULD BE BETTER OFF DEAD, OR OF HURTING YOURSELF: 0

## 2024-01-30 NOTE — PROGRESS NOTES
ALT 23 01/30/2024 03:32 PM     Lab Results   Component Value Date/Time    WBC 11.0 01/30/2024 03:32 PM    RBC 4.40 01/30/2024 03:32 PM    HGB 12.8 01/30/2024 03:32 PM    HCT 38.1 01/30/2024 03:32 PM    MCV 86.6 01/30/2024 03:32 PM    MCH 29.1 01/30/2024 03:32 PM    MCHC 33.6 01/30/2024 03:32 PM    RDW 12.8 01/30/2024 03:32 PM     01/30/2024 03:32 PM    MPV 10.5 01/30/2024 03:32 PM     Lab Results   Component Value Date/Time    TSH 1.49 01/30/2024 03:32 PM     Lab Results   Component Value Date/Time    LABA1C 5.3 09/19/2018 12:24 PM         Assessment & Plan:        Diagnosis Orders   1. Hot flashes  CBC with Auto Differential    TSH with Reflex    Comprehensive Metabolic Panel    Follicle Stimulating Hormone    Estradiol      2. Chronic constipation  CBC with Auto Differential    TSH with Reflex    Comprehensive Metabolic Panel      3. Severe episode of recurrent major depressive disorder, without psychotic features (HCC)          Ddx perimenopause, med side effects, thyroid dysfn, infection. Last wbc >17 in July. Rechecking labs. Advised we likely wouldn't change mgmt if perimenopausal.  Inc miralax, can also use laxative up to twice a wk  No immediate indication for hospitalization. Has supportive , regular counseling, established with psych. Continue current rx and following with psych.   Keep f/u with pain mgmt for back pain.     Requested Prescriptions      No prescriptions requested or ordered in this encounter         Patient Instructions   [employer] SURVEY:    You may be receiving a survey from [employer] regarding your visit today.    You may get this in the mail, through your MyChart or in your email.     Please complete the survey to enable us to provide the highest quality of care to you and your family.    If you cannot score us as very good ( 5 Stars) on any question, please feel free to call the office to discuss how we could have made your experience exceptional.     Thank

## 2024-01-30 NOTE — PATIENT INSTRUCTIONS
[employer] SURVEY:    You may be receiving a survey from [employer] regarding your visit today.    You may get this in the mail, through your MyChart or in your email.     Please complete the survey to enable us to provide the highest quality of care to you and your family.    If you cannot score us as very good ( 5 Stars) on any question, please feel free to call the office to discuss how we could have made your experience exceptional.     Thank you.    Clinical Care Team:   DO Vicente Perez CMA                                     Triage: Ai Sagastume CMA              Clerical Team:    Ai Alcantar

## 2024-01-31 LAB
ESTRADIOL LEVEL: 150 PG/ML
FSH SERPL-ACNC: 5.2 MIU/ML

## 2024-02-08 ENCOUNTER — OFFICE VISIT (OUTPATIENT)
Dept: PAIN MANAGEMENT | Age: 44
End: 2024-02-08
Payer: COMMERCIAL

## 2024-02-08 ENCOUNTER — TELEPHONE (OUTPATIENT)
Dept: PAIN MANAGEMENT | Age: 44
End: 2024-02-08

## 2024-02-08 VITALS
SYSTOLIC BLOOD PRESSURE: 128 MMHG | BODY MASS INDEX: 33.1 KG/M2 | OXYGEN SATURATION: 99 % | HEART RATE: 71 BPM | DIASTOLIC BLOOD PRESSURE: 80 MMHG | WEIGHT: 205 LBS | RESPIRATION RATE: 18 BRPM

## 2024-02-08 DIAGNOSIS — M53.3 SACROILIAC JOINT PAIN: Primary | ICD-10-CM

## 2024-02-08 DIAGNOSIS — M54.50 CHRONIC BILATERAL LOW BACK PAIN WITHOUT SCIATICA: ICD-10-CM

## 2024-02-08 DIAGNOSIS — M51.36 LUMBAR DEGENERATIVE DISC DISEASE: ICD-10-CM

## 2024-02-08 DIAGNOSIS — M47.817 LUMBOSACRAL SPONDYLOSIS WITHOUT MYELOPATHY: ICD-10-CM

## 2024-02-08 DIAGNOSIS — G89.29 CHRONIC BILATERAL LOW BACK PAIN WITHOUT SCIATICA: ICD-10-CM

## 2024-02-08 PROCEDURE — 99214 OFFICE O/P EST MOD 30 MIN: CPT | Performed by: STUDENT IN AN ORGANIZED HEALTH CARE EDUCATION/TRAINING PROGRAM

## 2024-02-08 RX ORDER — ARIPIPRAZOLE 2 MG/1
2 TABLET ORAL DAILY
COMMUNITY

## 2024-02-08 RX ORDER — METHYLPREDNISOLONE 4 MG/1
TABLET ORAL
Qty: 1 KIT | Refills: 0 | Status: SHIPPED | OUTPATIENT
Start: 2024-02-08

## 2024-02-08 NOTE — H&P (VIEW-ONLY)
DO  Interventional Pain Management/PM&R   [facility] [place]    Orders Placed This Encounter    ARIPiprazole (ABILIFY) 2 MG tablet     Sig: Take 1 tablet by mouth daily    methylPREDNISolone (MEDROL DOSEPACK) 4 MG tablet     Sig: Take by mouth.     Dispense:  1 kit     Refill:  0         
173.3

## 2024-02-08 NOTE — PROGRESS NOTES
visit, she reports worsening low back pain.  Her history and physical examination are consistent with sacroiliac joint pain. The patient has tenderness to palpation along the PSIS bilaterally and positive 3 out of 5 provocative maneuvers.  She underwent bilateral sacroiliac joint injection on 7/6/2023 with 100% improvement in pain and function for 6 months. Therefore, I will plan for repeat bilateral sacroiliac joint injections.    Neurologically, it appears the patient has full strength and normal sensation. There is no evidence of radiculopathy or myelopathy on examination. There are no red flags in the patient's history. The patient has failed conservative measures including outpatient physical therapy, greater than 3 medications for pain relief, a self-directed therapy program, as well as activity modification all within the last 6 weeks over 3 months. The patient's pain has been causing worsening quality of life and function.    PLAN:  Medications:    For nonopioid therapy, the following medications were prescribed:    -Start oral steroids    Opioid therapy:  -Non-opioid care plan.  Patient on Suboxone.    Interventions:  -Plan for bilateral sacroiliac joint injections  -Status post bilateral sacroiliac joint injections on 7/6/2023 with 100% improvement in pain and function  -Status post right and left lumbar L3, L4, L5 medial branch radiofrequency ablation on 1/12/2023 and 1/26/2023 with 85% improvement in pain and function    Imaging:  -No new imaging    Behavioral Therapies:  -Continue daily stretching and home exercise program    Referrals:  -None    Follow-up Plan:  -After procedure    Patient was offered intervention where appropriate.     Multi-modal Pain Therapy:  The patient was explicitly considered for multimodal and interdisciplinary therapy. Non-opioid and non-pharmacological opportunities to enhance analgesia and quality of life have been and will continue to be pursued.    Gerber Lane,

## 2024-02-08 NOTE — TELEPHONE ENCOUNTER
SURGERY SCHEDULING FORM  [facility]   [address] [place] [address]    Phone [phone] Fax [phone]      Jennifer Gilliland 1980 female    [address]  [facility] [address]   Marital Status: spouse         Home Phone: [phone]      Cell Phone:    Telephone Information:   Mobile [phone]          Surgeon: Ariana   Surgery Date: 3/7/24     Time:     Procedure: bilateral sacroilliac joint injection    Diagnosis: M53.3     Important Medical History:  In [facility]    Special Inst/Equip: Regular    CPT Codes:    03662  Latex Allergy: No     Cardiac Device:  No    Anesthesia:  Local        Admission Type:  Same Day                          Admit Prior to Day of Surgery: No    Case Location:  Ambulatory            Preadmission Testing:  Phone Call          PAT Date and Time:     Need Preop Cardiac Clearance: No    Does Patient have Cardiologist/physician?     No

## 2024-03-04 RX ORDER — LORATADINE 10 MG/1
10 TABLET ORAL DAILY
COMMUNITY

## 2024-03-04 NOTE — PROGRESS NOTES
[facility]   Preadmission Testing    Name: Jennifer Gilliland  : 1980  Patient Phone: [phone] (home) [phone] (work)    Procedure: BILATERAL SACROILIAC JOINT INJECTION - Bilateral   Date of Procedure: 2024    Surgeon: Gerber Lane DO    Ht:  167.6 cm (5' 6\")  Wt: 90.7 kg (200 lb)  Wt method: Stated    Allergies:   Allergies   Allergen Reactions    Vraylar [Cariprazine Hcl] Hallucinations    Clindamycin/Lincomycin Rash                There were no vitals filed for this visit.    Patient's last menstrual period was 02/15/2024.    Do you take blood thinners?   [] Yes    [x] No         Instructed to stop blood thinners prior to procedure?    [] Yes    [] No      [x] N/A    []Eliquis - 3 days  []Xarelto - 3 days  []Pradaxa - 5 days  []Coumadin - 5 days   []Plavix - 7 days  []ASA 325mg - 7 days  []Brillinta - 5 days  []Pletal - 2 days   Have you had a respiratory infection or sore throat in last 4 weeks before surgery?    [] Yes    [x] No     Patient instructed on: [x] NPO Status - if receiving sedation  [x] Meds to Take  [x] Ride Home - sedation/ epidurals  [x]No Jewelry/Contact Lenses/Nail Polish     DOS Patient Needs [x] HCG   [] Blood Sugar  [] PT/INR                     no

## 2024-03-07 ENCOUNTER — APPOINTMENT (OUTPATIENT)
Dept: GENERAL RADIOLOGY | Age: 44
End: 2024-03-07
Attending: STUDENT IN AN ORGANIZED HEALTH CARE EDUCATION/TRAINING PROGRAM
Payer: COMMERCIAL

## 2024-03-07 ENCOUNTER — HOSPITAL ENCOUNTER (OUTPATIENT)
Age: 44
Setting detail: OUTPATIENT SURGERY
Discharge: HOME | End: 2024-03-07
Attending: STUDENT IN AN ORGANIZED HEALTH CARE EDUCATION/TRAINING PROGRAM | Admitting: STUDENT IN AN ORGANIZED HEALTH CARE EDUCATION/TRAINING PROGRAM
Payer: COMMERCIAL

## 2024-03-07 VITALS
TEMPERATURE: 97.8 F | BODY MASS INDEX: 32.78 KG/M2 | HEART RATE: 69 BPM | WEIGHT: 204 LBS | HEIGHT: 66 IN | RESPIRATION RATE: 17 BRPM | OXYGEN SATURATION: 97 % | SYSTOLIC BLOOD PRESSURE: 100 MMHG | DIASTOLIC BLOOD PRESSURE: 83 MMHG

## 2024-03-07 LAB — HCG UR QL: NEGATIVE

## 2024-03-07 PROCEDURE — 7100000010 HC PHASE II RECOVERY - FIRST 15 MIN: Performed by: STUDENT IN AN ORGANIZED HEALTH CARE EDUCATION/TRAINING PROGRAM

## 2024-03-07 PROCEDURE — 6360000002 HC RX W HCPCS: Performed by: STUDENT IN AN ORGANIZED HEALTH CARE EDUCATION/TRAINING PROGRAM

## 2024-03-07 PROCEDURE — 2709999900 HC NON-CHARGEABLE SUPPLY: Performed by: STUDENT IN AN ORGANIZED HEALTH CARE EDUCATION/TRAINING PROGRAM

## 2024-03-07 PROCEDURE — 2500000003 HC RX 250 WO HCPCS: Performed by: STUDENT IN AN ORGANIZED HEALTH CARE EDUCATION/TRAINING PROGRAM

## 2024-03-07 PROCEDURE — 81025 URINE PREGNANCY TEST: CPT

## 2024-03-07 PROCEDURE — 27096 INJECT SACROILIAC JOINT: CPT | Performed by: STUDENT IN AN ORGANIZED HEALTH CARE EDUCATION/TRAINING PROGRAM

## 2024-03-07 PROCEDURE — 3600000052 HC PAIN LEVEL 2 BASE: Performed by: STUDENT IN AN ORGANIZED HEALTH CARE EDUCATION/TRAINING PROGRAM

## 2024-03-07 PROCEDURE — 7100000011 HC PHASE II RECOVERY - ADDTL 15 MIN: Performed by: STUDENT IN AN ORGANIZED HEALTH CARE EDUCATION/TRAINING PROGRAM

## 2024-03-07 PROCEDURE — 99152 MOD SED SAME PHYS/QHP 5/>YRS: CPT | Performed by: STUDENT IN AN ORGANIZED HEALTH CARE EDUCATION/TRAINING PROGRAM

## 2024-03-07 RX ORDER — MIDAZOLAM HYDROCHLORIDE 1 MG/ML
INJECTION INTRAMUSCULAR; INTRAVENOUS PRN
Status: DISCONTINUED | OUTPATIENT
Start: 2024-03-07 | End: 2024-03-07 | Stop reason: ALTCHOICE

## 2024-03-07 ASSESSMENT — PAIN - FUNCTIONAL ASSESSMENT: PAIN_FUNCTIONAL_ASSESSMENT: 0-10

## 2024-03-07 ASSESSMENT — PAIN DESCRIPTION - DESCRIPTORS: DESCRIPTORS: ACHING;BURNING

## 2024-03-07 NOTE — INTERVAL H&P NOTE
Update History & Physical    The patient's History and Physical of February 8, 2024 was reviewed with the patient and I examined the patient. There was no change. The surgical site was confirmed by the patient and me.     Plan: The risks, benefits, expected outcome, and alternative to the recommended procedure have been discussed with the patient. Patient understands and wants to proceed with the procedure.     Electronically signed by Gerber Lane DO on 3/7/2024 at 1:38 PM

## 2024-03-07 NOTE — OP NOTE
PROCEDURE PERFORMED: Bilateral Sacroiliac joint injection    PREOPERATIVE DIAGNOSIS: Lumbago and sacroiliac joint pain    INDICATIONS: Chronic low back pain    The patient's history and physical exam were reviewed.  The risk, benefits, and alternatives of the procedure were discussed and all questions were answered to the patient's satisfaction.  The patient agreed to proceed and written informed consent was obtained.    POSTOPERATIVE DIAGNOSIS: Same    PHYSICIAN:  Dr. Gerber Lane DO    ANESTHESIA:  LOCAL    ASSISTANT:  NONE    PATHOLOGY:  NONE    ESTIMATED BLOOD LOSS:  N/A    IMPLANTS:  NONE    PROCEDURE DESCRIPTION: Bilateral sacroiliac joint injection using fluoroscopy    The patient was placed on the operative bed in prone position.  The area was prepped with  Chlorhexidine.  The area was then draped in a sterile fashion.  The targeted side of the sacroiliac joint was identified and marked under AP fluoroscopy.  The fluoroscopic beam was then obliqued until the anterior and posterior margins of the joint were aligned.  The inferior margin of the joint was identified.  A 25-gauge 3-1/2 inch Quincke needle was directed toward the identified point under fluoroscopic guidance.  The joint space was then entered and negative aspiration was confirmed.  Then, Omnipaque was injected.  An appropriate arthrogram was noted.  Then, after negative aspiration, the injectate was easily injected.  The injectate consisted of 40 mg triamcinolone and 1 mL lidocaine 1%.  The needle was then removed. The same procedure was performed on the opposite side. The needle site was dressed appropriately.    The patient was transferred to the postoperative care unit in stable condition.  Written discharge instructions were given to the patient.    COMPLICATIONS:  There were no apparent complications.  The patient tolerated the procedure well.     SEDATION NOTE:     ASA CLASSIFICATION  2  MP   CLASSIFICATION  2     Moderate intravenous

## 2024-03-25 ENCOUNTER — OFFICE VISIT (OUTPATIENT)
Dept: FAMILY MEDICINE CLINIC | Age: 44
End: 2024-03-25
Payer: COMMERCIAL

## 2024-03-25 VITALS
TEMPERATURE: 97.5 F | BODY MASS INDEX: 32.12 KG/M2 | WEIGHT: 199 LBS | DIASTOLIC BLOOD PRESSURE: 62 MMHG | OXYGEN SATURATION: 97 % | SYSTOLIC BLOOD PRESSURE: 105 MMHG | HEART RATE: 70 BPM

## 2024-03-25 DIAGNOSIS — B86 SCABIES: ICD-10-CM

## 2024-03-25 DIAGNOSIS — B37.0 THRUSH: Primary | ICD-10-CM

## 2024-03-25 DIAGNOSIS — R00.2 PALPITATIONS: ICD-10-CM

## 2024-03-25 DIAGNOSIS — M53.3 PAIN OF BOTH SACROILIAC JOINTS: ICD-10-CM

## 2024-03-25 PROCEDURE — 99214 OFFICE O/P EST MOD 30 MIN: CPT | Performed by: FAMILY MEDICINE

## 2024-03-25 PROCEDURE — 1036F TOBACCO NON-USER: CPT | Performed by: FAMILY MEDICINE

## 2024-03-25 PROCEDURE — G8417 CALC BMI ABV UP PARAM F/U: HCPCS | Performed by: FAMILY MEDICINE

## 2024-03-25 PROCEDURE — G8484 FLU IMMUNIZE NO ADMIN: HCPCS | Performed by: FAMILY MEDICINE

## 2024-03-25 PROCEDURE — G8427 DOCREV CUR MEDS BY ELIG CLIN: HCPCS | Performed by: FAMILY MEDICINE

## 2024-03-25 RX ORDER — DILTIAZEM HYDROCHLORIDE 120 MG/1
120 CAPSULE, COATED, EXTENDED RELEASE ORAL DAILY
Qty: 30 CAPSULE | Refills: 3 | Status: SHIPPED | OUTPATIENT
Start: 2024-03-25

## 2024-03-25 RX ORDER — BUSPIRONE HYDROCHLORIDE 30 MG/1
30 TABLET ORAL 2 TIMES DAILY
COMMUNITY
Start: 2024-03-20

## 2024-03-25 RX ORDER — PERMETHRIN 50 MG/G
CREAM TOPICAL
Qty: 60 G | Refills: 0 | Status: SHIPPED | OUTPATIENT
Start: 2024-03-25

## 2024-03-25 NOTE — PROGRESS NOTES
Name: Jennifer Gilliland  : 1980         Chief Complaint:     Chief Complaint   Patient presents with    Discuss Medications    Rash    Pharyngitis       History of Present Illness:      Jennifer Gilliland is a 44 y.o.  female who presents with Discuss Medications, Rash, and Pharyngitis      HPI    Rash for about a wk, cuca hands, R wrist and elbow, L posterior axilla (not in the axilla itself). Terrible sore throat started a wk ago also, worst she's had. Had been around great niece who just turned 3 and gets sick frequently and had similar rash.     Still very frequent palpitations, feels a tick and then has to take a deep breath, sometimes happens many times in a row and can last even an hr, continue throughout the day with some breaks where she'll have no palpitations for maybe 30 minutes at a time. Worse with stress. Lots of water, 1 can of pop and 1 cup of coffee.     Low back pain and SI pain continues. Had recent injections which did help.     Medical History:     Patient Active Problem List   Diagnosis    Chronic neck pain    Onychomycosis    Chronic low back pain without sciatica    Narcotic abuse in remission (HCC)    Chronic constipation    Alcohol abuse, in remission    Hidradenitis suppurativa    Major depressive disorder, recurrent, moderate       Medications:       Prior to Admission medications    Medication Sig Start Date End Date Taking? Authorizing Provider   busPIRone (BUSPAR) 30 MG tablet Take 30 mg by mouth 2 times daily 3/20/24  Yes Provider, MD Katya   permethrin (ELIMITE) 5 % cream Apply to all areas of the body from the neck to soles of feet (30 g for average adult); leave on for 8 to 14 hours before removing by washing (shower or bath). 3/25/24  Yes Jennifer Camacho DO   dilTIAZem (CARDIZEM CD) 120 MG extended release capsule Take 1 capsule by mouth daily 3/25/24  Yes Jennifer Camacho DO   nystatin (MYCOSTATIN) 273295 UNIT/ML suspension Take 5 mLs by mouth 4 times daily for 7

## 2024-04-01 ENCOUNTER — PATIENT MESSAGE (OUTPATIENT)
Dept: FAMILY MEDICINE CLINIC | Age: 44
End: 2024-04-01

## 2024-04-01 RX ORDER — OMEPRAZOLE 40 MG/1
CAPSULE, DELAYED RELEASE ORAL
Qty: 90 CAPSULE | Refills: 1 | Status: SHIPPED | OUTPATIENT
Start: 2024-04-01

## 2024-04-01 NOTE — TELEPHONE ENCOUNTER
From: Jennifer Gilliland  To: Dr. Jennifer Camacho  Sent: 4/1/2024 10:12 AM EDT  Subject: Refill    Good morning and Happy Easter!  I was hoping you could call in my omeprazole 40mg 90 day supply to [facility] in [place] when you have some free time? I'd really appreciate it!  Thanks so much,   Olena

## 2024-04-30 ENCOUNTER — OFFICE VISIT (OUTPATIENT)
Dept: FAMILY MEDICINE CLINIC | Age: 44
End: 2024-04-30
Payer: COMMERCIAL

## 2024-04-30 VITALS
HEART RATE: 75 BPM | DIASTOLIC BLOOD PRESSURE: 70 MMHG | HEIGHT: 66 IN | WEIGHT: 203 LBS | OXYGEN SATURATION: 99 % | SYSTOLIC BLOOD PRESSURE: 116 MMHG | BODY MASS INDEX: 32.62 KG/M2

## 2024-04-30 DIAGNOSIS — R68.2 DRY MOUTH: ICD-10-CM

## 2024-04-30 DIAGNOSIS — M54.50 CHRONIC BILATERAL LOW BACK PAIN WITHOUT SCIATICA: Primary | ICD-10-CM

## 2024-04-30 DIAGNOSIS — K59.09 CHRONIC CONSTIPATION: ICD-10-CM

## 2024-04-30 DIAGNOSIS — G89.29 CHRONIC BILATERAL LOW BACK PAIN WITHOUT SCIATICA: Primary | ICD-10-CM

## 2024-04-30 DIAGNOSIS — F33.2 SEVERE EPISODE OF RECURRENT MAJOR DEPRESSIVE DISORDER, WITHOUT PSYCHOTIC FEATURES (HCC): ICD-10-CM

## 2024-04-30 PROCEDURE — 99214 OFFICE O/P EST MOD 30 MIN: CPT | Performed by: FAMILY MEDICINE

## 2024-04-30 PROCEDURE — G8417 CALC BMI ABV UP PARAM F/U: HCPCS | Performed by: FAMILY MEDICINE

## 2024-04-30 PROCEDURE — 1036F TOBACCO NON-USER: CPT | Performed by: FAMILY MEDICINE

## 2024-04-30 PROCEDURE — G8427 DOCREV CUR MEDS BY ELIG CLIN: HCPCS | Performed by: FAMILY MEDICINE

## 2024-04-30 RX ORDER — TRAMADOL HYDROCHLORIDE 50 MG/1
50 TABLET ORAL EVERY 6 HOURS PRN
Qty: 20 TABLET | Refills: 0 | Status: SHIPPED | OUTPATIENT
Start: 2024-04-30 | End: 2024-05-05

## 2024-04-30 SDOH — ECONOMIC STABILITY: FOOD INSECURITY: WITHIN THE PAST 12 MONTHS, THE FOOD YOU BOUGHT JUST DIDN'T LAST AND YOU DIDN'T HAVE MONEY TO GET MORE.: NEVER TRUE

## 2024-04-30 SDOH — ECONOMIC STABILITY: INCOME INSECURITY: HOW HARD IS IT FOR YOU TO PAY FOR THE VERY BASICS LIKE FOOD, HOUSING, MEDICAL CARE, AND HEATING?: NOT HARD AT ALL

## 2024-04-30 SDOH — ECONOMIC STABILITY: FOOD INSECURITY: WITHIN THE PAST 12 MONTHS, YOU WORRIED THAT YOUR FOOD WOULD RUN OUT BEFORE YOU GOT MONEY TO BUY MORE.: NEVER TRUE

## 2024-04-30 NOTE — PROGRESS NOTES
Name: Jennifer Gilliland  : 1980         Chief Complaint:     Chief Complaint   Patient presents with    Back Pain     Follows with pain management.    Palpitations       History of Present Illness:      Jennifer Gilliland is a 44 y.o.  female who presents with Back Pain (Follows with pain management.) and Palpitations      HPI    Palpitations resolved, started improving shortly after starting cardizem and after about 1-2 wks got full relief.     Ongoing low back pain, central lower lumbar and upper sacral area, non-radiating. Constant and severe and she feels she has no omer in life because of it, unable to enjoy time with  or other activities because of the pain. In the past had had great relief for 6-8 mos with ablations but not nearly as much from steroid injections. Had those performed 3/7 and got very little relief, only lasted for about 2 wks. Was very frustrated as she thought she was having ablations that day. Ibuprofen 800 mg daily to twice a day depending on level of pain, sometimes tylenol 2 pills. Little relief from either of these, just tries them to try to help herself somehow. Heating pads, lidocaine patches which sometimes help. Difficulty putting them on properly herself. Ice/heat q 20 min when really bad. Stretching. Upset that \"no one has helped her\" with pain and feels she's been denied pain meds d/t addiction history.    Chronic constipation continues, thinks it may be r/t perimenopause. Really started when she was on suboxone. Uses miralax prn, mag citrate if really bad which hasn't happened for about 3 mos.     Very dry mouth, got biotene spray which helps, drinks water all the time. Off doxepin d/t making her too drowsy the next day. On trazodone.     Medical History:     Patient Active Problem List   Diagnosis    Chronic neck pain    Onychomycosis    Chronic low back pain without sciatica    Narcotic abuse in remission (HCC)    Chronic constipation    Alcohol abuse, in remission

## 2024-05-07 DIAGNOSIS — M54.50 CHRONIC BILATERAL LOW BACK PAIN WITHOUT SCIATICA: ICD-10-CM

## 2024-05-07 DIAGNOSIS — G89.29 CHRONIC BILATERAL LOW BACK PAIN WITHOUT SCIATICA: ICD-10-CM

## 2024-05-07 RX ORDER — TRAMADOL HYDROCHLORIDE 50 MG/1
50 TABLET ORAL EVERY 6 HOURS PRN
Qty: 20 TABLET | Refills: 0 | Status: SHIPPED | OUTPATIENT
Start: 2024-05-07 | End: 2024-05-12

## 2024-05-07 NOTE — TELEPHONE ENCOUNTER
Tramadol 50 mg    [facility]- [facility]      Health Maintenance   Topic Date Due    Hepatitis B vaccine (1 of 3 - 3-dose series) Never done    COVID-19 Vaccine (1) Never done    Varicella vaccine (1 of 2 - 2-dose childhood series) Never done    DTaP/Tdap/Td vaccine (1 - Tdap) Never done    Diabetes screen  09/19/2021    Lipids  Never done    Flu vaccine (Season Ended) 08/01/2024    Depression Monitoring  01/30/2025    Cervical cancer screen  11/15/2025    Hepatitis C screen  Completed    HIV screen  Completed    Hepatitis A vaccine  Aged Out    Hib vaccine  Aged Out    HPV vaccine  Aged Out    Polio vaccine  Aged Out    Meningococcal (ACWY) vaccine  Aged Out    Pneumococcal 0-64 years Vaccine  Aged Out             (applicable per patient's age: Cancer Screenings, Depression Screening, Fall Risk Screening, Immunizations)    Hemoglobin A1C (%)   Date Value   09/19/2018 5.3     AST (U/L)   Date Value   01/30/2024 23     ALT (U/L)   Date Value   01/30/2024 23     BUN (mg/dL)   Date Value   01/30/2024 11      (goal A1C is < 7)   (goal LDL is <100) need 30-50% reduction from baseline     BP Readings from Last 3 Encounters:   04/30/24 116/70   03/25/24 105/62   03/07/24 100/83    (goal /80)      All Future Testing planned in [facility]:  Lab Frequency Next Occurrence   SI JOINT INJECTIONS / SI NERVE BLOCK Once 06/01/2023   CBC with Auto Differential Once 08/11/2023   TSH with Reflex Once 08/11/2023   Lipid Panel Once 08/11/2023   Basic Metabolic Panel Once 08/11/2023       Next Visit Date:  Future Appointments   Date Time Provider Department Center   6/6/2024  9:00 AM Gerber Lane DO [facility]            Patient Active Problem List:     Chronic neck pain     Onychomycosis     Chronic low back pain without sciatica     Narcotic abuse in remission (HCC)     Chronic constipation     Alcohol abuse, in remission     Hidradenitis suppurativa     Major depressive disorder, recurrent, moderate

## 2024-05-14 DIAGNOSIS — G89.29 CHRONIC BILATERAL LOW BACK PAIN WITHOUT SCIATICA: ICD-10-CM

## 2024-05-14 DIAGNOSIS — M54.50 CHRONIC BILATERAL LOW BACK PAIN WITHOUT SCIATICA: ICD-10-CM

## 2024-05-14 NOTE — TELEPHONE ENCOUNTER
Last visit:  4/30/2024  Next Visit Date:    Future Appointments   Date Time Provider Department Center   6/6/2024  9:00 AM Gerber Lane DO [facility]         Medication List:  Prior to Admission medications    Medication Sig Start Date End Date Taking? Authorizing Provider   omeprazole (PRILOSEC) 40 MG delayed release capsule TAKE 1 CAPSULE BY MOUTH EVERY DAY 4/1/24   Jennifer Camacho DO   busPIRone (BUSPAR) 30 MG tablet Take 30 mg by mouth 2 times daily 3/20/24   Katya Paul MD   dilTIAZem (CARDIZEM CD) 120 MG extended release capsule Take 1 capsule by mouth daily 3/25/24   Jennifer Camacho DO   loratadine (CLARITIN) 10 MG tablet Take 1 tablet by mouth daily    Katya Paul MD   ARIPiprazole (ABILIFY) 2 MG tablet Take 1 tablet by mouth daily    Katya Paul MD   venlafaxine 225 MG extended release tablet Take 1 tablet by mouth daily Take 1 tablet by mouth daily 9/17/23   Trung Pantoja MD   topiramate (TOPAMAX) 50 MG tablet Take 3 tablets by mouth nightly 11/8/23   Jennifer Camacho DO   ibuprofen (ADVIL;MOTRIN) 800 MG tablet TAKE 1 TABLET BY MOUTH 3 TIMES DAILY WITH MEALS. 9/27/23   Jennifer Camacho DO   acetaminophen ([facility] ACETAMINOPHEN EX ST) 500 MG tablet Take 2 tablets by mouth every 8 hours as needed for Pain 8/11/23   Jennifer Camacho DO   traZODone (DESYREL) 100 MG tablet 2 AT NIGHT-  PSYCH FILLS THIS MED 4/19/23   Katya Paul MD   buPROPion (WELLBUTRIN XL) 300 MG extended release tablet Take 1 tablet by mouth every morning 11/18/22   Jennifer Camacho DO   minocycline (MINOCIN;DYNACIN) 100 MG capsule Take 1 capsule by mouth daily 8/15/22   Katya Paul MD   albuterol sulfate HFA (PROVENTIL;VENTOLIN;PROAIR) 108 (90 Base) MCG/ACT inhaler Inhale 2 puffs into the lungs 4 times daily 7/25/22   Jennifer Camacho DO

## 2024-05-15 RX ORDER — TRAMADOL HYDROCHLORIDE 50 MG/1
50 TABLET ORAL EVERY 6 HOURS PRN
Qty: 20 TABLET | Refills: 0 | Status: SHIPPED | OUTPATIENT
Start: 2024-05-15 | End: 2024-05-20

## 2024-05-20 ENCOUNTER — TELEPHONE (OUTPATIENT)
Dept: FAMILY MEDICINE CLINIC | Age: 44
End: 2024-05-20

## 2024-05-20 DIAGNOSIS — M54.50 CHRONIC BILATERAL LOW BACK PAIN WITHOUT SCIATICA: ICD-10-CM

## 2024-05-20 DIAGNOSIS — G89.29 CHRONIC BILATERAL LOW BACK PAIN WITHOUT SCIATICA: ICD-10-CM

## 2024-05-20 RX ORDER — TRAMADOL HYDROCHLORIDE 50 MG/1
50 TABLET ORAL EVERY 6 HOURS PRN
Qty: 28 TABLET | Refills: 0 | Status: SHIPPED | OUTPATIENT
Start: 2024-05-20 | End: 2024-05-27

## 2024-05-20 NOTE — TELEPHONE ENCOUNTER
Rx sent, slightly longer supply but I really want her to limit how much she's taking - try not to take q6h all the time. Can quickly build tolerance. Has visit with Dr Lane 6/6 and needs to discuss longer term pain mgmt with him.

## 2024-05-21 RX ORDER — LIDOCAINE 50 MG/G
PATCH TOPICAL
Qty: 30 PATCH | Refills: 2 | Status: SHIPPED | OUTPATIENT
Start: 2024-05-21

## 2024-05-21 NOTE — TELEPHONE ENCOUNTER
Last OV: 4/30/2024  Back pain   Last RX:    Next scheduled apt: Visit date not found          Surescript requesting a refill

## 2024-05-29 DIAGNOSIS — G89.29 CHRONIC BILATERAL LOW BACK PAIN WITHOUT SCIATICA: ICD-10-CM

## 2024-05-29 DIAGNOSIS — M54.50 CHRONIC BILATERAL LOW BACK PAIN WITHOUT SCIATICA: ICD-10-CM

## 2024-05-29 RX ORDER — TRAMADOL HYDROCHLORIDE 50 MG/1
50 TABLET ORAL EVERY 6 HOURS PRN
Qty: 28 TABLET | Refills: 0 | Status: SHIPPED | OUTPATIENT
Start: 2024-05-29 | End: 2024-06-05

## 2024-05-31 RX ORDER — IBUPROFEN 800 MG/1
800 TABLET ORAL
Qty: 90 TABLET | Refills: 3 | Status: SHIPPED | OUTPATIENT
Start: 2024-05-31

## 2024-06-06 ENCOUNTER — TELEPHONE (OUTPATIENT)
Dept: PAIN MANAGEMENT | Age: 44
End: 2024-06-06

## 2024-06-06 ENCOUNTER — OFFICE VISIT (OUTPATIENT)
Dept: PAIN MANAGEMENT | Age: 44
End: 2024-06-06
Payer: COMMERCIAL

## 2024-06-06 ENCOUNTER — TELEPHONE (OUTPATIENT)
Dept: FAMILY MEDICINE CLINIC | Age: 44
End: 2024-06-06

## 2024-06-06 VITALS
SYSTOLIC BLOOD PRESSURE: 110 MMHG | HEART RATE: 83 BPM | OXYGEN SATURATION: 98 % | BODY MASS INDEX: 32.46 KG/M2 | DIASTOLIC BLOOD PRESSURE: 70 MMHG | WEIGHT: 201 LBS | RESPIRATION RATE: 18 BRPM

## 2024-06-06 DIAGNOSIS — M54.50 CHRONIC BILATERAL LOW BACK PAIN WITHOUT SCIATICA: ICD-10-CM

## 2024-06-06 DIAGNOSIS — M51.36 LUMBAR DEGENERATIVE DISC DISEASE: ICD-10-CM

## 2024-06-06 DIAGNOSIS — M53.3 SACROILIAC JOINT PAIN: ICD-10-CM

## 2024-06-06 DIAGNOSIS — G89.29 CHRONIC BILATERAL LOW BACK PAIN WITHOUT SCIATICA: ICD-10-CM

## 2024-06-06 DIAGNOSIS — M47.817 LUMBOSACRAL SPONDYLOSIS WITHOUT MYELOPATHY: Primary | ICD-10-CM

## 2024-06-06 PROCEDURE — G8427 DOCREV CUR MEDS BY ELIG CLIN: HCPCS | Performed by: STUDENT IN AN ORGANIZED HEALTH CARE EDUCATION/TRAINING PROGRAM

## 2024-06-06 PROCEDURE — G8417 CALC BMI ABV UP PARAM F/U: HCPCS | Performed by: STUDENT IN AN ORGANIZED HEALTH CARE EDUCATION/TRAINING PROGRAM

## 2024-06-06 PROCEDURE — 99214 OFFICE O/P EST MOD 30 MIN: CPT | Performed by: STUDENT IN AN ORGANIZED HEALTH CARE EDUCATION/TRAINING PROGRAM

## 2024-06-06 PROCEDURE — 1036F TOBACCO NON-USER: CPT | Performed by: STUDENT IN AN ORGANIZED HEALTH CARE EDUCATION/TRAINING PROGRAM

## 2024-06-06 RX ORDER — PREDNISONE 10 MG/1
10 TABLET ORAL DAILY
Qty: 10 TABLET | Refills: 0 | Status: SHIPPED | OUTPATIENT
Start: 2024-06-06 | End: 2024-06-16

## 2024-06-06 NOTE — TELEPHONE ENCOUNTER
Tramadol    DM- [facility]      Health Maintenance   Topic Date Due    Hepatitis B vaccine (1 of 3 - 3-dose series) Never done    COVID-19 Vaccine (1) Never done    Varicella vaccine (1 of 2 - 2-dose childhood series) Never done    DTaP/Tdap/Td vaccine (1 - Tdap) Never done    Diabetes screen  09/19/2021    Lipids  Never done    Breast cancer screen  06/08/2023    Flu vaccine (Season Ended) 08/01/2024    Depression Monitoring  01/30/2025    Cervical cancer screen  11/15/2025    Hepatitis C screen  Completed    HIV screen  Completed    Hepatitis A vaccine  Aged Out    Hib vaccine  Aged Out    HPV vaccine  Aged Out    Polio vaccine  Aged Out    Meningococcal (ACWY) vaccine  Aged Out    Pneumococcal 0-64 years Vaccine  Aged Out             (applicable per patient's age: Cancer Screenings, Depression Screening, Fall Risk Screening, Immunizations)    Hemoglobin A1C (%)   Date Value   09/19/2018 5.3     AST (U/L)   Date Value   01/30/2024 23     ALT (U/L)   Date Value   01/30/2024 23     BUN (mg/dL)   Date Value   01/30/2024 11      (goal A1C is < 7)   (goal LDL is <100) need 30-50% reduction from baseline     BP Readings from Last 3 Encounters:   06/06/24 110/70   04/30/24 116/70   03/25/24 105/62    (goal /80)      All Future Testing planned in [facility]:  Lab Frequency Next Occurrence   CBC with Auto Differential Once 08/11/2023   TSH with Reflex Once 08/11/2023   Lipid Panel Once 08/11/2023   Basic Metabolic Panel Once 08/11/2023   RADIOFREQUENCY L/S ADDITIONAL Once 06/06/2024   FACET JOINT L/S SINGLE Once 06/06/2024       Next Visit Date:  Future Appointments   Date Time Provider Department Center   8/22/2024  9:00 AM Gerber Lane DO [facility]            Patient Active Problem List:     Chronic neck pain     Onychomycosis     Chronic low back pain without sciatica     Narcotic abuse in remission (HCC)     Chronic constipation     Alcohol abuse, in remission     Hidradenitis suppurativa     Major

## 2024-06-06 NOTE — PROGRESS NOTES
Talat is a 44 y.o.female who presents with chronic low back pain and neck pain. To review, patient has had low back pain for the last 5 years without injuries or trauma.  She denies any low back surgeries.    At today's visit, she reports worsening low back pain.  Her history and physical examination are consistent with lumbosacral spondylosis as the patient has axial low back pain that is mechanical in nature. There is tenderness to palpation along the lumbar paraspinal musculature with positive lumbar facet loading bilaterally.  The lumbar MRI on 4/12/2022 reveals multilevel degenerative changes with facet hypertrophy throughout the lumbar spine specifically at L4-5 and L5-S1 facet joints.  She underwent previous right and left lumbar L3, L4, L5 medial branch radiofrequency ablation on 1/12/2023 and 1/26/2023 with 85% improvement in pain and function for 1 year.  Therefore, I will plan for bilateral lumbar L3, L4, L5 medial branch radiofrequency ablation.    I will start her on oral steroids in the meantime before her procedure.    Neurologically, it appears the patient has full strength and normal sensation. There is no evidence of radiculopathy or myelopathy on examination. There are no red flags in the patient's history. The patient has failed conservative measures including outpatient physical therapy, greater than 3 medications for pain relief, a self-directed therapy program, as well as activity modification all within the last 6 weeks over 3 months. The patient's pain has been causing worsening quality of life and function.    PLAN:  Medications:    For nonopioid therapy, the following medications were prescribed:    -Start oral steroids    Opioid therapy:  -Non-opioid care plan.  Patient previously on Suboxone.    Interventions:  -Plan for bilateral lumbar L3, L4, L5 medial branch radiofrequency ablation  -Status post bilateral sacroiliac joint injections on 3/7/2024 with 100% improvement in pain and

## 2024-06-06 NOTE — TELEPHONE ENCOUNTER
SURGERY SCHEDULING FORM  [facility]   [address] [place] [address]    Phone [phone] Fax [phone]      Jennifer Gilliland 1980 female    [address] [address]   Marital Status:          Home Phone: [phone]      Cell Phone:    Telephone Information:   Mobile [phone]          Surgeon: Ariana   Surgery Date: 6/27/24     Time: prefers to be last of the day if possible     Procedure: bilateral lumbar L3, L4, L5 radiofrequency ablation     Diagnosis: M47.817     Important Medical History:  In Epic    Special Inst/Equip: Regular    CPT Codes:    40633 [id number]  Latex Allergy: No     Cardiac Device:  No    Anesthesia:  Mod Sedation          Admission Type:  Same Day                          Admit Prior to Day of Surgery: No    Case Location:  Ambulatory            Preadmission Testing:  Phone Call          PAT Date and Time:     Need Preop Cardiac Clearance: No    Does Patient have Cardiologist/physician?     No

## 2024-06-06 NOTE — PATIENT INSTRUCTIONS
SURVEY:    You may be receiving a survey from [facility] regarding your visit today.    Please complete the survey to enable us to provide the highest quality of care to you and your family.    If you cannot score us a very good on any question, please call the office to discuss how we could have made your experience a very good one.    Thank you.  [facility]  MD Merced Ocasio, MD Gerber Lane, DO  Enrique Vang, MD Neida Zheng, APRN-CNP  Bella Gomes, Practice Manager  Rena, CMA  Iza, CCMA  Ayden, CMA  Eli, CMA  Vicente, PSC   Marilu, LPN

## 2024-06-06 NOTE — TELEPHONE ENCOUNTER
Patient saw pain management this morning . Was advised on 5/20/24 that longer term pain management would be handled by him

## 2024-06-07 RX ORDER — TRAMADOL HYDROCHLORIDE 50 MG/1
50 TABLET ORAL EVERY 8 HOURS PRN
Qty: 21 TABLET | Refills: 0 | Status: SHIPPED | OUTPATIENT
Start: 2024-06-07 | End: 2024-06-14

## 2024-06-14 DIAGNOSIS — G89.29 CHRONIC BILATERAL LOW BACK PAIN WITHOUT SCIATICA: ICD-10-CM

## 2024-06-14 DIAGNOSIS — M54.50 CHRONIC BILATERAL LOW BACK PAIN WITHOUT SCIATICA: ICD-10-CM

## 2024-06-17 RX ORDER — TRAMADOL HYDROCHLORIDE 50 MG/1
50 TABLET ORAL EVERY 8 HOURS PRN
Qty: 21 TABLET | Refills: 0 | Status: SHIPPED | OUTPATIENT
Start: 2024-06-17 | End: 2024-06-24

## 2024-06-17 NOTE — TELEPHONE ENCOUNTER
Pt saw Pain management on 6/6/24:      Interventions:  -Plan for bilateral lumbar L3, L4, L5 medial branch radiofrequency ablation

## 2024-06-27 DIAGNOSIS — G89.29 CHRONIC BILATERAL LOW BACK PAIN WITHOUT SCIATICA: ICD-10-CM

## 2024-06-27 DIAGNOSIS — M54.50 CHRONIC BILATERAL LOW BACK PAIN WITHOUT SCIATICA: ICD-10-CM

## 2024-06-27 RX ORDER — TRAMADOL HYDROCHLORIDE 50 MG/1
50 TABLET ORAL EVERY 8 HOURS PRN
Qty: 21 TABLET | Refills: 0 | Status: SHIPPED | OUTPATIENT
Start: 2024-06-27 | End: 2024-07-04

## 2024-06-27 NOTE — TELEPHONE ENCOUNTER
Refill request     Tramadol 50 mg    [facility] [place]    Patient also would like Dr. Camacho to know her appt with Dr Lane was canceled for today due to his wife going into labor.    Health Maintenance   Topic Date Due    Hepatitis B vaccine (1 of 3 - 3-dose series) Never done    COVID-19 Vaccine (1) Never done    Varicella vaccine (1 of 2 - 2-dose childhood series) Never done    DTaP/Tdap/Td vaccine (1 - Tdap) Never done    Diabetes screen  09/19/2021    Lipids  Never done    Breast cancer screen  06/08/2023    Flu vaccine (Season Ended) 08/01/2024    Depression Monitoring  01/30/2025    Cervical cancer screen  11/15/2025    Hepatitis C screen  Completed    HIV screen  Completed    Hepatitis A vaccine  Aged Out    Hib vaccine  Aged Out    HPV vaccine  Aged Out    Polio vaccine  Aged Out    Meningococcal (ACWY) vaccine  Aged Out    Pneumococcal 0-64 years Vaccine  Aged Out             (applicable per patient's age: Cancer Screenings, Depression Screening, Fall Risk Screening, Immunizations)    Hemoglobin A1C (%)   Date Value   09/19/2018 5.3     AST (U/L)   Date Value   01/30/2024 23     ALT (U/L)   Date Value   01/30/2024 23     BUN (mg/dL)   Date Value   01/30/2024 11      (goal A1C is < 7)   (goal LDL is <100) need 30-50% reduction from baseline     BP Readings from Last 3 Encounters:   06/06/24 110/70   04/30/24 116/70   03/25/24 105/62    (goal /80)      All Future Testing planned in [facility]:  Lab Frequency Next Occurrence   CBC with Auto Differential Once 08/11/2023   TSH with Reflex Once 08/11/2023   Lipid Panel Once 08/11/2023   Basic Metabolic Panel Once 08/11/2023   RADIOFREQUENCY L/S ADDITIONAL Once 06/06/2024   FACET JOINT L/S SINGLE Once 06/06/2024       Next Visit Date:  Future Appointments   Date Time Provider Department Center   8/22/2024  9:00 AM Gerber Lane DO [facility]            Patient Active Problem List:     Chronic neck pain     Onychomycosis     Chronic low back

## 2024-07-05 DIAGNOSIS — M54.50 CHRONIC BILATERAL LOW BACK PAIN WITHOUT SCIATICA: ICD-10-CM

## 2024-07-05 DIAGNOSIS — G89.29 CHRONIC BILATERAL LOW BACK PAIN WITHOUT SCIATICA: ICD-10-CM

## 2024-07-05 RX ORDER — TRAMADOL HYDROCHLORIDE 50 MG/1
50 TABLET ORAL EVERY 6 HOURS PRN
Qty: 28 TABLET | Refills: 0 | Status: SHIPPED | OUTPATIENT
Start: 2024-07-05 | End: 2024-07-12

## 2024-07-05 RX ORDER — TOPIRAMATE 50 MG/1
150 TABLET, FILM COATED ORAL NIGHTLY
Qty: 90 TABLET | Refills: 5 | Status: SHIPPED | OUTPATIENT
Start: 2024-07-05

## 2024-07-05 NOTE — TELEPHONE ENCOUNTER
Patient asking for new scripts for Topamax & Tramadol - patient uses [facility] in [place]  
L, DO

## 2024-07-12 DIAGNOSIS — G89.29 CHRONIC BILATERAL LOW BACK PAIN WITHOUT SCIATICA: ICD-10-CM

## 2024-07-12 DIAGNOSIS — M54.50 CHRONIC BILATERAL LOW BACK PAIN WITHOUT SCIATICA: ICD-10-CM

## 2024-07-12 NOTE — TELEPHONE ENCOUNTER
Patient asking for a new script for Tramadol - patient uses [facility] in [place] - patient aware that Dr Camacho out until Monday

## 2024-07-15 RX ORDER — TRAMADOL HYDROCHLORIDE 50 MG/1
50 TABLET ORAL EVERY 6 HOURS PRN
Qty: 28 TABLET | Refills: 0 | Status: SHIPPED | OUTPATIENT
Start: 2024-07-15 | End: 2024-07-22

## 2024-07-15 NOTE — TELEPHONE ENCOUNTER
Last visit:  4/30/2024  Next Visit Date:    Future Appointments   Date Time Provider Department Center   9/26/2024  9:30 AM Gerber Lane DO [facility]         Medication List:  Prior to Admission medications    Medication Sig Start Date End Date Taking? Authorizing Provider   topiramate (TOPAMAX) 50 MG tablet Take 3 tablets by mouth nightly 7/5/24   Jennifer Camacho DO   ibuprofen (ADVIL;MOTRIN) 800 MG tablet TAKE 1 TABLET BY MOUTH THREE TIMES DAILY WITH MEALS 5/31/24   Jennifer Camacho DO   lidocaine (LIDODERM) 5 % APPLY 1 PATCH ONTO THE SKIN DAILY FOR 12 HOURS ON, 12 HOURS OFF 5/21/24   Jennifer Camacho DO   omeprazole (PRILOSEC) 40 MG delayed release capsule TAKE 1 CAPSULE BY MOUTH EVERY DAY 4/1/24   Jennifer Camacho DO   busPIRone (BUSPAR) 30 MG tablet Take 30 mg by mouth 2 times daily 3/20/24   Katya Paul MD   dilTIAZem (CARDIZEM CD) 120 MG extended release capsule Take 1 capsule by mouth daily 3/25/24   Jennifer Camacho DO   loratadine (CLARITIN) 10 MG tablet Take 1 tablet by mouth daily    Katya Paul MD   ARIPiprazole (ABILIFY) 2 MG tablet Take 1 tablet by mouth daily    Katya Paul MD   venlafaxine 225 MG extended release tablet Take 1 tablet by mouth daily Take 1 tablet by mouth daily 9/17/23   Trung Pantoja MD   acetaminophen ([facility] ACETAMINOPHEN EX ST) 500 MG tablet Take 2 tablets by mouth every 8 hours as needed for Pain 8/11/23   Jennifer Camacho DO   traZODone (DESYREL) 100 MG tablet 2 AT NIGHT-  PSYCH FILLS THIS MED 4/19/23   Katya Paul MD   buPROPion (WELLBUTRIN XL) 300 MG extended release tablet Take 1 tablet by mouth every morning 11/18/22   Jennifer Camacho DO   minocycline (MINOCIN;DYNACIN) 100 MG capsule Take 1 capsule by mouth daily 8/15/22   Katya Paul MD   albuterol sulfate HFA (PROVENTIL;VENTOLIN;PROAIR) 108 (90 Base) MCG/ACT inhaler Inhale 2 puffs into the lungs 4 times daily 7/25/22   Jennifer Camacho,

## 2024-07-17 RX ORDER — DILTIAZEM HYDROCHLORIDE 120 MG/1
CAPSULE, COATED, EXTENDED RELEASE ORAL DAILY
Qty: 90 CAPSULE | Refills: 1 | Status: SHIPPED | OUTPATIENT
Start: 2024-07-17

## 2024-07-17 NOTE — TELEPHONE ENCOUNTER
Last visit:  4/30/2024  Next Visit Date:    Future Appointments   Date Time Provider Department Center   9/26/2024  9:30 AM Gerber Lane DO [facility]         Medication List:  Prior to Admission medications    Medication Sig Start Date End Date Taking? Authorizing Provider   traMADol (ULTRAM) 50 MG tablet Take 1 tablet by mouth every 6 hours as needed for Pain for up to 7 days. Max Daily Amount: 200 mg 7/15/24 7/22/24  Jennifer Camacho DO   topiramate (TOPAMAX) 50 MG tablet Take 3 tablets by mouth nightly 7/5/24   Jennifer Camacho DO   ibuprofen (ADVIL;MOTRIN) 800 MG tablet TAKE 1 TABLET BY MOUTH THREE TIMES DAILY WITH MEALS 5/31/24   Jennifer Camacho DO   lidocaine (LIDODERM) 5 % APPLY 1 PATCH ONTO THE SKIN DAILY FOR 12 HOURS ON, 12 HOURS OFF 5/21/24   Jennifer Camacho DO   omeprazole (PRILOSEC) 40 MG delayed release capsule TAKE 1 CAPSULE BY MOUTH EVERY DAY 4/1/24   Jennifer Camacho DO   busPIRone (BUSPAR) 30 MG tablet Take 30 mg by mouth 2 times daily 3/20/24   Katya Paul MD   dilTIAZem (CARDIZEM CD) 120 MG extended release capsule Take 1 capsule by mouth daily 3/25/24   Jennifer Camacho DO   loratadine (CLARITIN) 10 MG tablet Take 1 tablet by mouth daily    Katya Paul MD   ARIPiprazole (ABILIFY) 2 MG tablet Take 1 tablet by mouth daily    Katya Paul MD   venlafaxine 225 MG extended release tablet Take 1 tablet by mouth daily Take 1 tablet by mouth daily 9/17/23   Trung Pantoja MD   acetaminophen ([facility]S ACETAMINOPHEN EX ST) 500 MG tablet Take 2 tablets by mouth every 8 hours as needed for Pain 8/11/23   Jennifer Camacho DO   traZODone (DESYREL) 100 MG tablet 2 AT NIGHT-  PSYCH FILLS THIS MED 4/19/23   Katya Paul MD   buPROPion (WELLBUTRIN XL) 300 MG extended release tablet Take 1 tablet by mouth every morning 11/18/22   Jennifer Camacho DO   minocycline (MINOCIN;DYNACIN) 100 MG capsule Take 1 capsule by mouth daily 8/15/22

## 2024-07-23 DIAGNOSIS — G89.29 CHRONIC BILATERAL LOW BACK PAIN WITHOUT SCIATICA: ICD-10-CM

## 2024-07-23 DIAGNOSIS — M54.50 CHRONIC BILATERAL LOW BACK PAIN WITHOUT SCIATICA: ICD-10-CM

## 2024-07-23 RX ORDER — TRAMADOL HYDROCHLORIDE 50 MG/1
50 TABLET ORAL EVERY 6 HOURS PRN
Qty: 28 TABLET | Refills: 0 | Status: SHIPPED | OUTPATIENT
Start: 2024-07-23 | End: 2024-07-30

## 2024-07-23 NOTE — TELEPHONE ENCOUNTER
Last OV: 4/30/2024 lower back pain   Last RX:    Next scheduled apt: 8/12/2024  check up           Pt requesting a refill   Medication pending for approval       
Alcohol abuse, in remission     Hidradenitis suppurativa     Major depressive disorder, recurrent, moderate

## 2024-07-30 DIAGNOSIS — M54.50 CHRONIC BILATERAL LOW BACK PAIN WITHOUT SCIATICA: ICD-10-CM

## 2024-07-30 DIAGNOSIS — G89.29 CHRONIC BILATERAL LOW BACK PAIN WITHOUT SCIATICA: ICD-10-CM

## 2024-07-30 RX ORDER — TRAMADOL HYDROCHLORIDE 50 MG/1
50 TABLET ORAL EVERY 6 HOURS PRN
Qty: 28 TABLET | Refills: 0 | Status: SHIPPED | OUTPATIENT
Start: 2024-07-30 | End: 2024-08-06

## 2024-07-30 NOTE — TELEPHONE ENCOUNTER
Last visit:  4/30/2024  Next Visit Date:    Future Appointments   Date Time Provider Department Center   8/12/2024  9:40 AM Jennifer Camacho DO [facility]   9/26/2024  9:30 AM Gerber Lane DO [facility]         Medication List:  Prior to Admission medications    Medication Sig Start Date End Date Taking? Authorizing Provider   traMADol (ULTRAM) 50 MG tablet Take 1 tablet by mouth every 6 hours as needed for Pain for up to 7 days. Max Daily Amount: 200 mg 7/23/24 7/30/24  Calderon Easley DO   dilTIAZem (CARDIZEM CD) 120 MG extended release capsule TAKE 1 CAPSULE BY MOUTH EVERY DAY 7/17/24   Jennifer Camacho DO   topiramate (TOPAMAX) 50 MG tablet Take 3 tablets by mouth nightly 7/5/24   Jennifer Camacho DO   ibuprofen (ADVIL;MOTRIN) 800 MG tablet TAKE 1 TABLET BY MOUTH THREE TIMES DAILY WITH MEALS 5/31/24   Jennifer Camacho DO   lidocaine (LIDODERM) 5 % APPLY 1 PATCH ONTO THE SKIN DAILY FOR 12 HOURS ON, 12 HOURS OFF 5/21/24   Jennifer Camacho DO   omeprazole (PRILOSEC) 40 MG delayed release capsule TAKE 1 CAPSULE BY MOUTH EVERY DAY 4/1/24   Jennifer Camacho DO   busPIRone (BUSPAR) 30 MG tablet Take 30 mg by mouth 2 times daily 3/20/24   Katya Paul MD   loratadine (CLARITIN) 10 MG tablet Take 1 tablet by mouth daily    Katya Paul MD   ARIPiprazole (ABILIFY) 2 MG tablet Take 1 tablet by mouth daily    Katya Paul MD   venlafaxine 225 MG extended release tablet Take 1 tablet by mouth daily Take 1 tablet by mouth daily 9/17/23   Trung Pantoja MD   acetaminophen ([facility]S ACETAMINOPHEN EX ST) 500 MG tablet Take 2 tablets by mouth every 8 hours as needed for Pain 8/11/23   Jennifer Camacho DO   traZODone (DESYREL) 100 MG tablet 2 AT NIGHT-  PSYCH FILLS THIS MED 4/19/23   Katya Paul MD   buPROPion (WELLBUTRIN XL) 300 MG extended release tablet Take 1 tablet by mouth every morning 11/18/22   Jennifer Camacho,    minocycline 
remission     Hidradenitis suppurativa     Major depressive disorder, recurrent, moderate

## 2024-08-01 NOTE — PROGRESS NOTES
[facility]   Preadmission Testing    Name: Jennifer Gilliland  : 1980  Patient Phone: [phone] (home) [phone] (work)    Procedure: 2024  Date of Procedure: RFA  Surgeon: Gerber Lane DO    Ht:    Wt:   Wt method:     Allergies:   Allergies   Allergen Reactions    Vraylar [Cariprazine Hcl] Hallucinations    Clindamycin/Lincomycin Rash                There were no vitals filed for this visit.    No LMP recorded. (Menstrual status: Irregular periods).    Do you take blood thinners?   [] Yes    [] No         Instructed to stop blood thinners prior to procedure?    [] Yes    [] No      [x] N/A   Do you have sleep apnea?   [] Yes    [x] No     Do you have acid reflux ?   [x] Yes    [] No     Do you have  hiatal hernia?   [] Yes    [x] No    Do you ever experience motion sickness?   [] Yes    [x] No     Have you had a respiratory infection or sore throat in last 4 weeks before surgery?    [] Yes    [x] No     Do you have poorly controlled asthma or COPD?  Difficulty with intubation in past? [] Yes    [x] No      [] Yes    [x] No       Do you have a history of angina in the last month or symptomatic arrhythmia?   [] Yes    [x] No     Do you have significant central nervous system disease?   [] Yes    [x] No     Have you had an EKG, labs, or chest xray in last 12 months?  If yes provide copies to anesthesia   [x] Yes    [] No       [x] Lab    [] EKG    [] CXR     Have you had a stress test?     [] Yes    [] No    When/where:    Was it normal?    [] Yes    [] No     Do you or your family have a history of Malignant Hyperthermia?   [] Yes    [x] No           Do you smoke? [] Yes    [x] No  Vape    Please refrain from smoking on the day of surgery.      Patient instructed on: [] NPO Status   [x] Meds to Take  [] Hold GLP-1 Receptor Agonist  [x] Ride Home  [x] No Jewelry/Contact Lenses/Nail Polish  [] Prep/Lax/Clear Liquids    [] Chlorhexidene     DOS Patient Needs [x] HCG   [] Blood Sugar  []

## 2024-08-05 DIAGNOSIS — G89.29 CHRONIC BILATERAL LOW BACK PAIN WITHOUT SCIATICA: ICD-10-CM

## 2024-08-05 DIAGNOSIS — M54.50 CHRONIC BILATERAL LOW BACK PAIN WITHOUT SCIATICA: ICD-10-CM

## 2024-08-05 RX ORDER — TRAMADOL HYDROCHLORIDE 50 MG/1
50 TABLET ORAL EVERY 6 HOURS PRN
Qty: 28 TABLET | Refills: 0 | Status: SHIPPED | OUTPATIENT
Start: 2024-08-05 | End: 2024-08-12

## 2024-08-08 ENCOUNTER — APPOINTMENT (OUTPATIENT)
Dept: GENERAL RADIOLOGY | Age: 44
End: 2024-08-08
Attending: STUDENT IN AN ORGANIZED HEALTH CARE EDUCATION/TRAINING PROGRAM
Payer: COMMERCIAL

## 2024-08-08 ENCOUNTER — HOSPITAL ENCOUNTER (OUTPATIENT)
Age: 44
Setting detail: OUTPATIENT SURGERY
Discharge: HOME | End: 2024-08-08
Attending: STUDENT IN AN ORGANIZED HEALTH CARE EDUCATION/TRAINING PROGRAM | Admitting: STUDENT IN AN ORGANIZED HEALTH CARE EDUCATION/TRAINING PROGRAM
Payer: COMMERCIAL

## 2024-08-08 VITALS
SYSTOLIC BLOOD PRESSURE: 130 MMHG | RESPIRATION RATE: 13 BRPM | TEMPERATURE: 98.3 F | BODY MASS INDEX: 33.27 KG/M2 | OXYGEN SATURATION: 99 % | HEIGHT: 66 IN | WEIGHT: 207 LBS | HEART RATE: 69 BPM | DIASTOLIC BLOOD PRESSURE: 75 MMHG

## 2024-08-08 LAB — HCG UR QL: NEGATIVE

## 2024-08-08 PROCEDURE — 2580000003 HC RX 258: Performed by: STUDENT IN AN ORGANIZED HEALTH CARE EDUCATION/TRAINING PROGRAM

## 2024-08-08 PROCEDURE — 3600000058 HC PAIN LEVEL 5 BASE: Performed by: STUDENT IN AN ORGANIZED HEALTH CARE EDUCATION/TRAINING PROGRAM

## 2024-08-08 PROCEDURE — 2500000003 HC RX 250 WO HCPCS: Performed by: STUDENT IN AN ORGANIZED HEALTH CARE EDUCATION/TRAINING PROGRAM

## 2024-08-08 PROCEDURE — 99152 MOD SED SAME PHYS/QHP 5/>YRS: CPT | Performed by: STUDENT IN AN ORGANIZED HEALTH CARE EDUCATION/TRAINING PROGRAM

## 2024-08-08 PROCEDURE — 6360000002 HC RX W HCPCS: Performed by: STUDENT IN AN ORGANIZED HEALTH CARE EDUCATION/TRAINING PROGRAM

## 2024-08-08 PROCEDURE — 81025 URINE PREGNANCY TEST: CPT

## 2024-08-08 PROCEDURE — 64636 DESTROY L/S FACET JNT ADDL: CPT | Performed by: STUDENT IN AN ORGANIZED HEALTH CARE EDUCATION/TRAINING PROGRAM

## 2024-08-08 PROCEDURE — 64635 DESTROY LUMB/SAC FACET JNT: CPT | Performed by: STUDENT IN AN ORGANIZED HEALTH CARE EDUCATION/TRAINING PROGRAM

## 2024-08-08 PROCEDURE — 7100000010 HC PHASE II RECOVERY - FIRST 15 MIN: Performed by: STUDENT IN AN ORGANIZED HEALTH CARE EDUCATION/TRAINING PROGRAM

## 2024-08-08 PROCEDURE — 99153 MOD SED SAME PHYS/QHP EA: CPT | Performed by: STUDENT IN AN ORGANIZED HEALTH CARE EDUCATION/TRAINING PROGRAM

## 2024-08-08 PROCEDURE — 2709999900 HC NON-CHARGEABLE SUPPLY: Performed by: STUDENT IN AN ORGANIZED HEALTH CARE EDUCATION/TRAINING PROGRAM

## 2024-08-08 PROCEDURE — 7100000011 HC PHASE II RECOVERY - ADDTL 15 MIN: Performed by: STUDENT IN AN ORGANIZED HEALTH CARE EDUCATION/TRAINING PROGRAM

## 2024-08-08 PROCEDURE — 3600000059 HC PAIN LEVEL 5 ADDL 15 MIN: Performed by: STUDENT IN AN ORGANIZED HEALTH CARE EDUCATION/TRAINING PROGRAM

## 2024-08-08 RX ORDER — MIDAZOLAM HYDROCHLORIDE 1 MG/ML
INJECTION INTRAMUSCULAR; INTRAVENOUS PRN
Status: DISCONTINUED | OUTPATIENT
Start: 2024-08-08 | End: 2024-08-08 | Stop reason: ALTCHOICE

## 2024-08-08 RX ORDER — SODIUM CHLORIDE, SODIUM LACTATE, POTASSIUM CHLORIDE, CALCIUM CHLORIDE 600; 310; 30; 20 MG/100ML; MG/100ML; MG/100ML; MG/100ML
INJECTION, SOLUTION INTRAVENOUS CONTINUOUS
Status: DISCONTINUED | OUTPATIENT
Start: 2024-08-08 | End: 2024-08-08 | Stop reason: HOSPADM

## 2024-08-08 RX ORDER — LIDOCAINE HYDROCHLORIDE 10 MG/ML
INJECTION, SOLUTION EPIDURAL; INFILTRATION; INTRACAUDAL; PERINEURAL PRN
Status: DISCONTINUED | OUTPATIENT
Start: 2024-08-08 | End: 2024-08-08 | Stop reason: ALTCHOICE

## 2024-08-08 RX ORDER — TRIAMCINOLONE ACETONIDE 40 MG/ML
INJECTION, SUSPENSION INTRA-ARTICULAR; INTRAMUSCULAR PRN
Status: DISCONTINUED | OUTPATIENT
Start: 2024-08-08 | End: 2024-08-08 | Stop reason: ALTCHOICE

## 2024-08-08 RX ORDER — LIDOCAINE HYDROCHLORIDE 20 MG/ML
INJECTION, SOLUTION EPIDURAL; INFILTRATION; INTRACAUDAL; PERINEURAL PRN
Status: DISCONTINUED | OUTPATIENT
Start: 2024-08-08 | End: 2024-08-08 | Stop reason: ALTCHOICE

## 2024-08-08 RX ADMIN — SODIUM CHLORIDE, POTASSIUM CHLORIDE, SODIUM LACTATE AND CALCIUM CHLORIDE: 600; 310; 30; 20 INJECTION, SOLUTION INTRAVENOUS at 12:55

## 2024-08-08 ASSESSMENT — PAIN - FUNCTIONAL ASSESSMENT: PAIN_FUNCTIONAL_ASSESSMENT: 0-10

## 2024-08-08 ASSESSMENT — PAIN DESCRIPTION - DESCRIPTORS: DESCRIPTORS: SHARP

## 2024-08-08 NOTE — H&P
Average packs/day: 1 pack/day for 2.0 years (2.0 ttl pk-yrs)     Types: Cigarettes    Smokeless tobacco: Never   Vaping Use    Vaping Use: Every day    Substances: Nicotine   Substance and Sexual Activity    Alcohol use: Not Currently     Comment: in the past    Drug use: Not Currently    Sexual activity: Not Currently   Other Topics Concern    Not on file   Social History Narrative    Not on file     Social Determinants of Health     Financial Resource Strain: Low Risk  (4/30/2024)    Overall Financial Resource Strain (CARDIA)     Difficulty of Paying Living Expenses: Not hard at all   Food Insecurity: No Food Insecurity (4/30/2024)    Hunger Vital Sign     Worried About Running Out of Food in the Last Year: Never true     Ran Out of Food in the Last Year: Never true   Transportation Needs: Unknown (4/30/2024)    PRAPARE - Transportation     Lack of Transportation (Medical): Not on file     Lack of Transportation (Non-Medical): No   Physical Activity: Not on file   Stress: Not on file   Social Connections: Not on file   Intimate Partner Violence: Not on file   Housing Stability: Unknown (4/30/2024)    Housing Stability Vital Sign     Unable to Pay for Housing in the Last Year: Not on file     Number of Places Lived in the Last Year: Not on file     Unstable Housing in the Last Year: No       Medications & Allergies:   Current Outpatient Medications   Medication Instructions    acetaminophen ([facility]S ACETAMINOPHEN EX ST) 1,000 mg, Oral, EVERY 8 HOURS PRN    albuterol sulfate HFA (PROVENTIL;VENTOLIN;PROAIR) 108 (90 Base) MCG/ACT inhaler 2 puffs, Inhalation, 4 TIMES DAILY RESP    ARIPiprazole (ABILIFY) 2 mg, Oral, DAILY    buPROPion (WELLBUTRIN XL) 300 mg, Oral, EVERY MORNING    busPIRone (BUSPAR) 30 mg, Oral, 2 TIMES DAILY    dilTIAZem (CARDIZEM CD) 120 MG extended release capsule Oral, DAILY    ibuprofen (ADVIL;MOTRIN) 800 mg, Oral, 3 TIMES DAILY WITH MEALS    lidocaine (LIDODERM) 5 % APPLY 1 PATCH ONTO THE SKIN DAILY

## 2024-08-08 NOTE — OP NOTE
PROCEDURE PERFORMED: Bilateral Lumbar Medial Branch Radiofrequency Ablation using Fluoroscopy    PREOPERATIVE DIAGNOSIS: Lumbosacral spondylosis    INDICATIONS: Chronic low back pain    The patient's history and physical exam were reviewed.  The risk, benefits, and alternatives of the procedure were discussed and all questions were answered to the patient's satisfaction.  The patient agreed to proceed and written informed consent was obtained.    POSTOPERATIVE DIAGNOSIS: Lumbosacral spondylosis    PHYSICIAN:  Dr. Gerber Lane DO    ANESTHESIA:  LOCAL    ASSISTANT:  NONE    PATHOLOGY:  NONE    ESTIMATED BLOOD LOSS:  N/A    IMPLANTS:  NONE    PROCEDURE DESCRIPTION: Bilateral lumbar medial branch radiofrequency ablation using fluoroscopy    The patient was placed on the operative bed in prone position.  The area was prepped with  Chlorhexidine.  The area was then draped in a sterile fashion.    Targeted levels: Bilateral lumbar L3, L4, L5 medial branch radiofrequency ablation    An AP  fluoroscopy image was used to identify and sukhdeep Valenzuela's point at the L3, L4 levels on the targeted side.  Additionally, the junction of the SAP and sacral ala was also marked on the same side.  The skin and subcutaneous tissues were then anesthetized with 1% lidocaine. At each lumbar medial branch, a 20-gauge, 100 mm curved with 10 mm active tip probe was advanced under AP fluoroscopic projections until bone was contacted along the medial aspect of the transverse process.  Aspiration of each needle was negative for blood, CSF and paresthesia prior to injection.   Motor stimulation at 2 Hz and 1.2 V was negative.  Then, after negative aspiration, 1 mL lidocaine 2% was injected at each level prior to lesioning which was performed at 80°C for 90 seconds.  Once the lesion was complete, injectate solution containing Kenalog 40 mg and 2 mL lidocaine 1% was injected at each site with a total of 1 mL.  The probes were then removed.  The

## 2024-08-12 ENCOUNTER — OFFICE VISIT (OUTPATIENT)
Dept: FAMILY MEDICINE CLINIC | Age: 44
End: 2024-08-12
Payer: COMMERCIAL

## 2024-08-12 VITALS
DIASTOLIC BLOOD PRESSURE: 80 MMHG | OXYGEN SATURATION: 98 % | WEIGHT: 206 LBS | HEART RATE: 92 BPM | BODY MASS INDEX: 33.11 KG/M2 | HEIGHT: 66 IN | SYSTOLIC BLOOD PRESSURE: 120 MMHG

## 2024-08-12 DIAGNOSIS — M54.42 CHRONIC BILATERAL LOW BACK PAIN WITH LEFT-SIDED SCIATICA: Primary | ICD-10-CM

## 2024-08-12 DIAGNOSIS — F33.2 SEVERE EPISODE OF RECURRENT MAJOR DEPRESSIVE DISORDER, WITHOUT PSYCHOTIC FEATURES (HCC): ICD-10-CM

## 2024-08-12 DIAGNOSIS — G89.29 CHRONIC BILATERAL LOW BACK PAIN WITH LEFT-SIDED SCIATICA: Primary | ICD-10-CM

## 2024-08-12 DIAGNOSIS — R00.2 PALPITATIONS: ICD-10-CM

## 2024-08-12 DIAGNOSIS — Z12.31 VISIT FOR SCREENING MAMMOGRAM: ICD-10-CM

## 2024-08-12 DIAGNOSIS — K59.09 CHRONIC CONSTIPATION: ICD-10-CM

## 2024-08-12 PROCEDURE — 99214 OFFICE O/P EST MOD 30 MIN: CPT | Performed by: FAMILY MEDICINE

## 2024-08-12 PROCEDURE — 1036F TOBACCO NON-USER: CPT | Performed by: FAMILY MEDICINE

## 2024-08-12 PROCEDURE — G8427 DOCREV CUR MEDS BY ELIG CLIN: HCPCS | Performed by: FAMILY MEDICINE

## 2024-08-12 PROCEDURE — G8417 CALC BMI ABV UP PARAM F/U: HCPCS | Performed by: FAMILY MEDICINE

## 2024-08-12 RX ORDER — CARISOPRODOL 250 MG/1
250 TABLET ORAL 3 TIMES DAILY PRN
Qty: 90 TABLET | Refills: 0 | Status: SHIPPED | OUTPATIENT
Start: 2024-08-12 | End: 2024-09-11

## 2024-08-12 RX ORDER — CARISOPRODOL 250 MG/1
250 TABLET ORAL 3 TIMES DAILY PRN
Qty: 90 TABLET | Refills: 0 | Status: CANCELLED | OUTPATIENT
Start: 2024-08-12 | End: 2024-09-11

## 2024-08-12 NOTE — PATIENT INSTRUCTIONS
[employer] SURVEY:    You may be receiving a survey from [employer] regarding your visit today.    You may get this in the mail, through your MyChart or in your email.     Please complete the survey to enable us to provide the highest quality of care to you and your family.    If you cannot score us as very good ( 5 Stars) on any question, please feel free to call the office to discuss how we could have made your experience exceptional.     Thank you.    Clinical Care Team:   DO Vicente Perez CMA                                     Triage: Ai Sagastume CMA              Clerical Team:    Ai Alcantar     [place] [phone]           Billing questions: [phone]           Medical Records Request: [phone]

## 2024-08-12 NOTE — PROGRESS NOTES
Name: Jennifer Gilliland  : 1980         Chief Complaint:     Chief Complaint   Patient presents with    Back Pain    Anxiety     Wants pcp to take over psych meds    Depression       History of Present Illness:      Jennifer Gilliland is a 44 y.o.  female who presents with Back Pain, Anxiety (Wants pcp to take over psych meds), and Depression      HPI    Chronic low back pain, had ablations  and still very sore from that, using ice. Starting to have some shooting in lower ext but the back pain is still definitely the worst part. Mainly down L anterior thigh. Takes tramadol TID, feels better in AM and then after a couple hrs driving it starts to kick in so she'll take med. Hasn't triggered addictive feelings. Unsure how much it works, feels a muscle relaxer may work better, specifically soma which she had used in the past. Uses lidocaine patches, biofreeze, and once a wk gets to drive a vehicle that has heated seats which is helpful.     Mood a lot better, feels addition of abilify has been really helpful, turned corner. Having difficulty keeping up with specialists, co-pays.     Few palpitations, still much better with diltiazem than it had been prior to that med or on BB. Unsure whether it coincides with anxiety. No symptoms with exertion, happens just with sitting or while driving. Feels like a little tick that could take her breath away. Again much improved from previous.    GERD helped by meds. Only having stool about twice a wk which she r/t tramadol use, feels this is ok for her. Miralax prn, doesn't feel it does much.     Medical History:     Patient Active Problem List   Diagnosis    Chronic neck pain    Onychomycosis    Chronic low back pain without sciatica    Narcotic abuse in remission (HCC)    Chronic constipation    Alcohol abuse, in remission    Hidradenitis suppurativa    Major depressive disorder, recurrent, moderate       Medications:       Prior to Admission medications    Medication Sig

## 2024-08-23 ENCOUNTER — TELEPHONE (OUTPATIENT)
Dept: FAMILY MEDICINE CLINIC | Age: 44
End: 2024-08-23

## 2024-08-23 NOTE — TELEPHONE ENCOUNTER
Left message for pt to call back. Wondering if she was able to  her Soma 300mg, dr. Camacho switched from 250mg with pharmacist due to cost and PA denial.. wondering if she was able to pick it up so we can disregard paperwork we are getting from [facility] about 250mg.

## 2024-09-10 DIAGNOSIS — M54.42 CHRONIC BILATERAL LOW BACK PAIN WITH LEFT-SIDED SCIATICA: ICD-10-CM

## 2024-09-10 DIAGNOSIS — G89.29 CHRONIC BILATERAL LOW BACK PAIN WITH LEFT-SIDED SCIATICA: ICD-10-CM

## 2024-09-10 RX ORDER — CARISOPRODOL 250 MG/1
250 TABLET ORAL 3 TIMES DAILY PRN
Qty: 90 TABLET | Refills: 0 | Status: CANCELLED | OUTPATIENT
Start: 2024-09-10 | End: 2024-10-10

## 2024-09-10 RX ORDER — CARISOPRODOL 350 MG/1
350 TABLET ORAL 3 TIMES DAILY PRN
Qty: 90 TABLET | Refills: 0 | Status: SHIPPED | OUTPATIENT
Start: 2024-09-10 | End: 2024-10-10

## 2024-09-23 RX ORDER — OMEPRAZOLE 40 MG/1
CAPSULE, DELAYED RELEASE ORAL
Qty: 90 CAPSULE | Refills: 1 | Status: SHIPPED | OUTPATIENT
Start: 2024-09-23

## 2024-11-07 ENCOUNTER — TELEPHONE (OUTPATIENT)
Dept: FAMILY MEDICINE CLINIC | Age: 44
End: 2024-11-07

## 2024-11-07 NOTE — TELEPHONE ENCOUNTER
[facility]   Topic Date Due    Varicella vaccine (1 of 2 - 13+ 2-dose series) Never done    Hepatitis B vaccine (1 of 3 - 19+ 3-dose series) Never done    DTaP/Tdap/Td vaccine (1 - Tdap) Never done    Diabetes screen  09/19/2021    Lipids  Never done    Breast cancer screen  06/08/2023    Flu vaccine (1) 08/01/2024    COVID-19 Vaccine (1 - 2023-24 season) Never done    Depression Monitoring  01/30/2025    Cervical cancer screen  11/15/2025    Hepatitis C screen  Completed    HIV screen  Completed    Hepatitis A vaccine  Aged Out    Hib vaccine  Aged Out    HPV vaccine  Aged Out    Polio vaccine  Aged Out    Meningococcal (ACWY) vaccine  Aged Out    Pneumococcal 0-64 years Vaccine  Aged Out             (applicable per patient's age: Cancer Screenings, Depression Screening, Fall Risk Screening, Immunizations)    Hemoglobin A1C (%)   Date Value   09/19/2018 5.3     AST (U/L)   Date Value   01/30/2024 23     ALT (U/L)   Date Value   01/30/2024 23     BUN (mg/dL)   Date Value   01/30/2024 11      (goal A1C is < 7)   (goal LDL is <100) need 30-50% reduction from baseline     BP Readings from Last 3 Encounters:   08/12/24 120/80   08/08/24 130/75   06/06/24 110/70    (goal /80)      All Future Testing planned in [facility]:  Lab Frequency Next Occurrence   RADIOFREQUENCY L/S ADDITIONAL Once 06/06/2024   FACET JOINT L/S SINGLE Once 06/06/2024   SARAH ALONZO DIGITAL SCREEN BILATERAL Once 08/12/2024       Next Visit Date:  Future Appointments   Date Time Provider Department Center   11/11/2024  1:00 PM Jennifer Camacho DO [facility]            Patient Active Problem List:     Chronic neck pain     Onychomycosis     Chronic low back pain without sciatica     Narcotic abuse in remission (HCC)     Chronic constipation     Alcohol abuse, in remission     Hidradenitis suppurativa     Major depressive disorder, recurrent, moderate

## 2024-11-11 ENCOUNTER — OFFICE VISIT (OUTPATIENT)
Dept: FAMILY MEDICINE CLINIC | Age: 44
End: 2024-11-11

## 2024-11-11 VITALS
WEIGHT: 198 LBS | SYSTOLIC BLOOD PRESSURE: 124 MMHG | OXYGEN SATURATION: 99 % | HEIGHT: 66 IN | HEART RATE: 100 BPM | BODY MASS INDEX: 31.82 KG/M2 | DIASTOLIC BLOOD PRESSURE: 80 MMHG

## 2024-11-11 DIAGNOSIS — M54.42 CHRONIC BILATERAL LOW BACK PAIN WITH LEFT-SIDED SCIATICA: Primary | ICD-10-CM

## 2024-11-11 DIAGNOSIS — G89.29 CHRONIC BILATERAL LOW BACK PAIN WITH LEFT-SIDED SCIATICA: Primary | ICD-10-CM

## 2024-11-11 DIAGNOSIS — J20.9 ACUTE BRONCHITIS, UNSPECIFIED ORGANISM: ICD-10-CM

## 2024-11-11 DIAGNOSIS — F10.20 ALCOHOLISM (HCC): ICD-10-CM

## 2024-11-11 DIAGNOSIS — F33.2 SEVERE EPISODE OF RECURRENT MAJOR DEPRESSIVE DISORDER, WITHOUT PSYCHOTIC FEATURES (HCC): ICD-10-CM

## 2024-11-11 DIAGNOSIS — F15.10 STIMULANT ABUSE (HCC): ICD-10-CM

## 2024-11-11 RX ORDER — GABAPENTIN 400 MG/1
400 CAPSULE ORAL 3 TIMES DAILY
COMMUNITY

## 2024-11-11 RX ORDER — FUROSEMIDE 20 MG/1
20 TABLET ORAL 2 TIMES DAILY
COMMUNITY
Start: 2024-11-02 | End: 2024-11-11 | Stop reason: ALTCHOICE

## 2024-11-11 RX ORDER — AZITHROMYCIN 250 MG/1
TABLET, FILM COATED ORAL
COMMUNITY
Start: 2024-11-10

## 2024-11-11 RX ORDER — ORPHENADRINE CITRATE 100 MG/1
100 TABLET ORAL 2 TIMES DAILY PRN
Qty: 60 TABLET | Refills: 2 | Status: SHIPPED | OUTPATIENT
Start: 2024-11-11

## 2024-11-11 NOTE — PROGRESS NOTES
Kait regarding your visit today.    You may get this in the mail, through your MyChart or in your email.     Please complete the survey to enable us to provide the highest quality of care to you and your family.    If you cannot score us as very good ( 5 Stars) on any question, please feel free to call the office to discuss how we could have made your experience exceptional.     Thank you.    Clinical Care Team:   DO Vicente Perez CMA                                     Triage: Ai Sagastume CMA              Clerical Team:    Ai Alcantar     [place] [phone]           Billing questions: [phone]           Medical Records Request: [phone]           signed by Jennifer Camacho DO on 2024 at 8:10 PM  [address] [address]  Dept: [phone]

## 2024-11-11 NOTE — PATIENT INSTRUCTIONS
[employer] SURVEY:    You may be receiving a survey from [employer] regarding your visit today.    You may get this in the mail, through your MyChart or in your email.     Please complete the survey to enable us to provide the highest quality of care to you and your family.    If you cannot score us as very good ( 5 Stars) on any question, please feel free to call the office to discuss how we could have made your experience exceptional.     Thank you.    Clinical Care Team:   DO Vicente Perez CMA                                     Triage: Ai Sagastume CMA              Clerical Team:    Ai Alcantar     [place] [phone]           Billing questions: [phone]           Medical Records Request: [phone]

## 2024-11-14 PROBLEM — F11.11 NARCOTIC ABUSE IN REMISSION (HCC): Status: RESOLVED | Noted: 2022-05-06 | Resolved: 2024-11-14

## 2024-11-26 ENCOUNTER — PATIENT MESSAGE (OUTPATIENT)
Dept: FAMILY MEDICINE CLINIC | Age: 44
End: 2024-11-26

## 2024-11-26 DIAGNOSIS — M54.42 CHRONIC BILATERAL LOW BACK PAIN WITH LEFT-SIDED SCIATICA: ICD-10-CM

## 2024-11-26 DIAGNOSIS — G89.29 CHRONIC BILATERAL LOW BACK PAIN WITH LEFT-SIDED SCIATICA: ICD-10-CM

## 2024-11-26 RX ORDER — CARISOPRODOL 350 MG/1
350 TABLET ORAL 3 TIMES DAILY PRN
Qty: 90 TABLET | Refills: 0 | Status: SHIPPED | OUTPATIENT
Start: 2024-11-26 | End: 2024-12-26

## 2024-12-12 ENCOUNTER — PATIENT MESSAGE (OUTPATIENT)
Dept: FAMILY MEDICINE CLINIC | Age: 44
End: 2024-12-12

## 2024-12-12 RX ORDER — GABAPENTIN 400 MG/1
400 CAPSULE ORAL 3 TIMES DAILY
Qty: 90 CAPSULE | Refills: 2 | Status: SHIPPED | OUTPATIENT
Start: 2024-12-12 | End: 2025-03-12

## 2024-12-23 DIAGNOSIS — M54.42 CHRONIC BILATERAL LOW BACK PAIN WITH LEFT-SIDED SCIATICA: ICD-10-CM

## 2024-12-23 DIAGNOSIS — G89.29 CHRONIC BILATERAL LOW BACK PAIN WITH LEFT-SIDED SCIATICA: ICD-10-CM

## 2024-12-23 RX ORDER — CARISOPRODOL 350 MG/1
350 TABLET ORAL 3 TIMES DAILY PRN
Qty: 90 TABLET | Refills: 0 | Status: SHIPPED | OUTPATIENT
Start: 2024-12-23 | End: 2025-01-22

## 2024-12-23 NOTE — TELEPHONE ENCOUNTER
Last OV: 11/11/2024  LOWER BACK PAIN   Last RX:    Next scheduled apt: Visit date not found          Pt requesting a refill   Medication pending for approval

## 2024-12-23 NOTE — TELEPHONE ENCOUNTER
Refill Request    Soma 350 mg    [facility] Maintenance   Topic Date Due    Pneumococcal 0-64 years Vaccine (1 of 2 - PCV) Never done    Varicella vaccine (1 of 2 - 13+ 2-dose series) Never done    Hepatitis B vaccine (1 of 3 - 19+ 3-dose series) Never done    Diabetes screen  09/19/2021    Lipids  Never done    Breast cancer screen  06/08/2023    Flu vaccine (1) 08/01/2024    COVID-19 Vaccine (1 - 2023-24 season) Never done    Depression Monitoring  01/30/2025    Cervical cancer screen  11/15/2025    DTaP/Tdap/Td vaccine (2 - Td or Tdap) 09/20/2034    Hepatitis C screen  Completed    HIV screen  Completed    Hepatitis A vaccine  Aged Out    Hib vaccine  Aged Out    HPV vaccine  Aged Out    Polio vaccine  Aged Out    Meningococcal (ACWY) vaccine  Aged Out             (applicable per patient's age: Cancer Screenings, Depression Screening, Fall Risk Screening, Immunizations)    Hemoglobin A1C (%)   Date Value   09/19/2018 5.3     AST (U/L)   Date Value   01/30/2024 23     ALT (U/L)   Date Value   01/30/2024 23     BUN (mg/dL)   Date Value   01/30/2024 11      (goal A1C is < 7)   (goal LDL is <100) need 30-50% reduction from baseline     BP Readings from Last 3 Encounters:   11/11/24 124/80   08/12/24 120/80   08/08/24 130/75    (goal /80)      All Future Testing planned in [facility]:  Lab Frequency Next Occurrence   RADIOFREQUENCY L/S ADDITIONAL Once 06/06/2024   FACET JOINT L/S SINGLE Once 06/06/2024   SARAH ALONZO DIGITAL SCREEN BILATERAL Once 08/12/2024       Next Visit Date:  Future Appointments   Date Time Provider Department Center   1/30/2025 11:45 AM Gerber Lane DO [facility]            Patient Active Problem List:     Chronic neck pain     Onychomycosis     Chronic low back pain without sciatica     Chronic constipation     Alcohol abuse, in remission     Hidradenitis suppurativa     Major depressive disorder, recurrent, moderate

## 2025-01-22 ENCOUNTER — PATIENT MESSAGE (OUTPATIENT)
Dept: FAMILY MEDICINE CLINIC | Age: 45
End: 2025-01-22

## 2025-01-22 RX ORDER — VENLAFAXINE HYDROCHLORIDE 225 MG/1
1 TABLET, EXTENDED RELEASE ORAL DAILY
Qty: 90 TABLET | Refills: 1 | Status: SHIPPED | OUTPATIENT
Start: 2025-01-22

## 2025-01-22 NOTE — TELEPHONE ENCOUNTER
Last visit:  11/11/2024  Next Visit Date:    Future Appointments   Date Time Provider Department Center   1/30/2025 11:45 AM Gerber Lane DO [facility]   2/4/2025  1:40 PM Jennifer Camacho DO [facility] ECC DEP   2/14/2025 12:30 PM [facility] MAMMOGRAPHY ROOM AT [facility] Rad         Medication List:  Prior to Admission medications    Medication Sig Start Date End Date Taking? Authorizing Provider   carisoprodol (SOMA) 350 MG tablet Take 1 tablet by mouth 3 times daily as needed for Muscle spasms for up to 30 days. Max Daily Amount: 1,050 mg 12/23/24 1/22/25  Jennifer Camacho DO   gabapentin (NEURONTIN) 400 MG capsule Take 1 capsule by mouth 3 times daily for 90 days. 12/12/24 3/12/25  Jennifer Camacho DO   azithromycin (ZITHROMAX) 250 MG tablet  11/10/24   Katya Paul MD   omeprazole (PRILOSEC) 40 MG delayed release capsule TAKE 1 CAPSULE BY MOUTH EVERY DAY 9/23/24   Jennifer Camacho DO   dilTIAZem (CARDIZEM CD) 120 MG extended release capsule TAKE 1 CAPSULE BY MOUTH EVERY DAY 7/17/24   Jennifer Camacho DO   topiramate (TOPAMAX) 50 MG tablet Take 3 tablets by mouth nightly 7/5/24   Jennifer Camacho DO   ibuprofen (ADVIL;MOTRIN) 800 MG tablet TAKE 1 TABLET BY MOUTH THREE TIMES DAILY WITH MEALS 5/31/24   Jennifer Camacho DO   lidocaine (LIDODERM) 5 % APPLY 1 PATCH ONTO THE SKIN DAILY FOR 12 HOURS ON, 12 HOURS OFF 5/21/24   Jennifer Camacho DO   busPIRone (BUSPAR) 30 MG tablet Take 30 mg by mouth 2 times daily 3/20/24   Katya Paul MD   loratadine (CLARITIN) 10 MG tablet Take 1 tablet by mouth daily    Katya Paul MD   ARIPiprazole (ABILIFY) 2 MG tablet Take 1 tablet by mouth daily    Katya Paul MD   venlafaxine 225 MG extended release tablet Take 1 tablet by mouth daily Take 1 tablet by mouth daily 9/17/23   Trung Pantoja MD   acetaminophen ([facility] ACETAMINOPHEN EX [facility]) 500 MG tablet Take 2 tablets by mouth every 8 hours as needed for

## 2025-01-23 DIAGNOSIS — M54.42 CHRONIC BILATERAL LOW BACK PAIN WITH LEFT-SIDED SCIATICA: ICD-10-CM

## 2025-01-23 DIAGNOSIS — G89.29 CHRONIC BILATERAL LOW BACK PAIN WITH LEFT-SIDED SCIATICA: ICD-10-CM

## 2025-01-23 RX ORDER — CARISOPRODOL 350 MG/1
350 TABLET ORAL 3 TIMES DAILY PRN
Qty: 90 TABLET | Refills: 0 | Status: SHIPPED | OUTPATIENT
Start: 2025-01-23 | End: 2025-02-22

## 2025-02-04 ENCOUNTER — OFFICE VISIT (OUTPATIENT)
Dept: FAMILY MEDICINE CLINIC | Age: 45
End: 2025-02-04

## 2025-02-04 VITALS
OXYGEN SATURATION: 95 % | SYSTOLIC BLOOD PRESSURE: 110 MMHG | DIASTOLIC BLOOD PRESSURE: 60 MMHG | HEIGHT: 66 IN | WEIGHT: 220 LBS | HEART RATE: 94 BPM | BODY MASS INDEX: 35.36 KG/M2

## 2025-02-04 DIAGNOSIS — F33.2 SEVERE EPISODE OF RECURRENT MAJOR DEPRESSIVE DISORDER, WITHOUT PSYCHOTIC FEATURES (HCC): ICD-10-CM

## 2025-02-04 DIAGNOSIS — M79.671 CHRONIC FOOT PAIN, RIGHT: ICD-10-CM

## 2025-02-04 DIAGNOSIS — F10.20 ALCOHOLISM (HCC): ICD-10-CM

## 2025-02-04 DIAGNOSIS — G89.29 CHRONIC BILATERAL LOW BACK PAIN WITH LEFT-SIDED SCIATICA: ICD-10-CM

## 2025-02-04 DIAGNOSIS — M54.42 CHRONIC BILATERAL LOW BACK PAIN WITH LEFT-SIDED SCIATICA: ICD-10-CM

## 2025-02-04 DIAGNOSIS — G89.29 CHRONIC FOOT PAIN, RIGHT: ICD-10-CM

## 2025-02-04 DIAGNOSIS — M72.2 PLANTAR FASCIITIS, RIGHT: Primary | ICD-10-CM

## 2025-02-04 DIAGNOSIS — Z12.31 VISIT FOR SCREENING MAMMOGRAM: ICD-10-CM

## 2025-02-04 RX ORDER — BROMPHENIRAMINE MALEATE, PSEUDOEPHEDRINE HYDROCHLORIDE, AND DEXTROMETHORPHAN HYDROBROMIDE 2; 30; 10 MG/5ML; MG/5ML; MG/5ML
SYRUP ORAL
COMMUNITY
Start: 2025-01-29

## 2025-02-04 RX ORDER — TRIAMCINOLONE ACETONIDE 40 MG/ML
40 INJECTION, SUSPENSION INTRA-ARTICULAR; INTRAMUSCULAR ONCE
Status: COMPLETED | OUTPATIENT
Start: 2025-02-04 | End: 2025-02-04

## 2025-02-04 RX ORDER — PREDNISONE 20 MG/1
TABLET ORAL
COMMUNITY
Start: 2025-01-29 | End: 2025-02-04 | Stop reason: ALTCHOICE

## 2025-02-04 RX ADMIN — TRIAMCINOLONE ACETONIDE 40 MG: 40 INJECTION, SUSPENSION INTRA-ARTICULAR; INTRAMUSCULAR at 14:37

## 2025-02-04 SDOH — ECONOMIC STABILITY: FOOD INSECURITY: WITHIN THE PAST 12 MONTHS, THE FOOD YOU BOUGHT JUST DIDN'T LAST AND YOU DIDN'T HAVE MONEY TO GET MORE.: NEVER TRUE

## 2025-02-04 SDOH — ECONOMIC STABILITY: FOOD INSECURITY: WITHIN THE PAST 12 MONTHS, YOU WORRIED THAT YOUR FOOD WOULD RUN OUT BEFORE YOU GOT MONEY TO BUY MORE.: NEVER TRUE

## 2025-02-04 ASSESSMENT — PATIENT HEALTH QUESTIONNAIRE - PHQ9
8. MOVING OR SPEAKING SO SLOWLY THAT OTHER PEOPLE COULD HAVE NOTICED. OR THE OPPOSITE, BEING SO FIGETY OR RESTLESS THAT YOU HAVE BEEN MOVING AROUND A LOT MORE THAN USUAL: NOT AT ALL
1. LITTLE INTEREST OR PLEASURE IN DOING THINGS: NOT AT ALL
SUM OF ALL RESPONSES TO PHQ QUESTIONS 1-9: 0
6. FEELING BAD ABOUT YOURSELF - OR THAT YOU ARE A FAILURE OR HAVE LET YOURSELF OR YOUR FAMILY DOWN: NOT AT ALL
7. TROUBLE CONCENTRATING ON THINGS, SUCH AS READING THE NEWSPAPER OR WATCHING TELEVISION: NOT AT ALL
3. TROUBLE FALLING OR STAYING ASLEEP: NOT AT ALL
5. POOR APPETITE OR OVEREATING: NOT AT ALL
9. THOUGHTS THAT YOU WOULD BE BETTER OFF DEAD, OR OF HURTING YOURSELF: NOT AT ALL
10. IF YOU CHECKED OFF ANY PROBLEMS, HOW DIFFICULT HAVE THESE PROBLEMS MADE IT FOR YOU TO DO YOUR WORK, TAKE CARE OF THINGS AT HOME, OR GET ALONG WITH OTHER PEOPLE: NOT DIFFICULT AT ALL
4. FEELING TIRED OR HAVING LITTLE ENERGY: NOT AT ALL
SUM OF ALL RESPONSES TO PHQ9 QUESTIONS 1 & 2: 0
SUM OF ALL RESPONSES TO PHQ QUESTIONS 1-9: 0
2. FEELING DOWN, DEPRESSED OR HOPELESS: NOT AT ALL
SUM OF ALL RESPONSES TO PHQ QUESTIONS 1-9: 0
SUM OF ALL RESPONSES TO PHQ QUESTIONS 1-9: 0

## 2025-02-04 NOTE — PROGRESS NOTES
95%   BMI 35.53 kg/m²   Physical Exam  Vitals and nursing note reviewed.   Constitutional:       General: She is not in acute distress.     Appearance: She is well-developed.   HENT:      Head: Normocephalic and atraumatic.      Right Ear: Tympanic membrane and ear canal normal.      Left Ear: Tympanic membrane and ear canal normal.      Nose: Nose normal.      Mouth/Throat:      Mouth: Mucous membranes are moist.      Pharynx: Oropharynx is clear.   Eyes:      Conjunctiva/sclera: Conjunctivae normal.   Cardiovascular:      Rate and Rhythm: Normal rate and regular rhythm.      Heart sounds: Normal heart sounds.   Pulmonary:      Effort: Pulmonary effort is normal.      Breath sounds: Wheezing (RLL) present.   Musculoskeletal:      Cervical back: Neck supple.      Comments: R ankle normal ROM and appearance. Tenderness medial plantar fascia, arch, sole of heel   Lymphadenopathy:      Cervical: No cervical adenopathy.   Skin:     General: Skin is warm and dry.   Neurological:      Mental Status: She is alert and oriented to person, place, and time.   Psychiatric:         Mood and Affect: Mood normal.         Behavior: Behavior normal.         Data:     Lab Results   Component Value Date/Time     01/30/2024 03:32 PM    K 3.8 01/30/2024 03:32 PM     01/30/2024 03:32 PM    CO2 20 01/30/2024 03:32 PM    BUN 11 01/30/2024 03:32 PM    CREATININE 0.6 01/30/2024 03:32 PM    GLUCOSE 101 01/30/2024 03:32 PM    BILITOT 0.2 01/30/2024 03:32 PM    ALKPHOS 96 01/30/2024 03:32 PM    AST 23 01/30/2024 03:32 PM    ALT 23 01/30/2024 03:32 PM     Lab Results   Component Value Date/Time    WBC 11.0 01/30/2024 03:32 PM    RBC 4.40 01/30/2024 03:32 PM    HGB 12.8 01/30/2024 03:32 PM    HCT 38.1 01/30/2024 03:32 PM    MCV 86.6 01/30/2024 03:32 PM    MCH 29.1 01/30/2024 03:32 PM    MCHC 33.6 01/30/2024 03:32 PM    RDW 12.8 01/30/2024 03:32 PM     01/30/2024 03:32 PM    MPV 10.5 01/30/2024 03:32 PM     Lab Results

## 2025-02-15 ENCOUNTER — PATIENT MESSAGE (OUTPATIENT)
Dept: FAMILY MEDICINE CLINIC | Age: 45
End: 2025-02-15

## 2025-02-17 RX ORDER — ARIPIPRAZOLE 2 MG/1
2 TABLET ORAL DAILY
Qty: 90 TABLET | Refills: 1 | Status: SHIPPED | OUTPATIENT
Start: 2025-02-17

## 2025-02-17 RX ORDER — BUSPIRONE HYDROCHLORIDE 30 MG/1
30 TABLET ORAL 2 TIMES DAILY
Qty: 180 TABLET | Refills: 1 | Status: SHIPPED | OUTPATIENT
Start: 2025-02-17

## 2025-02-17 NOTE — TELEPHONE ENCOUNTER
Last OV: 2/4/2025  depression ans alcohol abuse   Last RX:    Next scheduled apt: Visit date not found            Spt requesting a refill

## 2025-02-21 ENCOUNTER — PATIENT MESSAGE (OUTPATIENT)
Dept: FAMILY MEDICINE CLINIC | Age: 45
End: 2025-02-21

## 2025-02-21 ENCOUNTER — HOSPITAL ENCOUNTER (OUTPATIENT)
Dept: MAMMOGRAPHY | Age: 45
Discharge: HOME | End: 2025-02-23
Attending: FAMILY MEDICINE
Payer: COMMERCIAL

## 2025-02-21 DIAGNOSIS — M54.42 CHRONIC BILATERAL LOW BACK PAIN WITH LEFT-SIDED SCIATICA: ICD-10-CM

## 2025-02-21 DIAGNOSIS — Z12.31 VISIT FOR SCREENING MAMMOGRAM: ICD-10-CM

## 2025-02-21 DIAGNOSIS — G89.29 CHRONIC BILATERAL LOW BACK PAIN WITH LEFT-SIDED SCIATICA: ICD-10-CM

## 2025-02-21 PROCEDURE — 77063 BREAST TOMOSYNTHESIS BI: CPT

## 2025-02-21 RX ORDER — CARISOPRODOL 350 MG/1
350 TABLET ORAL 3 TIMES DAILY PRN
Qty: 90 TABLET | Refills: 0 | Status: SHIPPED | OUTPATIENT
Start: 2025-02-21 | End: 2025-03-23

## 2025-02-21 NOTE — TELEPHONE ENCOUNTER
Last OV: 2/4/2025  chronic back pain   Last RX:    Next scheduled apt: Visit date not found          Pt requesting a refill   Medication pending for approval

## 2025-03-19 RX ORDER — OMEPRAZOLE 40 MG/1
CAPSULE, DELAYED RELEASE ORAL DAILY
Qty: 90 CAPSULE | Refills: 1 | Status: SHIPPED | OUTPATIENT
Start: 2025-03-19

## 2025-03-19 NOTE — TELEPHONE ENCOUNTER
Last visit:  2/4/2025  Next Visit Date:    Future Appointments   Date Time Provider Department Center   3/24/2025 11:30 AM Kristine Mckeon, APRN - CNP [facility]         Medication List:  Prior to Admission medications    Medication Sig Start Date End Date Taking? Authorizing Provider   carisoprodol (SOMA) 350 MG tablet Take 1 tablet by mouth 3 times daily as needed for Muscle spasms for up to 30 days. Max Daily Amount: 1,050 mg 2/21/25 3/23/25  Jennifer Camacho DO   busPIRone (BUSPAR) 30 MG tablet Take 30 mg by mouth 2 times daily 2/17/25   Jennifer Camacho DO   ARIPiprazole (ABILIFY) 2 MG tablet Take 1 tablet by mouth daily 2/17/25   Jennifer Camacho DO   brompheniramine-pseudoephedrine-DM 2-30-10 MG/5ML syrup TAKE 5 ML BY MOUTH FOUR TIMES DAILY AS NEEDED for cold symptoms 1/29/25   ProviderKatya MD   venlafaxine 225 MG extended release tablet Take 1 tablet by mouth daily Take 1 tablet by mouth daily 1/22/25   Jennifer Camacho DO   gabapentin (NEURONTIN) 400 MG capsule Take 1 capsule by mouth 3 times daily for 90 days. 12/12/24 3/12/25  Jennifer Camacho DO   omeprazole (PRILOSEC) 40 MG delayed release capsule TAKE 1 CAPSULE BY MOUTH EVERY DAY 9/23/24   Jennifer Camacho DO   dilTIAZem (CARDIZEM CD) 120 MG extended release capsule TAKE 1 CAPSULE BY MOUTH EVERY DAY 7/17/24   Jennifer Camacho DO   topiramate (TOPAMAX) 50 MG tablet Take 3 tablets by mouth nightly 7/5/24   Jennifer Camacho DO   ibuprofen (ADVIL;MOTRIN) 800 MG tablet TAKE 1 TABLET BY MOUTH THREE TIMES DAILY WITH MEALS 5/31/24   Jennifer Camacho DO   lidocaine (LIDODERM) 5 % APPLY 1 PATCH ONTO THE SKIN DAILY FOR 12 HOURS ON, 12 HOURS OFF 5/21/24   Jennifer Camacho DO   loratadine (CLARITIN) 10 MG tablet Take 1 tablet by mouth daily    Katya Paul MD   acetaminophen ([facility] ACETAMINOPHEN EX [facility]) 500 MG tablet Take 2 tablets by mouth every 8 hours as needed for Pain 8/11/23   Jennifer Camacho, DO

## 2025-04-10 ENCOUNTER — TELEPHONE (OUTPATIENT)
Dept: FAMILY MEDICINE CLINIC | Age: 45
End: 2025-04-10
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
## 2025-06-23 RX ORDER — IBUPROFEN 800 MG/1
800 TABLET, FILM COATED ORAL
Qty: 90 TABLET | Refills: 3 | Status: SHIPPED | OUTPATIENT
Start: 2025-06-23

## 2025-06-23 NOTE — TELEPHONE ENCOUNTER
Last visit:  2/4/2025  Next Visit Date:  No future appointments.      Medication List:  Prior to Admission medications    Medication Sig Start Date End Date Taking? Authorizing Provider   omeprazole (PRILOSEC) 40 MG delayed release capsule TAKE 1 CAPSULE BY MOUTH EVERY DAY 3/19/25   Jennifer Camacho DO   busPIRone (BUSPAR) 30 MG tablet Take 30 mg by mouth 2 times daily 2/17/25   Jennifer Camacho DO   ARIPiprazole (ABILIFY) 2 MG tablet Take 1 tablet by mouth daily 2/17/25   Jennifer Camacho DO   brompheniramine-pseudoephedrine-DM 2-30-10 MG/5ML syrup TAKE 5 ML BY MOUTH FOUR TIMES DAILY AS NEEDED for cold symptoms 1/29/25   Katya Paul MD   venlafaxine 225 MG extended release tablet Take 1 tablet by mouth daily Take 1 tablet by mouth daily 1/22/25   Jennifer Camacho DO   gabapentin (NEURONTIN) 400 MG capsule Take 1 capsule by mouth 3 times daily for 90 days. 12/12/24 3/12/25  Jennifer Camacho DO   dilTIAZem (CARDIZEM CD) 120 MG extended release capsule TAKE 1 CAPSULE BY MOUTH EVERY DAY 7/17/24   Jennifer Camacho DO   topiramate (TOPAMAX) 50 MG tablet Take 3 tablets by mouth nightly 7/5/24   Jennifer Camacho DO   ibuprofen (ADVIL;MOTRIN) 800 MG tablet TAKE 1 TABLET BY MOUTH THREE TIMES DAILY WITH MEALS 5/31/24   Jennifer Camacho DO   lidocaine (LIDODERM) 5 % APPLY 1 PATCH ONTO THE SKIN DAILY FOR 12 HOURS ON, 12 HOURS OFF 5/21/24   Jennifer Camacho DO   loratadine (CLARITIN) 10 MG tablet Take 1 tablet by mouth daily    Katya Paul MD   acetaminophen ([facility] ACETAMINOPHEN EX ST) 500 MG tablet Take 2 tablets by mouth every 8 hours as needed for Pain 8/11/23   Jennifer Camacho DO   traZODone (DESYREL) 100 MG tablet 2 AT NIGHT-  PSYCH FILLS THIS MED 4/19/23   Katya Paul MD   buPROPion (WELLBUTRIN XL) 300 MG extended release tablet Take 1 tablet by mouth every morning 11/18/22   Jennifer Camacho,    minocycline (MINOCIN;DYNACIN) 100 MG capsule Take 1 capsule by mouth

## (undated) DEVICE — BANDAGE ADH W1XL3IN NAT FAB WVN FLX DURABLE N ADH PD SEAL

## (undated) DEVICE — TOWEL,OR,DSP,ST,BLUE,STD,4/PK,20PK/CS: Brand: MEDLINE

## (undated) DEVICE — CANNULA NSL AD TUBE L7FT END TDL CO2 SAMP STD CONN DISP

## (undated) DEVICE — NERVE BLOCK TRAY: Brand: MEDLINE INDUSTRIES, INC.

## (undated) DEVICE — GLOVE ORANGE PI 7   MSG9070

## (undated) DEVICE — NEEDLE HYPO 18GA L1IN PNK POLYPR HUB S STL REG BVL STR W/O

## (undated) DEVICE — GLOVE SURG SZ 8 CRM LTX FREE POLYISOPRENE POLYMER BEAD ANTI

## (undated) DEVICE — APPLICATOR MEDICATED 10.5 CC SOLUTION HI LT ORNG CHLORAPREP

## (undated) DEVICE — GLOVE SURG SZ 75 CRM LTX FREE POLYISOPRENE POLYMER BEAD ANTI

## (undated) DEVICE — SET EXTN TBNG MINIBOR 20IN

## (undated) DEVICE — SYRINGE, LUER LOCK, 10ML: Brand: MEDLINE

## (undated) DEVICE — ELECTRODE PT RET AD L9FT HI MOIST COND ADH HYDRGEL CORDED

## (undated) DEVICE — GLOVE SURG SZ 8 L12IN FNGR THK87MIL WHT LTX FREE

## (undated) DEVICE — NEEDLE, QUINCKE 25GX3.5": Brand: MEDLINE

## (undated) DEVICE — GOWN,SIRUS,POLYRNF,BRTHSLV,2XL,18/CS: Brand: MEDLINE

## (undated) DEVICE — NEEDLE HYPO 22GA L1.5IN BLK POLYPR HUB S STL REG BVL STR

## (undated) DEVICE — TOWEL,OR,DSP,ST,NATURAL,DLX,4/PK,20PK/CS: Brand: MEDLINE

## (undated) DEVICE — SHEET,DRAPE,53X77,STERILE: Brand: MEDLINE

## (undated) DEVICE — GLOVE SURG SZ 75 L12IN FNGR THK79MIL GRN LTX FREE

## (undated) DEVICE — CURVED SHARP RF CANNULA, RADIOPAQUE MARKER: Brand: RADIOPAQUE RADIOFREQUENCY CANNULA

## (undated) DEVICE — NEEDLE SPNL 25GA L3.5IN BLU HUB S STL REG WALL FIT STYL W/

## (undated) DEVICE — CUSTOM PACK: Brand: UNBRANDED

## (undated) DEVICE — PAIN TRAY: Brand: MEDLINE INDUSTRIES, INC.

## (undated) DEVICE — NEEDLE SPINAL 22GA L3.5IN SPINOCAN